# Patient Record
Sex: FEMALE | Race: WHITE | Employment: UNEMPLOYED | URBAN - METROPOLITAN AREA
[De-identification: names, ages, dates, MRNs, and addresses within clinical notes are randomized per-mention and may not be internally consistent; named-entity substitution may affect disease eponyms.]

---

## 2017-07-03 PROBLEM — Z79.899 ENCOUNTER FOR MEDICATION MANAGEMENT: Status: ACTIVE | Noted: 2017-07-03

## 2017-09-20 ENCOUNTER — APPOINTMENT (OUTPATIENT)
Dept: GENERAL RADIOLOGY | Age: 52
End: 2017-09-20
Payer: MEDICAID

## 2017-09-20 ENCOUNTER — HOSPITAL ENCOUNTER (EMERGENCY)
Age: 52
Discharge: HOME | End: 2017-09-20
Attending: EMERGENCY MEDICINE
Payer: MEDICAID

## 2017-09-20 VITALS
RESPIRATION RATE: 16 BRPM | HEART RATE: 61 BPM | DIASTOLIC BLOOD PRESSURE: 71 MMHG | BODY MASS INDEX: 24.73 KG/M2 | OXYGEN SATURATION: 95 % | HEIGHT: 61 IN | WEIGHT: 131 LBS | TEMPERATURE: 98 F | SYSTOLIC BLOOD PRESSURE: 112 MMHG

## 2017-09-20 DIAGNOSIS — R10.84 ABDOMINAL PAIN, GENERALIZED: Primary | ICD-10-CM

## 2017-09-20 DIAGNOSIS — K59.00 CONSTIPATION, UNSPECIFIED CONSTIPATION TYPE: ICD-10-CM

## 2017-09-20 PROCEDURE — 74011250636 HC RX REV CODE- 250/636: Performed by: EMERGENCY MEDICINE

## 2017-09-20 PROCEDURE — 99283 EMERGENCY DEPT VISIT LOW MDM: CPT | Performed by: EMERGENCY MEDICINE

## 2017-09-20 PROCEDURE — 74020 XR ABD (AP AND ERECT OR DECUB): CPT

## 2017-09-20 RX ORDER — NALOXONE HYDROCHLORIDE 1 MG/ML
2 INJECTION INTRAMUSCULAR; INTRAVENOUS; SUBCUTANEOUS
Status: COMPLETED | OUTPATIENT
Start: 2017-09-20 | End: 2017-09-20

## 2017-09-20 RX ORDER — POLYETHYLENE GLYCOL 3350 17 G/17G
POWDER, FOR SOLUTION ORAL
Qty: 505 G | Refills: 0 | Status: SHIPPED | OUTPATIENT
Start: 2017-09-20 | End: 2017-12-13

## 2017-09-20 RX ADMIN — NALOXONE HYDROCHLORIDE 2 MG: 1 INJECTION PARENTERAL at 14:12

## 2017-09-20 NOTE — LETTER
[facility] [facility] EMERGENCY DEPT One [address] [address] 
[phone] Work/School Note Date: 9/20/2017 To Whom It May concern: 
 
Pandora Bosworth was seen and treated today in the emergency room by the following provider(s): 
Attending Provider: Roman Christie MD.   
 
Pandora Bosworth may return to work/school on 09/21/17. Sincerely, Aggie Kirkland

## 2017-09-20 NOTE — ED TRIAGE NOTES
Pt. Reports constipation x 5 days. States she used an enema on Monday and had minimal success. Pt. With hx of constipation problems.

## 2017-09-20 NOTE — DISCHARGE INSTRUCTIONS
Constipation: Care Instructions  Your Care Instructions  Constipation means that you have a hard time passing stools (bowel movements). People pass stools from 3 times a day to once every 3 days. What is normal for you may be different. Constipation may occur with pain in the rectum and cramping. The pain may get worse when you try to pass stools. Sometimes there are small amounts of bright red blood on toilet paper or the surface of stools. This is because of enlarged veins near the rectum (hemorrhoids). A few changes in your diet and lifestyle may help you avoid ongoing constipation. Your doctor may also prescribe medicine to help loosen your stool. Some medicines can cause constipation. These include pain medicines and antidepressants. Tell your doctor about all the medicines you take. Your doctor may want to make a medicine change to ease your symptoms. Follow-up care is a key part of your treatment and safety. Be sure to make and go to all appointments, and call your doctor if you are having problems. It's also a good idea to know your test results and keep a list of the medicines you take. How can you care for yourself at home? · Drink plenty of fluids, enough so that your urine is light yellow or clear like water. If you have kidney, heart, or liver disease and have to limit fluids, talk with your doctor before you increase the amount of fluids you drink. · Include high-fiber foods in your diet each day. These include fruits, vegetables, beans, and whole grains. · Get at least 30 minutes of exercise on most days of the week. Walking is a good choice. You also may want to do other activities, such as running, swimming, cycling, or playing tennis or team sports. · Take a fiber supplement, such as Citrucel or Metamucil, every day. Read and follow all instructions on the label. · Schedule time each day for a bowel movement. A daily routine may help.  Take your time having your bowel movement. · Support your feet with a small step stool when you sit on the toilet. This helps flex your hips and places your pelvis in a squatting position. · Your doctor may recommend an over-the-counter laxative to relieve your constipation. Examples are Milk of Magnesia and MiraLax. Read and follow all instructions on the label. Do not use laxatives on a long-term basis. When should you call for help? Call your doctor now or seek immediate medical care if:  · You have new or worse belly pain. · You have new or worse nausea or vomiting. · You have blood in your stools. Watch closely for changes in your health, and be sure to contact your doctor if:  · Your constipation is getting worse. · You do not get better as expected. Where can you learn more? Go to http://aarti-tammy.info/. Enter 21 [phone] in the search box to learn more about \"Constipation: Care Instructions. \"  Current as of: March 20, 2017  Content Version: 11.3  © 9173-0218 [employer]. Care instructions adapted under license by [employer] (which disclaims liability or warranty for this information). If you have questions about a medical condition or this instruction, always ask your healthcare professional. [place] [address] any warranty or liability for your use of this information.

## 2017-09-20 NOTE — ED PROVIDER NOTES
CDNotesTM Templates                                   Emergency Department     Chief Complaint:    HPI:  44-year-old female presents to the emergency department with abdominal discomfort. Patient went on vacation to [facility]. States that she has not had a bowel movement in 5 days. She usually takes Senokot but did not take it while she was out of town. When she got home she did an enema on Monday. Had minimal results. Did not want to do another enema. Had continued belly pain and no bowel movement. Presents today seeking assistance      Last bowel movement was 5 days  Severity of symptoms is described as mild to moderate  Associated symptoms include pain- no nausea/ vomiting/ fever  Modifying factors include none  Historian:   patient  Review of Systems:  Include pertinent positives and negatives. CONST:          Denies: fever, chills  RESP:  Denies: shortness of breath  CARDIO: Denies: chest pain  GI:  See HPI  :  Denies: urinary symptoms  Past Medical History:  Past Medical History:   Diagnosis Date    Abnormal MRI of head     Frontal Lobe periventricular WM disease.  Ruled out for MS by Neurology    ASCUS with positive high risk HPV cervical     Bipolar disorder ([address]     Borderline personality disorder     CAD     Cerebrovascular disease     Chronic diastolic congestive heart failure (HCC)     Chronic hepatitis C (HCC)     Chronic pain     Chronic UTI     Dyslipidemia     History of coronary artery bypass graft x 3     HTN     IBS (irritable bowel syndrome)     Pacemaker     Sick Sinus Syndrome    Subclavian artery stenosis, right ([address] 2016    Moderate to severe    Vertebral artery stenosis 2016    Asymptomatic Left Severe     Past Surgical History:   Procedure Laterality Date    HX  SECTION  ,     Abruption, CPD    HX CORONARY ARTERY BYPASS GRAFT  2011    3 vessel    HX HEART CATHETERIZATION      2012, 2012-occluded grafts    HX TUBAL LIGATION Social History   Substance Use Topics    Smoking status: Current Every Day Smoker     Packs/day: 0.50     Years: 32.00     Types: Cigarettes    Smokeless tobacco: Never Used    Alcohol use No     Family History   Problem Relation Age of Onset   Fabián Duff Cancer Mother      CAD, Lung cancer    Hypertension Father      DM, CAD    Other Sister     Hypertension Brother     Other Son      Adopted. Previous Medications    AMLODIPINE (NORVASC) 10 MG TABLET    Take 1 Tab by mouth daily. ASPIRIN DELAYED-RELEASE 81 MG TABLET    Take 1 Tab by mouth daily. ATORVASTATIN (LIPITOR) 80 MG TABLET    Take 1 Tab by mouth daily. BREXPIPRAZOLE (REXULTI) 0.5 MG TAB TABLET    Take  by mouth daily. CARVEDILOL (COREG) 12.5 MG TABLET    Take 1 Tab by mouth two (2) times daily (with meals). CEPHALEXIN (KEFLEX) 500 MG CAPSULE    Take 1 Cap by mouth two (2) times a day. CHOLECALCIFEROL, VITAMIN D3, (VITAMIN D3) 2,000 UNIT CAP CAPSULE    Take 2,000 Units by mouth daily. CYANOCOBALAMIN (VITAMIN B-12) 500 MCG TABLET    Take 1,000 mcg by mouth daily. DIVALPROEX DR (DEPAKOTE) 250 MG TABLET    Take 250 mg by mouth two (2) times a day. FLUTICASONE (FLONASE) 50 MCG/ACTUATION NASAL SPRAY    1 [place] by Nasal route. GABAPENTIN (NEURONTIN) 300 MG CAPSULE    TAKE 2 CAPSULES BY MOUTH 3 TIMES A DAY    HYDROCODONE-ACETAMINOPHEN (NORCO) 5-325 MG PER TABLET    Take  by mouth. LISINOPRIL (PRINIVIL, ZESTRIL) 20 MG TABLET    Take 1 tab p.o. daily  Indications: hypertension    MULTIVITAMIN (ONE A DAY) TABLET    Take 1 Tab by mouth daily. MV,CA,MIN-FA-HERBAL NO.157 (ESTROVEN MAXIMUM STRENGTH) 400 MCG TAB    Take  by mouth. NICOTINE (NICODERM CQ) 14 MG/24 HR PATCH    1 Patch by TransDERmal route every twenty-four (24) hours for 30 days. NITROGLYCERIN (NITROSTAT) 0.4 MG SL TABLET    by SubLINGual route every five (5) minutes as needed.       SENNA LAXATIVE 8.6 MG TABLET    TAKE 1 TABLET BY MOUTH NIGHTLY     Allergies as of 09/20/2017 - Review Complete 09/20/2017   Allergen Reaction Noted    Morphine (pf) Rash 08/07/2013    Other medication Swelling 06/23/2010       Physical Exam:    Vital signs:   Visit Vitals    /86 (BP 1 Location: Right arm, BP Patient Position: At rest)    Pulse 80    Temp 98 °F (36.7 °C)    Resp 16    Ht 5' 1\" (1.549 m)    Wt 59.4 kg (131 lb)    SpO2 95%    BMI 24.75 kg/m2       Vital signs were reviewed. General Appear: alert, no distress  Cardiovascular: regular rate, rhythm, no murmur  Respiratory:  clear to auscultation bilaterally  Abdomen:   soft, nontender, no rebound  Skin:   dry, no rash    _______________________________________________________________________  Progress:    Patient resting comfortably. Abdomen soft and nontender without rebound masses or guarding. X-ray of the abdomen and pelvis is reviewed. No signs of obstruction. No free air. Patient takes Ultram daily. Also on medications for bipolar. Suspect some of these may be causing her constipation. _______________________________________________________________________  Condition:  good  Disposition:  home  Diagnosis:  Constipation, abdominal pain      Crystal Frias M.D.      [facility]; version 2.0; revised April, 2016.

## 2017-09-20 NOTE — ED NOTES
I have reviewed discharge instructions with the patient. The patient verbalized understanding. Patient left ED via Discharge Method: ambulatory to Home with self. Opportunity for questions and clarification provided. Patient given 1 scripts that is at patients pharmacy. No e-sign. No distress observed.

## 2017-09-20 NOTE — ED TRIAGE NOTES
Ayah Ashton is a 54 y.o. female here for constipation x 5 days, used enema Monday with some results but still constipation, no fever no nv, h/o same, only abd surgery is c section. Rapid assessment performed. --- Orders were placed.   --- Patient will be placed in triage  Signed By: SPARKLE Terrell     September 20, 2017

## 2017-09-29 PROBLEM — I49.5 SICK SINUS SYNDROME (HCC): Status: ACTIVE | Noted: 2017-09-29

## 2017-10-07 ENCOUNTER — HOSPITAL ENCOUNTER (EMERGENCY)
Age: 52
Discharge: HOME | End: 2017-10-07
Attending: EMERGENCY MEDICINE | Admitting: SURGERY
Payer: MEDICAID

## 2017-10-07 ENCOUNTER — APPOINTMENT (OUTPATIENT)
Dept: CT IMAGING | Age: 52
End: 2017-10-07
Attending: EMERGENCY MEDICINE
Payer: MEDICAID

## 2017-10-07 ENCOUNTER — ANESTHESIA EVENT (OUTPATIENT)
Dept: SURGERY | Age: 52
End: 2017-10-07
Payer: MEDICAID

## 2017-10-07 ENCOUNTER — ANESTHESIA (OUTPATIENT)
Dept: SURGERY | Age: 52
End: 2017-10-07
Payer: MEDICAID

## 2017-10-07 VITALS
HEIGHT: 61 IN | RESPIRATION RATE: 20 BRPM | DIASTOLIC BLOOD PRESSURE: 78 MMHG | BODY MASS INDEX: 24.73 KG/M2 | SYSTOLIC BLOOD PRESSURE: 143 MMHG | WEIGHT: 131 LBS | OXYGEN SATURATION: 91 % | TEMPERATURE: 99 F | HEART RATE: 60 BPM

## 2017-10-07 DIAGNOSIS — K35.80 ACUTE APPENDICITIS, UNSPECIFIED ACUTE APPENDICITIS TYPE: Primary | ICD-10-CM

## 2017-10-07 LAB
ALBUMIN SERPL-MCNC: 3.9 G/DL (ref 3.5–5)
ALBUMIN/GLOB SERPL: 0.7 {RATIO} (ref 1.2–3.5)
ALP SERPL-CCNC: 116 U/L (ref 50–136)
ALT SERPL-CCNC: 135 U/L (ref 12–65)
ANION GAP SERPL CALC-SCNC: 12 MMOL/L (ref 7–16)
AST SERPL-CCNC: 140 U/L (ref 15–37)
BASOPHILS # BLD: 0 K/UL (ref 0–0.2)
BASOPHILS NFR BLD: 0 % (ref 0–2)
BILIRUB SERPL-MCNC: 1.2 MG/DL (ref 0.2–1.1)
BUN SERPL-MCNC: 12 MG/DL (ref 6–23)
CALCIUM SERPL-MCNC: 9.1 MG/DL (ref 8.3–10.4)
CHLORIDE SERPL-SCNC: 102 MMOL/L (ref 98–107)
CO2 SERPL-SCNC: 25 MMOL/L (ref 21–32)
CREAT SERPL-MCNC: 0.71 MG/DL (ref 0.6–1)
DIFFERENTIAL METHOD BLD: ABNORMAL
EOSINOPHIL # BLD: 0 K/UL (ref 0–0.8)
EOSINOPHIL NFR BLD: 0 % (ref 0.5–7.8)
ERYTHROCYTE [DISTWIDTH] IN BLOOD BY AUTOMATED COUNT: 12.6 % (ref 11.9–14.6)
GLOBULIN SER CALC-MCNC: 6 G/DL (ref 2.3–3.5)
GLUCOSE SERPL-MCNC: 128 MG/DL (ref 65–100)
HCT VFR BLD AUTO: 46.4 % (ref 35.8–46.3)
HGB BLD-MCNC: 17.1 G/DL (ref 11.7–15.4)
IMM GRANULOCYTES # BLD: 0 K/UL (ref 0–0.5)
IMM GRANULOCYTES NFR BLD: 0.2 % (ref 0–5)
LIPASE SERPL-CCNC: 203 U/L (ref 73–393)
LYMPHOCYTES # BLD: 2 K/UL (ref 0.5–4.6)
LYMPHOCYTES NFR BLD: 14 % (ref 13–44)
MAGNESIUM SERPL-MCNC: 1.2 MG/DL (ref 1.8–2.4)
MCH RBC QN AUTO: 34.5 PG (ref 26.1–32.9)
MCHC RBC AUTO-ENTMCNC: 37.5 G/DL (ref 31.4–35)
MCV RBC AUTO: 92.1 FL (ref 79.6–97.8)
MONOCYTES # BLD: 0.3 K/UL (ref 0.1–1.3)
MONOCYTES NFR BLD: 2 % (ref 4–12)
NEUTS SEG # BLD: 11.6 K/UL (ref 1.7–8.2)
NEUTS SEG NFR BLD: 84 % (ref 43–78)
PHOSPHATE SERPL-MCNC: 2.1 MG/DL (ref 2.5–4.5)
PLATELET # BLD AUTO: 249 K/UL (ref 150–450)
PMV BLD AUTO: 10.3 FL (ref 10.8–14.1)
POTASSIUM SERPL-SCNC: 3.4 MMOL/L (ref 3.5–5.1)
PROT SERPL-MCNC: 9.9 G/DL (ref 6.3–8.2)
RBC # BLD AUTO: 5.04 M/UL (ref 4.05–5.25)
SODIUM SERPL-SCNC: 139 MMOL/L (ref 136–145)
TROPONIN I SERPL-MCNC: <0.02 NG/ML (ref 0.02–0.05)
WBC # BLD AUTO: 14 K/UL (ref 4.3–11.1)

## 2017-10-07 PROCEDURE — 74011250637 HC RX REV CODE- 250/637: Performed by: ANESTHESIOLOGY

## 2017-10-07 PROCEDURE — 83735 ASSAY OF MAGNESIUM: CPT | Performed by: NURSE PRACTITIONER

## 2017-10-07 PROCEDURE — 77030008518 HC TBNG INSUF ENDO STRY -B: Performed by: SURGERY

## 2017-10-07 PROCEDURE — 77030031139 HC SUT VCRL2 J&J -A: Performed by: SURGERY

## 2017-10-07 PROCEDURE — 84100 ASSAY OF PHOSPHORUS: CPT | Performed by: NURSE PRACTITIONER

## 2017-10-07 PROCEDURE — 96365 THER/PROPH/DIAG IV INF INIT: CPT | Performed by: EMERGENCY MEDICINE

## 2017-10-07 PROCEDURE — 74011000250 HC RX REV CODE- 250: Performed by: SURGERY

## 2017-10-07 PROCEDURE — 77030029140 HC TRCR ENDOSC Z THRD SLV AMR -B: Performed by: SURGERY

## 2017-10-07 PROCEDURE — 85025 COMPLETE CBC W/AUTO DIFF WBC: CPT | Performed by: NURSE PRACTITIONER

## 2017-10-07 PROCEDURE — 84484 ASSAY OF TROPONIN QUANT: CPT | Performed by: NURSE PRACTITIONER

## 2017-10-07 PROCEDURE — 83690 ASSAY OF LIPASE: CPT | Performed by: NURSE PRACTITIONER

## 2017-10-07 PROCEDURE — 77030008756 HC TU IRR SUC STRY -B: Performed by: SURGERY

## 2017-10-07 PROCEDURE — 99285 EMERGENCY DEPT VISIT HI MDM: CPT | Performed by: EMERGENCY MEDICINE

## 2017-10-07 PROCEDURE — 77030037892: Performed by: SURGERY

## 2017-10-07 PROCEDURE — 77030018004 HC RELD STPLR ENDOSC1 J&J -C: Performed by: SURGERY

## 2017-10-07 PROCEDURE — 93005 ELECTROCARDIOGRAM TRACING: CPT | Performed by: EMERGENCY MEDICINE

## 2017-10-07 PROCEDURE — 74176 CT ABD & PELVIS W/O CONTRAST: CPT

## 2017-10-07 PROCEDURE — 96372 THER/PROPH/DIAG INJ SC/IM: CPT | Performed by: EMERGENCY MEDICINE

## 2017-10-07 PROCEDURE — 74011000250 HC RX REV CODE- 250

## 2017-10-07 PROCEDURE — 74011250636 HC RX REV CODE- 250/636

## 2017-10-07 PROCEDURE — 77030011810 HC STPLR ENDOSC J&J -G: Performed by: SURGERY

## 2017-10-07 PROCEDURE — 76210000020 HC REC RM PH II FIRST 0.5 HR: Performed by: SURGERY

## 2017-10-07 PROCEDURE — 76210000016 HC OR PH I REC 1 TO 1.5 HR: Performed by: SURGERY

## 2017-10-07 PROCEDURE — 77030033639 HC SHR ENDO COAG HARM 36 J&J -E: Performed by: SURGERY

## 2017-10-07 PROCEDURE — 76010000161 HC OR TIME 1 TO 1.5 HR INTENSV-TIER 1: Performed by: SURGERY

## 2017-10-07 PROCEDURE — 36415 COLL VENOUS BLD VENIPUNCTURE: CPT | Performed by: NURSE PRACTITIONER

## 2017-10-07 PROCEDURE — 88304 TISSUE EXAM BY PATHOLOGIST: CPT | Performed by: SURGERY

## 2017-10-07 PROCEDURE — 76060000033 HC ANESTHESIA 1 TO 1.5 HR: Performed by: SURGERY

## 2017-10-07 PROCEDURE — 77030008703 HC TU ET UNCUF COVD -A: Performed by: ANESTHESIOLOGY

## 2017-10-07 PROCEDURE — 77030018836 HC SOL IRR NACL ICUM -A: Performed by: SURGERY

## 2017-10-07 PROCEDURE — 77030035048 HC TRCR ENDOSC OPTCL COVD -B: Performed by: SURGERY

## 2017-10-07 PROCEDURE — 74011250637 HC RX REV CODE- 250/637: Performed by: EMERGENCY MEDICINE

## 2017-10-07 PROCEDURE — 74011000258 HC RX REV CODE- 258: Performed by: EMERGENCY MEDICINE

## 2017-10-07 PROCEDURE — 74011636320 HC RX REV CODE- 636/320: Performed by: EMERGENCY MEDICINE

## 2017-10-07 PROCEDURE — 74011250636 HC RX REV CODE- 250/636: Performed by: ANESTHESIOLOGY

## 2017-10-07 PROCEDURE — 77030034850: Performed by: SURGERY

## 2017-10-07 PROCEDURE — 77030011640 HC PAD GRND REM COVD -A: Performed by: SURGERY

## 2017-10-07 PROCEDURE — 77030008771 HC TU NG SALEM SUMP -A: Performed by: ANESTHESIOLOGY

## 2017-10-07 PROCEDURE — 76937 US GUIDE VASCULAR ACCESS: CPT

## 2017-10-07 PROCEDURE — 77030008603 HC TRCR ENDOSC EPATH J&J -C: Performed by: SURGERY

## 2017-10-07 PROCEDURE — 77030032490 HC SLV COMPR SCD KNE COVD -B: Performed by: SURGERY

## 2017-10-07 PROCEDURE — 74011250636 HC RX REV CODE- 250/636: Performed by: EMERGENCY MEDICINE

## 2017-10-07 PROCEDURE — 80053 COMPREHEN METABOLIC PANEL: CPT | Performed by: NURSE PRACTITIONER

## 2017-10-07 RX ORDER — ONDANSETRON 4 MG/1
4 TABLET, ORALLY DISINTEGRATING ORAL
Status: COMPLETED | OUTPATIENT
Start: 2017-10-07 | End: 2017-10-07

## 2017-10-07 RX ORDER — ONDANSETRON 2 MG/ML
INJECTION INTRAMUSCULAR; INTRAVENOUS AS NEEDED
Status: DISCONTINUED | OUTPATIENT
Start: 2017-10-07 | End: 2017-10-07 | Stop reason: HOSPADM

## 2017-10-07 RX ORDER — NALOXONE HYDROCHLORIDE 0.4 MG/ML
0.2 INJECTION, SOLUTION INTRAMUSCULAR; INTRAVENOUS; SUBCUTANEOUS AS NEEDED
Status: DISCONTINUED | OUTPATIENT
Start: 2017-10-07 | End: 2017-10-07 | Stop reason: HOSPADM

## 2017-10-07 RX ORDER — HYDROMORPHONE HYDROCHLORIDE 1 MG/ML
0.5 INJECTION, SOLUTION INTRAMUSCULAR; INTRAVENOUS; SUBCUTANEOUS
Status: DISCONTINUED | OUTPATIENT
Start: 2017-10-07 | End: 2017-10-07

## 2017-10-07 RX ORDER — LIDOCAINE HYDROCHLORIDE 20 MG/ML
INJECTION, SOLUTION EPIDURAL; INFILTRATION; INTRACAUDAL; PERINEURAL AS NEEDED
Status: DISCONTINUED | OUTPATIENT
Start: 2017-10-07 | End: 2017-10-07 | Stop reason: HOSPADM

## 2017-10-07 RX ORDER — PROPOFOL 10 MG/ML
INJECTION, EMULSION INTRAVENOUS AS NEEDED
Status: DISCONTINUED | OUTPATIENT
Start: 2017-10-07 | End: 2017-10-07 | Stop reason: HOSPADM

## 2017-10-07 RX ORDER — BUPIVACAINE HYDROCHLORIDE 2.5 MG/ML
INJECTION, SOLUTION EPIDURAL; INFILTRATION; INTRACAUDAL AS NEEDED
Status: DISCONTINUED | OUTPATIENT
Start: 2017-10-07 | End: 2017-10-07 | Stop reason: HOSPADM

## 2017-10-07 RX ORDER — HYDRALAZINE HYDROCHLORIDE 20 MG/ML
5 INJECTION INTRAMUSCULAR; INTRAVENOUS ONCE
Status: DISCONTINUED | OUTPATIENT
Start: 2017-10-07 | End: 2017-10-07 | Stop reason: HOSPADM

## 2017-10-07 RX ORDER — SODIUM CHLORIDE 9 MG/ML
200 INJECTION, SOLUTION INTRAVENOUS CONTINUOUS
Status: DISCONTINUED | OUTPATIENT
Start: 2017-10-07 | End: 2017-10-07 | Stop reason: HOSPADM

## 2017-10-07 RX ORDER — METOPROLOL TARTRATE 5 MG/5ML
INJECTION INTRAVENOUS AS NEEDED
Status: DISCONTINUED | OUTPATIENT
Start: 2017-10-07 | End: 2017-10-07 | Stop reason: HOSPADM

## 2017-10-07 RX ORDER — OXYCODONE AND ACETAMINOPHEN 5; 325 MG/1; MG/1
1 TABLET ORAL AS NEEDED
Status: DISCONTINUED | OUTPATIENT
Start: 2017-10-07 | End: 2017-10-07 | Stop reason: HOSPADM

## 2017-10-07 RX ORDER — ONDANSETRON 2 MG/ML
4 INJECTION INTRAMUSCULAR; INTRAVENOUS
Status: DISCONTINUED | OUTPATIENT
Start: 2017-10-07 | End: 2017-10-07

## 2017-10-07 RX ORDER — FENTANYL CITRATE 50 UG/ML
INJECTION, SOLUTION INTRAMUSCULAR; INTRAVENOUS AS NEEDED
Status: DISCONTINUED | OUTPATIENT
Start: 2017-10-07 | End: 2017-10-07 | Stop reason: HOSPADM

## 2017-10-07 RX ORDER — SODIUM CHLORIDE 0.9 % (FLUSH) 0.9 %
5-10 SYRINGE (ML) INJECTION AS NEEDED
Status: DISCONTINUED | OUTPATIENT
Start: 2017-10-07 | End: 2017-10-07 | Stop reason: HOSPADM

## 2017-10-07 RX ORDER — ONDANSETRON 8 MG/1
8 TABLET, ORALLY DISINTEGRATING ORAL
Qty: 10 TAB | Refills: 1 | Status: SHIPPED
Start: 2017-10-07 | End: 2017-10-19

## 2017-10-07 RX ORDER — HYDROCODONE BITARTRATE AND ACETAMINOPHEN 5; 325 MG/1; MG/1
TABLET ORAL
Qty: 40 TAB | Refills: 0 | Status: SHIPPED
Start: 2017-10-07 | End: 2017-10-19

## 2017-10-07 RX ORDER — HYDROMORPHONE HYDROCHLORIDE 1 MG/ML
1 INJECTION, SOLUTION INTRAMUSCULAR; INTRAVENOUS; SUBCUTANEOUS
Status: COMPLETED | OUTPATIENT
Start: 2017-10-07 | End: 2017-10-07

## 2017-10-07 RX ORDER — ROCURONIUM BROMIDE 10 MG/ML
INJECTION, SOLUTION INTRAVENOUS AS NEEDED
Status: DISCONTINUED | OUTPATIENT
Start: 2017-10-07 | End: 2017-10-07 | Stop reason: HOSPADM

## 2017-10-07 RX ORDER — SODIUM CHLORIDE, SODIUM LACTATE, POTASSIUM CHLORIDE, CALCIUM CHLORIDE 600; 310; 30; 20 MG/100ML; MG/100ML; MG/100ML; MG/100ML
75 INJECTION, SOLUTION INTRAVENOUS CONTINUOUS
Status: DISCONTINUED | OUTPATIENT
Start: 2017-10-07 | End: 2017-10-07 | Stop reason: HOSPADM

## 2017-10-07 RX ORDER — HYDROMORPHONE HYDROCHLORIDE 2 MG/ML
0.5 INJECTION, SOLUTION INTRAMUSCULAR; INTRAVENOUS; SUBCUTANEOUS
Status: DISCONTINUED | OUTPATIENT
Start: 2017-10-07 | End: 2017-10-07 | Stop reason: HOSPADM

## 2017-10-07 RX ORDER — SODIUM CHLORIDE, SODIUM LACTATE, POTASSIUM CHLORIDE, CALCIUM CHLORIDE 600; 310; 30; 20 MG/100ML; MG/100ML; MG/100ML; MG/100ML
200 INJECTION, SOLUTION INTRAVENOUS ONCE
Status: COMPLETED | OUTPATIENT
Start: 2017-10-07 | End: 2017-10-07

## 2017-10-07 RX ORDER — SUCCINYLCHOLINE CHLORIDE 20 MG/ML
INJECTION INTRAMUSCULAR; INTRAVENOUS AS NEEDED
Status: DISCONTINUED | OUTPATIENT
Start: 2017-10-07 | End: 2017-10-07 | Stop reason: HOSPADM

## 2017-10-07 RX ADMIN — OXYCODONE HYDROCHLORIDE AND ACETAMINOPHEN 1 TABLET: 5; 325 TABLET ORAL at 19:58

## 2017-10-07 RX ADMIN — HYDROMORPHONE HYDROCHLORIDE 0.5 MG: 2 INJECTION, SOLUTION INTRAMUSCULAR; INTRAVENOUS; SUBCUTANEOUS at 19:15

## 2017-10-07 RX ADMIN — PIPERACILLIN SODIUM,TAZOBACTAM SODIUM 4.5 G: 4; .5 INJECTION, POWDER, FOR SOLUTION INTRAVENOUS at 16:27

## 2017-10-07 RX ADMIN — FENTANYL CITRATE 50 MCG: 50 INJECTION, SOLUTION INTRAMUSCULAR; INTRAVENOUS at 18:18

## 2017-10-07 RX ADMIN — METOPROLOL TARTRATE 2 MG: 5 INJECTION INTRAVENOUS at 18:05

## 2017-10-07 RX ADMIN — LIDOCAINE HYDROCHLORIDE 60 MG: 20 INJECTION, SOLUTION EPIDURAL; INFILTRATION; INTRACAUDAL; PERINEURAL at 17:59

## 2017-10-07 RX ADMIN — HYDROMORPHONE HYDROCHLORIDE 1 MG: 1 INJECTION, SOLUTION INTRAMUSCULAR; INTRAVENOUS; SUBCUTANEOUS at 13:54

## 2017-10-07 RX ADMIN — ONDANSETRON 4 MG: 2 INJECTION INTRAMUSCULAR; INTRAVENOUS at 18:36

## 2017-10-07 RX ADMIN — PROPOFOL 30 MG: 10 INJECTION, EMULSION INTRAVENOUS at 18:00

## 2017-10-07 RX ADMIN — ROCURONIUM BROMIDE 5 MG: 10 INJECTION, SOLUTION INTRAVENOUS at 17:59

## 2017-10-07 RX ADMIN — FENTANYL CITRATE 100 MCG: 50 INJECTION, SOLUTION INTRAMUSCULAR; INTRAVENOUS at 17:59

## 2017-10-07 RX ADMIN — SODIUM CHLORIDE, SODIUM LACTATE, POTASSIUM CHLORIDE, AND CALCIUM CHLORIDE 200 ML/HR: 600; 310; 30; 20 INJECTION, SOLUTION INTRAVENOUS at 17:26

## 2017-10-07 RX ADMIN — ONDANSETRON 4 MG: 4 TABLET, ORALLY DISINTEGRATING ORAL at 13:54

## 2017-10-07 RX ADMIN — SUCCINYLCHOLINE CHLORIDE 140 MG: 20 INJECTION INTRAMUSCULAR; INTRAVENOUS at 17:59

## 2017-10-07 RX ADMIN — DIATRIZOATE MEGLUMINE AND DIATRIZOATE SODIUM 15 ML: 600; 100 SOLUTION ORAL; RECTAL at 13:54

## 2017-10-07 RX ADMIN — ROCURONIUM BROMIDE 15 MG: 10 INJECTION, SOLUTION INTRAVENOUS at 18:08

## 2017-10-07 RX ADMIN — FENTANYL CITRATE 50 MCG: 50 INJECTION, SOLUTION INTRAMUSCULAR; INTRAVENOUS at 18:28

## 2017-10-07 RX ADMIN — PROPOFOL 120 MG: 10 INJECTION, EMULSION INTRAVENOUS at 17:59

## 2017-10-07 NOTE — ANESTHESIA POSTPROCEDURE EVALUATION
Post-Anesthesia Evaluation and Assessment    Patient: Gretel Horton MRN: [id number]  SSN: [id number]    YOB: 1965  Age: 54 y.o. Sex: female       Cardiovascular Function/Vital Signs  Visit Vitals    BP (!) 160/95    Pulse 61    Temp 37.3 °C (99.1 °F)    Resp 22    Ht 5' 1\" (1.549 m)    Wt 59.4 kg (131 lb)    SpO2 94%    BMI 24.75 kg/m2       Patient is status post general anesthesia for Procedure(s):  APPENDECTOMY LAPAROSCOPIC SINGLE INCISION (SILS)  UMBILICAL HERNIA REPAIR. Nausea/Vomiting: None    Postoperative hydration reviewed and adequate. Pain:  Pain Scale 1: Numeric (0 - 10) (10/07/17 1915)  Pain Intensity 1: 7 (10/07/17 1915)   Managed    Neurological Status:   Neuro (WDL): Within Defined Limits (10/07/17 1717)   At baseline    Mental Status and Level of Consciousness: Arousable    Pulmonary Status:   O2 Device: Nasal cannula (10/07/17 1909)   Adequate oxygenation and airway patent    Complications related to anesthesia: None    Post-anesthesia assessment completed.  No concerns    Signed By: Marcia Yusuf MD     October 7, 2017

## 2017-10-07 NOTE — PERIOP NOTES
TRANSFER - IN REPORT:    Verbal report received from [facility] on Angela Gilford  being received from ER for ordered procedure      Report consisted of patients Situation, Background, Assessment and   Recommendations(SBAR). Information from the following report(s) Kardex, MAR and Recent Results was reviewed with the receiving nurse. Opportunity for questions and clarification was provided. Assessment completed upon patients arrival to unit and care assumed.

## 2017-10-07 NOTE — H&P
History and Physical      Patient: Burke Favors    Physician: Black Miramontes MD    Referring Physician: Myla Negron MD    Chief Complaint: abd pain    History of Present Illness: pt with nonradiating, persistent RLQ pain since last pm.  Associated with nausea and vomiting but no fever, chills, diarrhea. History:  Past Medical History:   Diagnosis Date    Abnormal MRI of head     Frontal Lobe periventricular WM disease. Ruled out for MS by Neurology    ASCUS with positive high risk HPV cervical     Bipolar disorder ([address]     Borderline personality disorder     CAD     Cerebrovascular disease     Chronic diastolic congestive heart failure (HCC)     Chronic hepatitis C ([address]     Chronic pain     Chronic UTI     Dyslipidemia     History of coronary artery bypass graft x 3     HTN     IBS (irritable bowel syndrome)     Pacemaker     Sick Sinus Syndrome    Subclavian artery stenosis, right ([address] 2016    Moderate to severe    Vertebral artery stenosis 2016    Asymptomatic Left Severe     Past Surgical History:   Procedure Laterality Date    HX  SECTION  ,     Abruption, CPD    HX CORONARY ARTERY BYPASS GRAFT  2011    3 vessel    HX HEART CATHETERIZATION      2012, 2012-occluded grafts    HX TUBAL LIGATION        Social History     Social History    Marital status:      Spouse name: N/A    Number of children: 2    Years of education: N/A     Occupational History    Disabled      Social History Main Topics    Smoking status: Current Every Day Smoker     Packs/day: 0.50     Years: 32.00     Types: Cigarettes    Smokeless tobacco: Never Used    Alcohol use No    Drug use: No      Comment: Former Cocaine User    Sexual activity: Yes     Partners: Male     Other Topics Concern    Not on file     Social History Narrative    Lives alone. 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 Adopted son.        Family History   Problem Relation Age of Onset  Cancer Mother      CAD, Lung cancer    Hypertension Father      DM, CAD    Other Sister     Hypertension Brother     Other Son      Adopted. Medications:   Prior to Admission medications    Medication Sig Start Date End Date Taking? Authorizing Provider   polyethylene glycol (MIRALAX) 17 gram/dose powder 1 tablespoon with 8 oz of water TID for 3 days, then BID for 3 days, then daily 9/20/17  Yes Adamaris Shannon MD   atorvastatin (LIPITOR) 80 mg tablet Take 1 Tab by mouth daily. 8/23/17  Yes Darwin Leone MD   lisinopril (PRINIVIL, ZESTRIL) 20 mg tablet Take 1 tab p.o. daily  Indications: hypertension 8/23/17  Yes Darwin Leone MD   amLODIPine (NORVASC) 10 mg tablet Take 1 Tab by mouth daily. 8/23/17  Yes Darwin Leone MD   carvedilol (COREG) 12.5 mg tablet Take 1 Tab by mouth two (2) times daily (with meals). 8/23/17  Yes Darwin Leone MD   cephALEXin (KEFLEX) 500 mg capsule Take 1 Cap by mouth two (2) times a day. 8/15/17  Yes EDDY Grover   brexpiprazole (REXULTI) 0.5 mg tab tablet Take  by mouth daily. Yes Historical Provider   gabapentin (NEURONTIN) 300 mg capsule TAKE 2 CAPSULES BY MOUTH 3 TIMES A DAY 7/17/17  Yes Andrew Romero MD   cyanocobalamin (VITAMIN B-12) 500 mcg tablet Take 1,000 mcg by mouth daily. Yes Historical Provider   multivitamin (ONE A DAY) tablet Take 1 Tab by mouth daily. Yes Historical Provider   SENNA LAXATIVE 8.6 mg tablet TAKE 1 TABLET BY MOUTH NIGHTLY 5/30/17  Yes Historical Provider   divalproex DR (DEPAKOTE) 250 mg tablet Take 250 mg by mouth two (2) times a day. 3/16/17  Yes Historical Provider   Cholecalciferol, Vitamin D3, (VITAMIN D3) 2,000 unit cap capsule Take 2,000 Units by mouth daily. Yes Historical Provider   aspirin delayed-release 81 mg tablet Take 1 Tab by mouth daily. 7/18/11  Yes Abigail Prakash MD   Mv,Ca,Min-FA-Herbal No.157 (ESTROVEN MAXIMUM STRENGTH) 400 mcg tab Take  by mouth.     Historical Provider HYDROcodone-acetaminophen (NORCO) 5-325 mg per tablet Take  by mouth. Historical Provider   fluticasone (FLONASE) 50 mcg/actuation nasal spray 1 [place] by Nasal route. 3/7/16   Historical Provider   nitroglycerin (NITROSTAT) 0.4 mg SL tablet by SubLINGual route every five (5) minutes as needed. Historical Provider       Allergies: Allergies   Allergen Reactions    Morphine (Pf) Rash    Other Medication Swelling     Whatever holds Vit d pill together       Review of Systems:  Pertinent items are noted in HPI. Physical Exam:     Vital Signs: Blood pressure 150/84, pulse 82, temperature 98.3 °F (36.8 °C), resp. rate 16, height 5' 1\" (1.549 m), weight 131 lb (59.4 kg), SpO2 94 %. .    General: alert, cooperative, no distress      Heart: Regular rate and rhythm or S1S2 present   Lungs: clear to auscultation bilaterally   Abdominal: soft, nondistended. RLQ-tender. Bowel sounds normal. No masses,  no organomegaly   Neurological: Grossly normal        Labs, CT report reviewed. Findings/Diagnosis: appendicitis    Plan of Care/Planned Procedure: Will proceed with appendectomy. Technical details of the procedure are reviewed, including the non-standard single incision (SILS) technique if indicated. Risks reviewed include anesthetic risks, bleeding, infection, visceral injury, postoperative abdominal/pelvic infection, as well as conversion to open technique. All questions are answered.       Signed:  Black Miramontes MD   10/7/2017

## 2017-10-07 NOTE — ED TRIAGE NOTES
Ulises Smiley is a 54 y.o. female here for RLQ pain with vomiting. Rapid assessment performed. --- Orders were placed. --- Patient will be waiting room  Signed By: Vaughn Duff NP     October 7, 2017          To ed from urgent care in wheelchair with vomiting since last hs and RLQ pain. No better w phenergan. Pain started last night about 10 pm with vomiting about 2 am.  No diff with voids. Dry heaves now, last emesis just PTA. No real diarrhea. Ate fish from [employer] for supper last hs. Some fever and chills -started about 2 pm     After discussing that she may have to wait short time to get into room pt states \"Oh by the way \" I am also having chest pain  - couldn't take my meds this am and I have bad heart problems\"  Asking for pain meds now. Wants to go to room for eval now. No arizmendi beds or room beds are available at present     Appears moderately uncomfortable at triage holding her rlq. Skin pink warm and dry   No resp distress present. Unable to assess further at triage.     Will start diagnostics in triage

## 2017-10-07 NOTE — DISCHARGE INSTRUCTIONS
Leonidas Perla M.D.  [phone]    Instructions following Laparoscopic Appendectomy:  ACTIVITY:   Try to take a few short walks with help around the house later today. It is very important to take short walks to avoid blood clots and pneumonia.  You may be light-headed or sleepy from anesthesia, so be careful going up and down stairs.  Avoid any activity that may be dangerous or involves heavy objects for 24-48 hours. DIET:   Drink only clear, non-carbonated liquids when you get home (sugar-free if you are diabetic), such as Gatorade, chicken broth, etc.   Tomorrow start soft foods such as grits and eggs, mashed potatoes, yogurt, cottage cheese, etc.   Remain on soft foods for 24-48 hours, then you may eat any foods you wish.  Many people experience nausea for a day or so after surgery, and many people have loose stools or diarrhea for a few days after any GI surgery. PAIN:   You will be given a prescription for pain medication and nausea.  Try to take the pain medication with food, even a few crackers.  You may also use Tylenol, Motrin, Advil, or Aleve instead of the prescription pain medication. Do not take Tylenol and the prescription pain medication within 4 hours of each other.  URINARY RETENTION: If you are unable to empty your bladder within 6 hours after returning home, please go to your nearest Emergency Department or Urgent Care for urinary catheterization. WOUND CARE:   You may shower the second day after surgery, unless instructed otherwise.  It is not uncommon for the incisions to ooze or drain blood-tinged fluid. Cover the area with gauze if the drainage continues.  Incisions will sometimes develop redness around them, up to the size of a quarter, as well as a hard lumpy feel. If this redness continues to get larger, please call the office. FOLLOW UP:   Your follow-up appointment is usually made when your surgery is arranged.  Please call the office if you are not sure of this appointment. CALL THE DOCTOR IF:   You have a temperature higher than 101.5° Fahrenheit for more than 6 hours.  You have severe nausea or vomiting not relieved by medication.  You develop increasing redness or infection at the incision.  Continue home medications as previously prescribed, unless instructed otherwise. After general anesthesia or intravenous sedation, for 24 hours or while taking prescription Narcotics:  Limit your activities  Do not drive and operate hazardous machinery  Do not make important personal or business decisions  Do  not drink alcoholic beverages  If you have not urinated within 8 hours after discharge, please contact your surgeon on call. *  Please give a list of your current medications to your Primary Care Provider. *  Please update this list whenever your medications are discontinued, doses are      changed, or new medications (including over-the-counter products) are added. *  Please carry medication information at all times in case of emergency situations. These are general instructions for a healthy lifestyle:  No smoking/ No tobacco products/ Avoid exposure to second hand smoke  Surgeon General's Warning:  Quitting smoking now greatly reduces serious risk to your health. Obesity, smoking, and sedentary lifestyle greatly increases your risk for illness  A healthy diet, regular physical exercise & weight monitoring are important for maintaining a healthy lifestyle    You may be retaining fluid if you have a history of heart failure or if you experience any of the following symptoms:  Weight gain of 3 pounds or more overnight or 5 pounds in a week, increased swelling in our hands or feet or shortness of breath while lying flat in bed. Please call your doctor as soon as you notice any of these symptoms; do not wait until your next office visit.     Recognize signs and symptoms of STROKE:    F-face looks uneven    A-arms unable to move or move unevenly    S-speech slurred or non-existent    T-time-call 911 as soon as signs and symptoms begin-DO NOT go       Back to bed or wait to see if you get better-TIME IS BRAIN.       

## 2017-10-07 NOTE — IP AVS SNAPSHOT
[address] [address] 
[phone] Patient: Edward Balderrama MRN: [id number] :1965 You are allergic to the following Allergen Reactions Morphine (Pf) Rash Other Medication Swelling Whatever holds Vit d pill together Recent Documentation Height Weight BMI OB Status Smoking Status 1.549 m 59.4 kg 24.75 kg/m2 Menopause Current Every Day Smoker Emergency Contacts Name Discharge Info Relation Home Work Mobile Zana Tobin  Friend [5] [phone] About your hospitalization You were admitted on:  N/A You last received care in the:  [facility] PACU You were discharged on:  2017 Unit phone number:  [phone] Why you were hospitalized Your primary diagnosis was:  Not on File Providers Seen During Your Hospitalizations Provider Role Specialty Primary office phone Grayson Garcia MD Attending Provider Emergency Medicine [phone] Your Primary Care Physician (PCP) Primary Care Physician Office Phone Office Fax Hero Lai [phone] Follow-up Information Follow up With Details Comments Contact Info Cherly Harada, MD   [address] [place] [address] [address] [place] [address] 
[phone] Jesús Brandon MD Schedule an appointment as soon as possible for a visit in 10 day(s)  [address] [address] 
[phone] Your Appointments 2017  1:30 PM EDT  
(Arrive by 1:00 PM) New Patient with Marcia Hwang MD  
[address]) [facility] [address] [phone] 2017  2:15 PM EDT Office Visit with EDDY Donohue [facility] (U [facility]) [phone] Friday October 27, 2017  2:00 PM EDT Follow Up with Nan Huntley MD  
[address] [facility] [facility] [address] [address] [place] [address]  
[phone] Current Discharge Medication List  
  
START taking these medications Dose & Instructions Dispensing Information Comments Morning Noon Evening Bedtime  
 ondansetron 8 mg disintegrating tablet Commonly known as:  ZOFRAN ODT Your last dose was: Your next dose is:    
   
   
 Dose:  8 mg Take 1 Tab by mouth every eight (8) hours as needed for Nausea. Quantity:  10 Tab Refills:  1 CONTINUE these medications which have CHANGED Dose & Instructions Dispensing Information Comments Morning Noon Evening Bedtime * NORCO 5-325 mg per tablet Generic drug:  HYDROcodone-acetaminophen What changed:  Another medication with the same name was added. Make sure you understand how and when to take each. Your last dose was: Your next dose is: Take  by mouth. Refills:  0  
     
   
   
   
  
 * HYDROcodone-acetaminophen 5-325 mg per tablet Commonly known as:  Epigami What changed: You were already taking a medication with the same name, and this prescription was added. Make sure you understand how and when to take each. Your last dose was: Your next dose is: Take 1-2 tabs po Q4-6hrs prn pain Quantity:  40 Tab Refills:  0  
     
   
   
   
  
 * Notice: This list has 2 medication(s) that are the same as other medications prescribed for you. Read the directions carefully, and ask your doctor or other care provider to review them with you. CONTINUE these medications which have NOT CHANGED Dose & Instructions Dispensing Information Comments Morning Noon Evening Bedtime  
 amLODIPine 10 mg tablet Commonly known as:  Sydnee Cueva Your last dose was: Your next dose is:    
   
   
 Dose:  10 mg Take 1 Tab by mouth daily. Quantity:  90 Tab Refills:  3  
     
   
   
   
  
 aspirin delayed-release 81 mg tablet Your last dose was: Your next dose is:    
   
   
 Dose:  81 mg Take 1 Tab by mouth daily. Quantity:  30 Tab Refills:  11  
     
   
   
   
  
 atorvastatin 80 mg tablet Commonly known as:  LIPITOR Your last dose was: Your next dose is:    
   
   
 Dose:  80 mg Take 1 Tab by mouth daily. Quantity:  90 Tab Refills:  3  
     
   
   
   
  
 carvedilol 12.5 mg tablet Commonly known as:  Gillermina Begun Your last dose was: Your next dose is:    
   
   
 Dose:  12.5 mg Take 1 Tab by mouth two (2) times daily (with meals). Quantity:  180 Tab Refills:  3  
     
   
   
   
  
 cephALEXin 500 mg capsule Commonly known as:  Dk Drum Your last dose was: Your next dose is:    
   
   
 Dose:  500 mg Take 1 Cap by mouth two (2) times a day. Quantity:  14 Cap Refills:  0  
     
   
   
   
  
 divalproex  mg tablet Commonly known as:  DEPAKOTE Your last dose was: Your next dose is:    
   
   
 Dose:  250 mg Take 250 mg by mouth two (2) times a day. Refills:  0 ESTROVEN MAXIMUM STRENGTH 400 mcg Tab Generic drug:  Mv,Ca,Min-FA-Herbal No.157 Your last dose was: Your next dose is: Take  by mouth. Refills:  0  
     
   
   
   
  
 fluticasone 50 mcg/actuation nasal spray Commonly known as:  Walt Blount Your last dose was: Your next dose is:    
   
   
 Dose:  1 Spray 1 Spray by Nasal route. Refills:  0  
     
   
   
   
  
 gabapentin 300 mg capsule Commonly known as:  NEURONTIN Your last dose was: Your next dose is: TAKE 2 CAPSULES BY MOUTH 3 TIMES A DAY Quantity:  180 Cap Refills:  2 lisinopril 20 mg tablet Commonly known as:  Ilan Roth Your last dose was: Your next dose is: Take 1 tab p.o. daily  Indications: hypertension Quantity:  90 Tab Refills:  3  
     
   
   
   
  
 multivitamin tablet Commonly known as:  ONE A DAY Your last dose was: Your next dose is:    
   
   
 Dose:  1 Tab Take 1 Tab by mouth daily. Refills:  0  
     
   
   
   
  
 NITROSTAT 0.4 mg SL tablet Generic drug:  nitroglycerin Your last dose was: Your next dose is:    
   
   
 by SubLINGual route every five (5) minutes as needed. Refills:  0  
     
   
   
   
  
 polyethylene glycol 17 gram/dose powder Commonly known as:  Leata Sans Your last dose was: Your next dose is:    
   
   
 1 tablespoon with 8 oz of water TID for 3 days, then BID for 3 days, then daily Quantity:  505 g Refills:  0 REXULTI 0.5 mg Tab tablet Generic drug:  brexpiprazole Your last dose was: Your next dose is: Take  by mouth daily. Refills:  0 SENNA LAXATIVE 8.6 mg tablet Generic drug:  senna Your last dose was: Your next dose is: TAKE 1 TABLET BY MOUTH NIGHTLY Refills:  0  
     
   
   
   
  
 VITAMIN B-12 500 mcg tablet Generic drug:  cyanocobalamin Your last dose was: Your next dose is:    
   
   
 Dose:  1000 mcg Take 1,000 mcg by mouth daily. Refills:  0  
     
   
   
   
  
 VITAMIN D3 2,000 unit Cap capsule Generic drug:  Cholecalciferol (Vitamin D3) Your last dose was: Your next dose is:    
   
   
 Dose:  2000 Units Take 2,000 Units by mouth daily. Refills:  0 Where to Get Your Medications Information on where to get these meds will be given to you by the nurse or doctor. ! Ask your nurse or doctor about these medications HYDROcodone-acetaminophen 5-325 mg per tablet  
 ondansetron 8 mg disintegrating tablet Discharge Instructions Rene Zapata M.D. 
[phone] Instructions following Laparoscopic Appendectomy: 
ACTIVITY: 
? Try to take a few short walks with help around the house later today. It is very important to take short walks to avoid blood clots and pneumonia. ? You may be light-headed or sleepy from anesthesia, so be careful going up and down stairs. ? Avoid any activity that may be dangerous or involves heavy objects for 24-48 hours. DIET: 
? Drink only clear, non-carbonated liquids when you get home (sugar-free if you are diabetic), such as Gatorade, chicken broth, etc. 
? Tomorrow start soft foods such as grits and eggs, mashed potatoes, yogurt, cottage cheese, etc. 
? Remain on soft foods for 24-48 hours, then you may eat any foods you wish. ? Many people experience nausea for a day or so after surgery, and many people have loose stools or diarrhea for a few days after any GI surgery. PAIN: 
? You will be given a prescription for pain medication and nausea. ? Try to take the pain medication with food, even a few crackers. ? You may also use Tylenol, Motrin, Advil, or Aleve instead of the prescription pain medication. Do not take Tylenol and the prescription pain medication within 4 hours of each other. ? URINARY RETENTION: If you are unable to empty your bladder within 6 hours after returning home, please go to your nearest Emergency Department or Urgent Care for urinary catheterization. WOUND CARE: 
? You may shower the second day after surgery, unless instructed otherwise. ? It is not uncommon for the incisions to ooze or drain blood-tinged fluid. Cover the area with gauze if the drainage continues. ? Incisions will sometimes develop redness around them, up to the size of a quarter, as well as a hard lumpy feel.  If this redness continues to get larger, please call the office. FOLLOW UP: 
? Your follow-up appointment is usually made when your surgery is arranged. Please call the office if you are not sure of this appointment. CALL THE DOCTOR IF: 
? You have a temperature higher than 101.5° Fahrenheit for more than 6 hours. ? You have severe nausea or vomiting not relieved by medication. ? You develop increasing redness or infection at the incision. ? Continue home medications as previously prescribed, unless instructed otherwise. After general anesthesia or intravenous sedation, for 24 hours or while taking prescription Narcotics: 
Limit your activities Do not drive and operate hazardous machinery Do not make important personal or business decisions Do  not drink alcoholic beverages If you have not urinated within 8 hours after discharge, please contact your surgeon on call. *  Please give a list of your current medications to your Primary Care Provider. *  Please update this list whenever your medications are discontinued, doses are 
    changed, or new medications (including over-the-counter products) are added. *  Please carry medication information at all times in case of emergency situations. These are general instructions for a healthy lifestyle: No smoking/ No tobacco products/ Avoid exposure to second hand smoke Surgeon General's Warning:  Quitting smoking now greatly reduces serious risk to your health. Obesity, smoking, and sedentary lifestyle greatly increases your risk for illness A healthy diet, regular physical exercise & weight monitoring are important for maintaining a healthy lifestyle You may be retaining fluid if you have a history of heart failure or if you experience any of the following symptoms:  Weight gain of 3 pounds or more overnight or 5 pounds in a week, increased swelling in our hands or feet or shortness of breath while lying flat in bed.   Please call your doctor as soon as you notice any of these symptoms; do not wait until your next office visit. Recognize signs and symptoms of STROKE: 
 
F-face looks uneven A-arms unable to move or move unevenly S-speech slurred or non-existent T-time-call 911 as soon as signs and symptoms begin-DO NOT go Back to bed or wait to see if you get better-TIME IS BRAIN. ? Discharge Orders None Introducing [facility]! Dear Helena Wright: 
Thank you for requesting a Rain account. Our records indicate that you already have an active Rain account. You can access your account anytime at [email or web address] Did you know that you can access your hospital and ER discharge instructions at any time in Rain? You can also review all of your test results from your hospital stay or ER visit. Additional Information If you have questions, please visit the Frequently Asked Questions section of the Rain website at [email or web address] Remember, Rain is NOT to be used for urgent needs. For medical emergencies, dial 911. Now available from your iPhone and Android! General Information Please provide this summary of care documentation to your next provider. Patient Signature:  ____________________________________________________________ Date:  ____________________________________________________________  
  
Anika Black Provider Signature:  ____________________________________________________________ Date:  ____________________________________________________________

## 2017-10-07 NOTE — PROGRESS NOTES
Asked to start a PIV. Placed a 20g jelco in patients left forearm on first attempt with the assistance of U/S. Primary nurse informed.     Jahaira Parisi RN, VAT

## 2017-10-07 NOTE — ED PROVIDER NOTES
HPI Comments: 82-year-old female with 14 hour history of right lower quadrant pain. Achy and dull and constant. She's had subjective fever and chills. One loose bowel movement but no real diarrhea. No change in urine. No radiation of the pain. No pain like this in the past she has had a  and tubal ligation in the past.  Does not know of any problems with appendix or gallbladder. History of coronary artery disease  CA bypass grafting in the past as well. Some occasional twinges of chest pain. No shortness of breath cough or sputum production. Patient is a 54 y.o. female presenting with abdominal pain. The history is provided by the patient. Abdominal Pain    This is a new problem. The current episode started 12 to 24 hours ago. The problem occurs constantly. The problem has not changed since onset. The pain is associated with vomiting. The pain is located in the chest and RLQ. The pain is moderate. Associated symptoms include a fever, nausea, vomiting and chest pain. Pertinent negatives include no diarrhea, no dysuria, no frequency, no headaches and no back pain. Nothing worsens the pain. The pain is relieved by nothing. Her past medical history does not include gallstones, pancreatitis, diverticulitis, atrial fibrillation or DM. The patient's surgical history non-contributory. Past Medical History:   Diagnosis Date    Abnormal MRI of head     Frontal Lobe periventricular WM disease.  Ruled out for MS by Neurology    ASCUS with positive high risk HPV cervical     B[facility] disorder [facility]     Borderline personality disorder     CAD     Cerebrovascular disease     Chronic diastolic congestive heart failure (HCC)     Chronic hepatitis C (HCC)     Chronic pain     Chronic UTI     Dyslipidemia     History of coronary artery bypass graft x 3     HTN     IBS (irritable bowel syndrome)     Pacemaker     Sick Sinus Syndrome    Subclavian artery stenosis, right ([facility] 2016 Moderate to severe    Vertebral artery stenosis 2016    Asymptomatic Left Severe       Past Surgical History:   Procedure Laterality Date    HX  SECTION  ,     Abruption, CPD    HX CORONARY ARTERY BYPASS GRAFT  2011    3 vessel    HX HEART CATHETERIZATION      2012, 2012-occluded grafts    HX TUBAL LIGATION           Family History:   Problem Relation Age of Onset   Benji Bearden Cancer Mother      CAD, Lung cancer    Hypertension Father      DM, CAD    Other Sister     Hypertension Brother     Other Son      Adopted. Social History     Social History    Marital status:      Spouse name: N/A    Number of children: 2    Years of education: N/A     Occupational History    Disabled      Social History Main Topics    Smoking status: Current Every Day Smoker     Packs/day: 0.50     Years: 32.00     Types: Cigarettes    Smokeless tobacco: Never Used    Alcohol use No    Drug use: No      Comment: Former Cocaine User    Sexual activity: Yes     Partners: Male     Other Topics Concern    Not on file     Social History Narrative    Lives alone. 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 Adopted son. ALLERGIES: Morphine (pf) and Other medication    Review of Systems   Constitutional: Positive for fever. Negative for chills. Respiratory: Negative for cough and shortness of breath. Cardiovascular: Positive for chest pain. Negative for palpitations. Gastrointestinal: Positive for abdominal pain, nausea and vomiting. Negative for diarrhea. Genitourinary: Negative for dysuria, flank pain and frequency. Musculoskeletal: Negative for back pain and neck pain. Skin: Negative for color change and rash. Neurological: Negative for syncope and headaches. All other systems reviewed and are negative.       Vitals:    10/07/17 1106 10/07/17 1107   BP:  (!) 170/108   Pulse: 68    Resp: 20    Temp: 98 °F (36.7 °C)    SpO2: 95%    Weight: 59.4 kg (131 lb)    Height: 5' 1\" (1.549 m)             Physical Exam   Constitutional: She is oriented to person, place, and time. She appears well-developed and well-nourished. No distress. HENT:   Head: Normocephalic and atraumatic. Mouth/Throat: Oropharynx is clear and moist. No oropharyngeal exudate. Eyes: Conjunctivae and EOM are normal. Pupils are equal, round, and reactive to light. Neck: Normal range of motion. Neck supple. Cardiovascular: Normal rate, regular rhythm and intact distal pulses. No murmur heard. Pulmonary/Chest: Breath sounds normal. No respiratory distress. Abdominal: Soft. Bowel sounds are normal. She exhibits no mass. There is tenderness in the right lower quadrant. There is tenderness at McBurney's point. There is no rebound, no guarding and negative Dover's sign. No hernia. Marked tenderness in McBurney's point right lower quadrant. No left-sided tenderness nor tenderness in the region of the gallbladder. Neurological: She is alert and oriented to person, place, and time. Gait normal.   Nl speech   Skin: Skin is warm and dry. Psychiatric: She has a normal mood and affect. Her speech is normal.   Nursing note and vitals reviewed. MDM  Number of Diagnoses or Management Options  Diagnosis management comments: Concern for appendicitis. Also check EKG and troponin. Medicated for pain and nausea.        Amount and/or Complexity of Data Reviewed  Clinical lab tests: ordered and reviewed  Tests in the radiology section of CPT®: ordered and reviewed  Tests in the medicine section of CPT®: ordered and reviewed  Independent visualization of images, tracings, or specimens: yes    Risk of Complications, Morbidity, and/or Mortality  Presenting problems: moderate  Diagnostic procedures: low  Management options: moderate    Patient Progress  Patient progress: stable    ED Course       Procedures      Results Include:    Recent Results (from the past 24 hour(s))   CBC WITH AUTOMATED DIFF    Collection Time: 10/07/17  2:13 PM   Result Value Ref Range    WBC 14.0 (H) 4.3 - 11.1 K/uL    RBC 5.04 4.05 - 5.25 M/uL    HGB 17.1 (H) 11.7 - 15.4 g/dL    HCT 46.4 (H) 35.8 - 46.3 %    MCV 92.1 79.6 - 97.8 FL    MCH 34.5 (H) 26.1 - 32.9 PG    MCHC 37.5 (H) 31.4 - 35.0 g/dL    RDW 12.6 11.9 - 14.6 %    PLATELET 554 081 - 855 K/uL    MPV 10.3 (L) 10.8 - 14.1 FL    DF AUTOMATED      NEUTROPHILS 84 (H) 43 - 78 %    LYMPHOCYTES 14 13 - 44 %    MONOCYTES 2 (L) 4.0 - 12.0 %    EOSINOPHILS 0 (L) 0.5 - 7.8 %    BASOPHILS 0 0.0 - 2.0 %    IMMATURE GRANULOCYTES 0.2 0.0 - 5.0 %    ABS. NEUTROPHILS 11.6 (H) 1.7 - 8.2 K/UL    ABS. LYMPHOCYTES 2.0 0.5 - 4.6 K/UL    ABS. MONOCYTES 0.3 0.1 - 1.3 K/UL    ABS. EOSINOPHILS 0.0 0.0 - 0.8 K/UL    ABS. BASOPHILS 0.0 0.0 - 0.2 K/UL    ABS. IMM. GRANS. 0.0 0.0 - 0.5 K/UL   METABOLIC PANEL, COMPREHENSIVE    Collection Time: 10/07/17  2:13 PM   Result Value Ref Range    Sodium 139 136 - 145 mmol/L    Potassium 3.4 (L) 3.5 - 5.1 mmol/L    Chloride 102 98 - 107 mmol/L    CO2 25 21 - 32 mmol/L    Anion gap 12 7 - 16 mmol/L    Glucose 128 (H) 65 - 100 mg/dL    BUN 12 6 - 23 MG/DL    Creatinine 0.71 0.6 - 1.0 MG/DL    GFR est AA >60 >60 ml/min/1.73m2    GFR est non-AA >60 >60 ml/min/1.73m2    Calcium 9.1 8.3 - 10.4 MG/DL    Bilirubin, total 1.2 (H) 0.2 - 1.1 MG/DL    ALT (SGPT) 135 (H) 12 - 65 U/L    AST (SGOT) 140 (H) 15 - 37 U/L    Alk.  phosphatase 116 50 - 136 U/L    Protein, total 9.9 (H) 6.3 - 8.2 g/dL    Albumin 3.9 3.5 - 5.0 g/dL    Globulin 6.0 (H) 2.3 - 3.5 g/dL    A-G Ratio 0.7 (L) 1.2 - 3.5     TROPONIN I    Collection Time: 10/07/17  2:13 PM   Result Value Ref Range    Troponin-I, Qt. <0.02 (L) 0.02 - 0.05 NG/ML   MAGNESIUM    Collection Time: 10/07/17  2:13 PM   Result Value Ref Range    Magnesium 1.2 (LL) 1.8 - 2.4 mg/dL   PHOSPHORUS    Collection Time: 10/07/17  2:13 PM   Result Value Ref Range    Phosphorus 2.1 (L) 2.5 - 4.5 MG/DL   LIPASE    Collection Time: 10/07/17  2:13 PM   Result Value Ref Range    Lipase 203 73 - 393 U/L     Ct Abd Pelv Wo Cont    Result Date: 10/7/2017  CT ABDOMEN AND PELVIS WITHOUT INTRAVENOUS CONTRAST. HISTORY: Right lower quadrant pain. COMPARISON: August 2016. TECHNIQUE: 5 mm axial scans from above the diaphragms to the pubic symphysis following oral contrast only due to lack of intravenous access. .  Radiation dose reduction techniques were used for this study. Our CT scanners use one or more of the following:  Automated exposure control, adjustment of the mA and or kV according to patient size, iterative reconstruction. FINDINGS: Abdomen: The lung bases are clear. No gross focal parenchymal abnormality identified within the liver or spleen on this noncontrast exam. No calcified gallstones. The biliary tree is not dilated. The pancreas is unremarkable. No free fluid, acute inflammatory changes or adenopathy. Bowel loops are not dilated. The kidneys are normal size. No radiopaque renal calculi. No hydronephrosis. The adrenal glands are normal size. Aorta is normal caliber and calcified. Pelvis: The appendix appears prominent sized. There is high attenuation within the distal appendix centrally. There are mild periappendiceal stranding densities. No free air. The urinary bladder unremarkable. Uterus and adnexa unremarkable. No gross bony lesions. Cardiac pacemaker is present. IMPRESSION:  Findings suspicious for early acute appendicitis. Patient very difficult IV stick. He has some axillary veins with a rather deep. Was able to consult the PICC team for attempted IV placement and this is in process. I will speak with surgeon regarding diagnosis for consultation.

## 2017-10-07 NOTE — OP NOTES
Operative Report    Patient: Bonny Abreu MRN: [id number]     YOB: 1965  Age: 54 y.o. Sex: female       Date of Surgery: 10/7/2017     Preoperative Diagnosis: Appendicitis     Postoperative Diagnosis: Appendicitis, umbilical hernia    NAME OF PROCEDURE:  Laparoscopic appendectomy- single incision/SILS; repair umbilical hernia    SURGEON: Chen Cerna MD    Anesthesia: General     Complications: none    INDICATIONS:  As per history and physical.   Single incision technique is planned to provide the patient with the benefit of less incisional pain and improved cosmesis due to fewer incisions as well as the potential for lower risk of wound infection. This technique is significantly more intricate than standard laparoscopic techniques and typically increases the complexity of the procedure by 10-20%. PROCEDURE IN DETAIL:  Informed consent was obtained. The patient was brought to the operating room and placed on the table in the supine position with adequate padding of all pressure points and compression devices on both lower extremities. After the successful induction of general anesthesia, a Kim catheter was inserted and the patient's abdomen was prepped and draped sterilely. Under laparoscopic visualization and additional local anesthetic, a 5 mm Optiview trocar was inserted through an infraumbilical  incision and pneumoperitoneum was created. Visual exploration revealed filmy lower midline omental adhesions, which were later lysed with harmonic jose luis, but  readily apparent pathology. A maryland grasper was inserted alongside the trocar and an additional 12 mm umbilical trocar was inserted through the umbilical incision. The appendix was identified and was noted to have mild distal 1/3 inflammation with no associated exudate or abscess.  The mesoappendix was divided with the Harmonic scalpel near the serosa of the appendix down to its base at the cecum where the appendix was transected with an endostapler, taking care to avoid compromise of the ileocecal valve. The staple line and transected mesoappendix were confirmed to be hemostatic. The appendix was placed in an endopouch. The pelvis and right abdomen were vigorously irrigated. The pouch was extracted and pneumoperitoneum was released. The trocars were removed. Attention was then turned to the umbilical hernia. The contents were dissected from the surrounding tissue with blunt dissection and cautery. The hernia was then amputated at the fascial level, revealing am additional 1cm fascial defect. Digital exploration of the abdominal wall did not reveal additional defects and did not reveal adhesions. The tissue quality of the fascia was noted to be weak for the patient's age. The fascial bridge between this defect and the 12mm trocar site was incised with cautery then the resulting defect was closed with interrupted sutures of 0-vicryl in a figure of eight fashion. The site confirmed hemostatic and infiltrated with additional local.  The umbilical skin was then tacked to the fascia with 2-0 vicryl to recreate the umbilicus,   and then closed with subcuticular 4-0 Vicryl. Steri-Strips, a tonsil sponge,  telfa, and a tegaderm dressing was applied as a pressure dressing. The patient tolerated the procedure well and was awakened from anesthesia and taken to recovery in satisfactory condition. Sponge, needle, and instrument counts were correct as reported to me.         Estimated Blood Loss: per anesthesia           Specimens:   ID Type Source Tests Collected by Time Destination   1 :  Preservative Appendix  Louie Louis MD 10/7/2017 1823 Pathology           Signed By:  Louie Louis MD     October 7, 2017

## 2017-10-07 NOTE — ED TRIAGE NOTES
Pt arrived ambulatory to ER via POV with c/o RLQ pain since 2200 last evening, and started vomiting at 0200. Pt went to [facility] today and was told to come to the ER per Pt.

## 2017-10-07 NOTE — ANESTHESIA PREPROCEDURE EVALUATION
Anesthetic History   No history of anesthetic complications            Review of Systems / Medical History  Patient summary reviewed, nursing notes reviewed and pertinent labs reviewed    Pulmonary          Smoker         Neuro/Psych             Comments: H/O CVA  Demyelinating disease, not MS  Vertebral and subclavian artery stenosis Cardiovascular    Hypertension      CHF  Dysrhythmias   Pacemaker, past MI, CABG and hyperlipidemia      Comments: SSS, has pacemaker   Recent perfusion scan shows EF 60%, anterior ischemia   GI/Hepatic/Renal       Hepatitis: type C    Liver disease     Endo/Other             Other Findings            Physical Exam    Airway  Mallampati: II  TM Distance: 4 - 6 cm  Neck ROM: normal range of motion   Mouth opening: Normal     Cardiovascular    Rhythm: regular           Dental  No notable dental hx       Pulmonary  Breath sounds clear to auscultation               Abdominal  GI exam deferred       Other Findings            Anesthetic Plan    ASA: 3, emergent  Anesthesia type: general          Induction: Intravenous  Anesthetic plan and risks discussed with: Patient

## 2017-10-08 LAB
ATRIAL RATE: 67 BPM
CALCULATED P AXIS, ECG09: 25 DEGREES
CALCULATED R AXIS, ECG10: -30 DEGREES
CALCULATED T AXIS, ECG11: 119 DEGREES
DIAGNOSIS, 93000: NORMAL
P-R INTERVAL, ECG05: 134 MS
Q-T INTERVAL, ECG07: 422 MS
QRS DURATION, ECG06: 84 MS
QTC CALCULATION (BEZET), ECG08: 445 MS
VENTRICULAR RATE, ECG03: 67 BPM

## 2017-10-31 NOTE — PROGRESS NOTES
Patient pre-assessment complete for [facility] poss with Dr Rajni Tinsley scheduled for 17 at 8am , arrival time 6am. Patient verified using . Patient instructed to bring all home medications in labeled bottles on the day of procedure. NPO status reinforced. Patient informed to take a full dose aspirin 325mg  or 81 mg x 4 on the day of procedure. Instructed they can take all other medications excluding vitamins & supplements. Patient verbalizes understanding of all instructions & denies any questions at this time.

## 2017-11-01 ENCOUNTER — HOSPITAL ENCOUNTER (OUTPATIENT)
Dept: CARDIAC CATH/INVASIVE PROCEDURES | Age: 52
Discharge: HOME | End: 2017-11-01
Attending: INTERNAL MEDICINE | Admitting: INTERNAL MEDICINE
Payer: MEDICAID

## 2017-11-01 VITALS
BODY MASS INDEX: 24.35 KG/M2 | TEMPERATURE: 98.2 F | DIASTOLIC BLOOD PRESSURE: 66 MMHG | WEIGHT: 129 LBS | HEART RATE: 59 BPM | SYSTOLIC BLOOD PRESSURE: 124 MMHG | HEIGHT: 61 IN | OXYGEN SATURATION: 95 % | RESPIRATION RATE: 30 BRPM

## 2017-11-01 LAB
ACT BLD: 219 SECS (ref 70–128)
ANION GAP SERPL CALC-SCNC: 10 MMOL/L (ref 7–16)
ATRIAL RATE: 60 BPM
BUN SERPL-MCNC: 11 MG/DL (ref 6–23)
CALCIUM SERPL-MCNC: 8.4 MG/DL (ref 8.3–10.4)
CALCULATED P AXIS, ECG09: 58 DEGREES
CALCULATED R AXIS, ECG10: -20 DEGREES
CALCULATED T AXIS, ECG11: 122 DEGREES
CHLORIDE SERPL-SCNC: 108 MMOL/L (ref 98–107)
CO2 SERPL-SCNC: 28 MMOL/L (ref 21–32)
CREAT SERPL-MCNC: 0.74 MG/DL (ref 0.6–1)
DIAGNOSIS, 93000: NORMAL
ERYTHROCYTE [DISTWIDTH] IN BLOOD BY AUTOMATED COUNT: 12.5 % (ref 11.9–14.6)
GLUCOSE SERPL-MCNC: 87 MG/DL (ref 65–100)
HCT VFR BLD AUTO: 37.9 % (ref 35.8–46.3)
HGB BLD-MCNC: 13.6 G/DL (ref 11.7–15.4)
INR PPP: 1 (ref 0.9–1.2)
MAGNESIUM SERPL-MCNC: 1.4 MG/DL (ref 1.8–2.4)
MCH RBC QN AUTO: 32.9 PG (ref 26.1–32.9)
MCHC RBC AUTO-ENTMCNC: 35.9 G/DL (ref 31.4–35)
MCV RBC AUTO: 91.8 FL (ref 79.6–97.8)
P-R INTERVAL, ECG05: 164 MS
PLATELET # BLD AUTO: 275 K/UL (ref 150–450)
PMV BLD AUTO: 9.8 FL (ref 10.8–14.1)
POTASSIUM SERPL-SCNC: 3.2 MMOL/L (ref 3.5–5.1)
PROTHROMBIN TIME: 11.1 SEC (ref 9.6–12)
Q-T INTERVAL, ECG07: 452 MS
QRS DURATION, ECG06: 90 MS
QTC CALCULATION (BEZET), ECG08: 452 MS
RBC # BLD AUTO: 4.13 M/UL (ref 4.05–5.25)
SODIUM SERPL-SCNC: 146 MMOL/L (ref 136–145)
VENTRICULAR RATE, ECG03: 60 BPM
WBC # BLD AUTO: 7.5 K/UL (ref 4.3–11.1)

## 2017-11-01 PROCEDURE — 93458 L HRT ARTERY/VENTRICLE ANGIO: CPT

## 2017-11-01 PROCEDURE — 74011250637 HC RX REV CODE- 250/637: Performed by: INTERNAL MEDICINE

## 2017-11-01 PROCEDURE — C1894 INTRO/SHEATH, NON-LASER: HCPCS

## 2017-11-01 PROCEDURE — 99153 MOD SED SAME PHYS/QHP EA: CPT

## 2017-11-01 PROCEDURE — 74011250636 HC RX REV CODE- 250/636: Performed by: INTERNAL MEDICINE

## 2017-11-01 PROCEDURE — 77030004558 HC CATH ANGI DX SUPR TORQ CARD -A

## 2017-11-01 PROCEDURE — 85027 COMPLETE CBC AUTOMATED: CPT | Performed by: INTERNAL MEDICINE

## 2017-11-01 PROCEDURE — C1769 GUIDE WIRE: HCPCS

## 2017-11-01 PROCEDURE — 74011636320 HC RX REV CODE- 636/320: Performed by: INTERNAL MEDICINE

## 2017-11-01 PROCEDURE — 77030004534 HC CATH ANGI DX INFN CARD -A

## 2017-11-01 PROCEDURE — C1760 CLOSURE DEV, VASC: HCPCS

## 2017-11-01 PROCEDURE — 80048 BASIC METABOLIC PNL TOTAL CA: CPT | Performed by: INTERNAL MEDICINE

## 2017-11-01 PROCEDURE — 77030004559 HC CATH ANGI DX SUPT CARD -B

## 2017-11-01 PROCEDURE — 77030012678 HC VLV HEMSTAS ABBT -B

## 2017-11-01 PROCEDURE — 93005 ELECTROCARDIOGRAM TRACING: CPT | Performed by: INTERNAL MEDICINE

## 2017-11-01 PROCEDURE — 76937 US GUIDE VASCULAR ACCESS: CPT

## 2017-11-01 PROCEDURE — 93459 L HRT ART/GRFT ANGIO: CPT

## 2017-11-01 PROCEDURE — 74011000250 HC RX REV CODE- 250: Performed by: INTERNAL MEDICINE

## 2017-11-01 PROCEDURE — 85610 PROTHROMBIN TIME: CPT | Performed by: INTERNAL MEDICINE

## 2017-11-01 PROCEDURE — 83735 ASSAY OF MAGNESIUM: CPT | Performed by: INTERNAL MEDICINE

## 2017-11-01 PROCEDURE — C1887 CATHETER, GUIDING: HCPCS

## 2017-11-01 PROCEDURE — 77030013687 HC GD NDL BARD -B

## 2017-11-01 PROCEDURE — 93571 IV DOP VEL&/PRESS C FLO 1ST: CPT

## 2017-11-01 PROCEDURE — 85347 COAGULATION TIME ACTIVATED: CPT

## 2017-11-01 PROCEDURE — 99152 MOD SED SAME PHYS/QHP 5/>YRS: CPT

## 2017-11-01 PROCEDURE — 74011250636 HC RX REV CODE- 250/636

## 2017-11-01 RX ORDER — ACETAMINOPHEN 325 MG/1
650 TABLET ORAL
Status: DISCONTINUED | OUTPATIENT
Start: 2017-11-01 | End: 2017-11-01 | Stop reason: HOSPADM

## 2017-11-01 RX ORDER — SODIUM CHLORIDE 9 MG/ML
75 INJECTION, SOLUTION INTRAVENOUS CONTINUOUS
Status: DISCONTINUED | OUTPATIENT
Start: 2017-11-01 | End: 2017-11-01 | Stop reason: HOSPADM

## 2017-11-01 RX ORDER — SODIUM CHLORIDE 0.9 % (FLUSH) 0.9 %
5-10 SYRINGE (ML) INJECTION AS NEEDED
Status: DISCONTINUED | OUTPATIENT
Start: 2017-11-01 | End: 2017-11-01 | Stop reason: HOSPADM

## 2017-11-01 RX ORDER — LIDOCAINE HYDROCHLORIDE 20 MG/ML
1-20 INJECTION, SOLUTION INFILTRATION; PERINEURAL ONCE
Status: COMPLETED | OUTPATIENT
Start: 2017-11-01 | End: 2017-11-01

## 2017-11-01 RX ORDER — ONDANSETRON 2 MG/ML
4 INJECTION INTRAMUSCULAR; INTRAVENOUS
Status: DISCONTINUED | OUTPATIENT
Start: 2017-11-01 | End: 2017-11-01 | Stop reason: HOSPADM

## 2017-11-01 RX ORDER — FENTANYL CITRATE 50 UG/ML
25-100 INJECTION, SOLUTION INTRAMUSCULAR; INTRAVENOUS
Status: DISCONTINUED | OUTPATIENT
Start: 2017-11-01 | End: 2017-11-01

## 2017-11-01 RX ORDER — MAGNESIUM SULFATE HEPTAHYDRATE 40 MG/ML
4 INJECTION, SOLUTION INTRAVENOUS ONCE
Status: COMPLETED | OUTPATIENT
Start: 2017-11-01 | End: 2017-11-01

## 2017-11-01 RX ORDER — HEPARIN SODIUM 200 [USP'U]/100ML
15 INJECTION, SOLUTION INTRAVENOUS CONTINUOUS
Status: DISCONTINUED | OUTPATIENT
Start: 2017-11-01 | End: 2017-11-01

## 2017-11-01 RX ORDER — DIAZEPAM 5 MG/1
5 TABLET ORAL ONCE
Status: DISCONTINUED | OUTPATIENT
Start: 2017-11-01 | End: 2017-11-01 | Stop reason: HOSPADM

## 2017-11-01 RX ORDER — HEPARIN SODIUM 10000 [USP'U]/ML
1000-9000 INJECTION, SOLUTION INTRAVENOUS; SUBCUTANEOUS
Status: DISCONTINUED | OUTPATIENT
Start: 2017-11-01 | End: 2017-11-01

## 2017-11-01 RX ORDER — HEPARIN SODIUM 200 [USP'U]/100ML
25 INJECTION, SOLUTION INTRAVENOUS CONTINUOUS
Status: DISCONTINUED | OUTPATIENT
Start: 2017-11-01 | End: 2017-11-01

## 2017-11-01 RX ORDER — HYDROCODONE BITARTRATE AND ACETAMINOPHEN 5; 325 MG/1; MG/1
1 TABLET ORAL
Status: DISCONTINUED | OUTPATIENT
Start: 2017-11-01 | End: 2017-11-01 | Stop reason: HOSPADM

## 2017-11-01 RX ORDER — SODIUM CHLORIDE 0.9 % (FLUSH) 0.9 %
5-10 SYRINGE (ML) INJECTION EVERY 8 HOURS
Status: DISCONTINUED | OUTPATIENT
Start: 2017-11-01 | End: 2017-11-01 | Stop reason: HOSPADM

## 2017-11-01 RX ORDER — GUAIFENESIN 100 MG/5ML
324 LIQUID (ML) ORAL ONCE
Status: COMPLETED | OUTPATIENT
Start: 2017-11-01 | End: 2017-11-01

## 2017-11-01 RX ORDER — MIDAZOLAM HYDROCHLORIDE 1 MG/ML
1-6 INJECTION, SOLUTION INTRAMUSCULAR; INTRAVENOUS
Status: DISCONTINUED | OUTPATIENT
Start: 2017-11-01 | End: 2017-11-01

## 2017-11-01 RX ADMIN — FENTANYL CITRATE 25 MCG: 50 INJECTION, SOLUTION INTRAMUSCULAR; INTRAVENOUS at 09:18

## 2017-11-01 RX ADMIN — SODIUM CHLORIDE 75 ML/HR: 900 INJECTION, SOLUTION INTRAVENOUS at 09:15

## 2017-11-01 RX ADMIN — MIDAZOLAM HYDROCHLORIDE 2 MG: 1 INJECTION, SOLUTION INTRAMUSCULAR; INTRAVENOUS at 09:17

## 2017-11-01 RX ADMIN — HEPARIN SODIUM 6000 UNITS: 10000 INJECTION, SOLUTION INTRAVENOUS; SUBCUTANEOUS at 09:19

## 2017-11-01 RX ADMIN — MAGNESIUM SULFATE HEPTAHYDRATE 4 G: 40 INJECTION, SOLUTION INTRAVENOUS at 10:15

## 2017-11-01 RX ADMIN — HEPARIN SODIUM 25 ML/HR: 200 INJECTION, SOLUTION INTRAVENOUS at 08:51

## 2017-11-01 RX ADMIN — NITROGLYCERIN 0.2 MG: 200 INJECTION, SOLUTION INTRAVENOUS at 09:24

## 2017-11-01 RX ADMIN — FENTANYL CITRATE 50 MCG: 50 INJECTION, SOLUTION INTRAMUSCULAR; INTRAVENOUS at 09:17

## 2017-11-01 RX ADMIN — LIDOCAINE HYDROCHLORIDE 200 MG: 20 INJECTION, SOLUTION INFILTRATION; PERINEURAL at 09:10

## 2017-11-01 RX ADMIN — ASPIRIN 81 MG 324 MG: 81 TABLET ORAL at 07:00

## 2017-11-01 RX ADMIN — MIDAZOLAM HYDROCHLORIDE 1 MG: 1 INJECTION, SOLUTION INTRAMUSCULAR; INTRAVENOUS at 09:18

## 2017-11-01 RX ADMIN — IOPAMIDOL 125 ML: 755 INJECTION, SOLUTION INTRAVENOUS at 09:38

## 2017-11-01 NOTE — PROGRESS NOTES
Discharge instructions reviewed with patient and boyfriend. Verbalize understanding. Written instructions given.

## 2017-11-01 NOTE — PROCEDURES
Tyoksana Fragoso 46       Name:  Ingrid Siu   MR#:  [id number]   :  1965   Account #:  [de-identified]   Date of Adm:  2017       DATE OF PROCEDURE: 2017    PROCEDURES:    1. Left heart catheterization, selective coronary arteriography,   left ventriculogram via the right femoral approach. 2. Right common femoral venous central line placement. 3. IFR of the LAD and diagonal.       INDICATION:    1. Recurrent chest pain in a patient with high risk stress test   showing anterior ischemia and prior history of left anterior   descending diagonal disease. 2. Need for intravenous access. Unable to obtain IV access in a   patient with multiple attempts. A central venous line was placed   for administration of medication, and drawing of labs. 3. Eccentric stenosis of the LAD and diagonal, assess   hemodynamic significance. TECHNICAL FACTORS: After informed consent was obtained, the   patient was brought to the cardiac catheterization lab. The   right femoral area was prepped and draped in the usual sterile   fashion. Using [facility]-R[facility] ultrasound, the right common femoral   vein and artery were identified. A 4-Tuvaluan venous line was   placed in the right common femoral vein and a 6-Tuvaluan arterial   sheath was placed in the right common femoral artery. A JL4   catheter was used to selectively engage the ostium of the left   main coronary artery. Selective injections in various   projections were performed. A 3DRC catheter was used to   selectively engage the ostium of the right coronary artery. Selective injections in various projections were performed. Pigtail catheter was used to cross the aortic valve and enter   the left ventricle. Hemodynamic measurements and left   ventriculogram were obtained. Left ventricular aortic pressure   gradient was obtained by pullback technique.       At the conclusion of the diagnostic procedure, patient was given   6000 units of heparin. ACT was greater than 250. A 6-Lithuanian   JL3.5 guide catheter was used to selectively engage the ostium   of the left main coronary artery and a Verrata pressure wire   system was advanced and appropriately normalized at the ostium   of the left main coronary artery. The patient was given 150 mcg   of intracoronary nitroglycerin and the Verrata wire was placed   in the distal LAD. IFR measurement was 0.98 indicating a   nonhemodynamically significant stenosis of the LAD. The wire was   then repositioned into the first diagonal artery. At this point,   iFR was measured again and again was noted to be 0.98. Both   stenoses were felt to be hemodynamically insignificant. The wire   was removed and final orthogonal projections were obtained. No   intervention was undertaken. SEDATION: The patient received a total of 3 mg Versed and 75 mcg   of fentanyl. Sedation was administered by   Rodolfo Severs, RN, under my supervision. Sedation start time   was 9:12 a.m. with procedure completion time of 9:42. CONTRAST: Isovue 125. HEMODYNAMIC RESULTS:   1. Aortic pressure 125/79 with a mean of 104.   2. Left ventricular end-diastolic pressure was 16.   3. There was no significant gradient across the aortic valve. ANGIOGRAPHIC RESULTS:     1. Left main coronary artery: Large caliber vessel. Angiographically normal.   2. LAD: This is initially a medium to large caliber vessel, 20%   proximal stenosis. The mid vessel contains a 30% to 40%   eccentric stenosis at the origin of the first diagonal artery. 3. First diagonal artery: Small to medium caliber vessel. Contains a 50% eccentric ostial stenosis. 4. Left circumflex: Medium to large caliber dominant vessel, 20%   proximal stenosis and 20% distal stenosis. 5. First obtuse marginal: A medium caliber vessel. Patent. 6. Left PDA: Small caliber vessel. Patent.    7. Right coronary artery: A small caliber, nondominant vessel,   20% to 30% mid stenosis. 8. Left ventriculogram performed in THOMAS projection shows normal   left ventricular systolic function, EF 83% to 70%. No focal   segmental wall motion abnormalities were seen. 9. IFR of LAD 0.98.   10. IFR of the diagonal 0.98. CONCLUSION:   1. Mild to moderate nonhemodynamically significant coronary   artery disease with normal iFR of left anterior descending   diagonal.   2. Normal left ventricular systolic function. 3. Successful placement of a 4-Belarusian right common femoral   venous central catheter for IV administration and lab draw. PLAN: Ongoing medical therapy. NOTE: At the conclusion of the procedure, right femoral   arteriogram was performed that showed the sheath was contained   in the right common femoral artery. A 6-Belarusian Mynx fascial   closure device was deployed by standard technique with excellent   hemostasis. No complications were encountered.         MD TERRANCE Blake / Cherelle Hernandez   D:  11/01/2017   17:16   T:  11/01/2017   17:44   Job #:  [id number]

## 2017-11-01 NOTE — PROGRESS NOTES
Report received from [address] [place] [address]. Procedural findings communicated. Intra procedural  medication administration reviewed. Progression of care discussed. Patient received into [address] [facility] 6 with 4 Icelandic venous sheath intact to right femoral vein.      Access site without bleeding or swelling yes    Dressing dry and intact yes    Patient instructed to limit movement to right lower extremity    Routine post procedural vital signs and site assessment initiated yes

## 2017-11-01 NOTE — PROGRESS NOTES
Discharged to home accompanied by boyfriend. Left unit via wheelchair. Right groin without bleeding or hematoma.

## 2017-11-01 NOTE — PROGRESS NOTES
Hemostasis obtained to right femoral vein after 4 Bulgarian sheath pulled and pressure hel for 20 minutes. No bleeding or hematoma. Site redressed with sterile gauze covered with tegaderm.

## 2017-11-01 NOTE — IP AVS SNAPSHOT
[address] 
[phone] Patient: Jade Jewell MRN: [id number] :1965 Discharge Summary 2017 Jade Jewell MRN[de-identified]  [id number] Admission Information Provider Pager Service Admission Date Expected D/C Date Sasha Garg MD  CARDIAC CATH LAB 2017 Actual LOS Patient Class 0 days OUTPATIENT Follow-up Information Follow up With Details Comments Contact Info Sasha Garg MD On 2017 at 11:15 a.m.  [address] [facility] [address] 
[phone] My Medications ASK your physician about these medications Instructions Each Dose to Equal  
 Morning Noon Evening Bedtime  
 amLODIPine 10 mg tablet Commonly known as:  Ponce Del Your last dose was: Your next dose is: Take 1 Tab by mouth daily. 10 mg  
    
   
   
   
  
 aspirin delayed-release 81 mg tablet Your last dose was: Your next dose is: Take 1 Tab by mouth daily. 81 mg  
    
   
   
   
  
 atorvastatin 80 mg tablet Commonly known as:  LIPITOR Your last dose was: Your next dose is: Take 1 Tab by mouth daily. 80 mg  
    
   
   
   
  
 carvedilol 12.5 mg tablet Commonly known as:  Nathaniel Bourdon Your last dose was: Your next dose is: Take 1 Tab by mouth two (2) times daily (with meals). 12.5 mg  
    
   
   
   
  
 divalproex  mg tablet Commonly known as:  DEPAKOTE Your last dose was: Your next dose is: Take 250 mg by mouth two (2) times a day. 250 mg  
    
   
   
   
  
 fluticasone 50 mcg/actuation nasal spray Commonly known as:  Chacho Barrios Your last dose was: Your next dose is:    
   
   
 1 Cook Sta by Nasal route daily. 1 Spray  
    
   
   
   
  
 gabapentin 300 mg capsule Commonly known as:  NEURONTIN Your last dose was: Your next dose is: TAKE 2 CAPSULES BY MOUTH 3 TIMES A DAY  
     
   
   
   
  
 lisinopril 20 mg tablet Commonly known as:  Ty Becker Your last dose was: Your next dose is: Take 1 tab p.o. daily  Indications: hypertension  
     
   
   
   
  
 methylPREDNISolone 4 mg tablet Commonly known as:  Cleta Loss Your last dose was: Your next dose is: TAKE AS DIRECTED  
     
   
   
   
  
 multivitamin tablet Commonly known as:  ONE A DAY Your last dose was: Your next dose is: Take 1 Tab by mouth daily. 1 Tab NITROSTAT 0.4 mg SL tablet Generic drug:  nitroglycerin Your last dose was: Your next dose is:    
   
   
 by SubLINGual route every five (5) minutes as needed. polyethylene glycol 17 gram/dose powder Commonly known as:  Lelan Situ Your last dose was: Your next dose is:    
   
   
 1 tablespoon with 8 oz of water TID for 3 days, then BID for 3 days, then daily  
     
   
   
   
  
 risperiDONE 2 mg tablet Commonly known as:  RisperDAL Your last dose was: Your next dose is: Take  by mouth. 1 qam and 2 qhs SENNA LAXATIVE 8.6 mg tablet Generic drug:  senna Your last dose was: Your next dose is: TAKE 1 TABLET BY MOUTH NIGHTLY  
     
   
   
   
  
 VITAMIN B-12 500 mcg tablet Generic drug:  cyanocobalamin Your last dose was: Your next dose is: Take 1,000 mcg by mouth daily. 1000 mcg VITAMIN D3 2,000 unit Cap capsule Generic drug:  Cholecalciferol (Vitamin D3) Your last dose was: Your next dose is: Take 2,000 Units by mouth daily. 2000 Units General Information Please provide this summary of care documentation to your next provider. Allergies Unspecified:  Morphine (Pf);  Other Medication Current Immunizations  Reviewed on 7/15/2011 Name Date Influenza Vaccine 9/6/2016 Influenza Vaccine (Quad) PF 12/29/2015, 11/5/2014 Pneumococcal Polysaccharide (PPSV-23) 11/5/2014 Discharge Instructions Discharge Instructions HEART CATHETERIZATION/ANGIOGRAPHY DISCHARGE INSTRUCTIONS 1. Check puncture site frequently for swelling or bleeding. If there is any bleeding, lie down and apply pressure over the area with a clean towel or washcloth. Notify your doctor for any redness, swelling, drainage, or oozing from the puncture site. Notify your doctor for any fever or chills. 2. If the extremity becomes cold, numb, or painful call Dr. Rajni Tinsley at [phone]. 
3. Activity should be limited for the next 48 hours. Climb stairs as little as possible and avoid any stooping, bending, or strenuous activity for 48 hours. No heavy lifting (anything over 10 pounds) for 3 days. 4. You may resume your usual diet. Drink more fluids than usual. 
5. Have a responsible person drive you home and stay with you for at least 24 hours after your heart catheterization/angiography. 6. You may remove bandage from your Right groin in 24 hours. You may shower in 24 hours. No tub baths, hot tubs, or swimming for 1 week. Do not place any lotions, creams, powders, or ointments over puncture site for 1 week. You may place a clean band-aid over the puncture site each day for 5 days. Change daily. 7. Do not drive for 24 hours. I have read the above instructions and have had the opportunity to ask questions. Patient: ________________________   Date: 11/1/2017 Witness: _______________________   Date: 11/1/2017 Discharge Orders None  
  
`  Patient Signature: ____________________________________________________________ Date:  ____________________________________________________________  
  
 [place] Provider Signature:  ____________________________________________________________ Date:  ____________________________________________________________

## 2017-11-01 NOTE — PROCEDURES
Brief Cardiac Procedure Note    Patient: Mary Estrada MRN: [id number]  SSN: [id number]    YOB: 1965  Age: 54 y.o. Sex: female      Date of Procedure: 11/1/2017     Pre-procedure Diagnosis: Chest pain CCS Class III    Post-procedure Diagnosis: Coronary Artery Disease    Procedure: Left Heart Catheterization. IFR of LAD/Diagonal    Brief Description of Procedure: As above    Performed By: Nabila Csise MD     Assistants: None    Anesthesia: Moderate Sedation    Estimated Blood Loss: Less than 10 mL      Specimens: None    Implants: None    Findings: Moderate LAD/diagonal disease. Media Boozer. IFR of LAD and diagonal.     Complications: None    Recommendations: Continue medical therapy.     Signed By: Nabila Cisse MD     November 1, 2017

## 2017-11-01 NOTE — DISCHARGE INSTRUCTIONS
HEART CATHETERIZATION/ANGIOGRAPHY DISCHARGE INSTRUCTIONS    1. Check puncture site frequently for swelling or bleeding. If there is any bleeding, lie down and apply pressure over the area with a clean towel or washcloth. Notify your doctor for any redness, swelling, drainage, or oozing from the puncture site. Notify your doctor for any fever or chills. 2. If the extremity becomes cold, numb, or painful call Dr. Shruthi Mathis at [phone].  3. Activity should be limited for the next 48 hours. Climb stairs as little as possible and avoid any stooping, bending, or strenuous activity for 48 hours. No heavy lifting (anything over 10 pounds) for 3 days. 4. You may resume your usual diet. Drink more fluids than usual.  5. Have a responsible person drive you home and stay with you for at least 24 hours after your heart catheterization/angiography. 6. You may remove bandage from your Right groin in 24 hours. You may shower in 24 hours. No tub baths, hot tubs, or swimming for 1 week. Do not place any lotions, creams, powders, or ointments over puncture site for 1 week. You may place a clean band-aid over the puncture site each day for 5 days. Change daily. 7. Do not drive for 24 hours. I have read the above instructions and have had the opportunity to ask questions.       Patient: ________________________   Date: 11/1/2017    Witness: _______________________   Date: 11/1/2017

## 2017-11-01 NOTE — PROGRESS NOTES
Patient received to [address] [place] [address] room # 6  Ambulatory from [facility]. Patient scheduled for [facility] today with Dr Loulou Chacon. Procedure reviewed & questions answered, voiced good understanding consent obtained & placed on chart. All medications and medical history reviewed. Will prep patient per orders. Patient & family updated on plan of care.     Patient states she took  mg at 0515 am

## 2018-01-24 PROBLEM — I49.5 SICK SINUS SYNDROME (HCC): Status: RESOLVED | Noted: 2017-09-29 | Resolved: 2018-01-24

## 2018-01-24 PROBLEM — Z79.899 ENCOUNTER FOR MEDICATION MANAGEMENT: Status: RESOLVED | Noted: 2017-07-03 | Resolved: 2018-01-24

## 2018-01-31 ENCOUNTER — APPOINTMENT (OUTPATIENT)
Dept: ULTRASOUND IMAGING | Age: 53
End: 2018-01-31
Attending: EMERGENCY MEDICINE
Payer: MEDICAID

## 2018-01-31 ENCOUNTER — HOSPITAL ENCOUNTER (EMERGENCY)
Age: 53
Discharge: HOME | End: 2018-01-31
Attending: EMERGENCY MEDICINE
Payer: MEDICAID

## 2018-01-31 ENCOUNTER — APPOINTMENT (OUTPATIENT)
Dept: GENERAL RADIOLOGY | Age: 53
End: 2018-01-31
Attending: EMERGENCY MEDICINE
Payer: MEDICAID

## 2018-01-31 VITALS
HEIGHT: 61 IN | HEART RATE: 75 BPM | WEIGHT: 129 LBS | BODY MASS INDEX: 24.35 KG/M2 | TEMPERATURE: 98.3 F | RESPIRATION RATE: 16 BRPM | OXYGEN SATURATION: 98 % | DIASTOLIC BLOOD PRESSURE: 72 MMHG | SYSTOLIC BLOOD PRESSURE: 158 MMHG

## 2018-01-31 DIAGNOSIS — M25.562 ARTHRALGIA OF BOTH LOWER LEGS: Primary | ICD-10-CM

## 2018-01-31 DIAGNOSIS — M25.561 ARTHRALGIA OF BOTH LOWER LEGS: Primary | ICD-10-CM

## 2018-01-31 LAB
ANION GAP SERPL CALC-SCNC: 9 MMOL/L (ref 7–16)
BASOPHILS # BLD: 0.1 K/UL (ref 0–0.2)
BASOPHILS NFR BLD: 0 % (ref 0–2)
BUN SERPL-MCNC: 23 MG/DL (ref 6–23)
CALCIUM SERPL-MCNC: 8.3 MG/DL (ref 8.3–10.4)
CHLORIDE SERPL-SCNC: 104 MMOL/L (ref 98–107)
CK SERPL-CCNC: 83 U/L (ref 21–215)
CO2 SERPL-SCNC: 28 MMOL/L (ref 21–32)
CREAT SERPL-MCNC: 0.75 MG/DL (ref 0.6–1)
DIFFERENTIAL METHOD BLD: ABNORMAL
EOSINOPHIL # BLD: 0.4 K/UL (ref 0–0.8)
EOSINOPHIL NFR BLD: 2 % (ref 0.5–7.8)
ERYTHROCYTE [DISTWIDTH] IN BLOOD BY AUTOMATED COUNT: 12.6 % (ref 11.9–14.6)
GLUCOSE SERPL-MCNC: 86 MG/DL (ref 65–100)
HCG UR QL: NEGATIVE
HCT VFR BLD AUTO: 44 % (ref 35.8–46.3)
HGB BLD-MCNC: 16 G/DL (ref 11.7–15.4)
IMM GRANULOCYTES # BLD: 0 K/UL (ref 0–0.5)
IMM GRANULOCYTES NFR BLD AUTO: 0 % (ref 0–5)
LACTATE BLD-SCNC: 1.2 MMOL/L (ref 0.5–1.9)
LYMPHOCYTES # BLD: 7.5 K/UL (ref 0.5–4.6)
LYMPHOCYTES NFR BLD: 51 % (ref 13–44)
MCH RBC QN AUTO: 33.2 PG (ref 26.1–32.9)
MCHC RBC AUTO-ENTMCNC: 36.4 G/DL (ref 31.4–35)
MCV RBC AUTO: 91.3 FL (ref 79.6–97.8)
MONOCYTES # BLD: 0.7 K/UL (ref 0.1–1.3)
MONOCYTES NFR BLD: 4 % (ref 4–12)
NEUTS SEG # BLD: 6.5 K/UL (ref 1.7–8.2)
NEUTS SEG NFR BLD: 43 % (ref 43–78)
PLATELET # BLD AUTO: 346 K/UL (ref 150–450)
PMV BLD AUTO: 9.4 FL (ref 10.8–14.1)
POTASSIUM SERPL-SCNC: 3.4 MMOL/L (ref 3.5–5.1)
RBC # BLD AUTO: 4.82 M/UL (ref 4.05–5.25)
SODIUM SERPL-SCNC: 141 MMOL/L (ref 136–145)
WBC # BLD AUTO: 15.1 K/UL (ref 4.3–11.1)

## 2018-01-31 PROCEDURE — 96375 TX/PRO/DX INJ NEW DRUG ADDON: CPT | Performed by: EMERGENCY MEDICINE

## 2018-01-31 PROCEDURE — 82550 ASSAY OF CK (CPK): CPT | Performed by: EMERGENCY MEDICINE

## 2018-01-31 PROCEDURE — 74011250636 HC RX REV CODE- 250/636: Performed by: EMERGENCY MEDICINE

## 2018-01-31 PROCEDURE — 81025 URINE PREGNANCY TEST: CPT

## 2018-01-31 PROCEDURE — 93970 EXTREMITY STUDY: CPT

## 2018-01-31 PROCEDURE — 96374 THER/PROPH/DIAG INJ IV PUSH: CPT | Performed by: EMERGENCY MEDICINE

## 2018-01-31 PROCEDURE — 73552 X-RAY EXAM OF FEMUR 2/>: CPT

## 2018-01-31 PROCEDURE — 73502 X-RAY EXAM HIP UNI 2-3 VIEWS: CPT

## 2018-01-31 PROCEDURE — 80048 BASIC METABOLIC PNL TOTAL CA: CPT | Performed by: EMERGENCY MEDICINE

## 2018-01-31 PROCEDURE — 83605 ASSAY OF LACTIC ACID: CPT

## 2018-01-31 PROCEDURE — 99285 EMERGENCY DEPT VISIT HI MDM: CPT | Performed by: EMERGENCY MEDICINE

## 2018-01-31 PROCEDURE — 85025 COMPLETE CBC W/AUTO DIFF WBC: CPT | Performed by: EMERGENCY MEDICINE

## 2018-01-31 PROCEDURE — 81003 URINALYSIS AUTO W/O SCOPE: CPT | Performed by: EMERGENCY MEDICINE

## 2018-01-31 PROCEDURE — 74011250637 HC RX REV CODE- 250/637: Performed by: EMERGENCY MEDICINE

## 2018-01-31 RX ORDER — KETOROLAC TROMETHAMINE 30 MG/ML
30 INJECTION, SOLUTION INTRAMUSCULAR; INTRAVENOUS
Status: COMPLETED | OUTPATIENT
Start: 2018-01-31 | End: 2018-01-31

## 2018-01-31 RX ORDER — DIAZEPAM 2 MG/1
2 TABLET ORAL
Qty: 12 TAB | Refills: 0 | Status: SHIPPED | OUTPATIENT
Start: 2018-01-31 | End: 2018-02-06

## 2018-01-31 RX ORDER — POTASSIUM CHLORIDE 20MEQ/15ML
40 LIQUID (ML) ORAL
Status: COMPLETED | OUTPATIENT
Start: 2018-01-31 | End: 2018-01-31

## 2018-01-31 RX ORDER — LORAZEPAM 2 MG/ML
1 INJECTION INTRAMUSCULAR
Status: COMPLETED | OUTPATIENT
Start: 2018-01-31 | End: 2018-01-31

## 2018-01-31 RX ADMIN — LORAZEPAM 1 MG: 2 INJECTION INTRAMUSCULAR; INTRAVENOUS at 16:12

## 2018-01-31 RX ADMIN — KETOROLAC TROMETHAMINE 30 MG: 30 INJECTION, SOLUTION INTRAMUSCULAR at 16:12

## 2018-01-31 RX ADMIN — POTASSIUM CHLORIDE 40 MEQ: 20 SOLUTION ORAL at 16:58

## 2018-01-31 NOTE — ED PROVIDER NOTES
HPI Comments: Presents with complaint of left greater than right leg pain. She states that she has a deep ache and hurts to touch and move. Reports throbbing but no numbness tingling or swelling. She denies any back pain. She's had it in both legs in the past and typically goes away after a few days and now she is unable to sleep because of the pain. She is worried that she may have bone CA because her mother had similar symptoms before her dx of bone CA. Patient is a 50 y.o. female presenting with leg pain. The history is provided by the patient. Leg Pain    This is a new problem. The current episode started more than 2 days ago. The problem occurs constantly. The problem has been gradually worsening. The pain is present in the left hip and left upper leg. The quality of the pain is described as pounding and aching. The pain is moderate. Associated symptoms include stiffness. Pertinent negatives include no numbness, full range of motion and no back pain. The symptoms are aggravated by contact, movement, standing, activity and palpation. She has tried nothing for the symptoms. There has been no history of extremity trauma. Past Medical History:   Diagnosis Date    Abnormal MRI of head     Frontal Lobe periventricular WM disease.  Ruled out for MS by Neurology    ASCUS with positive high risk HPV cervical     Bipolar disorder ([address]     Borderline personality disorder     CAD     Cerebrovascular disease     Chronic diastolic congestive heart failure (HCC)     Chronic hepatitis C (HCC)     Chronic pain     Dyslipidemia     HTN     IBS (irritable bowel syndrome)     Pacemaker     Sick Sinus Syndrome    S/P CABG x 3     Subclavian artery stenosis, right ([address] 2016    Moderate to severe    Vertebral artery stenosis 2016    Asymptomatic Left Severe       Past Surgical History:   Procedure Laterality Date    APPENDECTOMY      HX  SECTION  ,     Abruption, CPD    HX CORONARY ARTERY BYPASS GRAFT  07/2011    3 vessel    HX HEART CATHETERIZATION      7/2012, 8/2012-occluded grafts    HX HERNIA REPAIR      HX ORTHOPAEDIC      left leg    HX PACEMAKER      HX TUBAL LIGATION           Family History:   Problem Relation Age of Onset   Lissette Ramon Cancer Mother      CAD, Lung cancer    Hypertension Father      DM, CAD    Other Sister     Hypertension Brother     Other Son      Adopted. 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 Adopted son. Her boyfriend is Una Gore. ALLERGIES: Morphine (pf) and Other medication    Review of Systems   Constitutional: Negative for chills and fever. Musculoskeletal: Positive for stiffness. Negative for back pain. Skin: Negative for rash and wound. Neurological: Negative for numbness. All other systems reviewed and are negative. Vitals:    01/31/18 1445   BP: (!) 194/100   Pulse: 80   Resp: 18   Temp: 98.3 °F (36.8 °C)   SpO2: 98%   Weight: 58.5 kg (129 lb)   Height: 5' 1\" (1.549 m)            Physical Exam   Constitutional: She is oriented to person, place, and time. She appears well-developed and well-nourished. No distress. HENT:   Head: Normocephalic and atraumatic. Abdominal: Soft. She exhibits no distension. There is no tenderness. There is no rebound and no guarding.    Musculoskeletal: She exhibits tenderness (diffusely in thigh). She exhibits no edema or deformity. Left hip tenderness no edema normal distal pulses   Neurological: She is alert and oriented to person, place, and time. No cranial nerve deficit. Skin: Skin is warm and dry. She is not diaphoretic. Nursing note and vitals reviewed. MDM  Number of Diagnoses or Management Options  Arthralgia of both lower legs: new and requires workup  Diagnosis management comments: Replace K, ativan, xrays and labs. WBC is elevated likely due to steroids. Check lactic. Has nl distal pulses. Check US and if neg home with valium and f/u with PCP.   Checked out to Dr. Jr Cheng for dispo       Amount and/or Complexity of Data Reviewed  Clinical lab tests: ordered and reviewed  Tests in the radiology section of CPT®: ordered and reviewed  Review and summarize past medical records: yes    Risk of Complications, Morbidity, and/or Mortality  Presenting problems: high  Diagnostic procedures: moderate  Management options: moderate    Patient Progress  Patient progress: improved        ED Course       Procedures

## 2018-01-31 NOTE — DISCHARGE INSTRUCTIONS
Musculoskeletal Pain: Care Instructions  Your Care Instructions    Different problems with the bones, muscles, nerves, ligaments, and tendons in the body can cause pain. One or more areas of your body may ache or burn. Or they may feel tired, stiff, or sore. The medical term for this type of pain is musculoskeletal pain. It can have many different causes. Sometimes the pain is caused by an injury such as a strain or sprain. Or you might have pain from using one part of your body in the same way over and over again. This is called overuse. In some cases, the cause of the pain is another health problem such as arthritis or fibromyalgia. The doctor will examine you and ask you questions about your health to help find the cause of your pain. Blood tests or imaging tests like an X-ray may also be helpful. But sometimes doctors can't find a cause of the pain. Treatment depends on your symptoms and the cause of the pain, if known. The doctor has checked you carefully, but problems can develop later. If you notice any problems or new symptoms, get medical treatment right away. Follow-up care is a key part of your treatment and safety. Be sure to make and go to all appointments, and call your doctor if you are having problems. It's also a good idea to know your test results and keep a list of the medicines you take. How can you care for yourself at home? · Rest until you feel better. · Do not do anything that makes the pain worse. Return to exercise gradually if you feel better and your doctor says it's okay. · Be safe with medicines. Read and follow all instructions on the label. ¨ If the doctor gave you a prescription medicine for pain, take it as prescribed. ¨ If you are not taking a prescription pain medicine, ask your doctor if you can take an over-the-counter medicine. · Put ice or a cold pack on the area for 10 to 20 minutes at a time to ease pain.  Put a thin cloth between the ice and your skin.  When should you call for help? Call your doctor now or seek immediate medical care if:  ? · You have new pain, or your pain gets worse. ? · You have new symptoms such as a fever, a rash, or chills. ? Watch closely for changes in your health, and be sure to contact your doctor if:  ? · You do not get better as expected. Where can you learn more? Go to http://aarti-tammy.info/. Enter T965 in the search box to learn more about \"Musculoskeletal Pain: Care Instructions. \"  Current as of: October 14, 2016  Content Version: 11.4  © 4973-4629 [employer]. Care instructions adapted under license by [employer] (which disclaims liability or warranty for this information). If you have questions about a medical condition or this instruction, always ask your healthcare professional. [address] any warranty or liability for your use of this information.

## 2018-02-01 NOTE — ED NOTES
I have reviewed discharge instructions with the patient. The patient verbalized understanding. Patient left ED via Discharge Method: ambulatory to Home with self    Opportunity for questions and clarification provided. Patient given 1 scripts. To continue your aftercare when you leave the hospital, you may receive an automated call from our care team to check in on how you are doing. This is a free service and part of our promise to provide the best care and service to meet your aftercare needs.  If you have questions, or wish to unsubscribe from this service please call [phone]. Thank you for Choosing our [facility] Emergency Department.

## 2018-02-02 ENCOUNTER — HOSPITAL ENCOUNTER (EMERGENCY)
Age: 53
Discharge: HOME | End: 2018-02-02
Attending: EMERGENCY MEDICINE
Payer: MEDICAID

## 2018-02-02 VITALS
OXYGEN SATURATION: 98 % | BODY MASS INDEX: 25.49 KG/M2 | RESPIRATION RATE: 18 BRPM | TEMPERATURE: 98.2 F | SYSTOLIC BLOOD PRESSURE: 135 MMHG | WEIGHT: 135 LBS | DIASTOLIC BLOOD PRESSURE: 80 MMHG | HEIGHT: 61 IN | HEART RATE: 80 BPM

## 2018-02-02 DIAGNOSIS — M25.50 ARTHRALGIA, UNSPECIFIED JOINT: Primary | ICD-10-CM

## 2018-02-02 DIAGNOSIS — F41.1 ANXIETY STATE: ICD-10-CM

## 2018-02-02 PROCEDURE — 99283 EMERGENCY DEPT VISIT LOW MDM: CPT | Performed by: NURSE PRACTITIONER

## 2018-02-02 PROCEDURE — 74011250637 HC RX REV CODE- 250/637: Performed by: NURSE PRACTITIONER

## 2018-02-02 RX ORDER — HYDROCODONE BITARTRATE AND ACETAMINOPHEN 5; 325 MG/1; MG/1
1 TABLET ORAL ONCE
Status: COMPLETED | OUTPATIENT
Start: 2018-02-02 | End: 2018-02-02

## 2018-02-02 RX ADMIN — HYDROCODONE BITARTRATE AND ACETAMINOPHEN 1 TABLET: 5; 325 TABLET ORAL at 14:50

## 2018-02-02 NOTE — ED NOTES
I have reviewed discharge instructions with the patient. The patient verbalized understanding. Patient left ED via Discharge Method: ambulatory to Home with self. Opportunity for questions and clarification provided. Patient given 0 scripts. To continue your aftercare when you leave the hospital, you may receive an automated call from our care team to check in on how you are doing. This is a free service and part of our promise to provide the best care and service to meet your aftercare needs.  If you have questions, or wish to unsubscribe from this service please call [phone]. Thank you for Choosing our [facility] Emergency Department.

## 2018-02-02 NOTE — ED TRIAGE NOTES
Pt presents to ED c/o throbbing to BLE. Pt states she was seen here a few days ago for same complaint, given Rx for valium without relief. Pt ambulatory without difficulty.

## 2018-02-02 NOTE — DISCHARGE INSTRUCTIONS
Anxiety Disorder: Care Instructions  Your Care Instructions    Anxiety is a normal reaction to stress. Difficult situations can cause you to have symptoms such as sweaty palms and a nervous feeling. In an anxiety disorder, the symptoms are far more severe. Constant worry, muscle tension, trouble sleeping, nausea and diarrhea, and other symptoms can make normal daily activities difficult or impossible. These symptoms may occur for no reason, and they can affect your work, school, or social life. Medicines, counseling, and self-care can all help. Follow-up care is a key part of your treatment and safety. Be sure to make and go to all appointments, and call your doctor if you are having problems. It's also a good idea to know your test results and keep a list of the medicines you take. How can you care for yourself at home? · Take medicines exactly as directed. Call your doctor if you think you are having a problem with your medicine. · Go to your counseling sessions and follow-up appointments. · Recognize and accept your anxiety. Then, when you are in a situation that makes you anxious, say to yourself, \"This is not an emergency. I feel uncomfortable, but I am not in danger. I can keep going even if I feel anxious. \"  · Be kind to your body:  ¨ Relieve tension with exercise or a massage. ¨ Get enough rest.  ¨ Avoid alcohol, caffeine, nicotine, and illegal drugs. They can increase your anxiety level and cause sleep problems. ¨ Learn and do relaxation techniques. See below for more about these techniques. · Engage your mind. Get out and do something you enjoy. Go to a funny movie, or take a walk or hike. Plan your day. Having too much or too little to do can make you anxious. · Keep a record of your symptoms. Discuss your fears with a good friend or family member, or join a support group for people with similar problems. Talking to others sometimes relieves stress.   · Get involved in social groups, or volunteer to help others. Being alone sometimes makes things seem worse than they are. · Get at least 30 minutes of exercise on most days of the week to relieve stress. Walking is a good choice. You also may want to do other activities, such as running, swimming, cycling, or playing tennis or team sports. Relaxation techniques  Do relaxation exercises 10 to 20 minutes a day. You can play soothing, relaxing music while you do them, if you wish. · Tell others in your house that you are going to do your relaxation exercises. Ask them not to disturb you. · Find a comfortable place, away from all distractions and noise. · Lie down on your back, or sit with your back straight. · Focus on your breathing. Make it slow and steady. · Breathe in through your nose. Breathe out through either your nose or mouth. · Breathe deeply, filling up the area between your navel and your rib cage. Breathe so that your belly goes up and down. · Do not hold your breath. · Breathe like this for 5 to 10 minutes. Notice the feeling of calmness throughout your whole body. As you continue to breathe slowly and deeply, relax by doing the following for another 5 to 10 minutes:  · Tighten and relax each muscle group in your body. You can begin at your toes and work your way up to your head. · Imagine your muscle groups relaxing and becoming heavy. · Empty your mind of all thoughts. · Let yourself relax more and more deeply. · Become aware of the state of calmness that surrounds you. · When your relaxation time is over, you can bring yourself back to alertness by moving your fingers and toes and then your hands and feet and then stretching and moving your entire body. Sometimes people fall asleep during relaxation, but they usually wake up shortly afterward. · Always give yourself time to return to full alertness before you drive a car or do anything that might cause an accident if you are not fully alert.  Never play a relaxation tape while you drive a car. When should you call for help? Call 911 anytime you think you may need emergency care. For example, call if:  ? · You feel you cannot stop from hurting yourself or someone else. ? Keep the numbers for these national suicide hotlines: 1-142-630-TALK (1-181.748.2141) and 1-574-ZEJAXAV (3-572.347.3596). If you or someone you know talks about suicide or feeling hopeless, get help right away. ? Watch closely for changes in your health, and be sure to contact your doctor if:  ? · You have anxiety or fear that affects your life. ? · You have symptoms of anxiety that are new or different from those you had before. Where can you learn more? Go to http://aartiOpanga Networkstammy.info/. Enter P754 in the search box to learn more about \"Anxiety Disorder: Care Instructions. \"  Current as of: May 12, 2017  Content Version: 11.4  © 0462-7234 [employer]. Care instructions adapted under license by [employer] (which disclaims liability or warranty for this information). If you have questions about a medical condition or this instruction, always ask your healthcare professional. [address] any warranty or liability for your use of this information. Chronic Pain: Care Instructions  Your Care Instructions    Chronic pain is pain that lasts a long time (months or even years) and may or may not have a clear cause. It is different from acute pain, which usually does have a clear cause-like an injury or illness-and gets better over time. Chronic pain:  · Lasts over time but may vary from day to day. · Does not go away despite efforts to end it. · May disrupt your sleep and lead to fatigue. · May cause depression or anxiety. · May make your muscles tense, causing more pain. · Can disrupt your work, hobbies, home life, and relationships with friends and family. Chronic pain is a very real condition. It is not just in your head.  Treatment can help and usually includes several methods used together, such as medicines, physical therapy, exercise, and other treatments. Learning how to relax and changing negative thought patterns can also help you cope. Chronic pain is complex. Taking an active role in your treatment will help you better manage your pain. Tell your doctor if you have trouble dealing with your pain. You may have to try several things before you find what works best for you. Follow-up care is a key part of your treatment and safety. Be sure to make and go to all appointments, and call your doctor if you are having problems. It's also a good idea to know your test results and keep a list of the medicines you take. How can you care for yourself at home? · Pace yourself. Break up large jobs into smaller tasks. Save harder tasks for days when you have less pain, or go back and forth between hard tasks and easier ones. Take rest breaks. · Relax, and reduce stress. Relaxation techniques such as deep breathing or meditation can help. · Keep moving. Gentle, daily exercise can help reduce pain over the long run. Try low- or no-impact exercises such as walking, swimming, and stationary biking. Do stretches to stay flexible. · Try heat, cold packs, and massage. · Get enough sleep. Chronic pain can make you tired and drain your energy. Talk with your doctor if you have trouble sleeping because of pain. · Think positive. Your thoughts can affect your pain level. Do things that you enjoy to distract yourself when you have pain instead of focusing on the pain. See a movie, read a book, listen to music, or spend time with a friend. · If you think you are depressed, talk to your doctor about treatment. · Keep a daily pain diary. Record how your moods, thoughts, sleep patterns, activities, and medicine affect your pain. You may find that your pain is worse during or after certain activities or when you are feeling a certain emotion.  Having a record of your pain can help you and your doctor find the best ways to treat your pain. · Take pain medicines exactly as directed. ¨ If the doctor gave you a prescription medicine for pain, take it as prescribed. ¨ If you are not taking a prescription pain medicine, ask your doctor if you can take an over-the-counter medicine. Reducing constipation caused by pain medicine  · Include fruits, vegetables, beans, and whole grains in your diet each day. These foods are high in fiber. · Drink plenty of fluids, enough so that your urine is light yellow or clear like water. If you have kidney, heart, or liver disease and have to limit fluids, talk with your doctor before you increase the amount of fluids you drink. · If your doctor recommends it, get more exercise. Walking is a good choice. Bit by bit, increase the amount you walk every day. Try for at least 30 minutes on most days of the week. · Schedule time each day for a bowel movement. A daily routine may help. Take your time and do not strain when having a bowel movement. When should you call for help? Call your doctor now or seek immediate medical care if:  ? · Your pain gets worse or is out of control. ? · You feel down or blue, or you do not enjoy things like you once did. You may be depressed, which is common in people with chronic pain. Depression can be treated. ? · You have vomiting or cramps for more than 2 hours. ? Watch closely for changes in your health, and be sure to contact your doctor if:  ? · You cannot sleep because of pain. ? · You are very worried or anxious about your pain. ? · You have trouble taking your pain medicine. ? · You have any concerns about your pain medicine. ? · You have trouble with bowel movements, such as:  ¨ No bowel movement in 3 days. ¨ Blood in the anal area, in your stool, or on the toilet paper. ¨ Diarrhea for more than 24 hours. Where can you learn more?   Go to http://aarti-tammy.info/. Enter N004 in the search box to learn more about \"Chronic Pain: Care Instructions. \"  Current as of: October 14, 2016  Content Version: 11.4  © 2311-8429 [employer]. Care instructions adapted under license by [employer] (which disclaims liability or warranty for this information). If you have questions about a medical condition or this instruction, always ask your healthcare professional. [place] [address] any warranty or liability for your use of this information.

## 2018-02-02 NOTE — ED PROVIDER NOTES
HPI Comments: 53-year-old female with history of coronary artery disease, sick sinus syndrome resulting in a pacemaker, congestive heart failure, and coronary artery bypass graft ×3, borderline personality disorder, bipolar disorder, hypertension, cerebrovascular disease, vertebral artery stenosis, chronic hepatitis C, chronic pain, irritable bowel syndrome presents to the emergency department for reevaluation of her lower extremity pain. She reports that she's had pain to her lower extremities for the last week. She is ambulatory. She has no swelling and no discoloration. But she complains of constant pain to the lower extremities the last week. She is very concerned that this is the beginning of bone cancer as this is how her mother felt prior to being diagnosed with bone cancer. She is quite anxious. She was seen here 2 days ago for the same thing given Valium after workup was completed. She says that the Valium helps take the edge off of her nerves but she still has pain. Patient is a 50 y.o. female presenting with leg pain. The history is provided by the patient and the spouse. No  was used. Leg Pain    This is a new problem. Episode onset: one week. The problem occurs constantly. The problem has not changed since onset. The pain is present in the left lower leg and right lower leg. The quality of the pain is described as aching. The pain is moderate. Associated symptoms include stiffness. Pertinent negatives include no numbness, full range of motion, no tingling, no itching, no back pain and no neck pain. There has been no history of extremity trauma. Past Medical History:   Diagnosis Date    Abnormal MRI of head 2015    Frontal Lobe periventricular WM disease.  Ruled out for MS by Neurology    ASCUS with positive high risk HPV cervical     B[facility] disorder ([facility] [address]     Borderline personality disorder     CAD     Cerebrovascular disease     Chronic diastolic congestive heart failure (HCC)     Chronic hepatitis C (HCC)     Chronic pain     Dyslipidemia     HTN     IBS (irritable bowel syndrome)     Pacemaker     Sick Sinus Syndrome    S/P CABG x 3     Subclavian artery stenosis, right ([address] 2016    Moderate to severe    Vertebral artery stenosis 2016    Asymptomatic Left Severe       Past Surgical History:   Procedure Laterality Date    APPENDECTOMY      HX  SECTION  ,     Abruption, CPD    HX CORONARY ARTERY BYPASS GRAFT  2011    3 vessel    HX HEART CATHETERIZATION      2012, 2012-occluded grafts    HX HERNIA REPAIR      HX ORTHOPAEDIC      left leg    HX PACEMAKER      HX TUBAL LIGATION           Family History:   Problem Relation Age of Onset   [address] [facility] Cancer Mother      CAD, Lung cancer    Hypertension Father      DM, CAD    Other Sister     Hypertension Brother     Other Son      Adopted. 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 Adopted son. Her boyfriend is Ash Poser. ALLERGIES: Morphine (pf) and Other medication    Review of Systems   Constitutional: Negative for chills and fever. HENT: Negative for facial swelling and mouth sores. Eyes: Negative for discharge and redness. Respiratory: Negative for cough and shortness of breath. Cardiovascular: Negative for chest pain and palpitations.    Gastrointestinal: Negative for nausea and vomiting. Endocrine: Negative for cold intolerance and heat intolerance. Genitourinary: Negative for difficulty urinating, pelvic pain and urgency. Musculoskeletal: Positive for arthralgias, myalgias and stiffness. Negative for back pain, gait problem and neck pain. Skin: Negative for color change, itching, pallor, rash and wound. Neurological: Negative for dizziness, tingling, tremors, weakness and numbness. Psychiatric/Behavioral: Negative for confusion and decreased concentration. The patient is nervous/anxious. Vitals:    02/02/18 1218   BP: (!) 143/91   Pulse: 91   Resp: 18   Temp: 98.1 °F (36.7 °C)   SpO2: 98%   Weight: 61.2 kg (135 lb)   Height: 5' 1\" (1.549 m)            Physical Exam   Constitutional: She is oriented to person, place, and time. She appears well-developed and well-nourished. No distress. HENT:   Head: Normocephalic and atraumatic. Right Ear: External ear normal.   Left Ear: External ear normal.   Nose: Nose normal.   Eyes: Conjunctivae and EOM are normal. Pupils are equal, round, and reactive to light. Neck: Normal range of motion. Neck supple. Cardiovascular: Normal rate, regular rhythm and normal heart sounds. Pulmonary/Chest: Effort normal and breath sounds normal. No respiratory distress. She has no wheezes. Abdominal: Soft. Bowel sounds are normal. She exhibits no distension. There is no tenderness. Musculoskeletal: Normal range of motion. She exhibits tenderness. She exhibits no edema. Legs:  Neurological: She is alert and oriented to person, place, and time. No cranial nerve deficit. Coordination normal.   Skin: Skin is warm and dry. No rash noted. Psychiatric: She has a normal mood and affect. Her behavior is normal. Judgment and thought content normal.   Nursing note and vitals reviewed.        MDM  Number of Diagnoses or Management Options  Diagnosis management comments: 56-year-old female with history of coronary artery disease, sick sinus syndrome resulting in a pacemaker, congestive heart failure, and coronary artery bypass graft ×3, borderline personality disorder, bipolar disorder, hypertension, cerebrovascular disease, vertebral artery stenosis, chronic hepatitis C, chronic pain, irritable bowel syndrome presents to the emergency department for reevaluation of her lower extremity pain. She reports that she's had pain to her lower extremities for the last week. She is ambulatory. She has no swelling and no discoloration. But she complains of constant pain to the lower extremities the last week. She is very concerned that this is the beginning of bone cancer as this is how her mother felt prior to being diagnosed with bone cancer. She is quite anxious. She was seen here 2 days ago for the same thing given Valium after workup was completed. She says that the Valium helps take the edge off of her nerves but she still has pain. Review of her record from work up 2 days ago :  Left femur neg, bilat lower ext dopplers neg. Labs neg except wbc 15 (she is on steroid dose pack). Urine preg neg. Lactic 1.2. I discussed with her we can not prescribe pain meds from ed but will provide one time dose in ed. She expressed that she is very worried that these symptoms are the beginning of bone cancer as this is how her mother was prior to her diagnosis of same. I asked her to please contact her family md right now while I get her paper work ready so that he can work her up as out pt for this. She agrees.   Will dc home       Amount and/or Complexity of Data Reviewed  Clinical lab tests: reviewed  Tests in the radiology section of CPT®: reviewed    Risk of Complications, Morbidity, and/or Mortality  Presenting problems: minimal  Diagnostic procedures: minimal  Management options: minimal    Patient Progress  Patient progress: stable        ED Course       Procedures

## 2018-03-11 ENCOUNTER — HOSPITAL ENCOUNTER (EMERGENCY)
Age: 53
Discharge: HOME | End: 2018-03-11
Attending: EMERGENCY MEDICINE
Payer: MEDICAID

## 2018-03-11 VITALS
DIASTOLIC BLOOD PRESSURE: 78 MMHG | OXYGEN SATURATION: 95 % | HEART RATE: 83 BPM | TEMPERATURE: 98.5 F | SYSTOLIC BLOOD PRESSURE: 133 MMHG | BODY MASS INDEX: 26.06 KG/M2 | WEIGHT: 138 LBS | RESPIRATION RATE: 18 BRPM | HEIGHT: 61 IN

## 2018-03-11 DIAGNOSIS — N30.00 ACUTE CYSTITIS WITHOUT HEMATURIA: Primary | ICD-10-CM

## 2018-03-11 LAB
ALBUMIN SERPL-MCNC: 3.6 G/DL (ref 3.5–5)
ALBUMIN/GLOB SERPL: 0.8 {RATIO} (ref 1.2–3.5)
ALP SERPL-CCNC: 63 U/L (ref 50–136)
ALT SERPL-CCNC: 16 U/L (ref 12–65)
ANION GAP SERPL CALC-SCNC: 8 MMOL/L (ref 7–16)
AST SERPL-CCNC: 26 U/L (ref 15–37)
BACTERIA URNS QL MICRO: ABNORMAL /HPF
BASOPHILS # BLD: 0 K/UL (ref 0–0.2)
BASOPHILS NFR BLD: 0 % (ref 0–2)
BILIRUB SERPL-MCNC: 0.7 MG/DL (ref 0.2–1.1)
BUN SERPL-MCNC: 12 MG/DL (ref 6–23)
CALCIUM SERPL-MCNC: 8.9 MG/DL (ref 8.3–10.4)
CASTS URNS QL MICRO: ABNORMAL /LPF
CHLORIDE SERPL-SCNC: 110 MMOL/L (ref 98–107)
CO2 SERPL-SCNC: 25 MMOL/L (ref 21–32)
CREAT SERPL-MCNC: 0.82 MG/DL (ref 0.6–1)
DIFFERENTIAL METHOD BLD: ABNORMAL
EOSINOPHIL # BLD: 0.1 K/UL (ref 0–0.8)
EOSINOPHIL NFR BLD: 1 % (ref 0.5–7.8)
EPI CELLS #/AREA URNS HPF: ABNORMAL /HPF
ERYTHROCYTE [DISTWIDTH] IN BLOOD BY AUTOMATED COUNT: 13.1 % (ref 11.9–14.6)
GLOBULIN SER CALC-MCNC: 4.3 G/DL (ref 2.3–3.5)
GLUCOSE SERPL-MCNC: 130 MG/DL (ref 65–100)
HCT VFR BLD AUTO: 41.4 % (ref 35.8–46.3)
HGB BLD-MCNC: 14.9 G/DL (ref 11.7–15.4)
IMM GRANULOCYTES # BLD: 0 K/UL (ref 0–0.5)
IMM GRANULOCYTES NFR BLD AUTO: 0 % (ref 0–5)
LYMPHOCYTES # BLD: 3.2 K/UL (ref 0.5–4.6)
LYMPHOCYTES NFR BLD: 26 % (ref 13–44)
MCH RBC QN AUTO: 33 PG (ref 26.1–32.9)
MCHC RBC AUTO-ENTMCNC: 36 G/DL (ref 31.4–35)
MCV RBC AUTO: 91.6 FL (ref 79.6–97.8)
MONOCYTES # BLD: 0.9 K/UL (ref 0.1–1.3)
MONOCYTES NFR BLD: 7 % (ref 4–12)
NEUTS SEG # BLD: 8.1 K/UL (ref 1.7–8.2)
NEUTS SEG NFR BLD: 66 % (ref 43–78)
PLATELET # BLD AUTO: 259 K/UL (ref 150–450)
PMV BLD AUTO: 9.6 FL (ref 10.8–14.1)
POTASSIUM SERPL-SCNC: 3.7 MMOL/L (ref 3.5–5.1)
PROT SERPL-MCNC: 7.9 G/DL (ref 6.3–8.2)
RBC # BLD AUTO: 4.52 M/UL (ref 4.05–5.25)
RBC #/AREA URNS HPF: ABNORMAL /HPF
SERVICE CMNT-IMP: NORMAL
SODIUM SERPL-SCNC: 143 MMOL/L (ref 136–145)
WBC # BLD AUTO: 12.3 K/UL (ref 4.3–11.1)
WBC URNS QL MICRO: ABNORMAL /HPF
WET PREP GENITAL: NORMAL
WET PREP GENITAL: NORMAL

## 2018-03-11 PROCEDURE — 96372 THER/PROPH/DIAG INJ SC/IM: CPT | Performed by: EMERGENCY MEDICINE

## 2018-03-11 PROCEDURE — 80053 COMPREHEN METABOLIC PANEL: CPT | Performed by: EMERGENCY MEDICINE

## 2018-03-11 PROCEDURE — 87491 CHLMYD TRACH DNA AMP PROBE: CPT | Performed by: EMERGENCY MEDICINE

## 2018-03-11 PROCEDURE — 85025 COMPLETE CBC W/AUTO DIFF WBC: CPT | Performed by: EMERGENCY MEDICINE

## 2018-03-11 PROCEDURE — 87210 SMEAR WET MOUNT SALINE/INK: CPT | Performed by: EMERGENCY MEDICINE

## 2018-03-11 PROCEDURE — 74011250637 HC RX REV CODE- 250/637: Performed by: EMERGENCY MEDICINE

## 2018-03-11 PROCEDURE — 99284 EMERGENCY DEPT VISIT MOD MDM: CPT | Performed by: EMERGENCY MEDICINE

## 2018-03-11 PROCEDURE — 74011250636 HC RX REV CODE- 250/636: Performed by: EMERGENCY MEDICINE

## 2018-03-11 PROCEDURE — 81015 MICROSCOPIC EXAM OF URINE: CPT | Performed by: EMERGENCY MEDICINE

## 2018-03-11 PROCEDURE — 81003 URINALYSIS AUTO W/O SCOPE: CPT | Performed by: EMERGENCY MEDICINE

## 2018-03-11 RX ORDER — KETOROLAC TROMETHAMINE 30 MG/ML
60 INJECTION, SOLUTION INTRAMUSCULAR; INTRAVENOUS
Status: COMPLETED | OUTPATIENT
Start: 2018-03-11 | End: 2018-03-11

## 2018-03-11 RX ORDER — TRAMADOL HYDROCHLORIDE 50 MG/1
50 TABLET ORAL
Qty: 20 TAB | Refills: 0 | Status: SHIPPED | OUTPATIENT
Start: 2018-03-11 | End: 2018-03-26

## 2018-03-11 RX ORDER — CEFUROXIME AXETIL 500 MG/1
500 TABLET ORAL 2 TIMES DAILY
Qty: 14 TAB | Refills: 0 | Status: SHIPPED | OUTPATIENT
Start: 2018-03-11 | End: 2018-03-15

## 2018-03-11 RX ORDER — CEPHALEXIN 500 MG/1
500 CAPSULE ORAL 4 TIMES DAILY
Qty: 28 CAP | Refills: 0 | Status: SHIPPED | OUTPATIENT
Start: 2018-03-11 | End: 2018-03-11

## 2018-03-11 RX ORDER — HYDROMORPHONE HYDROCHLORIDE 2 MG/ML
0.2 INJECTION, SOLUTION INTRAMUSCULAR; INTRAVENOUS; SUBCUTANEOUS ONCE
Status: DISCONTINUED | OUTPATIENT
Start: 2018-03-11 | End: 2018-03-11

## 2018-03-11 RX ORDER — HYDROCODONE BITARTRATE AND ACETAMINOPHEN 10; 325 MG/1; MG/1
1 TABLET ORAL
Status: COMPLETED | OUTPATIENT
Start: 2018-03-11 | End: 2018-03-11

## 2018-03-11 RX ORDER — KETOROLAC TROMETHAMINE 30 MG/ML
30 INJECTION, SOLUTION INTRAMUSCULAR; INTRAVENOUS
Status: DISCONTINUED | OUTPATIENT
Start: 2018-03-11 | End: 2018-03-11

## 2018-03-11 RX ADMIN — KETOROLAC TROMETHAMINE 60 MG: 30 INJECTION, SOLUTION INTRAMUSCULAR at 12:15

## 2018-03-11 RX ADMIN — HYDROCODONE BITARTRATE AND ACETAMINOPHEN 1 TABLET: 10; 325 TABLET ORAL at 13:17

## 2018-03-11 NOTE — ED PROVIDER NOTES
HPI Comments: Patient started yesterday with some lower abdominal pain  Dull in nature. Continue today so came in. No nausea vomiting diarrhea constipation. Patient is a 50 y.o. female presenting with pelvic pain. The history is provided by the patient. No  was used. Pelvic Pain    This is a new problem. The current episode started yesterday. The problem occurs constantly. The problem has not changed since onset. The pain is associated with an unknown factor. The pain is located in the suprapubic region. The quality of the pain is dull. The pain is moderate. Pertinent negatives include no fever, no diarrhea, no melena, no nausea, no vomiting, no constipation, no dysuria, no hematuria, no headaches, no chest pain and no back pain. The pain is worsened by palpation. The pain is relieved by nothing. The patient's surgical history includes appendectomy. Past Medical History:   Diagnosis Date    Abnormal MRI of head     Frontal Lobe periventricular WM disease.  Ruled out for MS by Neurology    ASCUS with positive high risk HPV cervical     Bipolar disorder ([address]     Borderline personality disorder     CAD     Cerebrovascular disease     Chronic diastolic congestive heart failure (HCC)     Chronic hepatitis C (HCC)     Chronic pain     Dyslipidemia     HTN     IBS (irritable bowel syndrome)     Pacemaker     Sick Sinus Syndrome    S/P CABG x 3     Subclavian artery stenosis, right ([address] 2016    Moderate to severe    Vertebral artery stenosis 2016    Asymptomatic Left Severe       Past Surgical History:   Procedure Laterality Date    APPENDECTOMY      HX  SECTION  ,     Abruption, CPD    HX CORONARY ARTERY BYPASS GRAFT  2011    3 vessel    HX HEART CATHETERIZATION      2012, 2012-occluded grafts    HX HERNIA REPAIR      HX ORTHOPAEDIC      left leg    HX PACEMAKER      HX TUBAL LIGATION           Family History:   Problem Relation Age of Onset    Cancer Mother      CAD, Lung cancer    Hypertension Father      DM, CAD    Other Sister     Hypertension Brother     Other Son      Adopted. 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 Adopted son. Her boyfriend is Sammye Homans. ALLERGIES: Morphine (pf) and Other medication    Review of Systems   Constitutional: Negative for chills and fever. HENT: Negative for rhinorrhea and sore throat. Eyes: Negative for pain and redness. Respiratory: Negative for chest tightness, shortness of breath and wheezing. Cardiovascular: Negative for chest pain and leg swelling. Gastrointestinal: Positive for abdominal pain. Negative for constipation, diarrhea, melena, nausea and vomiting. Genitourinary: Negative for dysuria, hematuria, vaginal bleeding and vaginal discharge. Musculoskeletal: Negative for back pain, gait problem, neck pain and neck stiffness. Skin: Negative for color change and rash. Neurological: Negative for weakness, numbness and headaches. Vitals:    03/11/18 1145   BP: 134/83   Pulse: 83   Resp: 18   Temp: 98.5 °F (36.9 °C)   SpO2: 95%   Weight: 62.6 kg (138 lb)   Height: 5' 1\" (1.549 m)            Physical Exam   Constitutional: She is oriented to person, place, and time. She appears well-developed and well-nourished. HENT:   Head: Normocephalic and atraumatic.    Neck: Normal range of motion. Neck supple. Cardiovascular: Normal rate and regular rhythm. No murmur heard. Pulmonary/Chest: Effort normal and breath sounds normal. She has no wheezes. Abdominal: Soft. Bowel sounds are normal. There is tenderness (lower abd). Genitourinary: Cervix exhibits no motion tenderness, no discharge and no friability. No tenderness or bleeding in the vagina. No foreign body in the vagina. Vaginal discharge (mild white) found. Musculoskeletal: Normal range of motion. She exhibits no edema. Neurological: She is alert and oriented to person, place, and time. Skin: Skin is warm and dry. Nursing note and vitals reviewed. MDM  Number of Diagnoses or Management Options  Diagnosis management comments: UTI will treat at home. Amount and/or Complexity of Data Reviewed  Clinical lab tests: ordered and reviewed  Tests in the medicine section of CPT®: ordered and reviewed    Patient Progress  Patient progress: stable        ED Course       Procedures      Results Include:    Recent Results (from the past 24 hour(s))   URINE MICROSCOPIC    Collection Time: 03/11/18 11:59 AM   Result Value Ref Range    WBC 20-50 0 /hpf    RBC 0-3 0 /hpf    Epithelial cells 5-10 0 /hpf    Bacteria 1+ (H) 0 /hpf    Casts 5-10 0 /lpf   CBC WITH AUTOMATED DIFF    Collection Time: 03/11/18 12:05 PM   Result Value Ref Range    WBC 12.3 (H) 4.3 - 11.1 K/uL    RBC 4.52 4.05 - 5.25 M/uL    HGB 14.9 11.7 - 15.4 g/dL    HCT 41.4 35.8 - 46.3 %    MCV 91.6 79.6 - 97.8 FL    MCH 33.0 (H) 26.1 - 32.9 PG    MCHC 36.0 (H) 31.4 - 35.0 g/dL    RDW 13.1 11.9 - 14.6 %    PLATELET 449 703 - 758 K/uL    MPV 9.6 (L) 10.8 - 14.1 FL    DF AUTOMATED      NEUTROPHILS 66 43 - 78 %    LYMPHOCYTES 26 13 - 44 %    MONOCYTES 7 4.0 - 12.0 %    EOSINOPHILS 1 0.5 - 7.8 %    BASOPHILS 0 0.0 - 2.0 %    IMMATURE GRANULOCYTES 0 0.0 - 5.0 %    ABS. NEUTROPHILS 8.1 1.7 - 8.2 K/UL    ABS. LYMPHOCYTES 3.2 0.5 - 4.6 K/UL    ABS.  MONOCYTES 0.9 0.1 - 1.3 K/UL    ABS. EOSINOPHILS 0.1 0.0 - 0.8 K/UL    ABS. BASOPHILS 0.0 0.0 - 0.2 K/UL    ABS. IMM. GRANS. 0.0 0.0 - 0.5 K/UL   METABOLIC PANEL, COMPREHENSIVE    Collection Time: 03/11/18 12:05 PM   Result Value Ref Range    Sodium 143 136 - 145 mmol/L    Potassium 3.7 3.5 - 5.1 mmol/L    Chloride 110 (H) 98 - 107 mmol/L    CO2 25 21 - 32 mmol/L    Anion gap 8 7 - 16 mmol/L    Glucose 130 (H) 65 - 100 mg/dL    BUN 12 6 - 23 MG/DL    Creatinine 0.82 0.6 - 1.0 MG/DL    GFR est AA >60 >60 ml/min/1.73m2    GFR est non-AA >60 >60 ml/min/1.73m2    Calcium 8.9 8.3 - 10.4 MG/DL    Bilirubin, total 0.7 0.2 - 1.1 MG/DL    ALT (SGPT) 16 12 - 65 U/L    AST (SGOT) 26 15 - 37 U/L    Alk.  phosphatase 63 50 - 136 U/L    Protein, total 7.9 6.3 - 8.2 g/dL    Albumin 3.6 3.5 - 5.0 g/dL    Globulin 4.3 (H) 2.3 - 3.5 g/dL    A-G Ratio 0.8 (L) 1.2 - 3.5            WET PREP (Final result) Component (Lab Inquiry)       Collection Time Result Time SREQ WETS WETS     03/11/18 13:08:00 03/11/18 13:30:15 NO SPECIAL REQUESTS NO YEAST,TRICHOMONAS OR CLUE CELLS NOTED NO WBCS SEEN

## 2018-03-11 NOTE — DISCHARGE INSTRUCTIONS

## 2018-03-11 NOTE — ED TRIAGE NOTES
Pt arrived ambulatory via POV with c/o \"my bladder is hurting\", pt points to pelvic area. Pt reports pain started last night. Pain is constant, denies any N/V/D.

## 2018-03-12 ENCOUNTER — HOSPITAL ENCOUNTER (EMERGENCY)
Age: 53
Discharge: HOME | End: 2018-03-13
Attending: EMERGENCY MEDICINE
Payer: MEDICAID

## 2018-03-12 DIAGNOSIS — R33.8 ACUTE URINARY RETENTION: Primary | ICD-10-CM

## 2018-03-12 PROCEDURE — 81003 URINALYSIS AUTO W/O SCOPE: CPT | Performed by: EMERGENCY MEDICINE

## 2018-03-12 PROCEDURE — 77030005515 HC CATH URETH FOL14 BARD -B

## 2018-03-12 PROCEDURE — 51702 INSERT TEMP BLADDER CATH: CPT | Performed by: EMERGENCY MEDICINE

## 2018-03-12 PROCEDURE — 99284 EMERGENCY DEPT VISIT MOD MDM: CPT | Performed by: EMERGENCY MEDICINE

## 2018-03-13 VITALS
DIASTOLIC BLOOD PRESSURE: 72 MMHG | SYSTOLIC BLOOD PRESSURE: 179 MMHG | HEART RATE: 64 BPM | HEIGHT: 61 IN | OXYGEN SATURATION: 96 % | WEIGHT: 138 LBS | TEMPERATURE: 98.3 F | RESPIRATION RATE: 18 BRPM | BODY MASS INDEX: 26.06 KG/M2

## 2018-03-13 LAB
C TRACH RRNA SPEC QL NAA+PROBE: NEGATIVE
N GONORRHOEA RRNA SPEC QL NAA+PROBE: NEGATIVE
SPECIMEN SOURCE: NORMAL

## 2018-03-13 PROCEDURE — 74011250637 HC RX REV CODE- 250/637: Performed by: EMERGENCY MEDICINE

## 2018-03-13 RX ORDER — HYDROCODONE BITARTRATE AND ACETAMINOPHEN 5; 325 MG/1; MG/1
1 TABLET ORAL ONCE
Status: COMPLETED | OUTPATIENT
Start: 2018-03-13 | End: 2018-03-13

## 2018-03-13 RX ADMIN — HYDROCODONE BITARTRATE AND ACETAMINOPHEN 1 TABLET: 5; 325 TABLET ORAL at 00:44

## 2018-03-13 NOTE — DISCHARGE INSTRUCTIONS
Return with any fevers, vomiting, worsening symptoms, or additional concerns. Continue the antibiotics you were previously prescribed. Follow-up with your urologist as planned. Learning About How to Care for an Indwelling Urinary Catheter    A urinary catheter is a flexible plastic tube used to drain urine from the bladder when a person cannot urinate. A doctor will place the catheter into the bladder by inserting it through the urethra. The urethra is the opening that carries urine from the bladder to the outside of the body. When the catheter is in the bladder, a small balloon is used to keep the catheter in place. The catheter lets urine drain from the bladder into a bag. The bag is usually attached to the thigh. Urinary catheters can be used in both men and women. A catheter that stays in place for a longer period of time is called an indwelling catheter. A catheter may be needed because of certain medical conditions. These include an enlarged prostate or problems controlling urine. It may be used after surgery on the pelvis or urinary tract. Urinary catheters are also used when the lower part of the body is paralyzed. When helping a loved one with a catheter, try to be relaxed. Caring for a catheter can be embarrassing for both of you. This may be especially true if you are caring for someone of the opposite sex. If you are calm and don't seem embarrassed, the person may feel more comfortable. How do you take care of the catheter? Wear disposable gloves when handling someone's catheter. Make sure to follow all of the instructions the doctor has given. And always wash your hands before and after you're done. Here are some other things to remember when caring for someone's catheter:  · Make sure that urine is running out of the catheter into the urine collection bag. And make sure that the catheter tubing does not get twisted or bent.   · Keep the urine collection bag below the level of the bladder. At night it may be helpful to hang the bag on the side of the bed. · Make sure that the urine collection bag does not drag and pull on the catheter. · It is okay to shower with a catheter and urine collection bag in place, unless the doctor says not to. · Check for swelling or signs of infection in the area around the catheter. Signs of infection include pus or irritated, swollen, red, or tender skin. · Clean the area around the catheter twice a day with water. Dry with a clean towel afterward. · Do not apply powder or lotion to the skin around the catheter. · Do not tug or pull on the catheter. · Sexual intercourse may still be possible for individuals who wear a catheter. It is best to talk with a doctor about options. How do you empty the bag? The urine collection bag needs to be emptied regularly. It is best to empty the bag when it's about half full or at bedtime. If the doctor has asked you to measure the amount of urine, do that before you empty the urine into the toilet. When you are ready to empty the bag, follow these steps:  1. Put on disposable gloves. 2. Remove the drain spout from its sleeve at the bottom of the collection bag. Open the valve on the spout. 3. Let the urine flow out of the bag and into the toilet or a container. Do not let the tubing or drain spout touch anything. 4. After you empty the bag, wipe off any liquid on the end of the drain spout. Close the valve and put the drain spout back into its sleeve. 5. Remove your gloves and throw them away. 6. Wash your hands with soap and water. How do you care for someone after the catheter is removed? After the catheter is taken out, the person may have trouble urinating. If this happens, try helping them sit in a few inches of warm water (sitz bath). If the urge to urinate comes during the sitz bath, it may be easier for them to urinate while still in the bath.   Some burning may happen the first few times the person urinates. If the burning lasts longer, it may be a sign of an infection. If the catheter causes irritation or a rash, wearing loose, cotton underwear may help. Watch closely for changes in the person's health, and be sure to contact their doctor if you notice any problems. Where can you learn more? Go to http://aarti-tammy.info/. Enter U349 in the search box to learn more about \"Learning About How to Care for an Indwelling Urinary Catheter. \"  Current as of: September 24, 2016  Content Version: 11.4  © 5535-4321 [employer]. Care instructions adapted under license by [employer] (which disclaims liability or warranty for this information). If you have questions about a medical condition or this instruction, always ask your healthcare professional. [address] any warranty or liability for your use of this information.

## 2018-03-13 NOTE — ED NOTES
I have reviewed discharge instructions with the patient. The patient verbalized understanding. Patient left ED via Discharge Method: ambulatory to Home with boyfriend. Opportunity for questions and clarification provided. Patient given 0 scripts. To continue your aftercare when you leave the hospital, you may receive an automated call from our care team to check in on how you are doing. This is a free service and part of our promise to provide the best care and service to meet your aftercare needs.  If you have questions, or wish to unsubscribe from this service please call [phone]. Thank you for Choosing our [facility] Emergency Department.

## 2018-03-13 NOTE — ED TRIAGE NOTES
Pt arrives to ED with c/o urinary retention. Pt states seen last night and diagnosed with UTI. States has not been able to pee since around 1 pm today. Denies any burning with urination, denies any blood. Denies vomiting, fevers.

## 2018-04-24 PROBLEM — R33.9 URINARY RETENTION: Status: ACTIVE | Noted: 2018-04-24

## 2018-04-24 PROBLEM — R31.0 GROSS HEMATURIA: Status: ACTIVE | Noted: 2018-04-24

## 2018-04-24 PROBLEM — N39.0 RECURRENT UTI: Status: ACTIVE | Noted: 2018-04-24

## 2018-05-10 ENCOUNTER — HOSPITAL ENCOUNTER (OUTPATIENT)
Dept: CT IMAGING | Age: 53
Discharge: HOME | End: 2018-05-10
Attending: NURSE PRACTITIONER
Payer: MEDICAID

## 2018-05-10 DIAGNOSIS — R31.0 GROSS HEMATURIA: ICD-10-CM

## 2018-05-10 LAB — CREAT BLD-MCNC: 0.9 MG/DL (ref 0.8–1.5)

## 2018-05-10 PROCEDURE — 74011636320 HC RX REV CODE- 636/320: Performed by: NURSE PRACTITIONER

## 2018-05-10 PROCEDURE — 82565 ASSAY OF CREATININE: CPT

## 2018-05-10 PROCEDURE — 74178 CT ABD&PLV WO CNTR FLWD CNTR: CPT

## 2018-05-10 PROCEDURE — 74011000258 HC RX REV CODE- 258: Performed by: NURSE PRACTITIONER

## 2018-05-10 RX ORDER — SODIUM CHLORIDE 0.9 % (FLUSH) 0.9 %
10 SYRINGE (ML) INJECTION
Status: COMPLETED | OUTPATIENT
Start: 2018-05-10 | End: 2018-05-10

## 2018-05-10 RX ADMIN — Medication 10 ML: at 16:14

## 2018-05-10 RX ADMIN — SODIUM CHLORIDE 100 ML: 900 INJECTION, SOLUTION INTRAVENOUS at 16:14

## 2018-05-10 RX ADMIN — IOPAMIDOL 100 ML: 755 INJECTION, SOLUTION INTRAVENOUS at 16:14

## 2018-05-11 NOTE — PROGRESS NOTES
CT showed no etiology for hematuria. Follow up for in office cystoscopy as scheduled for further evaluation.

## 2018-05-24 ENCOUNTER — HOSPITAL ENCOUNTER (OUTPATIENT)
Dept: MAMMOGRAPHY | Age: 53
Discharge: HOME | End: 2018-05-24
Attending: INTERNAL MEDICINE
Payer: MEDICAID

## 2018-05-24 DIAGNOSIS — Z12.39 SCREENING FOR BREAST CANCER: ICD-10-CM

## 2018-05-24 PROCEDURE — 77067 SCR MAMMO BI INCL CAD: CPT

## 2018-06-22 ENCOUNTER — ANESTHESIA (OUTPATIENT)
Dept: ENDOSCOPY | Age: 53
End: 2018-06-22
Payer: MEDICAID

## 2018-06-22 ENCOUNTER — HOSPITAL ENCOUNTER (OUTPATIENT)
Age: 53
Setting detail: OUTPATIENT SURGERY
Discharge: HOME | End: 2018-06-22
Attending: SURGERY | Admitting: SURGERY
Payer: MEDICAID

## 2018-06-22 ENCOUNTER — ANESTHESIA EVENT (OUTPATIENT)
Dept: ENDOSCOPY | Age: 53
End: 2018-06-22
Payer: MEDICAID

## 2018-06-22 VITALS
WEIGHT: 130 LBS | OXYGEN SATURATION: 97 % | SYSTOLIC BLOOD PRESSURE: 171 MMHG | BODY MASS INDEX: 24.55 KG/M2 | RESPIRATION RATE: 12 BRPM | TEMPERATURE: 98 F | DIASTOLIC BLOOD PRESSURE: 74 MMHG | HEART RATE: 66 BPM | HEIGHT: 61 IN

## 2018-06-22 PROCEDURE — 76060000032 HC ANESTHESIA 0.5 TO 1 HR: Performed by: SURGERY

## 2018-06-22 PROCEDURE — 74011250636 HC RX REV CODE- 250/636

## 2018-06-22 PROCEDURE — 74011000250 HC RX REV CODE- 250

## 2018-06-22 PROCEDURE — 74011250636 HC RX REV CODE- 250/636: Performed by: ANESTHESIOLOGY

## 2018-06-22 PROCEDURE — 77030013991 HC SNR POLYP ENDOSC BSC -A: Performed by: SURGERY

## 2018-06-22 PROCEDURE — 76040000025: Performed by: SURGERY

## 2018-06-22 PROCEDURE — 77030011640 HC PAD GRND REM COVD -A: Performed by: SURGERY

## 2018-06-22 PROCEDURE — 88305 TISSUE EXAM BY PATHOLOGIST: CPT | Performed by: SURGERY

## 2018-06-22 PROCEDURE — 77030009426 HC FCPS BIOP ENDOSC BSC -B: Performed by: SURGERY

## 2018-06-22 RX ORDER — PROPOFOL 10 MG/ML
INJECTION, EMULSION INTRAVENOUS
Status: DISCONTINUED | OUTPATIENT
Start: 2018-06-22 | End: 2018-06-22 | Stop reason: HOSPADM

## 2018-06-22 RX ORDER — PROPOFOL 10 MG/ML
INJECTION, EMULSION INTRAVENOUS AS NEEDED
Status: DISCONTINUED | OUTPATIENT
Start: 2018-06-22 | End: 2018-06-22 | Stop reason: HOSPADM

## 2018-06-22 RX ORDER — SODIUM CHLORIDE, SODIUM LACTATE, POTASSIUM CHLORIDE, CALCIUM CHLORIDE 600; 310; 30; 20 MG/100ML; MG/100ML; MG/100ML; MG/100ML
100 INJECTION, SOLUTION INTRAVENOUS CONTINUOUS
Status: DISCONTINUED | OUTPATIENT
Start: 2018-06-22 | End: 2018-06-22 | Stop reason: HOSPADM

## 2018-06-22 RX ORDER — SODIUM CHLORIDE 0.9 % (FLUSH) 0.9 %
5-10 SYRINGE (ML) INJECTION AS NEEDED
Status: DISCONTINUED | OUTPATIENT
Start: 2018-06-22 | End: 2018-06-22 | Stop reason: HOSPADM

## 2018-06-22 RX ADMIN — SODIUM CHLORIDE, SODIUM LACTATE, POTASSIUM CHLORIDE, AND CALCIUM CHLORIDE: 600; 310; 30; 20 INJECTION, SOLUTION INTRAVENOUS at 10:16

## 2018-06-22 RX ADMIN — PROPOFOL 200 MCG/KG/MIN: 10 INJECTION, EMULSION INTRAVENOUS at 10:46

## 2018-06-22 RX ADMIN — PROPOFOL 50 MG: 10 INJECTION, EMULSION INTRAVENOUS at 10:46

## 2018-06-22 RX ADMIN — PROPOFOL 40 MG: 10 INJECTION, EMULSION INTRAVENOUS at 10:49

## 2018-06-22 NOTE — PROCEDURES
Procedure in Detail:  Informed consent was obtained for the procedure. The patient was placed in the left lateral decubitus position and sedation was induced by anesthesia. The [id number] was inserted into the rectum and advanced under direct vision to the cecum, which was identified by the ileocecal valve and appendiceal orifice. The quality of the colonic preparation was adequate. A careful inspection was made as the colonoscope was withdrawn, including a retroflexed view of the rectum; findings and interventions are described below. Appropriate photodocumentation was obtained. Findings:   Rectum:   Normal  Sigmoid:     - Protruding lesions:     -rectosigmoid sessile Polyp(s) size 8 and 6 mm removed by polypectomy (snare cautery)  Descending Colon:     - Protruding lesions:     -Sessile Polyp(s) size 4 mm removed by polypectomy (hot biopsy)  Transverse Colon:   Normal  Ascending Colon:   Normal  Cecum:   Normal          Specimens: Specimens were collected and sent to pathology. Complications: None; patient tolerated the procedure well. \    EBL - none    Recommendations:   - Await pathology.      Signed By: Ashley Alexander MD                        June 22, 2018

## 2018-06-22 NOTE — IP AVS SNAPSHOT
[facility] 
 
 
 [address] [facility] [address] [facility] 
[phone] Patient: Lacey Davies MRN: [id number] :1965 About your hospitalization You were admitted on:  2018 You last received care in the:  [facility] You were discharged on:  2018 Why you were hospitalized Your primary diagnosis was:  Not on File Follow-up Information None Your Scheduled Appointments 2018 10:00 AM EDT  
(Arrive by 9:30 AM) New Patient with [facility]) [address] [place] [address] [facility]  
[phone] 2018 10:15 AM EDT CYSTOSCOPY with Conrado Sarah MD  
[facility] 46 (PGU [facility] [address]) [address] [facility] [address] [facility] [address]  
[phone] Discharge Orders None A check edis indicates which time of day the medication should be taken. My Medications ASK your doctor about these medications Instructions Each Dose to Equal  
 Morning Noon Evening Bedtime  
 albuterol 90 mcg/actuation inhaler Commonly known as:  PROVENTIL HFA, VENTOLIN HFA, PROAIR HFA Your last dose was: Your next dose is: Take 2 Puffs by inhalation every six (6) hours as needed for Wheezing. 2 Puff  
    
   
   
   
  
 amLODIPine 5 mg tablet Commonly known as:  Octavio Cordial Your last dose was: Your next dose is: Take 1 Tab by mouth daily. 5 mg  
    
   
   
   
  
 aspirin delayed-release 81 mg tablet Your last dose was: Your next dose is: Take 1 Tab by mouth daily. 81 mg  
    
   
   
   
  
 atorvastatin 80 mg tablet Commonly known as:  LIPITOR Your last dose was: Your next dose is:    
   
   
      
   
   
   
  
 carvedilol 12.5 mg tablet Commonly known as:  Flora Mehta Your last dose was: Your next dose is: Take 1 Tab by mouth two (2) times daily (with meals). 12.5 mg  
    
   
   
   
  
 divalproex  mg tablet Commonly known as:  DEPAKOTE Your last dose was: Your next dose is: Take 1 Tab by mouth two (2) times a day. 250 mg  
    
   
   
   
  
 estradiol 0.01 % (0.1 mg/gram) vaginal cream  
Commonly known as:  ESTRACE Your last dose was: Your next dose is:    
   
   
 1 g per vagina QHS x 2 weeks Then twice weekly  
     
   
   
   
  
 fluticasone 50 mcg/actuation nasal spray Commonly known as:  Star Serve Your last dose was: Your next dose is:    
   
   
 1 [place] by Nasal route daily. 1 Spray  
    
   
   
   
  
 gabapentin 300 mg capsule Commonly known as:  NEURONTIN Your last dose was: Your next dose is: TAKE 2 CAPSULES BY MOUTH 3 TIMES A DAY MIRALAX 17 gram/dose powder Generic drug:  polyethylene glycol Your last dose was: Your next dose is: Take 17 g by mouth as needed. 17 g  
    
   
   
   
  
 multivitamin tablet Commonly known as:  ONE A DAY Your last dose was: Your next dose is: Take 1 Tab by mouth daily. 1 Tab NITROSTAT 0.4 mg SL tablet Generic drug:  nitroglycerin Your last dose was: Your next dose is:    
   
   
 by SubLINGual route every five (5) minutes as needed. potassium chloride 10 mEq tablet Commonly known as:  KLOR-CON Your last dose was: Your next dose is: Take 1 Tab by mouth daily. 10 mEq REXULTI 1 mg Tab tablet Generic drug:  brexpiprazole Your last dose was: Your next dose is: SENNA LAXATIVE 8.6 mg tablet Generic drug:  senna Your last dose was: Your next dose is: PRN  
     
   
   
   
  
 VITAMIN B-12 500 mcg tablet Generic drug:  cyanocobalamin Your last dose was: Your next dose is: Take 1,000 mcg by mouth daily. 1000 mcg VITAMIN D3 2,000 unit Cap capsule Generic drug:  Cholecalciferol (Vitamin D3) Your last dose was: Your next dose is: Take 2,000 Units by mouth daily. 2000 Units Discharge Instructions Gastrointestinal Colonoscopy/Flexible Sigmoidoscopy - Lower Exam Discharge Instructions 1. Call Dr. Mark Abebe for any problems or questions. 2. Contact the doctors office for follow up appointment as directed 3. Medication may cause drowsiness for several hours, therefore, do not drive or operate machinery for remainder of the day. 4. No alcohol today. 5. Ordinarily, you may resume regular diet and activity after exam unless otherwise specified by your physician. 6. Because of air put into your colon during exam, you may experience some abdominal distension, relieved by the passage of gas, for several hours. 7. Contact your physician if you have any of the following: 
a. Excessive amount of bleeding  large amount when having a bowel movement. Occasional specks of blood with bowel movement would not be unusual. 
b. Severe abdominal pain 
c. Fever or Chills 8. Polyp Removal  follow these additional instructions 
a. Take Metamucil  1 tablespoon in juice every morning for 3 days, if needed. b. No Aspirin, Advil, Aleve, Nuprin, Ibuprofen, or medications that contain these drugs for 2 weeks. Any additional instructions:  
Dr. Cherelle Corona office will call you with the results of the pathology within the week. If you do not hear from the office within a week, call the office and ask about your results. Instructions given to Lashaun West and other family members. Introducing [facility]! Dear Ann Jose: 
Thank you for requesting a [employer] account. Our records indicate that you already have an active [employer] account. You can access your account anytime at [email or web address] Did you know that you can access your hospital and ER discharge instructions at any time in MegaBits? You can also review all of your test results from your hospital stay or ER visit. Additional Information If you have questions, please visit the Frequently Asked Questions section of the Mega[employer] website at [email or web address] Remember, MegaBits is NOT to be used for urgent needs. For medical emergencies, dial 911. Now available from your iPhone and Android! Introducing Archie Goldsmith As a Murdeborah White patient, I wanted to make you aware of our electronic visit tool called Archie Goldsmith. Jorge Luis [email or web address] allows you to connect within minutes with a medical provider 24 hours a day, seven days a week via a mobile device or tablet or logging into a secure website from your computer. You can access Archie Goldsmith from anywhere in the [place]. A virtual visit might be right for you when you have a simple condition and feel like you just dont want to get out of bed, or cant get away from work for an appointment, when your regular Jorge Luis White provider is not available (evenings, weekends or holidays), or when youre out of town and need minor care. Electronic visits cost only $49 and if the Jorge Luis White [employer] provider determines a prescription is needed to treat your condition, one can be electronically transmitted to a nearby pharmacy*. Please take a moment to enroll today if you have not already done so. The enrollment process is free and takes just a few minutes. To enroll, please download the Z[employer]now/Schrodinger maddy to your tablet or phone, or visit [email or web address] to enroll on your computer. And, as an [address] [place] [address] patient with a [employer] account, the results of your visits will be scanned into your electronic medical record and your primary care provider will be able to view the scanned results. We urge you to continue to see your regular [employer] provider for your ongoing medical care. And while your primary care provider may not be the one available when you seek a Archie Dobsonfin virtual visit, the peace of mind you get from getting a real diagnosis real time can be priceless. For more information on Archie OpenEdlisafin, view our Frequently Asked Questions (FAQs) at [email or web address]. Sincerely, 
 
Candace Koehler MD 
Chief Medical Officer [employer] *:  certain medications cannot be prescribed via [employer] Providers Seen During Your Hospitalization Provider Specialty Primary office phone Tami Lowe MD General Surgery [phone] Your Primary Care Physician (PCP) Primary Care Physician Office Phone Office Fax Юлия Chen, [address] [facility] [phone] You are allergic to the following Allergen Reactions Lithium Analogues Unknown (comments) Morphine (Pf) Rash Other Medication Swelling Whatever holds Vit d pill together Recent Documentation Height Weight Breastfeeding? BMI OB Status Smoking Status 1.549 m 59 kg No 24.56 kg/m2 Menopause Current Every Day Smoker Emergency Contacts Name Discharge Info Relation Home Work Mobile Zana Tobin  Friend [] [phone] Patient Belongings The following personal items are in your possession at time of discharge: 
  Dental Appliances: Uppers  Visual Aid: Glasses Please provide this summary of care documentation to your next provider.  
  
  
 
  
Signatures-by signing, you are acknowledging that this After Visit Summary has been reviewed with you and you have received a copy. Patient Signature:  ____________________________________________________________ Date:  ____________________________________________________________  
  
Kia Wale Provider Signature:  ____________________________________________________________ Date:  ____________________________________________________________

## 2018-06-22 NOTE — DISCHARGE INSTRUCTIONS
Gastrointestinal Colonoscopy/Flexible Sigmoidoscopy - Lower Exam Discharge Instructions  1. Call Dr. Jimmy Hatfield for any problems or questions. 2. Contact the doctors office for follow up appointment as directed  3. Medication may cause drowsiness for several hours, therefore, do not drive or operate machinery for remainder of the day. 4. No alcohol today. 5. Ordinarily, you may resume regular diet and activity after exam unless otherwise specified by your physician. 6. Because of air put into your colon during exam, you may experience some abdominal distension, relieved by the passage of gas, for several hours. 7. Contact your physician if you have any of the following:  a. Excessive amount of bleeding - large amount when having a bowel movement. Occasional specks of blood with bowel movement would not be unusual.  b. Severe abdominal pain  c. Fever or Chills  8. Polyp Removal - follow these additional instructions  a. Take Metamucil - 1 tablespoon in juice every morning for 3 days, if needed. b. No Aspirin, Advil, Aleve, Nuprin, Ibuprofen, or medications that contain these drugs for 2 weeks. Any additional instructions:   Dr. Harsh Lanza office will call you with the results of the pathology within the week. If you do not hear from the office within a week, call the office and ask about your results. Instructions given to Iwona Harrison and other family members.

## 2018-06-22 NOTE — ANESTHESIA PREPROCEDURE EVALUATION
Anesthetic History   No history of anesthetic complications            Review of Systems / Medical History  Patient summary reviewed and pertinent labs reviewed    Pulmonary    COPD: mild      Smoker (Current)         Neuro/Psych       CVA: no residual symptoms  Psychiatric history     Cardiovascular    Hypertension: well controlled          Pacemaker (SSS), CAD, PAD and CABG    Exercise tolerance: >4 METS  Comments: Denies CP, SOB or recent changes in functional status   GI/Hepatic/Renal       Hepatitis: type C    Liver disease     Endo/Other             Other Findings              Physical Exam    Airway  Mallampati: II  TM Distance: 4 - 6 cm  Neck ROM: normal range of motion   Mouth opening: Normal     Cardiovascular    Rhythm: regular  Rate: normal         Dental    Dentition: Full upper dentures and Full lower dentures     Pulmonary  Breath sounds clear to auscultation               Abdominal  GI exam deferred       Other Findings            Anesthetic Plan    ASA: 3  Anesthesia type: total IV anesthesia          Induction: Intravenous  Anesthetic plan and risks discussed with: Patient

## 2018-06-22 NOTE — ROUTINE PROCESS
VSS. Discharge instructions reviewed with patient and friend and copy of instructions sent home with patient. Dr. Mark Abebe spoke with patient and friend prior to discharge. Questions answered. Discharged via car, wheeled out by [employer], volunteer. IV discontinued prior to discharge.      Personal items with patient at discharge: clothing, denture

## 2018-06-22 NOTE — ANESTHESIA POSTPROCEDURE EVALUATION
Post-Anesthesia Evaluation and Assessment    Patient: Jayme Root MRN: [id number]  SSN: [id number]    YOB: 1965  Age: 50 y.o. Sex: female       Cardiovascular Function/Vital Signs  Visit Vitals    BP 95/55    Pulse 68    Temp 36.7 °C (98 °F)    Resp 12    Ht 5' 1\" (1.549 m)    Wt 59 kg (130 lb)    SpO2 98%    Breastfeeding No    BMI 24.56 kg/m2       Patient is status post total IV anesthesia anesthesia for Procedure(s):  COLONOSCOPY/ 25  ENDOSCOPIC POLYPECTOMY. Nausea/Vomiting: None    Postoperative hydration reviewed and adequate. Pain:  Pain Scale 1: Numeric (0 - 10) (06/22/18 0932)  Pain Intensity 1: 6 (06/22/18 0932)   Managed    Neurological Status: At baseline    Mental Status and Level of Consciousness: Arousable    Pulmonary Status:   Room air  Adequate oxygenation and airway patent    Complications related to anesthesia: None    Post-anesthesia assessment completed.  No concerns    Signed By: Payton Pereira MD     June 22, 2018

## 2018-06-22 NOTE — H&P
History and Physical      Patient: Tali Freeman PWQCI    Physician: Tess Otero MD    Referring Physician: Juliocesar Alvarez MD    Chief Complaint: For colonoscopy    History of Present Illness: Pt presents for colonoscopy. She is known from appendectomy 10/2017. This is first screening exam.    History:  Past Medical History:   Diagnosis Date    Abnormal MRI of head     Frontal Lobe periventricular WM disease.  Ruled out for MS by Neurology    ASCUS with positive high risk HPV cervical     Bipolar disorder ([address]     Borderline personality disorder     CAD     Cerebrovascular disease     Chronic diastolic congestive heart failure (HCC)     Chronic hepatitis C (HCC)     Treated, in remission    Chronic pain     COPD (chronic obstructive pulmonary disease) (HCC)     Moderate    Dyslipidemia     HTN     IBS (irritable bowel syndrome)     Pacemaker     Sick Sinus Syndrome    S/P CABG x 3     Subclavian artery stenosis, right ([address] 2016    Moderate to severe    Vertebral artery stenosis 2016    Asymptomatic Left Severe     Past Surgical History:   Procedure Laterality Date    APPENDECTOMY      HX  SECTION  ,     Abruption, CPD    HX CORONARY ARTERY BYPASS GRAFT  2011    3 vessel    HX HEART CATHETERIZATION      2012, 2012-occluded grafts    HX HERNIA REPAIR      HX ORTHOPAEDIC      left leg    HX PACEMAKER      HX TUBAL LIGATION        Social History     Social History    Marital status:      Spouse name: N/A    Number of children: 2    Years of education: N/A     Occupational History    Disabled      Social History Main Topics    Smoking status: Current Every Day Smoker     Packs/day: 0.50     Years: 32.00     Types: Cigarettes    Smokeless tobacco: Never Used    Alcohol use 0.0 oz/week      Comment: seldom    Drug use: No      Comment: cocaine addict - clean x 5 months    Sexual activity: Yes     Partners: Male     Other Topics Concern    None Social History Narrative    Lives alone. 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 Adopted son. Her boyfriend is [place]. Family History   Problem Relation Age of Onset   [facility] Cancer Mother      CAD, Lung cancer    Hypertension Father      DM, CAD    Other Sister     Hypertension Brother     Other Son      Adopted. Medications:   Prior to Admission medications    Medication Sig Start Date End Date Taking? Authorizing Provider   potassium chloride (KLOR-CON) 10 mEq tablet Take 1 Tab by mouth daily. 5/24/18  Yes Hannah Marshall MD   gabapentin (NEURONTIN) 300 mg capsule TAKE 2 CAPSULES BY MOUTH 3 TIMES A DAY 5/3/18  Yes Hannah Marshall MD   divalproex DR (DEPAKOTE) 250 mg tablet Take 1 Tab by mouth two (2) times a day. 5/1/18  Yes Malia Coats MD   albuterol (PROVENTIL HFA, VENTOLIN HFA, PROAIR HFA) 90 mcg/actuation inhaler Take 2 Puffs by inhalation every six (6) hours as needed for Wheezing. 4/3/18  Yes Hannah Marshall MD   atorvastatin (LIPITOR) 80 mg tablet  2/5/18  Yes Historical Provider   REXULTI 1 mg tab tablet  2/6/18  Yes Historical Provider   estradiol (ESTRACE) 0.01 % (0.1 mg/gram) vaginal cream 1 g per vagina QHS x 2 weeks Then twice weekly 1/11/18  Yes Historical Provider   polyethylene glycol (MIRALAX) 17 gram/dose powder Take 17 g by mouth as needed. Yes Historical Provider   amLODIPine (NORVASC) 5 mg tablet Take 1 Tab by mouth daily. 12/13/17  Yes Hanh Duran MD   carvedilol (COREG) 12.5 mg tablet Take 1 Tab by mouth two (2) times daily (with meals). 12/4/17  Yes Hanh Duran MD   cyanocobalamin (VITAMIN B-12) 500 mcg tablet Take 1,000 mcg by mouth daily. Yes Historical Provider   multivitamin (ONE A DAY) tablet Take 1 Tab by mouth daily. Yes Historical Provider   Cholecalciferol, Vitamin D3, (VITAMIN D3) 2,000 unit cap capsule Take 2,000 Units by mouth daily. Yes Historical Provider   aspirin delayed-release 81 mg tablet Take 1 Tab by mouth daily. 7/18/11  Yes MD EARLE Nguyen LAXATIVE 8.6 mg tablet PRN 5/30/17   Historical Provider   fluticasone (FLONASE) 50 mcg/actuation nasal spray 1 [place] by Nasal route daily. 3/7/16   Historical Provider   nitroglycerin (NITROSTAT) 0.4 mg SL tablet by SubLINGual route every five (5) minutes as needed. Historical Provider       Allergies: Allergies   Allergen Reactions    Lithium Analogues Unknown (comments)    Morphine (Pf) Rash    Other Medication Swelling     Whatever holds Vit d pill together       Physical Exam:     Vital Signs:   Visit Vitals    /71    Pulse 64    Temp 98 °F (36.7 °C)    Resp 16    Ht 5' 1\" (1.549 m)    Wt 130 lb (59 kg)    SpO2 94%    Breastfeeding No    BMI 24.56 kg/m2     . General: no distress      Heart: regular   Lungs: unlabored   Abdominal: soft   Neurological: Grossly normal        Findings/Diagnosis: Screening for colorectal cancer     Plan of Care/Planned Procedure: Colonoscopy, possible polypectomy. Pt/designee agree to proceed.       Signed:  Eddie Brar MD   6/22/2018

## 2018-07-03 ENCOUNTER — HOSPITAL ENCOUNTER (OUTPATIENT)
Dept: GENERAL RADIOLOGY | Age: 53
Discharge: HOME | End: 2018-07-03
Payer: MEDICAID

## 2018-07-03 DIAGNOSIS — J44.9 CHRONIC OBSTRUCTIVE PULMONARY DISEASE, UNSPECIFIED COPD TYPE (HCC): ICD-10-CM

## 2018-07-03 PROBLEM — F17.210 CIGARETTE NICOTINE DEPENDENCE WITHOUT COMPLICATION: Status: ACTIVE | Noted: 2018-07-03

## 2018-07-03 PROCEDURE — 71046 X-RAY EXAM CHEST 2 VIEWS: CPT

## 2018-10-03 ENCOUNTER — HOSPITAL ENCOUNTER (OUTPATIENT)
Dept: MAMMOGRAPHY | Age: 53
Discharge: HOME | End: 2018-10-03
Attending: INTERNAL MEDICINE
Payer: MEDICAID

## 2018-10-03 DIAGNOSIS — N95.9 MENOPAUSAL AND POSTMENOPAUSAL DISORDER: ICD-10-CM

## 2018-10-03 PROCEDURE — 77080 DXA BONE DENSITY AXIAL: CPT

## 2019-01-01 ENCOUNTER — APPOINTMENT (OUTPATIENT)
Dept: GENERAL RADIOLOGY | Age: 54
End: 2019-01-01
Attending: INTERNAL MEDICINE
Payer: MEDICAID

## 2019-01-01 ENCOUNTER — HOSPITAL ENCOUNTER (EMERGENCY)
Age: 54
Discharge: HOME | End: 2019-12-06
Attending: EMERGENCY MEDICINE
Payer: MEDICAID

## 2019-01-01 ENCOUNTER — HOSPITAL ENCOUNTER (OUTPATIENT)
Age: 54
Setting detail: OBSERVATION
LOS: 1 days | Discharge: HOME | End: 2019-12-07
Attending: INTERNAL MEDICINE | Admitting: INTERNAL MEDICINE
Payer: MEDICAID

## 2019-01-01 ENCOUNTER — APPOINTMENT (OUTPATIENT)
Dept: GENERAL RADIOLOGY | Age: 54
End: 2019-01-01
Attending: EMERGENCY MEDICINE
Payer: MEDICAID

## 2019-01-01 VITALS
HEIGHT: 63 IN | BODY MASS INDEX: 17.54 KG/M2 | DIASTOLIC BLOOD PRESSURE: 99 MMHG | SYSTOLIC BLOOD PRESSURE: 160 MMHG | TEMPERATURE: 98 F | RESPIRATION RATE: 16 BRPM | HEART RATE: 72 BPM | WEIGHT: 99 LBS | OXYGEN SATURATION: 97 %

## 2019-01-01 VITALS
TEMPERATURE: 100.4 F | OXYGEN SATURATION: 95 % | BODY MASS INDEX: 16.7 KG/M2 | RESPIRATION RATE: 20 BRPM | WEIGHT: 94.3 LBS | HEART RATE: 75 BPM | DIASTOLIC BLOOD PRESSURE: 72 MMHG | SYSTOLIC BLOOD PRESSURE: 118 MMHG

## 2019-01-01 DIAGNOSIS — R07.89 ATYPICAL CHEST PAIN: Primary | ICD-10-CM

## 2019-01-01 DIAGNOSIS — J44.9 COPD, MODERATE (HCC): Chronic | ICD-10-CM

## 2019-01-01 DIAGNOSIS — I20.0 UNSTABLE ANGINA PECTORIS (HCC): ICD-10-CM

## 2019-01-01 DIAGNOSIS — I25.10 CORONARY ARTERY DISEASE INVOLVING NATIVE CORONARY ARTERY OF NATIVE HEART, ANGINA PRESENCE UNSPECIFIED: ICD-10-CM

## 2019-01-01 LAB
ALBUMIN SERPL-MCNC: 3.8 G/DL (ref 3.5–5)
ALBUMIN/GLOB SERPL: 0.8 {RATIO} (ref 1.2–3.5)
ALP SERPL-CCNC: 77 U/L (ref 50–130)
ALT SERPL-CCNC: 13 U/L (ref 12–65)
AMPHET UR QL SCN: POSITIVE
ANION GAP SERPL CALC-SCNC: 7 MMOL/L (ref 7–16)
ANION GAP SERPL CALC-SCNC: 8 MMOL/L (ref 7–16)
APTT PPP: 54.1 SEC (ref 24.7–39.8)
AST SERPL-CCNC: 21 U/L (ref 15–37)
ATRIAL RATE: 64 BPM
ATRIAL RATE: 69 BPM
ATRIAL RATE: 70 BPM
BARBITURATES UR QL SCN: NEGATIVE
BASOPHILS # BLD: 0.1 K/UL (ref 0–0.2)
BASOPHILS NFR BLD: 0 % (ref 0–2)
BENZODIAZ UR QL: NEGATIVE
BILIRUB SERPL-MCNC: 0.8 MG/DL (ref 0.2–1.1)
BUN SERPL-MCNC: 14 MG/DL (ref 6–23)
BUN SERPL-MCNC: 14 MG/DL (ref 6–23)
CALCIUM SERPL-MCNC: 10.1 MG/DL (ref 8.3–10.4)
CALCIUM SERPL-MCNC: 8.3 MG/DL (ref 8.3–10.4)
CALCULATED P AXIS, ECG09: 55 DEGREES
CALCULATED P AXIS, ECG09: 66 DEGREES
CALCULATED P AXIS, ECG09: 71 DEGREES
CALCULATED R AXIS, ECG10: -15 DEGREES
CALCULATED R AXIS, ECG10: -26 DEGREES
CALCULATED R AXIS, ECG10: -6 DEGREES
CALCULATED T AXIS, ECG11: -130 DEGREES
CALCULATED T AXIS, ECG11: -137 DEGREES
CALCULATED T AXIS, ECG11: -153 DEGREES
CANNABINOIDS UR QL SCN: NEGATIVE
CHLORIDE SERPL-SCNC: 103 MMOL/L (ref 98–107)
CHLORIDE SERPL-SCNC: 108 MMOL/L (ref 98–107)
CHOLEST SERPL-MCNC: 76 MG/DL
CO2 SERPL-SCNC: 25 MMOL/L (ref 21–32)
CO2 SERPL-SCNC: 25 MMOL/L (ref 21–32)
COCAINE UR QL SCN: POSITIVE
CREAT SERPL-MCNC: 1.15 MG/DL (ref 0.6–1)
CREAT SERPL-MCNC: 1.54 MG/DL (ref 0.6–1)
D DIMER PPP FEU-MCNC: 0.58 UG/ML(FEU)
DIAGNOSIS, 93000: NORMAL
DIFFERENTIAL METHOD BLD: ABNORMAL
EOSINOPHIL # BLD: 0.2 K/UL (ref 0–0.8)
EOSINOPHIL NFR BLD: 1 % (ref 0.5–7.8)
ERYTHROCYTE [DISTWIDTH] IN BLOOD BY AUTOMATED COUNT: 12.3 % (ref 11.9–14.6)
ERYTHROCYTE [DISTWIDTH] IN BLOOD BY AUTOMATED COUNT: 12.3 % (ref 11.9–14.6)
GLOBULIN SER CALC-MCNC: 5 G/DL (ref 2.3–3.5)
GLUCOSE SERPL-MCNC: 111 MG/DL (ref 65–100)
GLUCOSE SERPL-MCNC: 98 MG/DL (ref 65–100)
HCT VFR BLD AUTO: 31.5 % (ref 35.8–46.3)
HCT VFR BLD AUTO: 39.8 % (ref 35.8–46.3)
HDLC SERPL-MCNC: 34 MG/DL (ref 40–60)
HDLC SERPL: 2.2 {RATIO}
HGB BLD-MCNC: 11.1 G/DL (ref 11.7–15.4)
HGB BLD-MCNC: 13.5 G/DL (ref 11.7–15.4)
IMM GRANULOCYTES # BLD AUTO: 0 K/UL (ref 0–0.5)
IMM GRANULOCYTES NFR BLD AUTO: 0 % (ref 0–5)
LDLC SERPL CALC-MCNC: 31.8 MG/DL
LIPID PROFILE,FLP: ABNORMAL
LYMPHOCYTES # BLD: 2.8 K/UL (ref 0.5–4.6)
LYMPHOCYTES NFR BLD: 23 % (ref 13–44)
MAGNESIUM SERPL-MCNC: 1.3 MG/DL (ref 1.8–2.4)
MCH RBC QN AUTO: 30.5 PG (ref 26.1–32.9)
MCH RBC QN AUTO: 31.9 PG (ref 26.1–32.9)
MCHC RBC AUTO-ENTMCNC: 33.9 G/DL (ref 31.4–35)
MCHC RBC AUTO-ENTMCNC: 35.2 G/DL (ref 31.4–35)
MCV RBC AUTO: 90 FL (ref 79.6–97.8)
MCV RBC AUTO: 90.5 FL (ref 79.6–97.8)
METHADONE UR QL: NEGATIVE
MONOCYTES # BLD: 0.8 K/UL (ref 0.1–1.3)
MONOCYTES NFR BLD: 7 % (ref 4–12)
NEUTS SEG # BLD: 8.4 K/UL (ref 1.7–8.2)
NEUTS SEG NFR BLD: 69 % (ref 43–78)
NRBC # BLD: 0 K/UL (ref 0–0.2)
NRBC # BLD: 0 K/UL (ref 0–0.2)
OPIATES UR QL: NEGATIVE
P-R INTERVAL, ECG05: 130 MS
P-R INTERVAL, ECG05: 138 MS
P-R INTERVAL, ECG05: 140 MS
PCP UR QL: NEGATIVE
PLATELET # BLD AUTO: 407 K/UL (ref 150–450)
PLATELET # BLD AUTO: 444 K/UL (ref 150–450)
PMV BLD AUTO: 8.7 FL (ref 9.4–12.3)
PMV BLD AUTO: 9.1 FL (ref 9.4–12.3)
POTASSIUM SERPL-SCNC: 3.1 MMOL/L (ref 3.5–5.1)
POTASSIUM SERPL-SCNC: 3.3 MMOL/L (ref 3.5–5.1)
PROT SERPL-MCNC: 8.8 G/DL (ref 6.3–8.2)
Q-T INTERVAL, ECG07: 374 MS
Q-T INTERVAL, ECG07: 440 MS
Q-T INTERVAL, ECG07: 444 MS
QRS DURATION, ECG06: 86 MS
QRS DURATION, ECG06: 90 MS
QRS DURATION, ECG06: 96 MS
QTC CALCULATION (BEZET), ECG08: 403 MS
QTC CALCULATION (BEZET), ECG08: 458 MS
QTC CALCULATION (BEZET), ECG08: 471 MS
RBC # BLD AUTO: 3.48 M/UL (ref 4.05–5.2)
RBC # BLD AUTO: 4.42 M/UL (ref 4.05–5.2)
SODIUM SERPL-SCNC: 135 MMOL/L (ref 136–145)
SODIUM SERPL-SCNC: 141 MMOL/L (ref 136–145)
TRIGL SERPL-MCNC: 51 MG/DL (ref 35–150)
TROPONIN I BLD-MCNC: 0 NG/ML (ref 0.02–0.05)
TROPONIN I BLD-MCNC: 0 NG/ML (ref 0.02–0.05)
TROPONIN I SERPL-MCNC: <0.02 NG/ML (ref 0.02–0.05)
TROPONIN I SERPL-MCNC: <0.02 NG/ML (ref 0.02–0.05)
VENTRICULAR RATE, ECG03: 64 BPM
VENTRICULAR RATE, ECG03: 69 BPM
VENTRICULAR RATE, ECG03: 70 BPM
VLDLC SERPL CALC-MCNC: 10.2 MG/DL (ref 6–23)
WBC # BLD AUTO: 11.7 K/UL (ref 4.3–11.1)
WBC # BLD AUTO: 12.2 K/UL (ref 4.3–11.1)

## 2019-01-01 PROCEDURE — 80053 COMPREHEN METABOLIC PANEL: CPT

## 2019-01-01 PROCEDURE — 74011250637 HC RX REV CODE- 250/637: Performed by: NURSE PRACTITIONER

## 2019-01-01 PROCEDURE — 99218 HC RM OBSERVATION: CPT

## 2019-01-01 PROCEDURE — 77030004559 HC CATH ANGI DX SUPT CARD -B

## 2019-01-01 PROCEDURE — 94640 AIRWAY INHALATION TREATMENT: CPT

## 2019-01-01 PROCEDURE — 74011250636 HC RX REV CODE- 250/636: Performed by: INTERNAL MEDICINE

## 2019-01-01 PROCEDURE — 84484 ASSAY OF TROPONIN QUANT: CPT

## 2019-01-01 PROCEDURE — 74011636320 HC RX REV CODE- 636/320: Performed by: INTERNAL MEDICINE

## 2019-01-01 PROCEDURE — 36556 INSERT NON-TUNNEL CV CATH: CPT | Performed by: INTERNAL MEDICINE

## 2019-01-01 PROCEDURE — 99214 OFFICE O/P EST MOD 30 MIN: CPT | Performed by: INTERNAL MEDICINE

## 2019-01-01 PROCEDURE — 83735 ASSAY OF MAGNESIUM: CPT

## 2019-01-01 PROCEDURE — 74011250636 HC RX REV CODE- 250/636: Performed by: EMERGENCY MEDICINE

## 2019-01-01 PROCEDURE — 93005 ELECTROCARDIOGRAM TRACING: CPT | Performed by: NURSE PRACTITIONER

## 2019-01-01 PROCEDURE — 80307 DRUG TEST PRSMV CHEM ANLYZR: CPT

## 2019-01-01 PROCEDURE — C1760 CLOSURE DEV, VASC: HCPCS

## 2019-01-01 PROCEDURE — 74011000250 HC RX REV CODE- 250: Performed by: INTERNAL MEDICINE

## 2019-01-01 PROCEDURE — C1894 INTRO/SHEATH, NON-LASER: HCPCS

## 2019-01-01 PROCEDURE — 99153 MOD SED SAME PHYS/QHP EA: CPT

## 2019-01-01 PROCEDURE — 85025 COMPLETE CBC W/AUTO DIFF WBC: CPT

## 2019-01-01 PROCEDURE — 96374 THER/PROPH/DIAG INJ IV PUSH: CPT | Performed by: EMERGENCY MEDICINE

## 2019-01-01 PROCEDURE — 93005 ELECTROCARDIOGRAM TRACING: CPT | Performed by: EMERGENCY MEDICINE

## 2019-01-01 PROCEDURE — 80061 LIPID PANEL: CPT

## 2019-01-01 PROCEDURE — 71045 X-RAY EXAM CHEST 1 VIEW: CPT

## 2019-01-01 PROCEDURE — C1751 CATH, INF, PER/CENT/MIDLINE: HCPCS

## 2019-01-01 PROCEDURE — 74011000250 HC RX REV CODE- 250: Performed by: NURSE PRACTITIONER

## 2019-01-01 PROCEDURE — 36591 DRAW BLOOD OFF VENOUS DEVICE: CPT

## 2019-01-01 PROCEDURE — 85027 COMPLETE CBC AUTOMATED: CPT

## 2019-01-01 PROCEDURE — 99152 MOD SED SAME PHYS/QHP 5/>YRS: CPT

## 2019-01-01 PROCEDURE — 85730 THROMBOPLASTIN TIME PARTIAL: CPT

## 2019-01-01 PROCEDURE — 85379 FIBRIN DEGRADATION QUANT: CPT

## 2019-01-01 PROCEDURE — 96361 HYDRATE IV INFUSION ADD-ON: CPT | Performed by: EMERGENCY MEDICINE

## 2019-01-01 PROCEDURE — 77030004558 HC CATH ANGI DX SUPR TORQ CARD -A

## 2019-01-01 PROCEDURE — 74011250637 HC RX REV CODE- 250/637: Performed by: EMERGENCY MEDICINE

## 2019-01-01 PROCEDURE — 74011250636 HC RX REV CODE- 250/636: Performed by: NURSE PRACTITIONER

## 2019-01-01 PROCEDURE — 96375 TX/PRO/DX INJ NEW DRUG ADDON: CPT | Performed by: EMERGENCY MEDICINE

## 2019-01-01 PROCEDURE — 74011250637 HC RX REV CODE- 250/637: Performed by: INTERNAL MEDICINE

## 2019-01-01 PROCEDURE — 99285 EMERGENCY DEPT VISIT HI MDM: CPT | Performed by: EMERGENCY MEDICINE

## 2019-01-01 PROCEDURE — 77030020263 HC SOL INJ SOD CL0.9% LFCR 1000ML

## 2019-01-01 PROCEDURE — 94760 N-INVAS EAR/PLS OXIMETRY 1: CPT

## 2019-01-01 PROCEDURE — 93459 L HRT ART/GRFT ANGIO: CPT

## 2019-01-01 PROCEDURE — 80048 BASIC METABOLIC PNL TOTAL CA: CPT

## 2019-01-01 PROCEDURE — 77030013687 HC GD NDL BARD -B

## 2019-01-01 RX ORDER — POTASSIUM CHLORIDE 20 MEQ/1
40 TABLET, EXTENDED RELEASE ORAL
Status: COMPLETED | OUTPATIENT
Start: 2019-01-01 | End: 2019-01-01

## 2019-01-01 RX ORDER — CARVEDILOL 6.25 MG/1
6.25 TABLET ORAL 2 TIMES DAILY WITH MEALS
Status: DISCONTINUED | OUTPATIENT
Start: 2019-01-01 | End: 2019-01-01 | Stop reason: HOSPADM

## 2019-01-01 RX ORDER — ACETAMINOPHEN 325 MG/1
650 TABLET ORAL
Status: DISCONTINUED | OUTPATIENT
Start: 2019-01-01 | End: 2019-01-01 | Stop reason: HOSPADM

## 2019-01-01 RX ORDER — SODIUM CHLORIDE 9 MG/ML
75 INJECTION, SOLUTION INTRAVENOUS CONTINUOUS
Status: DISCONTINUED | OUTPATIENT
Start: 2019-01-01 | End: 2019-01-01

## 2019-01-01 RX ORDER — POTASSIUM CHLORIDE 20 MEQ/1
40 TABLET, EXTENDED RELEASE ORAL
Status: DISCONTINUED | OUTPATIENT
Start: 2019-01-01 | End: 2019-01-01 | Stop reason: HOSPADM

## 2019-01-01 RX ORDER — SODIUM CHLORIDE 0.9 % (FLUSH) 0.9 %
5-40 SYRINGE (ML) INJECTION EVERY 8 HOURS
Status: DISCONTINUED | OUTPATIENT
Start: 2019-01-01 | End: 2019-01-01 | Stop reason: HOSPADM

## 2019-01-01 RX ORDER — IPRATROPIUM BROMIDE AND ALBUTEROL SULFATE 2.5; .5 MG/3ML; MG/3ML
3 SOLUTION RESPIRATORY (INHALATION)
Status: DISCONTINUED | OUTPATIENT
Start: 2019-01-01 | End: 2019-01-01 | Stop reason: HOSPADM

## 2019-01-01 RX ORDER — MAGNESIUM SULFATE HEPTAHYDRATE 40 MG/ML
2 INJECTION, SOLUTION INTRAVENOUS ONCE
Status: COMPLETED | OUTPATIENT
Start: 2019-01-01 | End: 2019-01-01

## 2019-01-01 RX ORDER — NALOXONE HYDROCHLORIDE 0.4 MG/ML
0.4 INJECTION, SOLUTION INTRAMUSCULAR; INTRAVENOUS; SUBCUTANEOUS AS NEEDED
Status: DISCONTINUED | OUTPATIENT
Start: 2019-01-01 | End: 2019-01-01 | Stop reason: HOSPADM

## 2019-01-01 RX ORDER — HEPARIN SODIUM 5000 [USP'U]/ML
4000 INJECTION, SOLUTION INTRAVENOUS; SUBCUTANEOUS ONCE
Status: COMPLETED | OUTPATIENT
Start: 2019-01-01 | End: 2019-01-01

## 2019-01-01 RX ORDER — SODIUM CHLORIDE 0.9 % (FLUSH) 0.9 %
5-40 SYRINGE (ML) INJECTION AS NEEDED
Status: DISCONTINUED | OUTPATIENT
Start: 2019-01-01 | End: 2019-01-01 | Stop reason: HOSPADM

## 2019-01-01 RX ORDER — NITROGLYCERIN 0.4 MG/1
0.4 TABLET SUBLINGUAL
Status: DISCONTINUED | OUTPATIENT
Start: 2019-01-01 | End: 2019-01-01 | Stop reason: HOSPADM

## 2019-01-01 RX ORDER — HEPARIN SODIUM 5000 [USP'U]/100ML
12-25 INJECTION, SOLUTION INTRAVENOUS
Status: DISCONTINUED | OUTPATIENT
Start: 2019-01-01 | End: 2019-01-01 | Stop reason: ALTCHOICE

## 2019-01-01 RX ORDER — ATORVASTATIN CALCIUM 40 MG/1
80 TABLET, FILM COATED ORAL
Status: DISCONTINUED | OUTPATIENT
Start: 2019-01-01 | End: 2019-01-01

## 2019-01-01 RX ORDER — SODIUM CHLORIDE 9 MG/ML
250 INJECTION, SOLUTION INTRAVENOUS CONTINUOUS
Status: DISPENSED | OUTPATIENT
Start: 2019-01-01 | End: 2019-01-01

## 2019-01-01 RX ORDER — POTASSIUM CHLORIDE 750 MG/1
10 TABLET, EXTENDED RELEASE ORAL 2 TIMES DAILY
Status: DISCONTINUED | OUTPATIENT
Start: 2019-01-01 | End: 2019-01-01 | Stop reason: HOSPADM

## 2019-01-01 RX ORDER — LORAZEPAM 1 MG/1
1 TABLET ORAL
Status: DISCONTINUED | OUTPATIENT
Start: 2019-01-01 | End: 2019-01-01 | Stop reason: HOSPADM

## 2019-01-01 RX ORDER — MIDAZOLAM HYDROCHLORIDE 1 MG/ML
.5-2 INJECTION, SOLUTION INTRAMUSCULAR; INTRAVENOUS
Status: DISCONTINUED | OUTPATIENT
Start: 2019-01-01 | End: 2019-01-01 | Stop reason: HOSPADM

## 2019-01-01 RX ORDER — NALBUPHINE HYDROCHLORIDE 10 MG/ML
10 INJECTION, SOLUTION INTRAMUSCULAR; INTRAVENOUS; SUBCUTANEOUS
Status: COMPLETED | OUTPATIENT
Start: 2019-01-01 | End: 2019-01-01

## 2019-01-01 RX ORDER — ASPIRIN 81 MG/1
81 TABLET ORAL DAILY
Status: DISCONTINUED | OUTPATIENT
Start: 2019-01-01 | End: 2019-01-01 | Stop reason: HOSPADM

## 2019-01-01 RX ORDER — SODIUM CHLORIDE 9 MG/ML
75 INJECTION, SOLUTION INTRAVENOUS CONTINUOUS
Status: DISCONTINUED | OUTPATIENT
Start: 2019-01-01 | End: 2019-01-01 | Stop reason: HOSPADM

## 2019-01-01 RX ORDER — LORATADINE 10 MG/1
10 TABLET ORAL DAILY
Status: DISCONTINUED | OUTPATIENT
Start: 2019-01-01 | End: 2019-01-01 | Stop reason: HOSPADM

## 2019-01-01 RX ORDER — VARENICLINE TARTRATE 1 MG/1
.5-1 TABLET, FILM COATED ORAL 2 TIMES DAILY
Status: DISCONTINUED | OUTPATIENT
Start: 2019-01-01 | End: 2019-01-01 | Stop reason: HOSPADM

## 2019-01-01 RX ORDER — HEPARIN SODIUM 200 [USP'U]/100ML
3 INJECTION, SOLUTION INTRAVENOUS CONTINUOUS
Status: DISCONTINUED | OUTPATIENT
Start: 2019-01-01 | End: 2019-01-01 | Stop reason: HOSPADM

## 2019-01-01 RX ORDER — ONDANSETRON 2 MG/ML
4 INJECTION INTRAMUSCULAR; INTRAVENOUS
Status: COMPLETED | OUTPATIENT
Start: 2019-01-01 | End: 2019-01-01

## 2019-01-01 RX ORDER — LIDOCAINE HYDROCHLORIDE 10 MG/ML
10-30 INJECTION INFILTRATION; PERINEURAL ONCE
Status: COMPLETED | OUTPATIENT
Start: 2019-01-01 | End: 2019-01-01

## 2019-01-01 RX ORDER — ONDANSETRON 2 MG/ML
4 INJECTION INTRAMUSCULAR; INTRAVENOUS
Status: DISCONTINUED | OUTPATIENT
Start: 2019-01-01 | End: 2019-01-01 | Stop reason: HOSPADM

## 2019-01-01 RX ORDER — GUAIFENESIN 100 MG/5ML
324 LIQUID (ML) ORAL
Status: COMPLETED | OUTPATIENT
Start: 2019-01-01 | End: 2019-01-01

## 2019-01-01 RX ORDER — MAG HYDROX/ALUMINUM HYD/SIMETH 200-200-20
30 SUSPENSION, ORAL (FINAL DOSE FORM) ORAL
Status: DISCONTINUED | OUTPATIENT
Start: 2019-01-01 | End: 2019-01-01 | Stop reason: HOSPADM

## 2019-01-01 RX ORDER — ATORVASTATIN CALCIUM 40 MG/1
80 TABLET, FILM COATED ORAL DAILY
Status: DISCONTINUED | OUTPATIENT
Start: 2019-01-01 | End: 2019-01-01 | Stop reason: HOSPADM

## 2019-01-01 RX ORDER — AMLODIPINE BESYLATE 5 MG/1
5 TABLET ORAL DAILY
Status: DISCONTINUED | OUTPATIENT
Start: 2019-01-01 | End: 2019-01-01 | Stop reason: HOSPADM

## 2019-01-01 RX ORDER — DIPHENHYDRAMINE HCL 25 MG
25 CAPSULE ORAL
Status: DISCONTINUED | OUTPATIENT
Start: 2019-01-01 | End: 2019-01-01 | Stop reason: HOSPADM

## 2019-01-01 RX ORDER — HYDROMORPHONE HYDROCHLORIDE 1 MG/ML
0.5 INJECTION, SOLUTION INTRAMUSCULAR; INTRAVENOUS; SUBCUTANEOUS
Status: DISCONTINUED | OUTPATIENT
Start: 2019-01-01 | End: 2019-01-01

## 2019-01-01 RX ORDER — FENTANYL CITRATE 50 UG/ML
25-75 INJECTION, SOLUTION INTRAMUSCULAR; INTRAVENOUS
Status: DISCONTINUED | OUTPATIENT
Start: 2019-01-01 | End: 2019-01-01 | Stop reason: HOSPADM

## 2019-01-01 RX ADMIN — MAGNESIUM SULFATE HEPTAHYDRATE 2 G: 40 INJECTION, SOLUTION INTRAVENOUS at 04:34

## 2019-01-01 RX ADMIN — ACETAMINOPHEN 650 MG: 325 TABLET, FILM COATED ORAL at 02:52

## 2019-01-01 RX ADMIN — HYDROMORPHONE HYDROCHLORIDE 0.5 MG: 1 INJECTION, SOLUTION INTRAMUSCULAR; INTRAVENOUS; SUBCUTANEOUS at 08:06

## 2019-01-01 RX ADMIN — SODIUM CHLORIDE 75 ML/HR: 900 INJECTION, SOLUTION INTRAVENOUS at 11:55

## 2019-01-01 RX ADMIN — CARVEDILOL 6.25 MG: 6.25 TABLET, FILM COATED ORAL at 08:13

## 2019-01-01 RX ADMIN — HEPARIN SODIUM 4000 UNITS: 5000 INJECTION INTRAVENOUS; SUBCUTANEOUS at 01:26

## 2019-01-01 RX ADMIN — Medication 10 ML: at 08:19

## 2019-01-01 RX ADMIN — MIDAZOLAM 2 MG: 1 INJECTION INTRAMUSCULAR; INTRAVENOUS at 11:24

## 2019-01-01 RX ADMIN — HYDROMORPHONE HYDROCHLORIDE 0.5 MG: 1 INJECTION, SOLUTION INTRAMUSCULAR; INTRAVENOUS; SUBCUTANEOUS at 03:38

## 2019-01-01 RX ADMIN — POTASSIUM CHLORIDE 40 MEQ: 20 TABLET, EXTENDED RELEASE ORAL at 01:01

## 2019-01-01 RX ADMIN — IOPAMIDOL 80 ML: 755 INJECTION, SOLUTION INTRAVENOUS at 12:05

## 2019-01-01 RX ADMIN — SODIUM CHLORIDE 75 ML/HR: 900 INJECTION, SOLUTION INTRAVENOUS at 01:29

## 2019-01-01 RX ADMIN — Medication 10 ML: at 08:18

## 2019-01-01 RX ADMIN — HEPARIN SODIUM 3 UNITS/HR: 200 INJECTION, SOLUTION INTRAVENOUS at 11:15

## 2019-01-01 RX ADMIN — IPRATROPIUM BROMIDE AND ALBUTEROL SULFATE 3 ML: .5; 3 SOLUTION RESPIRATORY (INHALATION) at 08:17

## 2019-01-01 RX ADMIN — LIDOCAINE HYDROCHLORIDE 7 ML: 10 INJECTION, SOLUTION INFILTRATION; PERINEURAL at 11:51

## 2019-01-01 RX ADMIN — Medication 10 ML: at 01:06

## 2019-01-01 RX ADMIN — SODIUM CHLORIDE 250 ML/HR: 900 INJECTION, SOLUTION INTRAVENOUS at 12:44

## 2019-01-01 RX ADMIN — NALBUPHINE HYDROCHLORIDE 10 MG: 10 INJECTION, SOLUTION INTRAMUSCULAR; INTRAVENOUS; SUBCUTANEOUS at 20:02

## 2019-01-01 RX ADMIN — MAGNESIUM SULFATE HEPTAHYDRATE 2 G: 40 INJECTION, SOLUTION INTRAVENOUS at 03:42

## 2019-01-01 RX ADMIN — FENTANYL CITRATE 50 MCG: 50 INJECTION, SOLUTION INTRAMUSCULAR; INTRAVENOUS at 11:24

## 2019-01-01 RX ADMIN — ASPIRIN 81 MG 324 MG: 81 TABLET ORAL at 19:02

## 2019-01-01 RX ADMIN — POTASSIUM CHLORIDE 40 MEQ: 20 TABLET, EXTENDED RELEASE ORAL at 10:45

## 2019-01-01 RX ADMIN — AMLODIPINE BESYLATE 5 MG: 5 TABLET ORAL at 08:13

## 2019-01-01 RX ADMIN — NITROGLYCERIN 1 INCH: 20 OINTMENT TOPICAL at 01:04

## 2019-01-01 RX ADMIN — SODIUM CHLORIDE 500 ML: 900 INJECTION, SOLUTION INTRAVENOUS at 19:06

## 2019-01-01 RX ADMIN — Medication 10 ML: at 06:10

## 2019-01-01 RX ADMIN — DIPHENHYDRAMINE HYDROCHLORIDE 25 MG: 25 CAPSULE ORAL at 01:34

## 2019-01-01 RX ADMIN — IPRATROPIUM BROMIDE AND ALBUTEROL SULFATE 3 ML: .5; 3 SOLUTION RESPIRATORY (INHALATION) at 14:02

## 2019-01-01 RX ADMIN — POTASSIUM CHLORIDE 10 MEQ: 10 TABLET, EXTENDED RELEASE ORAL at 08:15

## 2019-01-01 RX ADMIN — NITROGLYCERIN 0.4 MG: 0.4 TABLET SUBLINGUAL at 19:02

## 2019-01-01 RX ADMIN — LORATADINE 10 MG: 10 TABLET ORAL at 08:13

## 2019-01-01 RX ADMIN — ONDANSETRON 4 MG: 2 INJECTION INTRAMUSCULAR; INTRAVENOUS at 20:02

## 2019-01-01 RX ADMIN — HEPARIN SODIUM 12 UNITS/KG/HR: 5000 INJECTION, SOLUTION INTRAVENOUS at 01:27

## 2019-01-01 RX ADMIN — ASPIRIN 81 MG: 81 TABLET ORAL at 08:13

## 2019-05-13 PROBLEM — G44.221 CHRONIC TENSION-TYPE HEADACHE, INTRACTABLE: Status: ACTIVE | Noted: 2019-01-01

## 2019-12-06 PROBLEM — R07.9 CHEST PAIN: Status: ACTIVE | Noted: 2019-01-01

## 2019-12-06 NOTE — ED TRIAGE NOTES
Patient with multiple complaints during triage. Patient advises about 1 hour ago she was shopping began to feel dizzy and thought she was going to pass out. Patient advises her friend took her home and states \" I think I went out a couple times\" while in car.  Patient complaining of shortness of breath, jaw and neck pain with radiation into chest.

## 2019-12-06 NOTE — ED PROVIDER NOTES
32-year-old female was out shopping when she noticed lightheadedness and felt like she was going to pass out. Symptoms persisted for half hour 45 minutes. EMS was called. They found essentially normal vitals but patient was still felt presyncopal.  She developed some substernal tightness in her chest after feeling a sharp left chest pain somewhere between 530 and 6 PM.  Pain somewhat worse with breathing. Slight wheezing but has COPD. She also broke out in a sweat and started. No cough no vomiting no fever no URI symptoms. Patient has history of coronary artery disease with bypass grafting. Also history of sick sinus syndrome with pacemaker placement. Also history of hypertension diastolic heart failure. These diagnoses per the old record. Also old records say patient had a catheterization of the heart and 2017 with some moderate coronary artery disease but nothing amenable to stenting. Also history of peripheral vascular disease. The patient did not take the aspirin today did not have a nitroglycerin with her. The history is provided by the patient. Dizziness This is a new problem. The current episode started 1 to 2 hours ago. The problem has been gradually improving. There was no focality noted. Pertinent negatives include no loss of sensation and no slurred speech. There has been no fever. Associated symptoms include shortness of breath and chest pain. Pertinent negatives include no vomiting and no headaches. There were no medications administered prior to arrival.  
  
 
Past Medical History:  
Diagnosis Date  Abnormal MRI of head 2015 Frontal Lobe periventricular WM disease. Ruled out for MS by Neurology  ASCUS with positive high risk HPV cervical   
 Bipolar disorder ([address]  Borderline personality disorder ([address]  CAD  Cerebrovascular disease  Chronic diastolic congestive heart failure ([address]  Chronic hepatitis C ([address] Treated, in remission  Chronic pain  COPD (chronic obstructive pulmonary disease) (HCC) Moderate  Dyslipidemia  Heart [facility] ([facility] 2011  
 HTN   
 IBS (irritable bowel syndrome)  Pacemaker Sick Sinus Syndrome  S/P CABG x 3   
 Subclavian artery stenosis, right ([facility] 2016 Moderate to severe  Vertebral artery stenosis 2016 Asymptomatic Left Severe Past Surgical History:  
Procedure Laterality Date  APPENDECTOMY  COLONOSCOPY N/A 6/22/2018 COLONOSCOPY/ 25 performed by Mohinder Olmstead MD at [address] [place] [address], [facility] Abruption, CPD  HX CORONARY ARTERY BYPASS GRAFT  07/2011  
 3 vessel  HX HEART CATHETERIZATION    
 7/2012, 8/2012-occluded grafts  HX HERNIA REPAIR    
 HX ORTHOPAEDIC    
 left leg  HX PACEMAKER    
 HX TUBAL LIGATION    
 AL COLSC FLX W/RMVL OF TUMOR POLYP LESION SNARE TQ  6/22/2018 Family History:  
Problem Relation Age of Onset  Cancer Mother   
     CAD, Lung cancer  Hypertension Father DM, CAD  Other Sister  Hypertension Brother  Other Son Adopted. Social History Socioeconomic History  Marital status:  Spouse name: Not on file  Number of children: 2  
 Years of education: Not on file  Highest education level: Not on file Occupational History  Occupation: Disabled Social Needs  Financial resource strain: Not on file  Food insecurity:  
  Worry: Not on file Inability: Not on file  Transportation needs:  
  Medical: Not on file Non-medical: Not on file Tobacco Use  Smoking status: Current Every Day Smoker Packs/day: 0.75 Years: 38.00 Pack years: 28.50 Types: Cigarettes  Smokeless tobacco: Never Used Substance and Sexual Activity  Alcohol use: No  
  Alcohol/week: 2.0 standard drinks Types: 2 Cans of beer per week Frequency: Never Binge frequency: Never  Drug use: No  
  Types: Cocaine Comment: cocaine addict - clean x 5 months  Sexual activity: Yes  
  Partners: Male Lifestyle  Physical activity:  
  Days per week: Not on file Minutes per session: Not on file  Stress: Not on file Relationships  Social connections:  
  Talks on phone: Not on file Gets together: Not on file Attends Confucianist service: Not on file Active member of club or organization: Not on file Attends meetings of clubs or organizations: Not on file Relationship status: Not on file  Intimate partner violence:  
  Fear of current or ex partner: Not on file Emotionally abused: Not on file Physically abused: Not on file Forced sexual activity: Not on file Other Topics Concern  Not on file Social History Narrative  and lives with roommate. 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 Has a son in his 19's. Adopted son. ALLERGIES: Lithium analogues; Morphine (pf); and Other medication Review of Systems Constitutional: Positive for diaphoresis. Negative for chills and fever. Respiratory: Positive for shortness of breath. Negative for cough. Cardiovascular: Positive for chest pain. Negative for palpitations. Gastrointestinal: Negative for abdominal pain, diarrhea and vomiting. Genitourinary: Negative for dysuria and flank pain. Musculoskeletal: Negative for back pain and neck pain. Skin: Negative for color change and rash. Neurological: Positive for dizziness and light-headedness. Negative for syncope, weakness and headaches. All other systems reviewed and are negative. Vitals:  
 12/06/19 1759 BP: 107/66 Pulse: 72 Resp: 16 Temp: 98 °F (36.7 °C) SpO2: 99% Weight: 44.9 kg (99 lb) Height: 5' 3\" (1.6 m) Physical Exam 
Vitals signs and nursing note reviewed. Constitutional:   
   General: She is not in acute distress. Appearance: She is well-developed. HENT:  
   Head: Normocephalic and atraumatic. Right Ear: External ear normal.  
   Left Ear: External ear normal.  
   Mouth/Throat:  
   Pharynx: No oropharyngeal exudate. Eyes:  
   Conjunctiva/sclera: Conjunctivae normal.  
   Pupils: Pupils are equal, round, and reactive to light. Neck: Musculoskeletal: Normal range of motion and neck supple. Cardiovascular:  
   Rate and Rhythm: Normal rate and regular rhythm. Heart sounds: No murmur. Pulmonary:  
   Effort: No respiratory distress. Breath sounds: Normal breath sounds. Abdominal:  
   General: Bowel sounds are normal.  
   Palpations: Abdomen is soft. There is no mass. Tenderness: There is no tenderness. There is no guarding or rebound. Hernia: No hernia is present. Skin: 
   General: Skin is warm and dry. Neurological:  
   Mental Status: She is alert and oriented to person, place, and time. Gait: Gait normal.  
   Comments: Nl speech Psychiatric:     
   Speech: Speech normal.  
 
  
 
MDM Number of Diagnoses or Management Options Diagnosis management comments: Check EKG and serial troponins. Screening blood work. Chest x-ray. Believe pulmonary embolism less likely but with shortness of breath and pleurisy, check d-dimer. Amount and/or Complexity of Data Reviewed Clinical lab tests: ordered and reviewed Tests in the radiology section of CPT®: ordered and reviewed Tests in the medicine section of CPT®: ordered and reviewed Review and summarize past medical records: yes (See HPI) Discuss the patient with other providers: yes Independent visualization of images, tracings, or specimens: yes (EKG shows normal sinus rhythm. There is ST depression about 1 mm that is widespread. Nonspecific ST elevation in aVR. The ST depression is somewhat more prominent inferiorly than on previous tracings.) Risk of Complications, Morbidity, and/or Mortality Presenting problems: moderate Diagnostic procedures: minimal 
Management options: low Procedures Results Include: 
 
Recent Results (from the past 24 hour(s)) EKG, 12 LEAD, INITIAL Collection Time: 12/06/19  6:05 PM  
Result Value Ref Range Ventricular Rate 69 BPM  
 Atrial Rate 69 BPM  
 P-R Interval 130 ms QRS Duration 90 ms Q-T Interval 440 ms QTC Calculation (Bezet) 471 ms Calculated P Axis 71 degrees Calculated R Axis -6 degrees Calculated T Axis -153 degrees Diagnosis Normal sinus rhythm Possible Left atrial enlargement RSR' or QR pattern in V1 suggests right ventricular conduction delay ST & T wave abnormality, consider inferior ischemia ST & T wave abnormality, consider anterolateral ischemia Prolonged QT Abnormal ECG When compared with ECG of 01-NOV-2017 07:00, Sinus rhythm has replaced Electronic atrial pacemaker T wave inversion now evident in Inferior leads T wave inversion more evident in Anterolateral leads Confirmed by Nakul Ray MD (), JERO STAFFORD ([id number]) on 12/6/2019 6:41:50 PM 
  
CBC WITH AUTOMATED DIFF Collection Time: 12/06/19  6:22 PM  
Result Value Ref Range WBC 12.2 (H) 4.3 - 11.1 K/uL  
 RBC 4.42 4.05 - 5.2 M/uL  
 HGB 13.5 11.7 - 15.4 g/dL HCT 39.8 35.8 - 46.3 % MCV 90.0 79.6 - 97.8 FL  
 MCH 30.5 26.1 - 32.9 PG  
 MCHC 33.9 31.4 - 35.0 g/dL  
 RDW 12.3 11.9 - 14.6 % PLATELET 374 172 - 272 K/uL MPV 8.7 (L) 9.4 - 12.3 FL ABSOLUTE NRBC 0.00 0.0 - 0.2 K/uL  
 DF AUTOMATED NEUTROPHILS 69 43 - 78 % LYMPHOCYTES 23 13 - 44 % MONOCYTES 7 4.0 - 12.0 % EOSINOPHILS 1 0.5 - 7.8 % BASOPHILS 0 0.0 - 2.0 % IMMATURE GRANULOCYTES 0 0.0 - 5.0 %  
 ABS. NEUTROPHILS 8.4 (H) 1.7 - 8.2 K/UL  
 ABS. LYMPHOCYTES 2.8 0.5 - 4.6 K/UL  
 ABS. MONOCYTES 0.8 0.1 - 1.3 K/UL  
 ABS. EOSINOPHILS 0.2 0.0 - 0.8 K/UL  
 ABS. BASOPHILS 0.1 0.0 - 0.2 K/UL  
 ABS. IMM. GRANS. 0.0 0.0 - 0.5 K/UL METABOLIC PANEL, COMPREHENSIVE  
 Collection Time: 12/06/19  6:22 PM  
Result Value Ref Range Sodium 135 (L) 136 - 145 mmol/L Potassium 3.3 (L) 3.5 - 5.1 mmol/L Chloride 103 98 - 107 mmol/L  
 CO2 25 21 - 32 mmol/L Anion gap 7 7 - 16 mmol/L Glucose 98 65 - 100 mg/dL BUN 14 6 - 23 MG/DL Creatinine 1.54 (H) 0.6 - 1.0 MG/DL  
 GFR est AA 45 (L) >60 ml/min/1.73m2 GFR est non-AA 38 (L) >60 ml/min/1.73m2 Calcium 10.1 8.3 - 10.4 MG/DL Bilirubin, total 0.8 0.2 - 1.1 MG/DL  
 ALT (SGPT) 13 12 - 65 U/L  
 AST (SGOT) 21 15 - 37 U/L Alk. phosphatase 77 50 - 130 U/L Protein, total 8.8 (H) 6.3 - 8.2 g/dL Albumin 3.8 3.5 - 5.0 g/dL Globulin 5.0 (H) 2.3 - 3.5 g/dL A-G Ratio 0.8 (L) 1.2 - 3.5    
TROPONIN I Collection Time: 12/06/19  6:22 PM  
Result Value Ref Range Troponin-I, Qt. <0.02 (L) 0.02 - 0.05 NG/ML  
D DIMER Collection Time: 12/06/19  6:22 PM  
Result Value Ref Range D DIMER 0.58 (HH) <0.56 ug/ml(FEU) POC TROPONIN-I Collection Time: 12/06/19  6:25 PM  
Result Value Ref Range Troponin-I (POC) 0 (L) 0.02 - 0.05 ng/ml EKG, 12 LEAD, SUBSEQUENT Collection Time: 12/06/19  7:38 PM  
Result Value Ref Range Ventricular Rate 70 BPM  
 Atrial Rate 70 BPM  
 P-R Interval 140 ms QRS Duration 86 ms  
 Q-T Interval 374 ms QTC Calculation (Bezet) 403 ms Calculated P Axis 66 degrees Calculated R Axis -26 degrees Calculated T Axis -130 degrees Diagnosis    
  !! AGE AND GENDER SPECIFIC ECG ANALYSIS !! Normal sinus rhythm RSR' or QR pattern in V1 suggests right ventricular conduction delay ST & T wave abnormality, consider inferior ischemia ST & T wave abnormality, consider anterolateral ischemia Abnormal ECG When compared with ECG of 06-DEC-2019 18:05, 
QT has shortened POC TROPONIN-I Collection Time: 12/06/19  8:05 PM  
Result Value Ref Range Troponin-I (POC) 0 (L) 0.02 - 0.05 ng/ml Xr Chest [facility] Result Date: 12/6/2019 Chest X-ray INDICATION: Chest pain A portable AP view of the chest was obtained. FINDINGS: The lungs are clear. There are no infiltrates or effusions. The heart size is normal.  There are sternotomy changes. Pacemaker is present. Cesia Perez IMPRESSION: No acute findings in the chest  
 
 
9:16 PM 
Reexam, mild pain.  in the sternum. Discussed EKG changes with the cardiologist.  They will admit for observation and accept in transfer.

## 2019-12-07 PROBLEM — E87.6 HYPOKALEMIA: Status: ACTIVE | Noted: 2019-01-01

## 2019-12-07 PROBLEM — I49.5 SICK SINUS SYNDROME (HCC): Chronic | Status: ACTIVE | Noted: 2017-09-29

## 2019-12-07 NOTE — PROCEDURES
Brief Cardiac Procedure Note Patient: Gabriel Page MRN: [id number]  SSN: [id number] YOB: 1965  Age: 48 y.o. Sex: female Date of Procedure: 12/7/2019 Pre-procedure Diagnosis: Atypical Angina Post-procedure Diagnosis: Non-cardiac Chest Pain Procedure: Left Heart Catheterization Brief Description of Procedure: See note Performed By: Eun Busch MD  
 
Assistants: None Anesthesia: Moderate Sedation Estimated Blood Loss: Less than 10 mL Specimens: None Implants: None Findings:  
LV:  EF 55% LM:  NML 
LAD:  30-40% mid LCx:  NML - dominant RCA:  NML - non dominant % [id number] Complications: None Recommendations: Continue medical therapy. Signed By: Eun Busch MD   
 December 7, 2019

## 2019-12-07 NOTE — H&P
[facility] CARDIOLOGY History & Physical 
            
 
Primary Cardiologist: Dr. Davida Renner Primary Care Physician: Dr. Maximo Méndez Admitting Physician: Dr. Hosey Dakin Subjective:  
 
Patient is a 48 y.o.  female with MHX of CAD (s/p CABG x3 2011), HTN, HLD, PVD, SSS (s/p MDT DC PM), dHF, Bipolar disorder, Chronic Hep C, COPD and Tobacco abuse who presented to the ED at [facility] with c/p dizziness, SOB and CP that radiated to her neck and R jaw. She denies any associated palpitations. States she was out shopping around 5PM when her s/s started. She used her inhaler without any improvement. States she has never felt this before. She left the store and had her friend take her to her sons house and she reports that on the way there she was \"passing out. \" EMS was then summoned. In the ED: EKG with new/worse ST depression. She was given 324mg ASA, 10mg Nubain and 1 SL NTG with improvement in s/s. CXR w/o acute findings. Labs with K 3.3 and Cr 1.54 (baseline ~0.8). DDimer 0.58. Trop (-) x2. She was transferred to [facility] for further Cardiology evaluation. She has a history of difficult IV access. On arrival to [facility] she did not have an IV in place despite multiple attempts PTA. She denies recent illness, F/C/S, no diaphoresis, N/V or edema. She continues to smoke. She has reportedly been complaint with meds and f/u recently. She had last device interrogation 8/19 with normal function and short runs ATach noted. [facility] 11/2017: 
CONCLUSION:  
1. Mild to moderate nonhemodynamically significant coronary  
artery disease with normal iFR of left anterior descending Diagonal (IFR of LAD 0.98, IFR of the diagonal 0.98) 2. Normal left ventricular systolic function. [address] [place] [address] 7/2012: 
ASSESSMENT 1. Occluded right internal and left internal mammary bypass grafts. 2. No change in the 70 to 80% focal left anterior descending disease 
involving the takeoff of a small first diagonal branch. 3. Bifurcation disease of a very small first diagonal, too small for 
intervention or for bypass evidently. 4. Moderately severe proximal right coronary artery disease in a vessel 
that is, at most, codominant and probably nondominant. Past Medical History:  
Diagnosis Date  Abnormal MRI of head 2015 Frontal Lobe periventricular WM disease. Ruled out for MS by Neurology  ASCUS with positive high risk HPV cervical   
 Bipolar disorder ([address]  Borderline personality disorder ([address]  CAD  Cerebrovascular disease  Chronic diastolic congestive heart failure ([address]  Chronic hepatitis C ([address] Treated, in remission  Chronic pain  COPD (chronic obstructive pulmonary disease) (HCC) Moderate  Dyslipidemia  Heart attack ([address] 2011  
 HTN   
 IBS (irritable bowel syndrome)  Pacemaker Sick Sinus Syndrome  S/P CABG x 3   
 Subclavian artery stenosis, right ([address] 2016 Moderate to severe  Vertebral artery stenosis 2016 Asymptomatic Left Severe Past Surgical History:  
Procedure Laterality Date  APPENDECTOMY  COLONOSCOPY N/A 6/22/2018 COLONOSCOPY/ 25 performed by Cody García MD at [address] [place] [address], [facility] Abruption, CPD  HX CORONARY ARTERY BYPASS GRAFT  07/2011  
 3 vessel  HX HEART CATHETERIZATION    
 7/2012, 8/2012-occluded grafts  HX HERNIA REPAIR    
 HX ORTHOPAEDIC    
 left leg  HX PACEMAKER    
 HX TUBAL LIGATION    
 ID COLSC FLX W/RMVL OF TUMOR POLYP LESION SNARE TQ  6/22/2018 Allergies Allergen Reactions  Lithium Analogues Unknown (comments)  Morphine (Pf) Rash  Other Medication Swelling Whatever holds Vit d pill together Social History Tobacco Use  Smoking status: Current Every Day Smoker Packs/day: 0.75 Years: 38.00 Pack years: 28.50 Types: Cigarettes  Smokeless tobacco: Never Used Substance Use Topics  Alcohol use:  No  
 Alcohol/week: 2.0 standard drinks Types: 2 Cans of beer per week Frequency: Never Binge frequency: Never FH:  
Family History Problem Relation Age of Onset  Cancer Mother   
     CAD, Lung cancer  Hypertension Father DM, CAD  Other Sister  Hypertension Brother  Other Son Adopted. Review of Systems General: no weight change, no weakness, fever or chills Skin: no rashes, lumps, or other skin changes HEENT: no headache, +dizziness, +lightheadedness, no vision changes, hearing changes, tinnitus, vertigo, sinus pressure/pain, bleeding gums, sore throat, or hoarseness Neck: no swollen glands, goiter, pain or stiffness Respiratory: no cough, sputum, hemoptysis, +dyspnea, no wheezing Cardiovascular: + as per HPI Gastrointestinal: no reflux, constipation, diarrhea, liver problems, GI bleeding, N/V 
Urinary: no frequency, urgency, hematuria, burning/pain with urination, recent flank pain, polyuria, nocturia, or difficulty urinating Peripheral Vascular: no claudication, leg cramps, prior DVTs, swelling of calves, legs, or feet, color change, or swelling with redness or tenderness Musculoskeletal: no muscle or joint pain/stiffness, joint swelling, erythema of joints, or back pain Psychiatric: no depression or excessive stress Neurological: no sensory or motor loss, seizures, syncope, tremors, numbness, tingling, no changes in mood, attention, or speech, no changes in orientation, memory, insight, or judgment. Hematologic: no anemia, easy bruising or bleeding Endocrine: no thyroid problems, no heat or cold intolerance, excessive sweating, polyuria, polydipsia, no diabetes. Objective:  
 
 
Visit Vitals /90 (BP 1 Location: Left arm, BP Patient Position: At rest) Pulse 67 Temp 98.7 °F (37.1 °C) Resp 18 Wt 42.2 kg (93 lb) SpO2 93% BMI 16.47 kg/m² No intake/output data recorded. No intake/output data recorded. Physical Exam: 
General: well developed and nourished, mildly dehydrated, chronic unhealthy appearance HEENT: pupils equal and round, no abnormalities noted, sclera clear Neck: supple, no JVD, no carotid bruits Heart: S1S2 with RRR without murmurs, rubs or gallops Lungs: coarse throughout auscultation bilaterally, normal effort on RA, no wheezing or rales Abd: soft, nontender, nondistended, with good bowel sounds Ext: warm, no edema, calves supple/nontender, pulses 1+ bilaterally Skin: warm and dry Psychiatric: Normal mood and affect, cooperative Neurologic: Alert and oriented X 3, CNs intact, moves all 4 equally ECG: SR with I/L ST depression and T-wave inversion worse than priors Data Review:  
  
Recent Results (from the past 24 hour(s)) EKG, 12 LEAD, INITIAL Collection Time: 12/06/19  6:05 PM  
Result Value Ref Range Ventricular Rate 69 BPM  
 Atrial Rate 69 BPM  
 P-R Interval 130 ms QRS Duration 90 ms Q-T Interval 440 ms QTC Calculation (Bezet) 471 ms Calculated P Axis 71 degrees Calculated R Axis -6 degrees Calculated T Axis -153 degrees Diagnosis Normal sinus rhythm Possible Left atrial enlargement RSR' or QR pattern in V1 suggests right ventricular conduction delay ST & T wave abnormality, consider inferior ischemia ST & T wave abnormality, consider anterolateral ischemia Prolonged QT Abnormal ECG When compared with ECG of 01-NOV-2017 07:00, Sinus rhythm has replaced Electronic atrial pacemaker T wave inversion now evident in Inferior leads T wave inversion more evident in Anterolateral leads Confirmed by Yosi Abdul MD (), JERO STAFFORD ([id number]) on 12/6/2019 6:41:50 PM 
  
CBC WITH AUTOMATED DIFF Collection Time: 12/06/19  6:22 PM  
Result Value Ref Range WBC 12.2 (H) 4.3 - 11.1 K/uL  
 RBC 4.42 4.05 - 5.2 M/uL  
 HGB 13.5 11.7 - 15.4 g/dL HCT 39.8 35.8 - 46.3 %  MCV 90.0 79.6 - 97.8 FL  
 MCH 30.5 26.1 - 32.9 PG  
 MCHC 33.9 31.4 - 35.0 g/dL  
 RDW 12.3 11.9 - 14.6 % PLATELET 384 146 - 873 K/uL MPV 8.7 (L) 9.4 - 12.3 FL ABSOLUTE NRBC 0.00 0.0 - 0.2 K/uL  
 DF AUTOMATED NEUTROPHILS 69 43 - 78 % LYMPHOCYTES 23 13 - 44 % MONOCYTES 7 4.0 - 12.0 % EOSINOPHILS 1 0.5 - 7.8 % BASOPHILS 0 0.0 - 2.0 % IMMATURE GRANULOCYTES 0 0.0 - 5.0 %  
 ABS. NEUTROPHILS 8.4 (H) 1.7 - 8.2 K/UL  
 ABS. LYMPHOCYTES 2.8 0.5 - 4.6 K/UL  
 ABS. MONOCYTES 0.8 0.1 - 1.3 K/UL  
 ABS. EOSINOPHILS 0.2 0.0 - 0.8 K/UL  
 ABS. BASOPHILS 0.1 0.0 - 0.2 K/UL  
 ABS. IMM. GRANS. 0.0 0.0 - 0.5 K/UL METABOLIC PANEL, COMPREHENSIVE Collection Time: 12/06/19  6:22 PM  
Result Value Ref Range Sodium 135 (L) 136 - 145 mmol/L Potassium 3.3 (L) 3.5 - 5.1 mmol/L Chloride 103 98 - 107 mmol/L  
 CO2 25 21 - 32 mmol/L Anion gap 7 7 - 16 mmol/L Glucose 98 65 - 100 mg/dL BUN 14 6 - 23 MG/DL Creatinine 1.54 (H) 0.6 - 1.0 MG/DL  
 GFR est AA 45 (L) >60 ml/min/1.73m2 GFR est non-AA 38 (L) >60 ml/min/1.73m2 Calcium 10.1 8.3 - 10.4 MG/DL Bilirubin, total 0.8 0.2 - 1.1 MG/DL  
 ALT (SGPT) 13 12 - 65 U/L  
 AST (SGOT) 21 15 - 37 U/L Alk. phosphatase 77 50 - 130 U/L Protein, total 8.8 (H) 6.3 - 8.2 g/dL Albumin 3.8 3.5 - 5.0 g/dL Globulin 5.0 (H) 2.3 - 3.5 g/dL A-G Ratio 0.8 (L) 1.2 - 3.5    
TROPONIN I Collection Time: 12/06/19  6:22 PM  
Result Value Ref Range Troponin-I, Qt. <0.02 (L) 0.02 - 0.05 NG/ML  
D DIMER Collection Time: 12/06/19  6:22 PM  
Result Value Ref Range D DIMER 0.58 (HH) <0.56 ug/ml(FEU) POC TROPONIN-I Collection Time: 12/06/19  6:25 PM  
Result Value Ref Range Troponin-I (POC) 0 (L) 0.02 - 0.05 ng/ml EKG, 12 LEAD, SUBSEQUENT Collection Time: 12/06/19  7:38 PM  
Result Value Ref Range Ventricular Rate 70 BPM  
 Atrial Rate 70 BPM  
 P-R Interval 140 ms QRS Duration 86 ms  
 Q-T Interval 374 ms QTC Calculation (Bezet) 403 ms Calculated P Axis 66 degrees Calculated R Axis -26 degrees Calculated T Axis -130 degrees Diagnosis    
  !! AGE AND GENDER SPECIFIC ECG ANALYSIS !! Normal sinus rhythm RSR' or QR pattern in V1 suggests right ventricular conduction delay ST & T wave abnormality, consider inferior ischemia ST & T wave abnormality, consider anterolateral ischemia Abnormal ECG When compared with ECG of 06-DEC-2019 18:05, 
QT has shortened POC TROPONIN-I Collection Time: 12/06/19  8:05 PM  
Result Value Ref Range Troponin-I (POC) 0 (L) 0.02 - 0.05 ng/ml Assessment/Plan:  
Principal Problem: 
  Chest pain -- admit to tele, serial Delfina, cont ASA and NTG, start IV heparin, daily labs/lytes, NPO at MN with IV hydration for poss [facility] later today Active Problems: S/P CABG x 3 -- MIR and LIMA grafts occluded per [facility] 2012 Bipolar disorder -- not on meds and refuses per PCP note Chronic diastolic congestive heart failure -- dehydrated presently -- monitor, f/u labs and I&O Coronary atherosclerosis of native coronary vessel -- cont ASA, BB, statin Tobacco use disorder -- ongoing, needs cessation HTN (hypertension), benign -- cont home meds, monitor and adjust PRN Dyslipidemia -- cont statin, check lipids Peripheral vascular disease -- diffuse vascular disease -- unable to place PIV, Dr. Madeleine Paez (Intensivist) will place CVC Neuropathy -- cont home meds Sick sinus syndrome -- s/p MDT DC PM -- interrogate in AM, ?? arrhythmia at time of her dizziness/CP event COPD, moderate -- cont nebs, O2 PRN, not hypoxic or wheezing Hypokalemia -- supplement, f/u labs MEDARDO Hickman 
12/7/2019 
12:05 AM

## 2019-12-07 NOTE — PROGRESS NOTES
TRANSFER - IN REPORT: 
 
Verbal report received from LOWELL He on Taylor Florentino  being received from [facility] for routine progression of care. Report consisted of patients Situation, Background, Assessment and  
Recommendations(SBAR). Information from the following reports was reviewed: Kardex, Procedure Summary, MAR and Recent Results. Opportunity for questions and clarification was provided. Assessment completed upon patients arrival to unit and care assumed. Patient received to room 324 and assessment completed. Patient connected to telemetry monitor and eagle with BP cycling every 15 minutes. Patient oriented to room and plan of care reviewed. Patient asleep, responds to voice but still drowsy and not verbally responsive. Right groin site benign, dressing dry and intact, no hematoma. Patient told to use call light to communicate needs.

## 2019-12-07 NOTE — PROGRESS NOTES
Patient is complaining of head pain and is crying in the bed. Tylenol given for pain. Critical magnesium result of 1.3 called. Andrew Wise NP paged. New orders received for Magnesium 2 g IVPB and Ativan 1 mg PO q 6 hr. Will continue to monitor patient.

## 2019-12-07 NOTE — PROGRESS NOTES
Verbal bedside report received from [facility]. Assumed care of patient. Heparin IV drip verified at bedside with outgoing RN.

## 2019-12-07 NOTE — ED NOTES
Pt now asking for something for her pain. When told she was given nitro for her chest pain, she stated \"nitro is not for pain! \".

## 2019-12-07 NOTE — PROGRESS NOTES
Problem: Falls - Risk of 
Goal: *Absence of Falls Description Document Ani Salmon Fall Risk and appropriate interventions in the flowsheet. Outcome: Progressing Towards Goal 
Note: Fall Risk Interventions: 
  
 
  
 
Medication Interventions: Evaluate medications/consider consulting pharmacy Problem: Pain Goal: *Control of Pain Outcome: Progressing Towards Goal 
  
Problem: Cath Lab Procedures: Post-Cath Day of Procedure (Initiate SCIP Measures for Post-Op Care) Goal: Activity/Safety Outcome: Progressing Towards Goal 
Goal: Nutrition/Diet Outcome: Progressing Towards Goal 
Goal: Medications Outcome: Progressing Towards Goal

## 2019-12-07 NOTE — ROUTINE PROCESS
TRANSFER - OUT REPORT: 
 
[facility] Dr. Aminata Sifuentes RFA access Versed 2mg, fentanyl 100mcg Diagnostic cath, medical management 
angioseal right groin Verbal report given to [facility] RN(name) on Lela Hernandez  being transferred to [facility](unit) for routine progression of care Report consisted of patients Situation, Background, Assessment and  
Recommendations(SBAR). Information from the following report(s) Procedure Summary was reviewed with the receiving nurse. Lines:  
Triple Lumen 12/07/19 Left Internal jugular (Active) Central Line Being Utilized Yes 12/7/2019  8:21 AM  
Criteria for Appropriate Use Limited/no vessel suitable for conventional peripheral access 12/7/2019  8:21 AM  
Site Assessment Clean, dry, & intact 12/7/2019  8:21 AM  
Infiltration Assessment 0 12/7/2019  8:21 AM  
Affected Extremity/Extremities Color distal to insertion site pink (or appropriate for race) 12/7/2019  8:21 AM  
Date of Last Dressing Change 12/07/19 12/7/2019  8:21 AM  
Dressing Status Clean, dry, & intact 12/7/2019  8:21 AM  
Dressing Type Transparent;Tape 12/7/2019  8:21 AM  
Action Taken Blood drawn 12/7/2019  8:21 AM  
Proximal Hub Color/Line Status Patent; Infusing;Flushed 12/7/2019  8:21 AM  
Positive Blood Return (Medial Site) Yes 12/7/2019  8:21 AM  
Medial Hub Color/Line Status Patent; Infusing;Flushed 12/7/2019  8:21 AM  
Positive Blood Return (Lateral Site) Yes 12/7/2019  8:21 AM  
Distal Hub Color/Line Status Patent; Flushed 12/7/2019  8:21 AM  
Positive Blood Return (Site #3) Yes 12/7/2019  8:21 AM  
Alcohol Cap Used No 12/7/2019  8:21 AM  
  
 
Opportunity for questions and clarification was provided. Patient transported with: 
 Registered Nurse Tech

## 2019-12-07 NOTE — DISCHARGE INSTRUCTIONS
No lifting of 10 lbs or more for 7 days, no tub baths for a week, may shower, no creams, lotions powders, or ointments to site for a week. Cardiac Catheterization/Angiography Discharge Instructions    *Check the puncture site frequently for swelling or bleeding. If you see any bleeding, lie down and apply pressure over the area with a clean town or washcloth. Notify your doctor for any redness, swelling, drainage or oozing from the puncture site. Notify your doctor for any fever or chills. *If the leg or arm with the puncture becomes cold, numb or painful, call Dr Genevieve Sullivan at  [facility]    *Activity should be limited for the next 48 hours. Climb stairs as little as possible and avoid any stooping, bending or strenuous activity for 48 hours. No heavy lifting (anything over 10 pounds) for three days. *Do not drive for 48 hours. *You may resume your usual diet. Drink more fluids than usual.    *Have a responsible person drive you home and stay with you for at least 24 hours after your heart catheterization/angiography. *You may remove the bandage from your {ARM/GROIN:18746} in 24 hours. You may shower in 24 hours. No tub baths, hot tubs or swimming for one week. Do not place any lotions, creams, powders, ointments over the puncture site for one week. You may place a clean band-aid over the puncture site each day for 5 days. Change this daily. Patient Education        Chest Pain: Care Instructions  Your Care Instructions    There are many things that can cause chest pain. Some are not serious and will get better on their own in a few days. But some kinds of chest pain need more testing and treatment. Your doctor may have recommended a follow-up visit in the next 8 to 12 hours. If you are not getting better, you may need more tests or treatment. Even though your doctor has released you, you still need to watch for any problems.  The doctor carefully checked you, but sometimes problems can develop later. If you have new symptoms or if your symptoms do not get better, get medical care right away. If you have worse or different chest pain or pressure that lasts more than 5 minutes or you passed out (lost consciousness), call 911 or seek other emergency help right away. A medical visit is only one step in your treatment. Even if you feel better, you still need to do what your doctor recommends, such as going to all suggested follow-up appointments and taking medicines exactly as directed. This will help you recover and help prevent future problems. How can you care for yourself at home? · Rest until you feel better. · Take your medicine exactly as prescribed. Call your doctor if you think you are having a problem with your medicine. · Do not drive after taking a prescription pain medicine. When should you call for help? Call 911 if:    · You passed out (lost consciousness).     · You have severe difficulty breathing.     · You have symptoms of a heart attack. These may include:  ? Chest pain or pressure, or a strange feeling in your chest.  ? Sweating. ? Shortness of breath. ? Nausea or vomiting. ? Pain, pressure, or a strange feeling in your back, neck, jaw, or upper belly or in one or both shoulders or arms. ? Lightheadedness or sudden weakness. ? A fast or irregular heartbeat. After you call 911, the  may tell you to chew 1 adult-strength or 2 to 4 low-dose aspirin. Wait for an ambulance. Do not try to drive yourself.    Call your doctor today if:    · You have any trouble breathing.     · Your chest pain gets worse.     · You are dizzy or lightheaded, or you feel like you may faint.     · You are not getting better as expected.     · You are having new or different chest pain. Where can you learn more? Go to http://aarti-tammy.info/. Enter A120 in the search box to learn more about \"Chest Pain: Care Instructions. \"  Current as of: June 26, 2019  Content Version: 12.2  © 0906-6100 [employer]. Care instructions adapted under license by [employer] (which disclaims liability or warranty for this information). If you have questions about a medical condition or this instruction, always ask your healthcare professional. [address] any warranty or liability for your use of this information. DISCHARGE SUMMARY from Nurse    PATIENT INSTRUCTIONS:    After general anesthesia or intravenous sedation, for 24 hours or while taking prescription Narcotics:  · Limit your activities  · Do not drive and operate hazardous machinery  · Do not make important personal or business decisions  · Do  not drink alcoholic beverages  · If you have not urinated within 8 hours after discharge, please contact your surgeon on call. Report the following to your surgeon:  · Excessive pain, swelling, redness or odor of or around the surgical area  · Temperature over 100.5  · Nausea and vomiting lasting longer than 4 hours or if unable to take medications  · Any signs of decreased circulation or nerve impairment to extremity: change in color, persistent  numbness, tingling, coldness or increase pain  · Any questions    What to do at Home:  Recommended activity: Activity as tolerated, limited by restrictions previously discussed    If you experience any of the following symptoms chest pain, please follow up with Your primary care physician. *  Please give a list of your current medications to your Primary Care Provider. *  Please update this list whenever your medications are discontinued, doses are      changed, or new medications (including over-the-counter products) are added. *  Please carry medication information at all times in case of emergency situations.     These are general instructions for a healthy lifestyle:    No smoking/ No tobacco products/ Avoid exposure to second hand smoke  Surgeon General's Warning:  Quitting smoking now greatly reduces serious risk to your health. Obesity, smoking, and sedentary lifestyle greatly increases your risk for illness    A healthy diet, regular physical exercise & weight monitoring are important for maintaining a healthy lifestyle    You may be retaining fluid if you have a history of heart failure or if you experience any of the following symptoms:  Weight gain of 3 pounds or more overnight or 5 pounds in a week, increased swelling in our hands or feet or shortness of breath while lying flat in bed. Please call your doctor as soon as you notice any of these symptoms; do not wait until your next office visit. The discharge information has been reviewed with the {PATIENT PARENT GUARDIAN:84502}. The {PATIENT PARENT GUARDIAN:13697} verbalized understanding. Discharge medications reviewed with the {Dishcarge meds reviewed HKK} and appropriate educational materials and side effects teaching were provided.   ___________________________________________________________________________________________________________________________________

## 2019-12-07 NOTE — ED NOTES
TRANSFER - OUT REPORT: 
 
Verbal report given to [address] [place] [address] (name) on Lela Mo  being transferred to [facility] (unit) for routine progression of care Report consisted of patients Situation, Background, Assessment and  
Recommendations(SBAR). Information from the following report(s) ED Summary was reviewed with the receiving nurse. Lines:  
Peripheral IV 12/06/19 Right Hand (Active) Opportunity for questions and clarification was provided. Patient transported with: 
 Monitor

## 2019-12-07 NOTE — H&P
Date of Surgery Update: 
Gabriel Page was seen and examined. History and physical has been reviewed. The patient has been examined.  There have been no significant clinical changes since the completion of the originally dated History and Physical. 
 
Signed By: Abelino Kennedy MD   
 December 7, 2019 1:00 AM

## 2019-12-07 NOTE — PROCEDURES
The pt. Was placed in the supine position The R neck was prepped and drapped 
usint ultrasound the R ij was accessed with 18 gauge needle but the vein was obstructed about 10 cm from skin The left neck was then prepped and drapped. 1% lidocaine was injected Using ultrasound the left ij was accessed with 18 gauge needle A quad lumen catheter was then placed using Seldinger technique Good blood return

## 2019-12-07 NOTE — PROCEDURES
[facility] 
CARDIAC CATH Name:  Phyllis Henderson 
MR#:  [id number] :  1965 ACCOUNT #:  [de-identified] DATE OF SERVICE:  2019 PRIMARY CARDIOLOGIST:  Sonam Mantilla MD 
 
PRIMARY CARE PHYSICIAN:  Nilda Schlatter. Rosalina Pabon MD 
 
BRIEF HISTORY:  The patient was admitted with symptoms worrisome for unstable angina, referred for cardiac catheterization. PREOPERATIVE DIAGNOSIS:  Unstable angina. POSTOPERATIVE DIAGNOSIS:  Noncardiac chest pain. PROCEDURES PERFORMED:  Bilateral selective coronary angiography, left ventriculography, left internal mammary artery injection, right internal mammary injection. SURGEON:  Tripp Lipscomb MD 
 
ASSISTANT:  None. COMPLICATIONS:  None. ANESTHESIA:  Conscious sedation. Start time 11:27, end time 12:09. MEDICATIONS:  2 mg of Versed, 50 mcg of fentanyl. MONITORING RN:  Baylee Ely. SPECIMENS:  None. IMPLANTS:  None. ESTIMATED BLOOD LOSS:  Less than 5 mL. PROCEDURE:  After informed consent, she was prepped and draped in the usual sterile fashion. Right groin was infiltrated with lidocaine. Right artery was accessed under ultrasound guidance. A 6-Micronesian sheath was advanced. A 6-Micronesian JL4 and JR4 were utilized for left and right coronary injections as well as left internal mammary artery and right internal mammary artery injections. A 6-Micronesian angled pigtail for left ventriculography. Isovue contrast utilized. FINDINGS: 
1. Left ventricle:  Normal left ventricular size. Normal left ventricular systolic function. Ejection fraction is 55%. There is no significant mitral regurgitation. No aortic valve gradient. Left ventricular end-diastolic pressure is measured at 40 mmHg. 2.  Left main:  Left main is large, bifurcates into LAD and circumflex systems, appears angiographically normal. 
3.  Left anterior descending coronary: It is a moderate-sized vessel. There is a moderate-sized proximal diagonal.  There is 30-40% stenosis in both of these limbs. There is a very large septal LAD that takes off, which is normal.  The majority of the apex is filled via the large first obtuse marginal. 
4.  Left circumflex coronary: It is a large anatomically dominant vessel, gives rise to a very large first obtuse marginal, which superior branch courses all the way to the apex of the left ventricle. The ongoing circumflex gives rise to a moderate-sized second obtuse marginal, moderate-sized posterolateral branch and posterior descending branch that all appeared normal. 
5.  Right coronary: It is a moderate-sized anatomically nondominant vessel, appears angiographically normal. 
6.  Right internal mammary artery:  Occluded. 7.  Left internal mammary artery:  Occluded. Successful hemostasis with Angio-Seal closure device. CONCLUSIONS: 
1. Normal left ventricular systolic function. 2.  Stable-appearing atherosclerotic coronary disease with stable mid LAD diagonal bifurcation disease, normal-appearing left circumflex and right coronary systems. RECOMMENDATIONS:  Medication compliance and cessation of methamphetamines and cocaine. The patient will be discharged home. Thank you for allowing us to participate in the care of this patient. Any questions or concerns, please feel free to contact me. Nirali Tai MD 
 
 
[id number] 
D:  12/07/2019 12:31 
T:  12/07/2019 13:21 
JOB #:  [id number] CC:  MD Mindi Mattson MD

## 2019-12-07 NOTE — PROGRESS NOTES
Multiple IV attempts made to stick patient. Unsuccessful. Zakiya Pineda NP at bedside. New orders received for central line placement. Consent obtained. Currently waiting on Dr. Romelle Dance to place line. Will continue to monitor patient.

## 2019-12-07 NOTE — DISCHARGE SUMMARY
[address] Cardiology Discharge Summary Patient ID: Porfirio Preciado 
[id number] 
5 y.o. 
1965 Admit date: 12/6/2019 Discharge date:  12/7/2019 Admitting Physician: Sherwin Patel MD  
 
Discharge Physician: SPARKLE Overton/Dr. Luis Gallardo Admission Diagnoses: Chest pain [R07.9] Discharge Diagnoses:  
 Diagnosis  Hypokalemia  Chest pain  Chronic tension-type headache, intractable  Cigarette nicotine dependence without complication  COPD, moderate ([address]  Recurrent UTI  Gross hematuria  Urinary retention  Sick sinus syndrome ([address]  IBS (irritable bowel syndrome)  Stenosis of vertebral artery without cerebral infarction  Encounter for medication management  Neuropathy  Intractable migraine with aura without status migrainosus  Peripheral vascular disease ([address]  Borderline personality disorder ([address]  Cerebrovascular disease  Chronic hepatitis C ([address]  Chronic pain  ASCUS with positive high risk HPV cervical  
 Pacemaker  Chronic diastolic congestive heart failure ([address]  Coronary atherosclerosis of native coronary vessel  HTN (hypertension), benign  Dyslipidemia  Tobacco use disorder  Abnormal MRI of head  Cocaine abuse in remission ([address]  S/P CABG x 3  
 Bipolar disorder ([address] Cardiology Procedures this admission:  Diagnostic left heart catheterization Consults: None and Pulmonary/Intensive care Hospital Course: [place] [facility] of [place] radiating to her jaw and questionable syncope. Patient was evaluated and subsequently admitted for further cardiac evaluation and treatment. Cardiac enzymes were negative. Shew as started on heparin. Pulmonary was consulted to place a central line for IV access. LH was planned for 12-7-19.   Patient underwent cardiac catheterization by  Jessenia showed stable CAD. Patient tolerated the procedure well and returned to the telemetry floor for recovery. The afternoon of 12/7/19 patient was up feeling well without any complaints of chest pain or shortness of breath. Patient's right femoral cath site was clean, dry and intact without hematoma or bruit. Patient's labs were WNL. Patient was seen and examined by Dr. Rashad Puri and determined stable and ready for discharge. The patient will follow up with [facility] Cardiology Dr. Sebas Miramontes in 3-4 weeks. DISPOSITION: The patient is being discharged home in stable condition on a low saturated fat, low cholesterol and low salt diet. The patient is instructed to advance activities as tolerated to the limit of fatigue or shortness of breath. The patient is instructed to avoid all heavy lifting, straining, stooping or squatting for 3-5 days. The patient is instructed to watch the cath site for bleeding/oozing; if seen, the patient is instructed to apply firm pressure with a clean cloth and call [facility] Cardiology at [phone]. The patient is instructed to watch for signs of infection which include: increasing area of redness, fever/hot to touch or purulent drainage at the catheterization site. The patient is instructed not to soak in a bathtub for 7-10 days, but is cleared to shower. The patient is instructed to call the office or return to the ER for immediate evaluation for any shortness of breath or chest pain not relieved by NTG. Discharge Exam:  
Visit Vitals /67 (BP 1 Location: Right arm, BP Patient Position: At rest;Supine) Pulse 67 Temp 99.5 °F (37.5 °C) Resp 21 Wt 42.8 kg (94 lb 4.8 oz) SpO2 92% BMI 16.70 kg/m² Patient has been seen by Dr. Ray Gilmore: see his progress note for exam details. Recent Results (from the past 24 hour(s)) EKG, 12 LEAD, INITIAL Collection Time: 12/06/19  6:05 PM  
Result Value Ref Range  Ventricular Rate 69 BPM  
 Atrial Rate 69 BPM  
 P-R Interval 130 ms QRS Duration 90 ms Q-T Interval 440 ms QTC Calculation (Bezet) 471 ms Calculated P Axis 71 degrees Calculated R Axis -6 degrees Calculated T Axis -153 degrees Diagnosis Normal sinus rhythm Possible Left atrial enlargement RSR' or QR pattern in V1 suggests right ventricular conduction delay ST & T wave abnormality, consider inferior ischemia ST & T wave abnormality, consider anterolateral ischemia Prolonged QT Abnormal ECG When compared with ECG of 01-NOV-2017 07:00, Sinus rhythm has replaced Electronic atrial pacemaker T wave inversion now evident in Inferior leads T wave inversion more evident in Anterolateral leads Confirmed by Tyler Pion MD (), JERO STAFFORD ([id number]) on 12/6/2019 6:41:50 PM 
  
CBC WITH AUTOMATED DIFF Collection Time: 12/06/19  6:22 PM  
Result Value Ref Range WBC 12.2 (H) 4.3 - 11.1 K/uL  
 RBC 4.42 4.05 - 5.2 M/uL  
 HGB 13.5 11.7 - 15.4 g/dL HCT 39.8 35.8 - 46.3 % MCV 90.0 79.6 - 97.8 FL  
 MCH 30.5 26.1 - 32.9 PG  
 MCHC 33.9 31.4 - 35.0 g/dL  
 RDW 12.3 11.9 - 14.6 % PLATELET 025 705 - 924 K/uL MPV 8.7 (L) 9.4 - 12.3 FL ABSOLUTE NRBC 0.00 0.0 - 0.2 K/uL  
 DF AUTOMATED NEUTROPHILS 69 43 - 78 % LYMPHOCYTES 23 13 - 44 % MONOCYTES 7 4.0 - 12.0 % EOSINOPHILS 1 0.5 - 7.8 % BASOPHILS 0 0.0 - 2.0 % IMMATURE GRANULOCYTES 0 0.0 - 5.0 %  
 ABS. NEUTROPHILS 8.4 (H) 1.7 - 8.2 K/UL  
 ABS. LYMPHOCYTES 2.8 0.5 - 4.6 K/UL  
 ABS. MONOCYTES 0.8 0.1 - 1.3 K/UL  
 ABS. EOSINOPHILS 0.2 0.0 - 0.8 K/UL  
 ABS. BASOPHILS 0.1 0.0 - 0.2 K/UL  
 ABS. IMM. GRANS. 0.0 0.0 - 0.5 K/UL METABOLIC PANEL, COMPREHENSIVE Collection Time: 12/06/19  6:22 PM  
Result Value Ref Range Sodium 135 (L) 136 - 145 mmol/L Potassium 3.3 (L) 3.5 - 5.1 mmol/L Chloride 103 98 - 107 mmol/L  
 CO2 25 21 - 32 mmol/L Anion gap 7 7 - 16 mmol/L Glucose 98 65 - 100 mg/dL  BUN 14 6 - 23 MG/DL  
 Creatinine 1.54 (H) 0.6 - 1.0 MG/DL  
 GFR est AA 45 (L) >60 ml/min/1.73m2 GFR est non-AA 38 (L) >60 ml/min/1.73m2 Calcium 10.1 8.3 - 10.4 MG/DL Bilirubin, total 0.8 0.2 - 1.1 MG/DL  
 ALT (SGPT) 13 12 - 65 U/L  
 AST (SGOT) 21 15 - 37 U/L Alk. phosphatase 77 50 - 130 U/L Protein, total 8.8 (H) 6.3 - 8.2 g/dL Albumin 3.8 3.5 - 5.0 g/dL Globulin 5.0 (H) 2.3 - 3.5 g/dL A-G Ratio 0.8 (L) 1.2 - 3.5    
TROPONIN I Collection Time: 12/06/19  6:22 PM  
Result Value Ref Range Troponin-I, Qt. <0.02 (L) 0.02 - 0.05 NG/ML  
D DIMER Collection Time: 12/06/19  6:22 PM  
Result Value Ref Range D DIMER 0.58 (HH) <0.56 ug/ml(FEU) POC TROPONIN-I Collection Time: 12/06/19  6:25 PM  
Result Value Ref Range Troponin-I (POC) 0 (L) 0.02 - 0.05 ng/ml EKG, 12 LEAD, SUBSEQUENT Collection Time: 12/06/19  7:38 PM  
Result Value Ref Range Ventricular Rate 70 BPM  
 Atrial Rate 70 BPM  
 P-R Interval 140 ms QRS Duration 86 ms  
 Q-T Interval 374 ms QTC Calculation (Bezet) 403 ms Calculated P Axis 66 degrees Calculated R Axis -26 degrees Calculated T Axis -130 degrees Diagnosis    
  !! AGE AND GENDER SPECIFIC ECG ANALYSIS !! Normal sinus rhythm RSR' or QR pattern in V1 suggests right ventricular conduction delay ST & T wave abnormality, consider inferior ischemia ST & T wave abnormality, consider anterolateral ischemia Abnormal ECG When compared with ECG of 06-DEC-2019 18:05, 
QT has shortened Confirmed by Maria Isabel Becker MD (), JERO STAFFORD ([id number]) on 12/7/2019 7:48:21 AM 
  
POC TROPONIN-I Collection Time: 12/06/19  8:05 PM  
Result Value Ref Range Troponin-I (POC) 0 (L) 0.02 - 0.05 ng/ml DRUG SCREEN, URINE Collection Time: 12/07/19  1:20 AM  
Result Value Ref Range PCP(PHENCYCLIDINE) NEGATIVE BENZODIAZEPINES NEGATIVE     
 COCAINE POSITIVE AMPHETAMINES POSITIVE  METHADONE NEGATIVE     
 THC (TH-CANNABINOL) NEGATIVE     
 OPIATES NEGATIVE     
 BARBITURATES NEGATIVE     
EKG, 12 LEAD, INITIAL Collection Time: 12/07/19  1:39 AM  
Result Value Ref Range Ventricular Rate 64 BPM  
 Atrial Rate 64 BPM  
 P-R Interval 138 ms QRS Duration 96 ms  
 Q-T Interval 444 ms QTC Calculation (Bezet) 458 ms Calculated P Axis 55 degrees Calculated R Axis -15 degrees Calculated T Axis -137 degrees Diagnosis Normal sinus rhythm Incomplete right bundle branch block Septal infarct , age undetermined ST & T wave abnormality, consider inferior ischemia ST & T wave abnormality, consider anterolateral ischemia Abnormal ECG When compared with ECG of 06-DEC-2019 19:38, No significant change was found Confirmed by [facility]  MD ()JERO ([id number]) on 12/7/2019 7:54:50 AM 
  
LIPID PANEL Collection Time: 12/07/19  2:02 AM  
Result Value Ref Range LIPID PROFILE Cholesterol, total 76 <200 MG/DL Triglyceride 51 35 - 150 MG/DL  
 HDL Cholesterol 34 (L) 40 - 60 MG/DL  
 LDL, calculated 31.8 <100 MG/DL VLDL, calculated 10.2 6.0 - 23.0 MG/DL  
 CHOL/HDL Ratio 2.2 TROPONIN I Collection Time: 12/07/19  2:02 AM  
Result Value Ref Range Troponin-I, Qt. <0.02 (L) 0.02 - 8.63 NG/ML  
METABOLIC PANEL, BASIC Collection Time: 12/07/19  2:02 AM  
Result Value Ref Range Sodium 141 136 - 145 mmol/L Potassium 3.1 (L) 3.5 - 5.1 mmol/L Chloride 108 (H) 98 - 107 mmol/L  
 CO2 25 21 - 32 mmol/L Anion gap 8 7 - 16 mmol/L Glucose 111 (H) 65 - 100 mg/dL BUN 14 6 - 23 MG/DL Creatinine 1.15 (H) 0.6 - 1.0 MG/DL  
 GFR est AA >60 >60 ml/min/1.73m2 GFR est non-AA 53 (L) >60 ml/min/1.73m2 Calcium 8.3 8.3 - 10.4 MG/DL  
CBC W/O DIFF Collection Time: 12/07/19  2:02 AM  
Result Value Ref Range WBC 11.7 (H) 4.3 - 11.1 K/uL  
 RBC 3.48 (L) 4.05 - 5.2 M/uL  
 HGB 11.1 (L) 11.7 - 15.4 g/dL HCT 31.5 (L) 35.8 - 46.3 % MCV 90.5 79.6 - 97.8 FL  
 MCH 31.9 26.1 - 32.9 PG  
 MCHC 35.2 (H) 31.4 - 35.0 g/dL RDW 12.3 11.9 - 14.6 % PLATELET 726 938 - 995 K/uL MPV 9.1 (L) 9.4 - 12.3 FL ABSOLUTE NRBC 0.00 0.0 - 0.2 K/uL MAGNESIUM Collection Time: 12/07/19  2:02 AM  
Result Value Ref Range Magnesium 1.3 (LL) 1.8 - 2.4 mg/dL PTT Collection Time: 12/07/19  8:04 AM  
Result Value Ref Range aPTT 54.1 (H) 24.7 - 39.8 SEC Patient Instructions:  
Current Discharge Medication List  
  
CONTINUE these medications which have NOT CHANGED Details  
varenicline (CHANTIX STARTER CASSIE) 0.5 mg (11)- 1 mg (42) DsPk Use as directed on starter pack. Qty: 1 Dose Pack, Refills: 0  
  
varenicline (CHANTIX) 1 mg tablet Take 1 Tab by mouth two (2) times a day for 90 days. Qty: 60 Tab, Refills: 2  
  
tiotropium-olodaterol (STIOLTO RESPIMAT) 2.5-2.5 mcg/actuation inhaler Take 2 Inhalation by inhalation two (2) times a day. loratadine 10 mg cap Take 1 Cap by mouth daily. Qty: 30 Cap, Refills: 2  
  
amLODIPine (NORVASC) 5 mg tablet Take 1 Tab by mouth daily. Qty: 90 Tab, Refills: 3  
  
atorvastatin (LIPITOR) 80 mg tablet Take 1 Tab by mouth daily. Qty: 90 Tab, Refills: 3  
  
ipratropium (ATROVENT HFA) 17 mcg/actuation inhaler Take 2 Puffs by inhalation four (4) times daily. Qty: 1 Inhaler, Refills: 11  
  
nitroglycerin (NITROSTAT) 0.4 mg SL tablet by SubLINGual route every five (5) minutes as needed. aspirin delayed-release 81 mg tablet Take 1 Tab by mouth daily. Qty: 30 Tab, Refills: 11  
  
potassium chloride (KLOR-CON) 10 mEq tablet Take 1 Tab by mouth two (2) times a day. Qty: 180 Tab, Refills: 1  
  
carvedilol (COREG) 6.25 mg tablet Take 1 Tab by mouth two (2) times daily (with meals). Qty: 180 Tab, Refills: 3 Signed: 
Omid Adkins PA-C 
12/7/2019 
12:27 Diana Antunez MD

## 2019-12-07 NOTE — PROGRESS NOTES
Problem: Falls - Risk of 
Goal: *Absence of Falls Description Document Faviola Yost Fall Risk and appropriate interventions in the flowsheet. Outcome: Progressing Towards Goal 
Note: Fall Risk Interventions: 
  
 
  
 
Medication Interventions: Evaluate medications/consider consulting pharmacy, Patient to call before getting OOB, Teach patient to arise slowly Problem: Pain Goal: *Control of Pain Outcome: Progressing Towards Goal 
  
Problem: Cath Lab Procedures: Pre-Procedure Goal: Activity/Safety Outcome: Progressing Towards Goal

## 2019-12-07 NOTE — PROGRESS NOTES
Bedside and Verbal shift change report given to Fortino Rosales (oncoming nurse) by self, RN (offgoing nurse). Report included the following information SBAR, Kardex, Intake/Output, MAR and Recent Results. Heparin gtt verified per MAR.

## 2019-12-07 NOTE — PROGRESS NOTES
TRANSFER - IN REPORT: 
 
Verbal report received from LOWELL Pizano(name) on [facility]  being received from [facility] ED(unit) for routine progression of care Report consisted of patients Situation, Background, Assessment and  
Recommendations(SBAR). Information from the following report(s) SBAR, Kardex, Intake/Output, MAR and Recent Results was reviewed with the receiving nurse. Opportunity for questions and clarification was provided. Dual skin assessment completed with second RN on arrival to floor. Sacrum is intact and blanchable. Scattered tattoos present on back. Heels are dry and intact. Chest scar noted from previous CABG. No other abnormalities noted. Assessment completed upon patients arrival to unit and care assumed.

## 2019-12-07 NOTE — PROGRESS NOTES
Patient complaining of 7/10 chest pain. Patient refusing to take sublingual nitroglycerin. Dilaudid 0.5 mg ordered. Medication given. Will continue to monitor patient.

## 2019-12-07 NOTE — CONSULTS
CONSULT NOTE [address] 12/7/2019 Date of Admission:  12/6/2019 The patient's chart is reviewed and the patient is discussed with the staff. Subjective:  
 
Patient is a 48 y.o.  female  48 y.o.  female with MHX of CAD (s/p CABG x3 2011), HTN, HLD, PVD, SSS (s/p MDT DC PM), dHF, Bipolar disorder, Chronic Hep C, COPD and Tobacco abuse who presented to the ED at [facility] with c/p dizziness, SOB and CP that radiated to her neck and R jaw. She denies any associated palpitations. States she was out shopping around 5PM when her s/s started. She used her inhaler without any improvement. States she has never felt this before. She left the store and had her friend take her to her sons house and she reports that on the way there she was \"passing out. \" EMS was then summoned.   
  
In the ED: EKG with new/worse ST depression. She was given 324mg ASA, 10mg Nubain and 1 SL NTG with improvement in s/s. CXR w/o acute findings. Labs with K 3.3 and Cr 1.54 (baseline ~0.8). DDimer 0.58. Trop (-) x2. She was transferred to [facility] for further Cardiology evaluation. 
  
She has a history of difficult IV access. On arrival to [facility] she did not have an IV in place despite multiple attempts PTA. We are asked to assist with iv access Review of Systems Pertinent items are noted in HPI. Patient Active Problem List  
Diagnosis Code  Cocaine abuse in remission (HCC) F14.11  
 S/P CABG x 3 Z95.1  Bipolar disorder ([address] F31.9  Chronic diastolic congestive heart failure (HCC) I50.32  
 Coronary atherosclerosis of native coronary vessel I25.10  Tobacco use disorder F17.200  
 HTN (hypertension), benign I10  Dyslipidemia E78.5  Pacemaker Z95.0  Borderline personality disorder ([address] F60.3  Cerebrovascular disease I67.9  Chronic hepatitis C ([address] B18.2  Chronic pain G89.29  
 ASCUS with positive high risk HPV cervical R87.610, R87.810  
  Abnormal MRI of head R93.0  Peripheral vascular disease (HCC) I73.9  Stenosis of vertebral artery without cerebral infarction I65.09  
 Encounter for medication management Z79.899  Neuropathy G62.9  
 Intractable migraine with aura without status migrainosus G43.119  
 IBS (irritable bowel syndrome) K58.9  Sick sinus syndrome (HCC) I49.5  Recurrent UTI N39.0  Gross hematuria R31.0  
 Urinary retention R33.9  COPD, moderate ([address] J44.9  Cigarette nicotine dependence without complication R84.820  Chronic tension-type headache, intractable G44.221  
 Chest pain R07.9  Hypokalemia E87.6 Prior to Admission Medications Prescriptions Last Dose Informant Patient Reported? Taking? amLODIPine (NORVASC) 5 mg tablet   No No  
Sig: Take 1 Tab by mouth daily. aspirin delayed-release 81 mg tablet   No No  
Sig: Take 1 Tab by mouth daily. atorvastatin (LIPITOR) 80 mg tablet 12/6/2019 at Unknown time  No Yes Sig: Take 1 Tab by mouth daily. carvedilol (COREG) 6.25 mg tablet   No No  
Sig: Take 1 Tab by mouth two (2) times daily (with meals). ipratropium (ATROVENT HFA) 17 mcg/actuation inhaler   No No  
Sig: Take 2 Puffs by inhalation four (4) times daily. loratadine 10 mg cap   No No  
Sig: Take 1 Cap by mouth daily. methylPREDNISolone (MEDROL DOSEPACK) 4 mg tablet   No No  
Sig: As directed  
nitroglycerin (NITROSTAT) 0.4 mg SL tablet   Yes No  
Sig: by SubLINGual route every five (5) minutes as needed. potassium chloride (KLOR-CON) 10 mEq tablet   No No  
Sig: Take 1 Tab by mouth two (2) times a day. tiotropium-olodaterol (STIOLTO RESPIMAT) 2.5-2.5 mcg/actuation inhaler   Yes No  
Sig: Take 2 Inhalation by inhalation two (2) times a day. varenicline (CHANTIX STARTER CASSIE) 0.5 mg (11)- 1 mg (42) DsPk   No No  
Sig: Use as directed on starter pack. varenicline (CHANTIX) 1 mg tablet   No No  
Sig: Take 1 Tab by mouth two (2) times a day for 90 days. Facility-Administered Medications: None Past Medical History:  
Diagnosis Date  Abnormal MRI of head 2015 Frontal Lobe periventricular WM disease. Ruled out for MS by Neurology  ASCUS with positive high risk HPV cervical   
 Bipolar disorder [facility]  Borderline [facility] disorder ([facility]  CAD  Cerebrovascular disease  Chronic diastolic congestive heart failure ([facility]  Chronic hepatitis C ([facility] Treated, in remission  Chronic pain  COPD (chronic obstructive pulmonary disease) (HCC) Moderate  Dyslipidemia  Heart attack ([facility] 2011  
 HTN   
 IBS (irritable bowel syndrome)  Pacemaker Sick Sinus Syndrome  S/P CABG x 3   
 Subclavian artery stenosis, right ([facility] 2016 Moderate to severe  Vertebral artery stenosis 2016 Asymptomatic Left Severe Past Surgical History:  
Procedure Laterality Date  APPENDECTOMY  COLONOSCOPY N/A 6/22/2018 COLONOSCOPY/ 25 performed by Veronica Sheffield MD at [address] [place] [address], [facility] Abruption, CPD  HX CORONARY ARTERY BYPASS GRAFT  07/2011  
 3 vessel  HX HEART CATHETERIZATION    
 7/2012, 8/2012-occluded grafts  HX HERNIA REPAIR    
 HX ORTHOPAEDIC    
 left leg  HX PACEMAKER    
 HX TUBAL LIGATION    
 NC COLSC FLX W/RMVL OF TUMOR POLYP LESION SNARE TQ  6/22/2018 Social History Socioeconomic History  Marital status:  Spouse name: Not on file  Number of children: 2  
 Years of education: Not on file  Highest education level: Not on file Occupational History  Occupation: Disabled Social Needs  Financial resource strain: Not on file  Food insecurity:  
  Worry: Not on file Inability: Not on file  Transportation needs:  
  Medical: Not on file Non-medical: Not on file Tobacco Use  Smoking status: Current Every Day Smoker Packs/day: 0.75 Years: 38.00 Pack years: 28.50 Types: Cigarettes  Smokeless tobacco: Never Used Substance and Sexual Activity  Alcohol use: No  
  Alcohol/week: 2.0 standard drinks Types: 2 Cans of beer per week Frequency: Never Binge frequency: Never  Drug use: No  
  Types: Cocaine Comment: cocaine addict - clean x 5 months  Sexual activity: Yes  
  Partners: Male Lifestyle  Physical activity:  
  Days per week: Not on file Minutes per session: Not on file  Stress: Not on file Relationships  Social connections:  
  Talks on phone: Not on file Gets together: Not on file Attends [employer] service: Not on file Active member of club or organization: Not on file Attends meetings of clubs or organizations: Not on file Relationship status: Not on file  Intimate partner violence:  
  Fear of current or ex partner: Not on file Emotionally abused: Not on file Physically abused: Not on file Forced sexual activity: Not on file Other Topics Concern  Not on file Social History Narrative  and lives with roommate. 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 Has a son in his 19's. Adopted son. Family History Problem Relation Age of Onset  Cancer Mother   
     CAD, Lung cancer  Hypertension Father DM, CAD  Other Sister  Hypertension Brother  Other Son Adopted. Allergies Allergen Reactions  Lithium Analogues Unknown (comments)  Morphine (Pf) Rash  Other Medication Swelling Whatever holds Vit d pill together Current Facility-Administered Medications Medication Dose Route Frequency  [START ON 12/8/2019] atorvastatin (LIPITOR) tablet 80 mg  80 mg Oral DAILY  0.9% sodium chloride infusion  75 mL/hr IntraVENous CONTINUOUS  
 LORazepam (ATIVAN) tablet 1 mg  1 mg Oral Q6H PRN  
 amLODIPine (NORVASC) tablet 5 mg  5 mg Oral DAILY  aspirin delayed-release tablet 81 mg  81 mg Oral DAILY  carvedilol (COREG) tablet 6.25 mg  6.25 mg Oral BID WITH MEALS  
 loratadine (CLARITIN) tablet 10 mg  10 mg Oral DAILY  potassium chloride (KLOR-CON) tablet 10 mEq  10 mEq Oral BID  varenicline (CHANTIX) tablet 0.5-1 mg (Patient Supplied)  0.5-1 mg Oral BID  sodium chloride (NS) flush 5-40 mL  5-40 mL IntraVENous Q8H  
 sodium chloride (NS) flush 5-40 mL  5-40 mL IntraVENous PRN  
 nitroglycerin (NITROBID) 2 % ointment 1 Inch  1 Inch Topical Q6H  
 nitroglycerin (NITROSTAT) tablet 0.4 mg  0.4 mg SubLINGual Q5MIN PRN  
 acetaminophen (TYLENOL) tablet 650 mg  650 mg Oral Q4H PRN  
 HYDROmorphone (PF) (DILAUDID) injection 0.5 mg  0.5 mg IntraVENous Q4H PRN  
 naloxone (NARCAN) injection 0.4 mg  0.4 mg IntraVENous PRN  
 ondansetron (ZOFRAN) injection 4 mg  4 mg IntraVENous Q4H PRN  
 alum-mag hydroxide-simeth (MYLANTA) oral suspension 30 mL  30 mL Oral Q4H PRN  
 diphenhydrAMINE (BENADRYL) capsule 25 mg  25 mg Oral Q6H PRN  
 heparin 25,000 units in dextrose 500 mL infusion  12-25 Units/kg/hr IntraVENous TITRATE  albuterol-ipratropium (DUO-NEB) 2.5 MG-0.5 MG/3 ML  3 mL Nebulization Q6HWA RT  
 
 
 
Objective:  
 
Vitals:  
 12/06/19 2244 12/06/19 2249 12/07/19 0155 12/07/19 7367 BP: 137/90  117/68 Pulse: 67  64 61 Resp: 18  16 16 Temp: 98.7 °F (37.1 °C)  98.9 °F (37.2 °C) 98.8 °F (37.1 °C) SpO2: 93%  99% 96% Weight:  93 lb (42.2 kg)  94 lb 4.8 oz (42.8 kg) PHYSICAL EXAM  
 
Constitutional:  the patient is well developed and in no acute distress EENMT:  Sclera clear, pupils equal, oral mucosa moist 
Respiratory: clear Cardiovascular:  RRR without M,G,R 
Gastrointestinal: soft and non-tender; with positive bowel sounds. Musculoskeletal: warm without cyanosis. There is no lower extremity edema. Skin:  no jaundice or rashes, no wounds Neurologic: no gross neuro deficits Psychiatric:  alert and oriented x 3 CXR:   
 
 
Recent Labs 12/07/19 
0202 12/06/19 
1822 WBC 11.7* 12.2* HGB 11.1* 13.5 HCT 31.5* 39.8  444 Recent Labs 12/07/19 
0202 12/06/19 
1822  135* K 3.1* 3.3*  
* 103 * 98  
CO2 25 25 BUN 14 14 CREA 1.15* 1.54* MG 1.3*  --   
CA 8.3 10.1 TROIQ <0.02* <0.02* ALB  --  3.8 TBILI  --  0.8 ALT  --  13 SGOT  --  21 No results for input(s): PH, PCO2, PO2, HCO3, PHI, PCO2I, PO2I, HCO3I in the last 72 hours. No results for input(s): LCAD, LAC in the last 72 hours. Assessment:  (Medical Decision Making) Hospital Problems  Date Reviewed: 11/9/2019 Codes Class Noted POA Hypokalemia ICD-10-CM: E87.6 ICD-9-CM: 276.8  12/7/2019 Yes * (Principal) Chest pain ICD-10-CM: R07.9 ICD-9-CM: 786.50  12/6/2019 Yes COPD, moderate ([address] (Chronic) ICD-10-CM: J44.9 ICD-9-CM: 616  Unknown Yes Sick sinus syndrome (HCC) (Chronic) ICD-10-CM: I49.5 ICD-9-CM: 427.81  9/29/2017 Yes Neuropathy (Chronic) ICD-10-CM: G62.9 ICD-9-CM: 355.9  12/28/2016 Yes Overview Signed 7/28/2017  2:20 PM by Marga Fontana MD  
  Last Assessment & Plan:  
Patient is currently on gabapentin 600 mg. Medication may cause increased drowsiness with Rispirdol, however pt has no complaints today. Peripheral vascular disease (HCC) (Chronic) ICD-10-CM: I73.9 ICD-9-CM: 443.9  12/1/2016 Yes Overview Signed 12/1/2016  4:23 PM by Nicolasa Treadwell MD  
  CTA (2/11/16): Moderate to severe stenosis in the proximal right subclavian artery. Chronic diastolic congestive heart failure (HCC) (Chronic) ICD-10-CM: I50.32 
ICD-9-CM: 428.32, 428.0  8/9/2016 Yes Overview Addendum 8/27/2019  5:11 PM by Nicolasa Treadwell MD  
  Admitted [facility]/12/11 with CP and elevated BNP at 352. 
1.  Echo (8/3/11):  EF > 55%. Mild LVH. Pseudonormal LV filling pattern. Bi-atrial enlargement. .  Mild mitral regurgitation. 2.  Echo (8/22/19):  EF 65-70%. Indeterminant diastolic function.   Mild LAE.   
  
  
   
 Coronary atherosclerosis of native coronary vessel (Chronic) ICD-10-CM: I25.10 ICD-9-CM: 414.01  8/9/2016 Yes Overview Addendum 12/1/2017 11:36 AM by Emilio Streeter MD  
  1.  3 Vessel CABG (7/8/11):  MIR to LAD. LIMA in Y graft to superior and inferior diagonal. 
2.  Cath (8/31/12): Occluded grafts. FFR of LAD 0.89. Similar stenosis of diagonal but no FFR done. 3.  LHC (11/1/17): Mild to moderate nonhemodynamically significant CAD. Normal IFR of LAD (40%: 0.98) and Diagonal (50%: 0.98) Tobacco use disorder (Chronic) ICD-10-CM: X41.851 ICD-9-CM: 305.1  Unknown Yes HTN (hypertension), benign (Chronic) ICD-10-CM: I10 
ICD-9-CM: 401.1  8/9/2016 Yes Dyslipidemia (Chronic) ICD-10-CM: W02.1 ICD-9-CM: 272.4  8/9/2016 Yes S/P CABG x 3 (Chronic) ICD-10-CM: Z95.1 ICD-9-CM: V45.81  9/18/2012 Yes Bipolar disorder (HCC) (Chronic) ICD-10-CM: F31.9 ICD-9-CM: 296.80  9/18/2012 Yes Plan:  (Medical Decision Making) 1   Will place central line and check cxr after placement- will not plan to follow after that- 
-- 
 
More than 50% of the time documented was spent in face-to-face contact with the patient and in the care of the patient on the floor/unit where the patient is located. Thank you very much for this referral.  We appreciate the opportunity to participate in this patient's care. Will follow along with above stated plan.  
 
Abhijeet Bolaños MD

## 2020-01-01 ENCOUNTER — HOSPITAL ENCOUNTER (EMERGENCY)
Age: 55
Discharge: HOME | End: 2020-01-13
Attending: EMERGENCY MEDICINE
Payer: MEDICAID

## 2020-01-01 ENCOUNTER — APPOINTMENT (OUTPATIENT)
Dept: GENERAL RADIOLOGY | Age: 55
DRG: 136 | End: 2020-01-01
Attending: PHYSICIAN ASSISTANT
Payer: MEDICAID

## 2020-01-01 ENCOUNTER — APPOINTMENT (OUTPATIENT)
Dept: GENERAL RADIOLOGY | Age: 55
DRG: 136 | End: 2020-01-01
Attending: NURSE PRACTITIONER
Payer: MEDICAID

## 2020-01-01 ENCOUNTER — HOSPITAL ENCOUNTER (OUTPATIENT)
Dept: LAB | Age: 55
Discharge: HOME | End: 2020-02-06
Payer: MEDICAID

## 2020-01-01 ENCOUNTER — ANESTHESIA EVENT (OUTPATIENT)
Dept: ENDOSCOPY | Age: 55
End: 2020-01-01
Payer: MEDICAID

## 2020-01-01 ENCOUNTER — APPOINTMENT (OUTPATIENT)
Dept: GENERAL RADIOLOGY | Age: 55
DRG: 136 | End: 2020-01-01
Attending: INTERNAL MEDICINE
Payer: MEDICAID

## 2020-01-01 ENCOUNTER — HOSPITAL ENCOUNTER (OUTPATIENT)
Dept: PET IMAGING | Age: 55
Discharge: HOME | End: 2020-01-30
Payer: MEDICAID

## 2020-01-01 ENCOUNTER — HOSPICE ADMISSION (OUTPATIENT)
Dept: HOSPICE | Facility: HOSPICE | Age: 55
End: 2020-01-01

## 2020-01-01 ENCOUNTER — APPOINTMENT (OUTPATIENT)
Dept: CT IMAGING | Age: 55
DRG: 136 | End: 2020-01-01
Attending: INTERNAL MEDICINE
Payer: MEDICAID

## 2020-01-01 ENCOUNTER — HOSPITAL ENCOUNTER (OUTPATIENT)
Age: 55
Discharge: STILL ADMITTED OR ADMINISTRATIVE | End: 2020-02-10
Attending: INTERNAL MEDICINE | Admitting: INTERNAL MEDICINE
Payer: MEDICAID

## 2020-01-01 ENCOUNTER — HOSPITAL ENCOUNTER (OUTPATIENT)
Dept: LAB | Age: 55
Discharge: HOME | End: 2020-01-30
Payer: MEDICAID

## 2020-01-01 ENCOUNTER — HOSPITAL ENCOUNTER (OUTPATIENT)
Dept: RADIATION ONCOLOGY | Age: 55
Discharge: HOME | End: 2020-02-03
Payer: MEDICAID

## 2020-01-01 ENCOUNTER — HOSPITAL ENCOUNTER (OUTPATIENT)
Dept: RADIATION ONCOLOGY | Age: 55
Discharge: HOME | End: 2020-02-06
Payer: MEDICAID

## 2020-01-01 ENCOUNTER — HOSPITAL ENCOUNTER (EMERGENCY)
Age: 55
Discharge: HOME | DRG: 136 | End: 2020-02-07
Attending: EMERGENCY MEDICINE
Payer: MEDICAID

## 2020-01-01 ENCOUNTER — ANESTHESIA EVENT (OUTPATIENT)
Dept: SURGERY | Age: 55
End: 2020-01-01
Payer: MEDICAID

## 2020-01-01 ENCOUNTER — HOSPITAL ENCOUNTER (OUTPATIENT)
Dept: MRI IMAGING | Age: 55
Discharge: HOME | End: 2020-02-03
Attending: INTERNAL MEDICINE
Payer: MEDICAID

## 2020-01-01 ENCOUNTER — PATIENT OUTREACH (OUTPATIENT)
Dept: CASE MANAGEMENT | Age: 55
End: 2020-01-01

## 2020-01-01 ENCOUNTER — HOSPITAL ENCOUNTER (OUTPATIENT)
Dept: RADIATION ONCOLOGY | Age: 55
Discharge: HOME | End: 2020-02-19
Payer: MEDICAID

## 2020-01-01 ENCOUNTER — HOSPITAL ENCOUNTER (OUTPATIENT)
Age: 55
Setting detail: OUTPATIENT SURGERY
Discharge: HOME | End: 2020-02-05
Attending: RADIOLOGY | Admitting: RADIOLOGY
Payer: MEDICAID

## 2020-01-01 ENCOUNTER — HOSPITAL ENCOUNTER (INPATIENT)
Age: 55
LOS: 15 days | DRG: 136 | End: 2020-02-25
Attending: INTERNAL MEDICINE | Admitting: INTERNAL MEDICINE
Payer: MEDICAID

## 2020-01-01 ENCOUNTER — HOSPITAL ENCOUNTER (OUTPATIENT)
Dept: INFUSION THERAPY | Age: 55
End: 2020-01-01
Payer: MEDICAID

## 2020-01-01 ENCOUNTER — HOSPITAL ENCOUNTER (OUTPATIENT)
Age: 55
Setting detail: OUTPATIENT SURGERY
Discharge: HOME | End: 2020-01-22
Attending: INTERNAL MEDICINE | Admitting: INTERNAL MEDICINE
Payer: MEDICAID

## 2020-01-01 ENCOUNTER — ANESTHESIA (OUTPATIENT)
Dept: SURGERY | Age: 55
End: 2020-01-01
Payer: MEDICAID

## 2020-01-01 ENCOUNTER — HOSPITAL ENCOUNTER (OUTPATIENT)
Dept: RADIATION ONCOLOGY | Age: 55
Discharge: HOME | End: 2020-02-21
Payer: MEDICAID

## 2020-01-01 ENCOUNTER — APPOINTMENT (OUTPATIENT)
Dept: GENERAL RADIOLOGY | Age: 55
End: 2020-01-01
Attending: EMERGENCY MEDICINE
Payer: MEDICAID

## 2020-01-01 ENCOUNTER — APPOINTMENT (OUTPATIENT)
Dept: CT IMAGING | Age: 55
End: 2020-01-01
Attending: PHYSICIAN ASSISTANT
Payer: MEDICAID

## 2020-01-01 ENCOUNTER — ANESTHESIA (OUTPATIENT)
Dept: ENDOSCOPY | Age: 55
End: 2020-01-01
Payer: MEDICAID

## 2020-01-01 ENCOUNTER — HOSPITAL ENCOUNTER (OUTPATIENT)
Dept: INFUSION THERAPY | Age: 55
Discharge: HOME | End: 2020-02-10
Payer: MEDICAID

## 2020-01-01 ENCOUNTER — APPOINTMENT (OUTPATIENT)
Dept: CT IMAGING | Age: 55
End: 2020-01-01
Attending: EMERGENCY MEDICINE
Payer: MEDICAID

## 2020-01-01 ENCOUNTER — DOCUMENTATION ONLY (OUTPATIENT)
Dept: HEMATOLOGY | Age: 55
End: 2020-01-01

## 2020-01-01 ENCOUNTER — HOSPITAL ENCOUNTER (OUTPATIENT)
Dept: INTERVENTIONAL RADIOLOGY/VASCULAR | Age: 55
Discharge: HOME | End: 2020-02-05
Attending: INTERNAL MEDICINE
Payer: MEDICAID

## 2020-01-01 ENCOUNTER — HOSPITAL ENCOUNTER (OUTPATIENT)
Dept: RADIATION ONCOLOGY | Age: 55
Discharge: HOME | End: 2020-02-12
Payer: MEDICAID

## 2020-01-01 ENCOUNTER — HOSPITAL ENCOUNTER (OUTPATIENT)
Dept: LAB | Age: 55
Discharge: HOME | DRG: 136 | End: 2020-02-10
Payer: MEDICAID

## 2020-01-01 ENCOUNTER — HOSPITAL ENCOUNTER (EMERGENCY)
Age: 55
Discharge: HOME | End: 2020-01-23
Attending: EMERGENCY MEDICINE
Payer: MEDICAID

## 2020-01-01 VITALS
RESPIRATION RATE: 16 BRPM | SYSTOLIC BLOOD PRESSURE: 110 MMHG | DIASTOLIC BLOOD PRESSURE: 66 MMHG | OXYGEN SATURATION: 100 % | TEMPERATURE: 98.1 F | HEART RATE: 70 BPM

## 2020-01-01 VITALS
HEART RATE: 79 BPM | DIASTOLIC BLOOD PRESSURE: 69 MMHG | SYSTOLIC BLOOD PRESSURE: 128 MMHG | RESPIRATION RATE: 18 BRPM | TEMPERATURE: 98.6 F | OXYGEN SATURATION: 95 %

## 2020-01-01 VITALS
SYSTOLIC BLOOD PRESSURE: 95 MMHG | HEART RATE: 69 BPM | RESPIRATION RATE: 16 BRPM | DIASTOLIC BLOOD PRESSURE: 56 MMHG | OXYGEN SATURATION: 97 % | TEMPERATURE: 98.1 F

## 2020-01-01 VITALS
BODY MASS INDEX: 17.08 KG/M2 | HEART RATE: 89 BPM | RESPIRATION RATE: 26 BRPM | DIASTOLIC BLOOD PRESSURE: 50 MMHG | WEIGHT: 90.4 LBS | OXYGEN SATURATION: 94 % | TEMPERATURE: 97.2 F | SYSTOLIC BLOOD PRESSURE: 90 MMHG

## 2020-01-01 VITALS
BODY MASS INDEX: 18.88 KG/M2 | HEART RATE: 72 BPM | OXYGEN SATURATION: 98 % | WEIGHT: 100 LBS | RESPIRATION RATE: 16 BRPM | SYSTOLIC BLOOD PRESSURE: 142 MMHG | DIASTOLIC BLOOD PRESSURE: 72 MMHG | HEIGHT: 61 IN | TEMPERATURE: 98.2 F

## 2020-01-01 VITALS
OXYGEN SATURATION: 95 % | BODY MASS INDEX: 17.37 KG/M2 | DIASTOLIC BLOOD PRESSURE: 70 MMHG | SYSTOLIC BLOOD PRESSURE: 113 MMHG | TEMPERATURE: 99 F | WEIGHT: 92 LBS | RESPIRATION RATE: 16 BRPM | HEIGHT: 61 IN | HEART RATE: 78 BPM

## 2020-01-01 VITALS
OXYGEN SATURATION: 96 % | SYSTOLIC BLOOD PRESSURE: 99 MMHG | DIASTOLIC BLOOD PRESSURE: 50 MMHG | WEIGHT: 92.8 LBS | TEMPERATURE: 98.4 F | HEART RATE: 80 BPM | BODY MASS INDEX: 16.44 KG/M2 | RESPIRATION RATE: 17 BRPM | HEIGHT: 63 IN

## 2020-01-01 VITALS
OXYGEN SATURATION: 95 % | WEIGHT: 100 LBS | RESPIRATION RATE: 16 BRPM | DIASTOLIC BLOOD PRESSURE: 74 MMHG | SYSTOLIC BLOOD PRESSURE: 119 MMHG | TEMPERATURE: 97.6 F | HEART RATE: 83 BPM | BODY MASS INDEX: 19.53 KG/M2

## 2020-01-01 VITALS
OXYGEN SATURATION: 85 % | HEART RATE: 62 BPM | RESPIRATION RATE: 22 BRPM | DIASTOLIC BLOOD PRESSURE: 60 MMHG | SYSTOLIC BLOOD PRESSURE: 104 MMHG

## 2020-01-01 VITALS
BODY MASS INDEX: 18.88 KG/M2 | OXYGEN SATURATION: 97 % | HEART RATE: 72 BPM | SYSTOLIC BLOOD PRESSURE: 115 MMHG | RESPIRATION RATE: 18 BRPM | TEMPERATURE: 98.6 F | WEIGHT: 100 LBS | HEIGHT: 61 IN | DIASTOLIC BLOOD PRESSURE: 67 MMHG

## 2020-01-01 DIAGNOSIS — J98.59 MEDIASTINAL MASS: Primary | ICD-10-CM

## 2020-01-01 DIAGNOSIS — Z51.5 ENCOUNTER FOR PALLIATIVE CARE: ICD-10-CM

## 2020-01-01 DIAGNOSIS — R53.81 DEBILITY: ICD-10-CM

## 2020-01-01 DIAGNOSIS — J98.59 MEDIASTINAL MASS: ICD-10-CM

## 2020-01-01 DIAGNOSIS — J96.01 ACUTE RESPIRATORY FAILURE WITH HYPOXIA (HCC): ICD-10-CM

## 2020-01-01 DIAGNOSIS — Z79.899 NEED FOR PROPHYLACTIC CHEMOTHERAPY: ICD-10-CM

## 2020-01-01 DIAGNOSIS — C34.82 MALIGNANT NEOPLASM OF OVERLAPPING SITES OF LEFT LUNG (HCC): ICD-10-CM

## 2020-01-01 DIAGNOSIS — E87.1 HYPONATREMIA: ICD-10-CM

## 2020-01-01 DIAGNOSIS — M54.2 NECK PAIN: Primary | ICD-10-CM

## 2020-01-01 DIAGNOSIS — J18.9 PNEUMONIA OF RIGHT LUNG DUE TO INFECTIOUS ORGANISM, UNSPECIFIED PART OF LUNG: ICD-10-CM

## 2020-01-01 DIAGNOSIS — J44.9 CHRONIC OBSTRUCTIVE PULMONARY DISEASE, UNSPECIFIED COPD TYPE (HCC): ICD-10-CM

## 2020-01-01 DIAGNOSIS — F17.200 TOBACCO USE DISORDER: Chronic | ICD-10-CM

## 2020-01-01 DIAGNOSIS — R52 PAIN: ICD-10-CM

## 2020-01-01 DIAGNOSIS — C34.01 MALIGNANT NEOPLASM OF HILUS OF RIGHT LUNG (HCC): Primary | ICD-10-CM

## 2020-01-01 DIAGNOSIS — I87.1 SVC SYNDROME: ICD-10-CM

## 2020-01-01 DIAGNOSIS — I80.8 SUPERFICIAL THROMBOPHLEBITIS INVOLVING OTHER SITE: Primary | ICD-10-CM

## 2020-01-01 DIAGNOSIS — G89.3 CANCER RELATED PAIN: ICD-10-CM

## 2020-01-01 DIAGNOSIS — E87.29 CO2 RETENTION: ICD-10-CM

## 2020-01-01 DIAGNOSIS — F14.11 COCAINE ABUSE IN REMISSION (HCC): ICD-10-CM

## 2020-01-01 DIAGNOSIS — C34.82 MALIGNANT NEOPLASM OF OVERLAPPING SITES OF LEFT LUNG (HCC): Primary | ICD-10-CM

## 2020-01-01 DIAGNOSIS — R41.82 ALTERED MENTAL STATUS, UNSPECIFIED ALTERED MENTAL STATUS TYPE: ICD-10-CM

## 2020-01-01 DIAGNOSIS — Z51.5 END OF LIFE CARE: ICD-10-CM

## 2020-01-01 DIAGNOSIS — R41.0 CONFUSION: ICD-10-CM

## 2020-01-01 DIAGNOSIS — R09.02 HYPOXIA: ICD-10-CM

## 2020-01-01 DIAGNOSIS — R82.5 POSITIVE URINE DRUG SCREEN: ICD-10-CM

## 2020-01-01 DIAGNOSIS — R45.1 AGITATION: ICD-10-CM

## 2020-01-01 DIAGNOSIS — R41.0 DELIRIUM: ICD-10-CM

## 2020-01-01 DIAGNOSIS — R06.02 SOB (SHORTNESS OF BREATH): ICD-10-CM

## 2020-01-01 DIAGNOSIS — E22.2 SIADH (SYNDROME OF INAPPROPRIATE ADH PRODUCTION) (HCC): ICD-10-CM

## 2020-01-01 DIAGNOSIS — J90 PLEURAL EFFUSION ON RIGHT: ICD-10-CM

## 2020-01-01 DIAGNOSIS — J44.1 COPD EXACERBATION (HCC): ICD-10-CM

## 2020-01-01 DIAGNOSIS — R93.0 ABNORMAL MRI OF HEAD: ICD-10-CM

## 2020-01-01 DIAGNOSIS — I50.32 CHRONIC DIASTOLIC CONGESTIVE HEART FAILURE (HCC): Chronic | ICD-10-CM

## 2020-01-01 DIAGNOSIS — Z71.89 COUNSELING AND COORDINATION OF CARE: ICD-10-CM

## 2020-01-01 LAB
ABO + RH BLD: NORMAL
AFP-TM SERPL-MCNC: 2.4 NG/ML
ALBUMIN SERPL-MCNC: 1.8 G/DL (ref 3.5–5)
ALBUMIN SERPL-MCNC: 1.9 G/DL (ref 3.5–5)
ALBUMIN SERPL-MCNC: 2 G/DL (ref 3.5–5)
ALBUMIN SERPL-MCNC: 2.1 G/DL (ref 3.5–5)
ALBUMIN SERPL-MCNC: 2.1 G/DL (ref 3.5–5)
ALBUMIN SERPL-MCNC: 2.2 G/DL (ref 3.5–5)
ALBUMIN SERPL-MCNC: 2.7 G/DL (ref 3.5–5)
ALBUMIN SERPL-MCNC: 2.8 G/DL (ref 3.5–5)
ALBUMIN SERPL-MCNC: 3 G/DL (ref 3.5–5)
ALBUMIN/GLOB SERPL: 0.4 {RATIO} (ref 1.2–3.5)
ALBUMIN/GLOB SERPL: 0.5 {RATIO} (ref 1.2–3.5)
ALBUMIN/GLOB SERPL: 0.6 {RATIO} (ref 1.2–3.5)
ALBUMIN/GLOB SERPL: 0.6 {RATIO} (ref 1.2–3.5)
ALP SERPL-CCNC: 47 U/L (ref 50–136)
ALP SERPL-CCNC: 49 U/L (ref 50–136)
ALP SERPL-CCNC: 54 U/L (ref 50–136)
ALP SERPL-CCNC: 55 U/L (ref 50–136)
ALP SERPL-CCNC: 55 U/L (ref 50–136)
ALP SERPL-CCNC: 56 U/L (ref 50–136)
ALP SERPL-CCNC: 60 U/L (ref 50–136)
ALP SERPL-CCNC: 61 U/L (ref 50–136)
ALP SERPL-CCNC: 62 U/L (ref 50–136)
ALP SERPL-CCNC: 69 U/L (ref 50–136)
ALP SERPL-CCNC: 70 U/L (ref 50–136)
ALP SERPL-CCNC: 71 U/L (ref 50–136)
ALP SERPL-CCNC: 73 U/L (ref 50–136)
ALP SERPL-CCNC: 75 U/L (ref 50–136)
ALP SERPL-CCNC: 81 U/L (ref 50–136)
ALT SERPL-CCNC: 10 U/L (ref 12–65)
ALT SERPL-CCNC: 11 U/L (ref 12–65)
ALT SERPL-CCNC: 12 U/L (ref 12–65)
ALT SERPL-CCNC: 12 U/L (ref 12–65)
ALT SERPL-CCNC: 13 U/L (ref 12–65)
ALT SERPL-CCNC: 13 U/L (ref 12–65)
ALT SERPL-CCNC: 14 U/L (ref 12–65)
ALT SERPL-CCNC: 15 U/L (ref 12–65)
ALT SERPL-CCNC: 16 U/L (ref 12–65)
ALT SERPL-CCNC: 19 U/L (ref 12–65)
ALT SERPL-CCNC: 27 U/L (ref 12–65)
ALT SERPL-CCNC: 35 U/L (ref 12–65)
ALT SERPL-CCNC: 50 U/L (ref 12–65)
ALT SERPL-CCNC: 54 U/L (ref 12–65)
ALT SERPL-CCNC: 56 U/L (ref 12–65)
ALT SERPL-CCNC: 59 U/L (ref 12–65)
ALT SERPL-CCNC: 66 U/L (ref 12–65)
ALT SERPL-CCNC: 7 U/L (ref 12–65)
ALT SERPL-CCNC: 85 U/L (ref 12–65)
AMPHET UR QL SCN: NEGATIVE
ANION GAP SERPL CALC-SCNC: 12 MMOL/L (ref 7–16)
ANION GAP SERPL CALC-SCNC: 3 MMOL/L (ref 7–16)
ANION GAP SERPL CALC-SCNC: 4 MMOL/L (ref 7–16)
ANION GAP SERPL CALC-SCNC: 4 MMOL/L (ref 7–16)
ANION GAP SERPL CALC-SCNC: 5 MMOL/L (ref 7–16)
ANION GAP SERPL CALC-SCNC: 6 MMOL/L (ref 7–16)
ANION GAP SERPL CALC-SCNC: 7 MMOL/L (ref 7–16)
ANION GAP SERPL CALC-SCNC: 8 MMOL/L (ref 7–16)
ANION GAP SERPL CALC-SCNC: 8 MMOL/L (ref 7–16)
ANION GAP SERPL CALC-SCNC: 9 MMOL/L (ref 7–16)
APPEARANCE UR: CLEAR
ARTERIAL PATENCY WRIST A: YES
AST SERPL-CCNC: 11 U/L (ref 15–37)
AST SERPL-CCNC: 17 U/L (ref 15–37)
AST SERPL-CCNC: 19 U/L (ref 15–37)
AST SERPL-CCNC: 19 U/L (ref 15–37)
AST SERPL-CCNC: 20 U/L (ref 15–37)
AST SERPL-CCNC: 22 U/L (ref 15–37)
AST SERPL-CCNC: 27 U/L (ref 15–37)
AST SERPL-CCNC: 27 U/L (ref 15–37)
AST SERPL-CCNC: 28 U/L (ref 15–37)
AST SERPL-CCNC: 31 U/L (ref 15–37)
AST SERPL-CCNC: 33 U/L (ref 15–37)
AST SERPL-CCNC: 36 U/L (ref 15–37)
AST SERPL-CCNC: 38 U/L (ref 15–37)
AST SERPL-CCNC: 39 U/L (ref 15–37)
AST SERPL-CCNC: 48 U/L (ref 15–37)
AST SERPL-CCNC: 55 U/L (ref 15–37)
AST SERPL-CCNC: 55 U/L (ref 15–37)
AST SERPL-CCNC: 57 U/L (ref 15–37)
AST SERPL-CCNC: 63 U/L (ref 15–37)
ATRIAL RATE: 82 BPM
BACTERIA SPEC CULT: ABNORMAL
BACTERIA SPEC CULT: ABNORMAL
BACTERIA SPEC CULT: NORMAL
BACTERIA URNS QL MICRO: 0 /HPF
BARBITURATES UR QL SCN: NEGATIVE
BASE EXCESS BLD CALC-SCNC: 0 MMOL/L
BASE EXCESS BLD CALC-SCNC: 0 MMOL/L
BASE EXCESS BLD CALC-SCNC: 13 MMOL/L
BASE EXCESS BLD CALC-SCNC: 5 MMOL/L
BASOPHILS # BLD: 0 K/UL (ref 0–0.2)
BASOPHILS # BLD: 0.1 K/UL (ref 0–0.2)
BASOPHILS NFR BLD: 0 % (ref 0–2)
BASOPHILS NFR BLD: 1 % (ref 0–2)
BASOPHILS NFR BLD: 2 % (ref 0–2)
BDY SITE: ABNORMAL
BENZODIAZ UR QL: NEGATIVE
BILIRUB SERPL-MCNC: 0.2 MG/DL (ref 0.2–1.1)
BILIRUB SERPL-MCNC: 0.2 MG/DL (ref 0.2–1.1)
BILIRUB SERPL-MCNC: 0.3 MG/DL (ref 0.2–1.1)
BILIRUB SERPL-MCNC: 0.3 MG/DL (ref 0.2–1.1)
BILIRUB SERPL-MCNC: 0.4 MG/DL (ref 0.2–1.1)
BILIRUB SERPL-MCNC: 0.5 MG/DL (ref 0.2–1.1)
BILIRUB SERPL-MCNC: 0.6 MG/DL (ref 0.2–1.1)
BILIRUB SERPL-MCNC: 0.7 MG/DL (ref 0.2–1.1)
BILIRUB SERPL-MCNC: 0.7 MG/DL (ref 0.2–1.1)
BILIRUB SERPL-MCNC: 0.8 MG/DL (ref 0.2–1.1)
BILIRUB SERPL-MCNC: 0.9 MG/DL (ref 0.2–1.1)
BILIRUB SERPL-MCNC: 1 MG/DL (ref 0.2–1.1)
BILIRUB UR QL: NEGATIVE
BLD PROD TYP BPU: NORMAL
BLOOD GROUP ANTIBODIES SERPL: NORMAL
BNP SERPL-MCNC: 2115 PG/ML (ref 5–125)
BNP SERPL-MCNC: 3655 PG/ML (ref 5–125)
BNP SERPL-MCNC: 4014 PG/ML (ref 5–125)
BPU ID: NORMAL
BUN SERPL-MCNC: 13 MG/DL (ref 6–23)
BUN SERPL-MCNC: 14 MG/DL (ref 6–23)
BUN SERPL-MCNC: 15 MG/DL (ref 6–23)
BUN SERPL-MCNC: 17 MG/DL (ref 6–23)
BUN SERPL-MCNC: 21 MG/DL (ref 6–23)
BUN SERPL-MCNC: 21 MG/DL (ref 6–23)
BUN SERPL-MCNC: 24 MG/DL (ref 6–23)
BUN SERPL-MCNC: 25 MG/DL (ref 6–23)
BUN SERPL-MCNC: 26 MG/DL (ref 6–23)
BUN SERPL-MCNC: 26 MG/DL (ref 6–23)
BUN SERPL-MCNC: 28 MG/DL (ref 6–23)
BUN SERPL-MCNC: 31 MG/DL (ref 6–23)
BUN SERPL-MCNC: 8 MG/DL (ref 6–23)
BUN SERPL-MCNC: 8 MG/DL (ref 6–23)
BUN SERPL-MCNC: 9 MG/DL (ref 6–23)
CALCIUM SERPL-MCNC: 10.1 MG/DL (ref 8.3–10.4)
CALCIUM SERPL-MCNC: 7.6 MG/DL (ref 8.3–10.4)
CALCIUM SERPL-MCNC: 7.8 MG/DL (ref 8.3–10.4)
CALCIUM SERPL-MCNC: 8 MG/DL (ref 8.3–10.4)
CALCIUM SERPL-MCNC: 8.2 MG/DL (ref 8.3–10.4)
CALCIUM SERPL-MCNC: 8.3 MG/DL (ref 8.3–10.4)
CALCIUM SERPL-MCNC: 8.4 MG/DL (ref 8.3–10.4)
CALCIUM SERPL-MCNC: 8.7 MG/DL (ref 8.3–10.4)
CALCIUM SERPL-MCNC: 8.7 MG/DL (ref 8.3–10.4)
CALCIUM SERPL-MCNC: 8.8 MG/DL (ref 8.3–10.4)
CALCIUM SERPL-MCNC: 9.1 MG/DL (ref 8.3–10.4)
CALCIUM SERPL-MCNC: 9.2 MG/DL (ref 8.3–10.4)
CALCIUM SERPL-MCNC: 9.6 MG/DL (ref 8.3–10.4)
CALCULATED P AXIS, ECG09: 75 DEGREES
CALCULATED R AXIS, ECG10: -13 DEGREES
CALCULATED T AXIS, ECG11: 111 DEGREES
CANCER AG19-9 SERPL-ACNC: 20.6 U/ML (ref 2–37)
CANNABINOIDS UR QL SCN: NEGATIVE
CASTS URNS QL MICRO: ABNORMAL /LPF
CEA SERPL-MCNC: 19 NG/ML (ref 0–3)
CEA SERPL-MCNC: 21.3 NG/ML (ref 0–3)
CHLORIDE SERPL-SCNC: 100 MMOL/L (ref 98–107)
CHLORIDE SERPL-SCNC: 102 MMOL/L (ref 98–107)
CHLORIDE SERPL-SCNC: 104 MMOL/L (ref 98–107)
CHLORIDE SERPL-SCNC: 92 MMOL/L (ref 98–107)
CHLORIDE SERPL-SCNC: 94 MMOL/L (ref 98–107)
CHLORIDE SERPL-SCNC: 95 MMOL/L (ref 98–107)
CHLORIDE SERPL-SCNC: 95 MMOL/L (ref 98–107)
CHLORIDE SERPL-SCNC: 96 MMOL/L (ref 98–107)
CHLORIDE SERPL-SCNC: 96 MMOL/L (ref 98–107)
CHLORIDE SERPL-SCNC: 97 MMOL/L (ref 98–107)
CHLORIDE SERPL-SCNC: 98 MMOL/L (ref 98–107)
CK SERPL-CCNC: 34 U/L (ref 21–215)
CO2 BLD-SCNC: 27 MMOL/L
CO2 BLD-SCNC: 27 MMOL/L
CO2 BLD-SCNC: 34 MMOL/L
CO2 BLD-SCNC: 43 MMOL/L
CO2 SERPL-SCNC: 22 MMOL/L (ref 21–32)
CO2 SERPL-SCNC: 23 MMOL/L (ref 21–32)
CO2 SERPL-SCNC: 24 MMOL/L (ref 21–32)
CO2 SERPL-SCNC: 26 MMOL/L (ref 21–32)
CO2 SERPL-SCNC: 26 MMOL/L (ref 21–32)
CO2 SERPL-SCNC: 27 MMOL/L (ref 21–32)
CO2 SERPL-SCNC: 28 MMOL/L (ref 21–32)
CO2 SERPL-SCNC: 29 MMOL/L (ref 21–32)
CO2 SERPL-SCNC: 29 MMOL/L (ref 21–32)
CO2 SERPL-SCNC: 31 MMOL/L (ref 21–32)
CO2 SERPL-SCNC: 31 MMOL/L (ref 21–32)
CO2 SERPL-SCNC: 32 MMOL/L (ref 21–32)
CO2 SERPL-SCNC: 34 MMOL/L (ref 21–32)
CO2 SERPL-SCNC: 35 MMOL/L (ref 21–32)
CO2 SERPL-SCNC: 36 MMOL/L (ref 21–32)
CO2 SERPL-SCNC: 39 MMOL/L (ref 21–32)
COCAINE UR QL SCN: NEGATIVE
COLLECT TIME,HTIME: 1145
COLLECT TIME,HTIME: 1405
COLLECT TIME,HTIME: 1429
COLLECT TIME,HTIME: 1757
COLOR UR: YELLOW
CREAT BLD-MCNC: 0.8 MG/DL (ref 0.8–1.5)
CREAT SERPL-MCNC: 0.46 MG/DL (ref 0.6–1)
CREAT SERPL-MCNC: 0.47 MG/DL (ref 0.6–1)
CREAT SERPL-MCNC: 0.51 MG/DL (ref 0.6–1)
CREAT SERPL-MCNC: 0.53 MG/DL (ref 0.6–1)
CREAT SERPL-MCNC: 0.58 MG/DL (ref 0.6–1)
CREAT SERPL-MCNC: 0.58 MG/DL (ref 0.6–1)
CREAT SERPL-MCNC: 0.59 MG/DL (ref 0.6–1)
CREAT SERPL-MCNC: 0.61 MG/DL (ref 0.6–1)
CREAT SERPL-MCNC: 0.62 MG/DL (ref 0.6–1)
CREAT SERPL-MCNC: 0.64 MG/DL (ref 0.6–1)
CREAT SERPL-MCNC: 0.64 MG/DL (ref 0.6–1)
CREAT SERPL-MCNC: 0.66 MG/DL (ref 0.6–1)
CREAT SERPL-MCNC: 0.66 MG/DL (ref 0.6–1)
CREAT SERPL-MCNC: 0.67 MG/DL (ref 0.6–1)
CREAT SERPL-MCNC: 0.73 MG/DL (ref 0.6–1)
CREAT SERPL-MCNC: 0.78 MG/DL (ref 0.6–1)
CREAT SERPL-MCNC: 1.03 MG/DL (ref 0.6–1)
CROSSMATCH RESULT,%XM: NORMAL
DIAGNOSIS, 93000: NORMAL
DIFFERENTIAL METHOD BLD: ABNORMAL
EOSINOPHIL # BLD: 0 K/UL (ref 0–0.8)
EOSINOPHIL # BLD: 0.1 K/UL (ref 0–0.8)
EOSINOPHIL # BLD: 0.1 K/UL (ref 0–0.8)
EOSINOPHIL NFR BLD: 0 % (ref 0.5–7.8)
EOSINOPHIL NFR BLD: 1 % (ref 0.5–7.8)
EOSINOPHIL NFR BLD: 2 % (ref 0.5–7.8)
EPI CELLS #/AREA URNS HPF: ABNORMAL /HPF
ERYTHROCYTE [DISTWIDTH] IN BLOOD BY AUTOMATED COUNT: 12.3 % (ref 11.9–14.6)
ERYTHROCYTE [DISTWIDTH] IN BLOOD BY AUTOMATED COUNT: 12.4 % (ref 11.9–14.6)
ERYTHROCYTE [DISTWIDTH] IN BLOOD BY AUTOMATED COUNT: 12.6 % (ref 11.9–14.6)
ERYTHROCYTE [DISTWIDTH] IN BLOOD BY AUTOMATED COUNT: 12.8 % (ref 11.9–14.6)
ERYTHROCYTE [DISTWIDTH] IN BLOOD BY AUTOMATED COUNT: 12.8 % (ref 11.9–14.6)
ERYTHROCYTE [DISTWIDTH] IN BLOOD BY AUTOMATED COUNT: 12.9 % (ref 11.9–14.6)
ERYTHROCYTE [DISTWIDTH] IN BLOOD BY AUTOMATED COUNT: 13 % (ref 11.9–14.6)
ERYTHROCYTE [DISTWIDTH] IN BLOOD BY AUTOMATED COUNT: 13 % (ref 11.9–14.6)
ERYTHROCYTE [DISTWIDTH] IN BLOOD BY AUTOMATED COUNT: 13.1 % (ref 11.9–14.6)
ERYTHROCYTE [DISTWIDTH] IN BLOOD BY AUTOMATED COUNT: 13.2 % (ref 11.9–14.6)
FLOW RATE ISTAT,IFRATE: 4 L/MIN
FLOW RATE ISTAT,IFRATE: 40 L/MIN
FLOW RATE ISTAT,IFRATE: 5 L/MIN
FLOW RATE ISTAT,IFRATE: 6 L/MIN
GAS FLOW.O2 O2 DELIVERY SYS: ABNORMAL L/MIN
GLOBULIN SER CALC-MCNC: 4 G/DL (ref 2.3–3.5)
GLOBULIN SER CALC-MCNC: 4 G/DL (ref 2.3–3.5)
GLOBULIN SER CALC-MCNC: 4.1 G/DL (ref 2.3–3.5)
GLOBULIN SER CALC-MCNC: 4.2 G/DL (ref 2.3–3.5)
GLOBULIN SER CALC-MCNC: 4.3 G/DL (ref 2.3–3.5)
GLOBULIN SER CALC-MCNC: 4.5 G/DL (ref 2.3–3.5)
GLOBULIN SER CALC-MCNC: 4.5 G/DL (ref 2.3–3.5)
GLOBULIN SER CALC-MCNC: 4.6 G/DL (ref 2.3–3.5)
GLOBULIN SER CALC-MCNC: 4.7 G/DL (ref 2.3–3.5)
GLOBULIN SER CALC-MCNC: 4.7 G/DL (ref 2.3–3.5)
GLOBULIN SER CALC-MCNC: 4.8 G/DL (ref 2.3–3.5)
GLOBULIN SER CALC-MCNC: 4.9 G/DL (ref 2.3–3.5)
GLOBULIN SER CALC-MCNC: 5 G/DL (ref 2.3–3.5)
GLOBULIN SER CALC-MCNC: 5.2 G/DL (ref 2.3–3.5)
GLUCOSE SERPL-MCNC: 101 MG/DL (ref 65–100)
GLUCOSE SERPL-MCNC: 105 MG/DL (ref 65–100)
GLUCOSE SERPL-MCNC: 111 MG/DL (ref 65–100)
GLUCOSE SERPL-MCNC: 111 MG/DL (ref 65–100)
GLUCOSE SERPL-MCNC: 112 MG/DL (ref 65–100)
GLUCOSE SERPL-MCNC: 117 MG/DL (ref 65–100)
GLUCOSE SERPL-MCNC: 124 MG/DL (ref 65–100)
GLUCOSE SERPL-MCNC: 126 MG/DL (ref 65–100)
GLUCOSE SERPL-MCNC: 130 MG/DL (ref 65–100)
GLUCOSE SERPL-MCNC: 131 MG/DL (ref 65–100)
GLUCOSE SERPL-MCNC: 132 MG/DL (ref 65–100)
GLUCOSE SERPL-MCNC: 139 MG/DL (ref 65–100)
GLUCOSE SERPL-MCNC: 145 MG/DL (ref 65–100)
GLUCOSE SERPL-MCNC: 150 MG/DL (ref 65–100)
GLUCOSE SERPL-MCNC: 151 MG/DL (ref 65–100)
GLUCOSE SERPL-MCNC: 169 MG/DL (ref 65–100)
GLUCOSE SERPL-MCNC: 95 MG/DL (ref 65–100)
GLUCOSE UR STRIP.AUTO-MCNC: 100 MG/DL
GRAM STN SPEC: ABNORMAL
HCO3 BLD-SCNC: 25.8 MMOL/L (ref 22–26)
HCO3 BLD-SCNC: 25.9 MMOL/L (ref 22–26)
HCO3 BLD-SCNC: 32.3 MMOL/L (ref 22–26)
HCO3 BLD-SCNC: 40.8 MMOL/L (ref 22–26)
HCT VFR BLD AUTO: 23.3 % (ref 35.8–46.3)
HCT VFR BLD AUTO: 23.4 % (ref 35.8–46.3)
HCT VFR BLD AUTO: 24.7 % (ref 35.8–46.3)
HCT VFR BLD AUTO: 25 % (ref 35.8–46.3)
HCT VFR BLD AUTO: 25.1 % (ref 35.8–46.3)
HCT VFR BLD AUTO: 25.8 % (ref 35.8–46.3)
HCT VFR BLD AUTO: 26.3 % (ref 35.8–46.3)
HCT VFR BLD AUTO: 26.3 % (ref 35.8–46.3)
HCT VFR BLD AUTO: 26.5 % (ref 35.8–46.3)
HCT VFR BLD AUTO: 26.9 % (ref 35.8–46.3)
HCT VFR BLD AUTO: 27 % (ref 35.8–46.3)
HCT VFR BLD AUTO: 28 % (ref 35.8–46.3)
HCT VFR BLD AUTO: 28.1 % (ref 35.8–46.3)
HCT VFR BLD AUTO: 28.5 % (ref 35.8–46.3)
HCT VFR BLD AUTO: 28.9 % (ref 35.8–46.3)
HCT VFR BLD AUTO: 29.1 % (ref 35.8–46.3)
HCT VFR BLD AUTO: 32.8 % (ref 35.8–46.3)
HCT VFR BLD AUTO: 36.1 % (ref 35.8–46.3)
HCT VFR BLD AUTO: 39.5 % (ref 35.8–46.3)
HGB BLD-MCNC: 11 G/DL (ref 11.7–15.4)
HGB BLD-MCNC: 12.1 G/DL (ref 11.7–15.4)
HGB BLD-MCNC: 12.6 G/DL (ref 11.7–15.4)
HGB BLD-MCNC: 7.5 G/DL (ref 11.7–15.4)
HGB BLD-MCNC: 7.6 G/DL (ref 11.7–15.4)
HGB BLD-MCNC: 7.9 G/DL (ref 11.7–15.4)
HGB BLD-MCNC: 7.9 G/DL (ref 11.7–15.4)
HGB BLD-MCNC: 8.2 G/DL (ref 11.7–15.4)
HGB BLD-MCNC: 8.3 G/DL (ref 11.7–15.4)
HGB BLD-MCNC: 8.4 G/DL (ref 11.7–15.4)
HGB BLD-MCNC: 8.4 G/DL (ref 11.7–15.4)
HGB BLD-MCNC: 8.5 G/DL (ref 11.7–15.4)
HGB BLD-MCNC: 8.7 G/DL (ref 11.7–15.4)
HGB BLD-MCNC: 8.7 G/DL (ref 11.7–15.4)
HGB BLD-MCNC: 8.8 G/DL (ref 11.7–15.4)
HGB BLD-MCNC: 8.9 G/DL (ref 11.7–15.4)
HGB BLD-MCNC: 9.2 G/DL (ref 11.7–15.4)
HGB BLD-MCNC: 9.2 G/DL (ref 11.7–15.4)
HGB BLD-MCNC: 9.4 G/DL (ref 11.7–15.4)
HGB UR QL STRIP: ABNORMAL
IMM GRANULOCYTES # BLD AUTO: 0 K/UL (ref 0–0.5)
IMM GRANULOCYTES # BLD AUTO: 0.1 K/UL (ref 0–0.5)
IMM GRANULOCYTES # BLD AUTO: 0.2 K/UL (ref 0–0.5)
IMM GRANULOCYTES # BLD AUTO: 0.3 K/UL (ref 0–0.5)
IMM GRANULOCYTES # BLD AUTO: 0.5 K/UL (ref 0–0.5)
IMM GRANULOCYTES # BLD AUTO: 0.7 K/UL (ref 0–0.5)
IMM GRANULOCYTES NFR BLD AUTO: 0 % (ref 0–5)
IMM GRANULOCYTES NFR BLD AUTO: 1 % (ref 0–5)
IMM GRANULOCYTES NFR BLD AUTO: 6 % (ref 0–5)
IMM GRANULOCYTES NFR BLD AUTO: 6 % (ref 0–5)
IMM GRANULOCYTES NFR BLD AUTO: 8 % (ref 0–5)
KETONES UR QL STRIP.AUTO: NEGATIVE MG/DL
LDH SERPL L TO P-CCNC: 413 U/L (ref 100–190)
LEUKOCYTE ESTERASE UR QL STRIP.AUTO: NEGATIVE
LYMPHOCYTES # BLD: 0.1 K/UL (ref 0.5–4.6)
LYMPHOCYTES # BLD: 0.1 K/UL (ref 0.5–4.6)
LYMPHOCYTES # BLD: 0.2 K/UL (ref 0.5–4.6)
LYMPHOCYTES # BLD: 0.2 K/UL (ref 0.5–4.6)
LYMPHOCYTES # BLD: 0.3 K/UL (ref 0.5–4.6)
LYMPHOCYTES # BLD: 0.3 K/UL (ref 0.5–4.6)
LYMPHOCYTES # BLD: 0.4 K/UL (ref 0.5–4.6)
LYMPHOCYTES # BLD: 0.6 K/UL (ref 0.5–4.6)
LYMPHOCYTES # BLD: 0.6 K/UL (ref 0.5–4.6)
LYMPHOCYTES # BLD: 0.7 K/UL (ref 0.5–4.6)
LYMPHOCYTES # BLD: 1.3 K/UL (ref 0.5–4.6)
LYMPHOCYTES # BLD: 1.4 K/UL (ref 0.5–4.6)
LYMPHOCYTES # BLD: 1.4 K/UL (ref 0.5–4.6)
LYMPHOCYTES # BLD: 1.6 K/UL (ref 0.5–4.6)
LYMPHOCYTES # BLD: 2.7 K/UL (ref 0.5–4.6)
LYMPHOCYTES # BLD: 3.1 K/UL (ref 0.5–4.6)
LYMPHOCYTES NFR BLD: 1 % (ref 13–44)
LYMPHOCYTES NFR BLD: 10 % (ref 13–44)
LYMPHOCYTES NFR BLD: 10 % (ref 13–44)
LYMPHOCYTES NFR BLD: 14 % (ref 13–44)
LYMPHOCYTES NFR BLD: 15 % (ref 13–44)
LYMPHOCYTES NFR BLD: 2 % (ref 13–44)
LYMPHOCYTES NFR BLD: 22 % (ref 13–44)
LYMPHOCYTES NFR BLD: 25 % (ref 13–44)
LYMPHOCYTES NFR BLD: 3 % (ref 13–44)
LYMPHOCYTES NFR BLD: 5 % (ref 13–44)
LYMPHOCYTES NFR BLD: 6 % (ref 13–44)
LYMPHOCYTES NFR BLD: 61 % (ref 13–44)
LYMPHOCYTES NFR BLD: 7 % (ref 13–44)
LYMPHOCYTES NFR BLD: 9 % (ref 13–44)
MAGNESIUM SERPL-MCNC: 1.3 MG/DL (ref 1.8–2.4)
MAGNESIUM SERPL-MCNC: 1.6 MG/DL (ref 1.8–2.4)
MAGNESIUM SERPL-MCNC: 1.7 MG/DL (ref 1.8–2.4)
MAGNESIUM SERPL-MCNC: 1.9 MG/DL (ref 1.8–2.4)
MAGNESIUM SERPL-MCNC: 1.9 MG/DL (ref 1.8–2.4)
MAGNESIUM SERPL-MCNC: 2.1 MG/DL (ref 1.8–2.4)
MCH RBC QN AUTO: 28 PG (ref 26.1–32.9)
MCH RBC QN AUTO: 28.2 PG (ref 26.1–32.9)
MCH RBC QN AUTO: 28.4 PG (ref 26.1–32.9)
MCH RBC QN AUTO: 28.5 PG (ref 26.1–32.9)
MCH RBC QN AUTO: 28.5 PG (ref 26.1–32.9)
MCH RBC QN AUTO: 28.6 PG (ref 26.1–32.9)
MCH RBC QN AUTO: 28.7 PG (ref 26.1–32.9)
MCH RBC QN AUTO: 28.8 PG (ref 26.1–32.9)
MCH RBC QN AUTO: 28.9 PG (ref 26.1–32.9)
MCH RBC QN AUTO: 29.6 PG (ref 26.1–32.9)
MCH RBC QN AUTO: 30.3 PG (ref 26.1–32.9)
MCH RBC QN AUTO: 30.4 PG (ref 26.1–32.9)
MCHC RBC AUTO-ENTMCNC: 31.3 G/DL (ref 31.4–35)
MCHC RBC AUTO-ENTMCNC: 31.5 G/DL (ref 31.4–35)
MCHC RBC AUTO-ENTMCNC: 31.6 G/DL (ref 31.4–35)
MCHC RBC AUTO-ENTMCNC: 31.8 G/DL (ref 31.4–35)
MCHC RBC AUTO-ENTMCNC: 31.9 G/DL (ref 31.4–35)
MCHC RBC AUTO-ENTMCNC: 31.9 G/DL (ref 31.4–35)
MCHC RBC AUTO-ENTMCNC: 32 G/DL (ref 31.4–35)
MCHC RBC AUTO-ENTMCNC: 32.1 G/DL (ref 31.4–35)
MCHC RBC AUTO-ENTMCNC: 32.3 G/DL (ref 31.4–35)
MCHC RBC AUTO-ENTMCNC: 32.3 G/DL (ref 31.4–35)
MCHC RBC AUTO-ENTMCNC: 32.5 G/DL (ref 31.4–35)
MCHC RBC AUTO-ENTMCNC: 32.6 G/DL (ref 31.4–35)
MCHC RBC AUTO-ENTMCNC: 32.6 G/DL (ref 31.4–35)
MCHC RBC AUTO-ENTMCNC: 32.7 G/DL (ref 31.4–35)
MCHC RBC AUTO-ENTMCNC: 32.8 G/DL (ref 31.4–35)
MCHC RBC AUTO-ENTMCNC: 33.5 G/DL (ref 31.4–35)
MCHC RBC AUTO-ENTMCNC: 33.5 G/DL (ref 31.4–35)
MCV RBC AUTO: 87.2 FL (ref 79.6–97.8)
MCV RBC AUTO: 87.2 FL (ref 79.6–97.8)
MCV RBC AUTO: 88.1 FL (ref 79.6–97.8)
MCV RBC AUTO: 88.2 FL (ref 79.6–97.8)
MCV RBC AUTO: 88.3 FL (ref 79.6–97.8)
MCV RBC AUTO: 88.4 FL (ref 79.6–97.8)
MCV RBC AUTO: 88.4 FL (ref 79.6–97.8)
MCV RBC AUTO: 88.5 FL (ref 79.6–97.8)
MCV RBC AUTO: 88.6 FL (ref 79.6–97.8)
MCV RBC AUTO: 88.7 FL (ref 79.6–97.8)
MCV RBC AUTO: 88.8 FL (ref 79.6–97.8)
MCV RBC AUTO: 89.5 FL (ref 79.6–97.8)
MCV RBC AUTO: 89.7 FL (ref 79.6–97.8)
MCV RBC AUTO: 90.3 FL (ref 79.6–97.8)
MCV RBC AUTO: 90.4 FL (ref 79.6–97.8)
MCV RBC AUTO: 90.7 FL (ref 79.6–97.8)
MCV RBC AUTO: 90.7 FL (ref 79.6–97.8)
MCV RBC AUTO: 91.2 FL (ref 79.6–97.8)
MCV RBC AUTO: 95.4 FL (ref 79.6–97.8)
METHADONE UR QL: NEGATIVE
MM INDURATION POC: 0 MM (ref 0–5)
MM INDURATION POC: 0 MM (ref 0–5)
MONOCYTES # BLD: 0 K/UL (ref 0.1–1.3)
MONOCYTES # BLD: 0.1 K/UL (ref 0.1–1.3)
MONOCYTES # BLD: 0.2 K/UL (ref 0.1–1.3)
MONOCYTES # BLD: 0.3 K/UL (ref 0.1–1.3)
MONOCYTES # BLD: 0.6 K/UL (ref 0.1–1.3)
MONOCYTES # BLD: 0.6 K/UL (ref 0.1–1.3)
MONOCYTES # BLD: 1.1 K/UL (ref 0.1–1.3)
MONOCYTES # BLD: 1.1 K/UL (ref 0.1–1.3)
MONOCYTES # BLD: 1.2 K/UL (ref 0.1–1.3)
MONOCYTES # BLD: 1.3 K/UL (ref 0.1–1.3)
MONOCYTES NFR BLD: 0 % (ref 4–12)
MONOCYTES NFR BLD: 1 % (ref 4–12)
MONOCYTES NFR BLD: 11 % (ref 4–12)
MONOCYTES NFR BLD: 2 % (ref 4–12)
MONOCYTES NFR BLD: 2 % (ref 4–12)
MONOCYTES NFR BLD: 3 % (ref 4–12)
MONOCYTES NFR BLD: 6 % (ref 4–12)
MONOCYTES NFR BLD: 8 % (ref 4–12)
MONOCYTES NFR BLD: 9 % (ref 4–12)
NEUTS SEG # BLD: 0.2 K/UL (ref 1.7–8.2)
NEUTS SEG # BLD: 1.9 K/UL (ref 1.7–8.2)
NEUTS SEG # BLD: 10.2 K/UL (ref 1.7–8.2)
NEUTS SEG # BLD: 10.4 K/UL (ref 1.7–8.2)
NEUTS SEG # BLD: 10.5 K/UL (ref 1.7–8.2)
NEUTS SEG # BLD: 11.3 K/UL (ref 1.7–8.2)
NEUTS SEG # BLD: 14.2 K/UL (ref 1.7–8.2)
NEUTS SEG # BLD: 15.4 K/UL (ref 1.7–8.2)
NEUTS SEG # BLD: 16.1 K/UL (ref 1.7–8.2)
NEUTS SEG # BLD: 16.3 K/UL (ref 1.7–8.2)
NEUTS SEG # BLD: 18.8 K/UL (ref 1.7–8.2)
NEUTS SEG # BLD: 3.3 K/UL (ref 1.7–8.2)
NEUTS SEG # BLD: 6.8 K/UL (ref 1.7–8.2)
NEUTS SEG # BLD: 7.2 K/UL (ref 1.7–8.2)
NEUTS SEG # BLD: 7.5 K/UL (ref 1.7–8.2)
NEUTS SEG # BLD: 9.8 K/UL (ref 1.7–8.2)
NEUTS SEG NFR BLD: 27 % (ref 43–78)
NEUTS SEG NFR BLD: 67 % (ref 43–78)
NEUTS SEG NFR BLD: 68 % (ref 43–78)
NEUTS SEG NFR BLD: 74 % (ref 43–78)
NEUTS SEG NFR BLD: 76 % (ref 43–78)
NEUTS SEG NFR BLD: 76 % (ref 43–78)
NEUTS SEG NFR BLD: 80 % (ref 43–78)
NEUTS SEG NFR BLD: 81 % (ref 43–78)
NEUTS SEG NFR BLD: 87 % (ref 43–78)
NEUTS SEG NFR BLD: 87 % (ref 43–78)
NEUTS SEG NFR BLD: 88 % (ref 43–78)
NEUTS SEG NFR BLD: 96 % (ref 43–78)
NEUTS SEG NFR BLD: 96 % (ref 43–78)
NEUTS SEG NFR BLD: 97 % (ref 43–78)
NEUTS SEG NFR BLD: 98 % (ref 43–78)
NEUTS SEG NFR BLD: 98 % (ref 43–78)
NITRITE UR QL STRIP.AUTO: NEGATIVE
NRBC # BLD: 0 K/UL (ref 0–0.2)
O2/TOTAL GAS SETTING VFR VENT: 45 %
OPIATES UR QL: POSITIVE
OSMOLALITY SERPL: 265 MOSM/KG H2O (ref 275–295)
P-R INTERVAL, ECG05: 124 MS
PCO2 BLD: 47.2 MMHG (ref 35–45)
PCO2 BLD: 47.2 MMHG (ref 35–45)
PCO2 BLD: 61.1 MMHG (ref 35–45)
PCO2 BLD: 76.5 MMHG (ref 35–45)
PCP UR QL: NEGATIVE
PH BLD: 7.33 [PH] (ref 7.35–7.45)
PH BLD: 7.33 [PH] (ref 7.35–7.45)
PH BLD: 7.34 [PH] (ref 7.35–7.45)
PH BLD: 7.35 [PH] (ref 7.35–7.45)
PH UR STRIP: 7.5 [PH] (ref 5–9)
PLATELET # BLD AUTO: 116 K/UL (ref 150–450)
PLATELET # BLD AUTO: 121 K/UL (ref 150–450)
PLATELET # BLD AUTO: 182 K/UL (ref 150–450)
PLATELET # BLD AUTO: 226 K/UL (ref 150–450)
PLATELET # BLD AUTO: 31 K/UL (ref 150–450)
PLATELET # BLD AUTO: 340 K/UL (ref 150–450)
PLATELET # BLD AUTO: 349 K/UL (ref 150–450)
PLATELET # BLD AUTO: 380 K/UL (ref 150–450)
PLATELET # BLD AUTO: 382 K/UL (ref 150–450)
PLATELET # BLD AUTO: 392 K/UL (ref 150–450)
PLATELET # BLD AUTO: 400 K/UL (ref 150–450)
PLATELET # BLD AUTO: 411 K/UL (ref 150–450)
PLATELET # BLD AUTO: 433 K/UL (ref 150–450)
PLATELET # BLD AUTO: 45 K/UL (ref 150–450)
PLATELET # BLD AUTO: 51 K/UL (ref 150–450)
PLATELET # BLD AUTO: 510 K/UL (ref 150–450)
PLATELET # BLD AUTO: 542 K/UL (ref 150–450)
PLATELET # BLD AUTO: 63 K/UL (ref 150–450)
PLATELET # BLD AUTO: 97 K/UL (ref 150–450)
PLATELET COMMENTS,PCOM: ABNORMAL
PLATELET COMMENTS,PCOM: ADEQUATE
PLATELET COMMENTS,PCOM: ADEQUATE
PLATELET COMMENTS,PCOM: SLIGHT
PMV BLD AUTO: 10.4 FL (ref 9.4–12.3)
PMV BLD AUTO: 10.6 FL (ref 9.4–12.3)
PMV BLD AUTO: 10.8 FL (ref 9.4–12.3)
PMV BLD AUTO: 12.2 FL (ref 9.4–12.3)
PMV BLD AUTO: 8.1 FL (ref 9.4–12.3)
PMV BLD AUTO: 8.3 FL (ref 9.4–12.3)
PMV BLD AUTO: 8.3 FL (ref 9.4–12.3)
PMV BLD AUTO: 8.4 FL (ref 9.4–12.3)
PMV BLD AUTO: 8.5 FL (ref 9.4–12.3)
PMV BLD AUTO: 8.5 FL (ref 9.4–12.3)
PMV BLD AUTO: 8.6 FL (ref 9.4–12.3)
PMV BLD AUTO: 8.7 FL (ref 9.4–12.3)
PMV BLD AUTO: 8.8 FL (ref 9.4–12.3)
PMV BLD AUTO: 9.1 FL (ref 9.4–12.3)
PMV BLD AUTO: 9.7 FL (ref 9.4–12.3)
PMV BLD AUTO: 9.7 FL (ref 9.4–12.3)
PMV BLD AUTO: 9.8 FL (ref 9.4–12.3)
PO2 BLD: 57 MMHG (ref 75–100)
PO2 BLD: 59 MMHG (ref 75–100)
PO2 BLD: 63 MMHG (ref 75–100)
PO2 BLD: 88 MMHG (ref 75–100)
POTASSIUM SERPL-SCNC: 2.7 MMOL/L (ref 3.5–5.1)
POTASSIUM SERPL-SCNC: 2.8 MMOL/L (ref 3.5–5.1)
POTASSIUM SERPL-SCNC: 3 MMOL/L (ref 3.5–5.1)
POTASSIUM SERPL-SCNC: 3.1 MMOL/L (ref 3.5–5.1)
POTASSIUM SERPL-SCNC: 3.3 MMOL/L (ref 3.5–5.1)
POTASSIUM SERPL-SCNC: 3.3 MMOL/L (ref 3.5–5.1)
POTASSIUM SERPL-SCNC: 3.4 MMOL/L (ref 3.5–5.1)
POTASSIUM SERPL-SCNC: 3.5 MMOL/L (ref 3.5–5.1)
POTASSIUM SERPL-SCNC: 3.6 MMOL/L (ref 3.5–5.1)
POTASSIUM SERPL-SCNC: 3.7 MMOL/L (ref 3.5–5.1)
POTASSIUM SERPL-SCNC: 3.8 MMOL/L (ref 3.5–5.1)
POTASSIUM SERPL-SCNC: 3.8 MMOL/L (ref 3.5–5.1)
POTASSIUM SERPL-SCNC: 3.9 MMOL/L (ref 3.5–5.1)
POTASSIUM SERPL-SCNC: 4 MMOL/L (ref 3.5–5.1)
POTASSIUM SERPL-SCNC: 4.2 MMOL/L (ref 3.5–5.1)
POTASSIUM SERPL-SCNC: 4.3 MMOL/L (ref 3.5–5.1)
POTASSIUM SERPL-SCNC: 4.3 MMOL/L (ref 3.5–5.1)
POTASSIUM SERPL-SCNC: 5.2 MMOL/L (ref 3.5–5.1)
PPD POC: NEGATIVE NEGATIVE
PPD POC: NEGATIVE NEGATIVE
PROT SERPL-MCNC: 5.9 G/DL (ref 6.3–8.2)
PROT SERPL-MCNC: 5.9 G/DL (ref 6.3–8.2)
PROT SERPL-MCNC: 6 G/DL (ref 6.3–8.2)
PROT SERPL-MCNC: 6 G/DL (ref 6.3–8.2)
PROT SERPL-MCNC: 6.1 G/DL (ref 6.3–8.2)
PROT SERPL-MCNC: 6.2 G/DL (ref 6.3–8.2)
PROT SERPL-MCNC: 6.2 G/DL (ref 6.3–8.2)
PROT SERPL-MCNC: 6.3 G/DL (ref 6.3–8.2)
PROT SERPL-MCNC: 6.4 G/DL (ref 6.3–8.2)
PROT SERPL-MCNC: 6.5 G/DL (ref 6.3–8.2)
PROT SERPL-MCNC: 6.6 G/DL (ref 6.3–8.2)
PROT SERPL-MCNC: 6.7 G/DL (ref 6.3–8.2)
PROT SERPL-MCNC: 6.9 G/DL (ref 6.3–8.2)
PROT SERPL-MCNC: 6.9 G/DL (ref 6.3–8.2)
PROT SERPL-MCNC: 7.5 G/DL (ref 6.3–8.2)
PROT SERPL-MCNC: 8 G/DL (ref 6.3–8.2)
PROT SERPL-MCNC: 8 G/DL (ref 6.3–8.2)
PROT UR STRIP-MCNC: 30 MG/DL
Q-T INTERVAL, ECG07: 362 MS
QRS DURATION, ECG06: 84 MS
QTC CALCULATION (BEZET), ECG08: 422 MS
RBC # BLD AUTO: 2.64 M/UL (ref 4.05–5.2)
RBC # BLD AUTO: 2.64 M/UL (ref 4.05–5.2)
RBC # BLD AUTO: 2.78 M/UL (ref 4.05–5.2)
RBC # BLD AUTO: 2.82 M/UL (ref 4.05–5.2)
RBC # BLD AUTO: 2.88 M/UL (ref 4.05–5.2)
RBC # BLD AUTO: 2.9 M/UL (ref 4.05–5.2)
RBC # BLD AUTO: 2.91 M/UL (ref 4.05–5.2)
RBC # BLD AUTO: 2.93 M/UL (ref 4.05–5.2)
RBC # BLD AUTO: 3.01 M/UL (ref 4.05–5.2)
RBC # BLD AUTO: 3.04 M/UL (ref 4.05–5.2)
RBC # BLD AUTO: 3.04 M/UL (ref 4.05–5.2)
RBC # BLD AUTO: 3.08 M/UL (ref 4.05–5.2)
RBC # BLD AUTO: 3.13 M/UL (ref 4.05–5.2)
RBC # BLD AUTO: 3.21 M/UL (ref 4.05–5.2)
RBC # BLD AUTO: 3.23 M/UL (ref 4.05–5.25)
RBC # BLD AUTO: 3.27 M/UL (ref 4.05–5.2)
RBC # BLD AUTO: 3.71 M/UL (ref 4.05–5.25)
RBC # BLD AUTO: 4 M/UL (ref 4.05–5.2)
RBC # BLD AUTO: 4.14 M/UL (ref 4.05–5.2)
RBC #/AREA URNS HPF: ABNORMAL /HPF
RBC MORPH BLD: ABNORMAL
SAO2 % BLD: 86 % (ref 95–98)
SAO2 % BLD: 88 % (ref 95–98)
SAO2 % BLD: 89 % (ref 95–98)
SAO2 % BLD: 96 % (ref 95–98)
SERVICE CMNT-IMP: ABNORMAL
SERVICE CMNT-IMP: NORMAL
SODIUM SERPL-SCNC: 123 MMOL/L (ref 136–145)
SODIUM SERPL-SCNC: 131 MMOL/L (ref 136–145)
SODIUM SERPL-SCNC: 132 MMOL/L (ref 136–145)
SODIUM SERPL-SCNC: 133 MMOL/L (ref 136–145)
SODIUM SERPL-SCNC: 134 MMOL/L (ref 136–145)
SODIUM SERPL-SCNC: 135 MMOL/L (ref 136–145)
SODIUM SERPL-SCNC: 135 MMOL/L (ref 136–145)
SODIUM SERPL-SCNC: 136 MMOL/L (ref 136–145)
SODIUM SERPL-SCNC: 137 MMOL/L (ref 136–145)
SODIUM SERPL-SCNC: 137 MMOL/L (ref 136–145)
SODIUM SERPL-SCNC: 138 MMOL/L (ref 136–145)
SODIUM SERPL-SCNC: 141 MMOL/L (ref 136–145)
SP GR UR REFRACTOMETRY: 1.01 (ref 1–1.02)
SPECIMEN EXP DATE BLD: NORMAL
SPECIMEN TYPE: ABNORMAL
STATUS OF UNIT,%ST: NORMAL
UNIT DIVISION, %UDIV: 0
UROBILINOGEN UR QL STRIP.AUTO: 0.2 EU/DL (ref 0.2–1)
VANCOMYCIN TROUGH SERPL-MCNC: 19.8 UG/ML (ref 5–20)
VANCOMYCIN TROUGH SERPL-MCNC: 19.9 UG/ML (ref 5–20)
VANCOMYCIN TROUGH SERPL-MCNC: 21.2 UG/ML (ref 5–20)
VANCOMYCIN TROUGH SERPL-MCNC: 29.8 UG/ML (ref 5–20)
VENTRICULAR RATE, ECG03: 82 BPM
WBC # BLD AUTO: 0.1 K/UL (ref 4.3–11.1)
WBC # BLD AUTO: 0.2 K/UL (ref 4.3–11.1)
WBC # BLD AUTO: 0.3 K/UL (ref 4.3–11.1)
WBC # BLD AUTO: 0.6 K/UL (ref 4.3–11.1)
WBC # BLD AUTO: 10 K/UL (ref 4.3–11.1)
WBC # BLD AUTO: 10.6 K/UL (ref 4.3–11.1)
WBC # BLD AUTO: 10.7 K/UL (ref 4.3–11.1)
WBC # BLD AUTO: 11.9 K/UL (ref 4.3–11.1)
WBC # BLD AUTO: 13 K/UL (ref 4.3–11.1)
WBC # BLD AUTO: 13.9 K/UL (ref 4.3–11.1)
WBC # BLD AUTO: 14.3 K/UL (ref 4.3–11.1)
WBC # BLD AUTO: 14.6 K/UL (ref 4.3–11.1)
WBC # BLD AUTO: 16 K/UL (ref 4.3–11.1)
WBC # BLD AUTO: 16.8 K/UL (ref 4.3–11.1)
WBC # BLD AUTO: 18.6 K/UL (ref 4.3–11.1)
WBC # BLD AUTO: 19.3 K/UL (ref 4.3–11.1)
WBC # BLD AUTO: 2.5 K/UL (ref 4.3–11.1)
WBC # BLD AUTO: 4.2 K/UL (ref 4.3–11.1)
WBC # BLD AUTO: 7.9 K/UL (ref 4.3–11.1)
WBC MORPH BLD: ABNORMAL
WBC URNS QL MICRO: ABNORMAL /HPF

## 2020-01-01 PROCEDURE — 74011250637 HC RX REV CODE- 250/637: Performed by: NURSE PRACTITIONER

## 2020-01-01 PROCEDURE — 93005 ELECTROCARDIOGRAM TRACING: CPT | Performed by: INTERNAL MEDICINE

## 2020-01-01 PROCEDURE — 74011000258 HC RX REV CODE- 258: Performed by: NURSE PRACTITIONER

## 2020-01-01 PROCEDURE — 74011250636 HC RX REV CODE- 250/636: Performed by: INTERNAL MEDICINE

## 2020-01-01 PROCEDURE — 76060000032 HC ANESTHESIA 0.5 TO 1 HR: Performed by: INTERNAL MEDICINE

## 2020-01-01 PROCEDURE — 85025 COMPLETE CBC W/AUTO DIFF WBC: CPT

## 2020-01-01 PROCEDURE — 77030040830 HC CATH URETH FOL MDII -A

## 2020-01-01 PROCEDURE — 74011250636 HC RX REV CODE- 250/636: Performed by: NURSE PRACTITIONER

## 2020-01-01 PROCEDURE — 80053 COMPREHEN METABOLIC PANEL: CPT

## 2020-01-01 PROCEDURE — 74011000250 HC RX REV CODE- 250: Performed by: NURSE PRACTITIONER

## 2020-01-01 PROCEDURE — 83735 ASSAY OF MAGNESIUM: CPT

## 2020-01-01 PROCEDURE — C1894 INTRO/SHEATH, NON-LASER: HCPCS

## 2020-01-01 PROCEDURE — 70470 CT HEAD/BRAIN W/O & W/DYE: CPT

## 2020-01-01 PROCEDURE — 74011250637 HC RX REV CODE- 250/637: Performed by: INTERNAL MEDICINE

## 2020-01-01 PROCEDURE — 99232 SBSQ HOSP IP/OBS MODERATE 35: CPT | Performed by: INTERNAL MEDICINE

## 2020-01-01 PROCEDURE — 74011000250 HC RX REV CODE- 250: Performed by: INTERNAL MEDICINE

## 2020-01-01 PROCEDURE — 74011250636 HC RX REV CODE- 250/636: Performed by: PHYSICIAN ASSISTANT

## 2020-01-01 PROCEDURE — 65270000029 HC RM PRIVATE

## 2020-01-01 PROCEDURE — 97162 PT EVAL MOD COMPLEX 30 MIN: CPT

## 2020-01-01 PROCEDURE — 77470 SPECIAL RADIATION TREATMENT: CPT

## 2020-01-01 PROCEDURE — 74011636637 HC RX REV CODE- 636/637: Performed by: NURSE PRACTITIONER

## 2020-01-01 PROCEDURE — 77300 RADIATION THERAPY DOSE PLAN: CPT

## 2020-01-01 PROCEDURE — 80202 ASSAY OF VANCOMYCIN: CPT

## 2020-01-01 PROCEDURE — 74011000250 HC RX REV CODE- 250: Performed by: NURSE ANESTHETIST, CERTIFIED REGISTERED

## 2020-01-01 PROCEDURE — 36415 COLL VENOUS BLD VENIPUNCTURE: CPT

## 2020-01-01 PROCEDURE — 77030003560 HC NDL HUBR BARD -A

## 2020-01-01 PROCEDURE — 76604 US EXAM CHEST: CPT | Performed by: INTERNAL MEDICINE

## 2020-01-01 PROCEDURE — 77399 UNLISTED PX MED RADJ PHYSICS: CPT

## 2020-01-01 PROCEDURE — 36591 DRAW BLOOD OFF VENOUS DEVICE: CPT

## 2020-01-01 PROCEDURE — 94760 N-INVAS EAR/PLS OXIMETRY 1: CPT

## 2020-01-01 PROCEDURE — 31720 CLEARANCE OF AIRWAYS: CPT

## 2020-01-01 PROCEDURE — 77010033678 HC OXYGEN DAILY

## 2020-01-01 PROCEDURE — 93005 ELECTROCARDIOGRAM TRACING: CPT | Performed by: EMERGENCY MEDICINE

## 2020-01-01 PROCEDURE — 71046 X-RAY EXAM CHEST 2 VIEWS: CPT

## 2020-01-01 PROCEDURE — 86580 TB INTRADERMAL TEST: CPT | Performed by: INTERNAL MEDICINE

## 2020-01-01 PROCEDURE — 74011250636 HC RX REV CODE- 250/636: Performed by: EMERGENCY MEDICINE

## 2020-01-01 PROCEDURE — 74011250637 HC RX REV CODE- 250/637: Performed by: PHYSICIAN ASSISTANT

## 2020-01-01 PROCEDURE — 82550 ASSAY OF CK (CPK): CPT

## 2020-01-01 PROCEDURE — 77030018846 HC SOL IRR STRL H20 ICUM -A

## 2020-01-01 PROCEDURE — 99211 OFF/OP EST MAY X REQ PHY/QHP: CPT

## 2020-01-01 PROCEDURE — 94640 AIRWAY INHALATION TREATMENT: CPT

## 2020-01-01 PROCEDURE — 77010033711 HC HIGH FLOW OXYGEN

## 2020-01-01 PROCEDURE — 30233N1 TRANSFUSION OF NONAUTOLOGOUS RED BLOOD CELLS INTO PERIPHERAL VEIN, PERCUTANEOUS APPROACH: ICD-10-PCS | Performed by: INTERNAL MEDICINE

## 2020-01-01 PROCEDURE — 86923 COMPATIBILITY TEST ELECTRIC: CPT

## 2020-01-01 PROCEDURE — 74011636320 HC RX REV CODE- 636/320: Performed by: INTERNAL MEDICINE

## 2020-01-01 PROCEDURE — 77301 RADIOTHERAPY DOSE PLAN IMRT: CPT

## 2020-01-01 PROCEDURE — 87086 URINE CULTURE/COLONY COUNT: CPT

## 2020-01-01 PROCEDURE — 99233 SBSQ HOSP IP/OBS HIGH 50: CPT | Performed by: INTERNAL MEDICINE

## 2020-01-01 PROCEDURE — 74011250637 HC RX REV CODE- 250/637: Performed by: EMERGENCY MEDICINE

## 2020-01-01 PROCEDURE — 80307 DRUG TEST PRSMV CHEM ANLYZR: CPT

## 2020-01-01 PROCEDURE — 36600 WITHDRAWAL OF ARTERIAL BLOOD: CPT

## 2020-01-01 PROCEDURE — 96374 THER/PROPH/DIAG INJ IV PUSH: CPT | Performed by: EMERGENCY MEDICINE

## 2020-01-01 PROCEDURE — 83880 ASSAY OF NATRIURETIC PEPTIDE: CPT

## 2020-01-01 PROCEDURE — 77030009046 HC CATH BRNCH BLLN OCOA -B: Performed by: INTERNAL MEDICINE

## 2020-01-01 PROCEDURE — 99284 EMERGENCY DEPT VISIT MOD MDM: CPT

## 2020-01-01 PROCEDURE — 74011250636 HC RX REV CODE- 250/636: Performed by: NURSE ANESTHETIST, CERTIFIED REGISTERED

## 2020-01-01 PROCEDURE — 99223 1ST HOSP IP/OBS HIGH 75: CPT | Performed by: INTERNAL MEDICINE

## 2020-01-01 PROCEDURE — 82803 BLOOD GASES ANY COMBINATION: CPT

## 2020-01-01 PROCEDURE — 74011000250 HC RX REV CODE- 250: Performed by: PHYSICIAN ASSISTANT

## 2020-01-01 PROCEDURE — 73020000043 HC LEUKOCYTE FILTER

## 2020-01-01 PROCEDURE — 70553 MRI BRAIN STEM W/O & W/DYE: CPT

## 2020-01-01 PROCEDURE — 77293 RESPIRATOR MOTION MGMT SIMUL: CPT

## 2020-01-01 PROCEDURE — 93306 TTE W/DOPPLER COMPLETE: CPT

## 2020-01-01 PROCEDURE — 87040 BLOOD CULTURE FOR BACTERIA: CPT

## 2020-01-01 PROCEDURE — 74011000258 HC RX REV CODE- 258: Performed by: INTERNAL MEDICINE

## 2020-01-01 PROCEDURE — 76450000000

## 2020-01-01 PROCEDURE — 82378 CARCINOEMBRYONIC ANTIGEN: CPT

## 2020-01-01 PROCEDURE — 99233 SBSQ HOSP IP/OBS HIGH 50: CPT | Performed by: NURSE PRACTITIONER

## 2020-01-01 PROCEDURE — BT40ZZZ ULTRASONOGRAPHY OF BLADDER: ICD-10-PCS | Performed by: INTERNAL MEDICINE

## 2020-01-01 PROCEDURE — 77030027138 HC INCENT SPIROMETER -A

## 2020-01-01 PROCEDURE — 99223 1ST HOSP IP/OBS HIGH 75: CPT | Performed by: NURSE PRACTITIONER

## 2020-01-01 PROCEDURE — 82565 ASSAY OF CREATININE: CPT

## 2020-01-01 PROCEDURE — 71045 X-RAY EXAM CHEST 1 VIEW: CPT

## 2020-01-01 PROCEDURE — 71250 CT THORAX DX C-: CPT

## 2020-01-01 PROCEDURE — 74011636320 HC RX REV CODE- 636/320: Performed by: EMERGENCY MEDICINE

## 2020-01-01 PROCEDURE — 31652 BRONCH EBUS SAMPLNG 1/2 NODE: CPT | Performed by: INTERNAL MEDICINE

## 2020-01-01 PROCEDURE — 88305 TISSUE EXAM BY PATHOLOGIST: CPT

## 2020-01-01 PROCEDURE — C1788 PORT, INDWELLING, IMP: HCPCS

## 2020-01-01 PROCEDURE — 99232 SBSQ HOSP IP/OBS MODERATE 35: CPT | Performed by: NURSE PRACTITIONER

## 2020-01-01 PROCEDURE — 74011000258 HC RX REV CODE- 258: Performed by: RADIOLOGY

## 2020-01-01 PROCEDURE — 74011636320 HC RX REV CODE- 636/320: Performed by: RADIOLOGY

## 2020-01-01 PROCEDURE — 83930 ASSAY OF BLOOD OSMOLALITY: CPT

## 2020-01-01 PROCEDURE — 81001 URINALYSIS AUTO W/SCOPE: CPT

## 2020-01-01 PROCEDURE — 86900 BLOOD TYPING SEROLOGIC ABO: CPT

## 2020-01-01 PROCEDURE — 84132 ASSAY OF SERUM POTASSIUM: CPT

## 2020-01-01 PROCEDURE — P9040 RBC LEUKOREDUCED IRRADIATED: HCPCS

## 2020-01-01 PROCEDURE — 36561 INSERT TUNNELED CV CATH: CPT

## 2020-01-01 PROCEDURE — 77030003406 HC NDL ASPIR BIOP OCOA -C: Performed by: INTERNAL MEDICINE

## 2020-01-01 PROCEDURE — 97530 THERAPEUTIC ACTIVITIES: CPT

## 2020-01-01 PROCEDURE — 97110 THERAPEUTIC EXERCISES: CPT

## 2020-01-01 PROCEDURE — 76060000032 HC ANESTHESIA 0.5 TO 1 HR: Performed by: RADIOLOGY

## 2020-01-01 PROCEDURE — 51798 US URINE CAPACITY MEASURE: CPT

## 2020-01-01 PROCEDURE — 96365 THER/PROPH/DIAG IV INF INIT: CPT

## 2020-01-01 PROCEDURE — 77030010507 HC ADH SKN DERMBND J&J -B

## 2020-01-01 PROCEDURE — 77030031131 HC SUT MXN P COVD -B

## 2020-01-01 PROCEDURE — 86644 CMV ANTIBODY: CPT

## 2020-01-01 PROCEDURE — 86301 IMMUNOASSAY TUMOR CA 19-9: CPT

## 2020-01-01 PROCEDURE — 99239 HOSP IP/OBS DSCHRG MGMT >30: CPT | Performed by: INTERNAL MEDICINE

## 2020-01-01 PROCEDURE — 76040000026: Performed by: INTERNAL MEDICINE

## 2020-01-01 PROCEDURE — 99153 MOD SED SAME PHYS/QHP EA: CPT | Performed by: INTERNAL MEDICINE

## 2020-01-01 PROCEDURE — A9552 F18 FDG: HCPCS

## 2020-01-01 PROCEDURE — 88173 CYTOPATH EVAL FNA REPORT: CPT

## 2020-01-01 PROCEDURE — 77030019605

## 2020-01-01 PROCEDURE — 74011636320 HC RX REV CODE- 636/320: Performed by: NURSE PRACTITIONER

## 2020-01-01 PROCEDURE — 99152 MOD SED SAME PHYS/QHP 5/>YRS: CPT | Performed by: INTERNAL MEDICINE

## 2020-01-01 PROCEDURE — 71260 CT THORAX DX C+: CPT

## 2020-01-01 PROCEDURE — 36592 COLLECT BLOOD FROM PICC: CPT

## 2020-01-01 PROCEDURE — A9575 INJ GADOTERATE MEGLUMI 0.1ML: HCPCS | Performed by: INTERNAL MEDICINE

## 2020-01-01 PROCEDURE — 99285 EMERGENCY DEPT VISIT HI MDM: CPT | Performed by: EMERGENCY MEDICINE

## 2020-01-01 PROCEDURE — 77030012699 HC VLV SUC CNTRL OCOA -A: Performed by: INTERNAL MEDICINE

## 2020-01-01 PROCEDURE — 77338 DESIGN MLC DEVICE FOR IMRT: CPT

## 2020-01-01 PROCEDURE — 83615 LACTATE (LD) (LDH) ENZYME: CPT

## 2020-01-01 PROCEDURE — 36430 TRANSFUSION BLD/BLD COMPNT: CPT

## 2020-01-01 PROCEDURE — 99283 EMERGENCY DEPT VISIT LOW MDM: CPT

## 2020-01-01 PROCEDURE — 88172 CYTP DX EVAL FNA 1ST EA SITE: CPT

## 2020-01-01 PROCEDURE — 74011000302 HC RX REV CODE- 302: Performed by: INTERNAL MEDICINE

## 2020-01-01 PROCEDURE — 87106 FUNGI IDENTIFICATION YEAST: CPT

## 2020-01-01 PROCEDURE — 77030031139 HC SUT VCRL2 J&J -A

## 2020-01-01 PROCEDURE — 74011250636 HC RX REV CODE- 250/636: Performed by: ANESTHESIOLOGY

## 2020-01-01 PROCEDURE — 82105 ALPHA-FETOPROTEIN SERUM: CPT

## 2020-01-01 PROCEDURE — 87070 CULTURE OTHR SPECIMN AEROBIC: CPT

## 2020-01-01 PROCEDURE — 74011000258 HC RX REV CODE- 258: Performed by: EMERGENCY MEDICINE

## 2020-01-01 PROCEDURE — 77334 RADIATION TREATMENT AID(S): CPT

## 2020-01-01 RX ORDER — AMLODIPINE BESYLATE 5 MG/1
5 TABLET ORAL DAILY
Status: DISCONTINUED | OUTPATIENT
Start: 2020-01-01 | End: 2020-01-01

## 2020-01-01 RX ORDER — PROPOFOL 10 MG/ML
INJECTION, EMULSION INTRAVENOUS
Status: DISCONTINUED | OUTPATIENT
Start: 2020-01-01 | End: 2020-01-01 | Stop reason: HOSPADM

## 2020-01-01 RX ORDER — CEPHALEXIN 500 MG/1
500 CAPSULE ORAL 3 TIMES DAILY
Status: DISCONTINUED | OUTPATIENT
Start: 2020-01-01 | End: 2020-01-01

## 2020-01-01 RX ORDER — IPRATROPIUM BROMIDE AND ALBUTEROL SULFATE 2.5; .5 MG/3ML; MG/3ML
3 SOLUTION RESPIRATORY (INHALATION)
Status: DISCONTINUED | OUTPATIENT
Start: 2020-01-01 | End: 2020-01-01

## 2020-01-01 RX ORDER — SODIUM CHLORIDE 0.9 % (FLUSH) 0.9 %
10 SYRINGE (ML) INJECTION
Status: COMPLETED | OUTPATIENT
Start: 2020-01-01 | End: 2020-01-01

## 2020-01-01 RX ORDER — LORAZEPAM 2 MG/ML
1 INJECTION INTRAMUSCULAR ONCE
Status: COMPLETED | OUTPATIENT
Start: 2020-01-01 | End: 2020-01-01

## 2020-01-01 RX ORDER — FUROSEMIDE 10 MG/ML
20 INJECTION INTRAMUSCULAR; INTRAVENOUS ONCE
Status: COMPLETED | OUTPATIENT
Start: 2020-01-01 | End: 2020-01-01

## 2020-01-01 RX ORDER — MECLIZINE HYDROCHLORIDE 25 MG/1
TABLET ORAL
COMMUNITY

## 2020-01-01 RX ORDER — LIDOCAINE HYDROCHLORIDE AND EPINEPHRINE 10; 10 MG/ML; UG/ML
2-100 INJECTION, SOLUTION INFILTRATION; PERINEURAL ONCE
Status: COMPLETED | OUTPATIENT
Start: 2020-01-01 | End: 2020-01-01

## 2020-01-01 RX ORDER — POTASSIUM CHLORIDE 20 MEQ/1
20 TABLET, EXTENDED RELEASE ORAL DAILY
Status: DISCONTINUED | OUTPATIENT
Start: 2020-01-01 | End: 2020-01-01

## 2020-01-01 RX ORDER — GADOTERATE MEGLUMINE 376.9 MG/ML
10 INJECTION INTRAVENOUS
Status: COMPLETED | OUTPATIENT
Start: 2020-01-01 | End: 2020-01-01

## 2020-01-01 RX ORDER — VANCOMYCIN/0.9 % SOD CHLORIDE 750 MG/250
750 PLASTIC BAG, INJECTION (ML) INTRAVENOUS
Status: DISCONTINUED | OUTPATIENT
Start: 2020-01-01 | End: 2020-01-01

## 2020-01-01 RX ORDER — LORAZEPAM 2 MG/ML
0.5 INJECTION INTRAMUSCULAR
Status: COMPLETED | OUTPATIENT
Start: 2020-01-01 | End: 2020-01-01

## 2020-01-01 RX ORDER — CEFEPIME HYDROCHLORIDE 2 G/1
2 INJECTION, POWDER, FOR SOLUTION INTRAVENOUS EVERY 12 HOURS
Status: DISCONTINUED | OUTPATIENT
Start: 2020-01-01 | End: 2020-01-01 | Stop reason: SDUPTHER

## 2020-01-01 RX ORDER — LIDOCAINE HYDROCHLORIDE 20 MG/ML
INJECTION, SOLUTION EPIDURAL; INFILTRATION; INTRACAUDAL; PERINEURAL AS NEEDED
Status: DISCONTINUED | OUTPATIENT
Start: 2020-01-01 | End: 2020-01-01 | Stop reason: HOSPADM

## 2020-01-01 RX ORDER — MORPHINE SULFATE 2 MG/ML
3 INJECTION, SOLUTION INTRAMUSCULAR; INTRAVENOUS
Status: DISCONTINUED | OUTPATIENT
Start: 2020-01-01 | End: 2020-01-01

## 2020-01-01 RX ORDER — PROPOFOL 10 MG/ML
INJECTION, EMULSION INTRAVENOUS AS NEEDED
Status: DISCONTINUED | OUTPATIENT
Start: 2020-01-01 | End: 2020-01-01 | Stop reason: HOSPADM

## 2020-01-01 RX ORDER — HYDROCODONE BITARTRATE AND ACETAMINOPHEN 5; 325 MG/1; MG/1
1 TABLET ORAL
Qty: 10 TAB | Refills: 0 | Status: SHIPPED | OUTPATIENT
Start: 2020-01-01 | End: 2020-01-01 | Stop reason: DRUGHIGH

## 2020-01-01 RX ORDER — AMOXICILLIN 250 MG
1 CAPSULE ORAL DAILY
Status: DISCONTINUED | OUTPATIENT
Start: 2020-01-01 | End: 2020-01-01

## 2020-01-01 RX ORDER — SODIUM CHLORIDE 9 MG/ML
250 INJECTION, SOLUTION INTRAVENOUS AS NEEDED
Status: DISCONTINUED | OUTPATIENT
Start: 2020-01-01 | End: 2020-01-01

## 2020-01-01 RX ORDER — ONDANSETRON 8 MG/1
8 TABLET, ORALLY DISINTEGRATING ORAL
Status: DISCONTINUED | OUTPATIENT
Start: 2020-01-01 | End: 2020-01-01

## 2020-01-01 RX ORDER — SENNOSIDES 8.6 MG/1
1 TABLET ORAL DAILY
COMMUNITY

## 2020-01-01 RX ORDER — FENTANYL CITRATE 50 UG/ML
100 INJECTION, SOLUTION INTRAMUSCULAR; INTRAVENOUS ONCE
Status: DISCONTINUED | OUTPATIENT
Start: 2020-01-01 | End: 2020-01-01 | Stop reason: HOSPADM

## 2020-01-01 RX ORDER — POTASSIUM CITRATE 10 MEQ/1
10 TABLET, EXTENDED RELEASE ORAL 2 TIMES DAILY
COMMUNITY

## 2020-01-01 RX ORDER — SODIUM CHLORIDE 0.9 % (FLUSH) 0.9 %
10 SYRINGE (ML) INJECTION AS NEEDED
Status: DISCONTINUED | OUTPATIENT
Start: 2020-01-01 | End: 2020-01-01 | Stop reason: HOSPADM

## 2020-01-01 RX ORDER — NALOXONE HYDROCHLORIDE 0.4 MG/ML
0.4 INJECTION, SOLUTION INTRAMUSCULAR; INTRAVENOUS; SUBCUTANEOUS ONCE
Status: COMPLETED | OUTPATIENT
Start: 2020-01-01 | End: 2020-01-01

## 2020-01-01 RX ORDER — SODIUM CHLORIDE, SODIUM LACTATE, POTASSIUM CHLORIDE, CALCIUM CHLORIDE 600; 310; 30; 20 MG/100ML; MG/100ML; MG/100ML; MG/100ML
75 INJECTION, SOLUTION INTRAVENOUS CONTINUOUS
Status: DISCONTINUED | OUTPATIENT
Start: 2020-01-01 | End: 2020-01-01 | Stop reason: HOSPADM

## 2020-01-01 RX ORDER — CEPHALEXIN 500 MG/1
500 CAPSULE ORAL 3 TIMES DAILY
Qty: 21 CAP | Refills: 0 | Status: SHIPPED | OUTPATIENT
Start: 2020-01-01 | End: 2020-01-01

## 2020-01-01 RX ORDER — VANCOMYCIN HYDROCHLORIDE
1250 ONCE
Status: COMPLETED | OUTPATIENT
Start: 2020-01-01 | End: 2020-01-01

## 2020-01-01 RX ORDER — VANCOMYCIN/0.9 % SOD CHLORIDE 750 MG/250
750 PLASTIC BAG, INJECTION (ML) INTRAVENOUS EVERY 12 HOURS
Status: DISCONTINUED | OUTPATIENT
Start: 2020-01-01 | End: 2020-01-01

## 2020-01-01 RX ORDER — PREDNISONE 10 MG/1
TABLET ORAL DAILY
COMMUNITY

## 2020-01-01 RX ORDER — LORAZEPAM 2 MG/ML
0.5 INJECTION INTRAMUSCULAR
Status: ACTIVE | OUTPATIENT
Start: 2020-01-01 | End: 2020-01-01

## 2020-01-01 RX ORDER — MAGNESIUM SULFATE HEPTAHYDRATE 40 MG/ML
2 INJECTION, SOLUTION INTRAVENOUS ONCE
Status: COMPLETED | OUTPATIENT
Start: 2020-01-01 | End: 2020-01-01

## 2020-01-01 RX ORDER — POLYETHYLENE GLYCOL 3350 17 G/17G
17 POWDER, FOR SOLUTION ORAL DAILY
Status: DISCONTINUED | OUTPATIENT
Start: 2020-01-01 | End: 2020-01-01

## 2020-01-01 RX ORDER — HEPARIN 100 UNIT/ML
300 SYRINGE INTRAVENOUS AS NEEDED
Status: DISCONTINUED | OUTPATIENT
Start: 2020-01-01 | End: 2020-01-01

## 2020-01-01 RX ORDER — LIDOCAINE HYDROCHLORIDE 10 MG/ML
0.1 INJECTION INFILTRATION; PERINEURAL AS NEEDED
Status: DISCONTINUED | OUTPATIENT
Start: 2020-01-01 | End: 2020-01-01 | Stop reason: HOSPADM

## 2020-01-01 RX ORDER — HYDROMORPHONE HYDROCHLORIDE 1 MG/ML
0.5 INJECTION, SOLUTION INTRAMUSCULAR; INTRAVENOUS; SUBCUTANEOUS
Status: DISCONTINUED | OUTPATIENT
Start: 2020-01-01 | End: 2020-01-01 | Stop reason: HOSPADM

## 2020-01-01 RX ORDER — DEXAMETHASONE SODIUM PHOSPHATE 4 MG/ML
8 INJECTION, SOLUTION INTRA-ARTICULAR; INTRALESIONAL; INTRAMUSCULAR; INTRAVENOUS; SOFT TISSUE ONCE
Status: COMPLETED | OUTPATIENT
Start: 2020-01-01 | End: 2020-01-01

## 2020-01-01 RX ORDER — SODIUM CHLORIDE 0.9 % (FLUSH) 0.9 %
10 SYRINGE (ML) INJECTION AS NEEDED
Status: DISCONTINUED | OUTPATIENT
Start: 2020-01-01 | End: 2020-01-01

## 2020-01-01 RX ORDER — CARVEDILOL 6.25 MG/1
TABLET ORAL 2 TIMES DAILY WITH MEALS
COMMUNITY

## 2020-01-01 RX ORDER — SODIUM CHLORIDE 9 MG/ML
25 INJECTION, SOLUTION INTRAVENOUS CONTINUOUS
Status: DISCONTINUED | OUTPATIENT
Start: 2020-01-01 | End: 2020-01-01

## 2020-01-01 RX ORDER — HEPARIN SODIUM (PORCINE) LOCK FLUSH IV SOLN 100 UNIT/ML 100 UNIT/ML
300 SOLUTION INTRAVENOUS AS NEEDED
Status: DISCONTINUED | OUTPATIENT
Start: 2020-01-01 | End: 2020-01-01 | Stop reason: HOSPADM

## 2020-01-01 RX ORDER — IPRATROPIUM BROMIDE 0.5 MG/2.5ML
0.5 SOLUTION RESPIRATORY (INHALATION)
Status: DISCONTINUED | OUTPATIENT
Start: 2020-01-01 | End: 2020-01-01

## 2020-01-01 RX ORDER — ACETAMINOPHEN 500 MG
1000 TABLET ORAL ONCE
Status: COMPLETED | OUTPATIENT
Start: 2020-01-01 | End: 2020-01-01

## 2020-01-01 RX ORDER — MORPHINE SULFATE 15 MG/1
TABLET ORAL
COMMUNITY

## 2020-01-01 RX ORDER — CEPHALEXIN 500 MG/1
500 CAPSULE ORAL 3 TIMES DAILY
Qty: 30 CAP | Refills: 0 | Status: SHIPPED | OUTPATIENT
Start: 2020-01-01 | End: 2020-01-01

## 2020-01-01 RX ORDER — FUROSEMIDE 10 MG/ML
20 INJECTION INTRAMUSCULAR; INTRAVENOUS DAILY PRN
Status: DISCONTINUED | OUTPATIENT
Start: 2020-01-01 | End: 2020-01-01

## 2020-01-01 RX ORDER — ALBUTEROL SULFATE 0.83 MG/ML
2.5 SOLUTION RESPIRATORY (INHALATION)
Status: DISCONTINUED | OUTPATIENT
Start: 2020-01-01 | End: 2020-01-01

## 2020-01-01 RX ORDER — HYDROCODONE BITARTRATE AND ACETAMINOPHEN 5; 325 MG/1; MG/1
2 TABLET ORAL ONCE
Status: COMPLETED | OUTPATIENT
Start: 2020-01-01 | End: 2020-01-01

## 2020-01-01 RX ORDER — AMLODIPINE BESYLATE 5 MG/1
5 TABLET ORAL DAILY
COMMUNITY

## 2020-01-01 RX ORDER — ALBUTEROL SULFATE 0.83 MG/ML
2.5 SOLUTION RESPIRATORY (INHALATION)
Status: DISCONTINUED | OUTPATIENT
Start: 2020-01-01 | End: 2020-01-01 | Stop reason: CLARIF

## 2020-01-01 RX ORDER — NALOXONE HYDROCHLORIDE 0.4 MG/ML
0.04 INJECTION, SOLUTION INTRAMUSCULAR; INTRAVENOUS; SUBCUTANEOUS AS NEEDED
Status: DISCONTINUED | OUTPATIENT
Start: 2020-01-01 | End: 2020-01-01

## 2020-01-01 RX ORDER — CARVEDILOL 6.25 MG/1
6.25 TABLET ORAL 2 TIMES DAILY WITH MEALS
Status: DISCONTINUED | OUTPATIENT
Start: 2020-01-01 | End: 2020-01-01

## 2020-01-01 RX ORDER — ACETAMINOPHEN 500 MG
1000 TABLET ORAL ONCE
Status: CANCELLED | OUTPATIENT
Start: 2020-01-01 | End: 2020-01-01

## 2020-01-01 RX ORDER — MORPHINE SULFATE 2 MG/ML
1 INJECTION, SOLUTION INTRAMUSCULAR; INTRAVENOUS
Status: DISCONTINUED | OUTPATIENT
Start: 2020-01-01 | End: 2020-01-01

## 2020-01-01 RX ORDER — LORAZEPAM 0.5 MG/1
0.25 TABLET ORAL
Status: DISCONTINUED | OUTPATIENT
Start: 2020-01-01 | End: 2020-01-01

## 2020-01-01 RX ORDER — ONDANSETRON 2 MG/ML
4 INJECTION INTRAMUSCULAR; INTRAVENOUS
Status: CANCELLED | OUTPATIENT
Start: 2020-01-01 | End: 2020-01-01

## 2020-01-01 RX ORDER — ALBUTEROL SULFATE 0.83 MG/ML
SOLUTION RESPIRATORY (INHALATION)
COMMUNITY

## 2020-01-01 RX ORDER — HYDROMORPHONE HYDROCHLORIDE 1 MG/ML
0.5 INJECTION, SOLUTION INTRAMUSCULAR; INTRAVENOUS; SUBCUTANEOUS ONCE
Status: COMPLETED | OUTPATIENT
Start: 2020-01-01 | End: 2020-01-01

## 2020-01-01 RX ORDER — LIDOCAINE HYDROCHLORIDE 40 MG/ML
SOLUTION TOPICAL AS NEEDED
Status: DISCONTINUED | OUTPATIENT
Start: 2020-01-01 | End: 2020-01-01 | Stop reason: HOSPADM

## 2020-01-01 RX ORDER — POTASSIUM CHLORIDE 20 MEQ/1
40 TABLET, EXTENDED RELEASE ORAL DAILY
Status: DISCONTINUED | OUTPATIENT
Start: 2020-01-01 | End: 2020-01-01

## 2020-01-01 RX ORDER — GUAIFENESIN 400 MG/1
400 TABLET ORAL EVERY 4 HOURS
COMMUNITY

## 2020-01-01 RX ORDER — FUROSEMIDE 20 MG/1
20 TABLET ORAL DAILY
Status: DISCONTINUED | OUTPATIENT
Start: 2020-01-01 | End: 2020-01-01

## 2020-01-01 RX ORDER — ATORVASTATIN CALCIUM 40 MG/1
80 TABLET, FILM COATED ORAL DAILY
Status: DISCONTINUED | OUTPATIENT
Start: 2020-01-01 | End: 2020-01-01

## 2020-01-01 RX ORDER — SERTRALINE HYDROCHLORIDE 25 MG/1
TABLET, FILM COATED ORAL DAILY
COMMUNITY

## 2020-01-01 RX ORDER — MORPHINE SULFATE 100 MG/5ML
10 SOLUTION ORAL
Status: DISCONTINUED | OUTPATIENT
Start: 2020-01-01 | End: 2020-01-01

## 2020-01-01 RX ORDER — HYDROCORTISONE SODIUM SUCCINATE 100 MG/2ML
100 INJECTION, POWDER, FOR SOLUTION INTRAMUSCULAR; INTRAVENOUS AS NEEDED
Status: ACTIVE | OUTPATIENT
Start: 2020-01-01 | End: 2020-01-01

## 2020-01-01 RX ORDER — CEFEPIME HYDROCHLORIDE 2 G/1
2 INJECTION, POWDER, FOR SOLUTION INTRAVENOUS EVERY 12 HOURS
Status: DISCONTINUED | OUTPATIENT
Start: 2020-01-01 | End: 2020-01-01 | Stop reason: CLARIF

## 2020-01-01 RX ORDER — OLANZAPINE 5 MG/1
10 TABLET, ORALLY DISINTEGRATING ORAL
Status: DISCONTINUED | OUTPATIENT
Start: 2020-01-01 | End: 2020-01-01

## 2020-01-01 RX ORDER — POTASSIUM CHLORIDE 29.8 MG/ML
40 INJECTION INTRAVENOUS ONCE
Status: COMPLETED | OUTPATIENT
Start: 2020-01-01 | End: 2020-01-01

## 2020-01-01 RX ORDER — LORAZEPAM 2 MG/ML
1 INJECTION INTRAMUSCULAR
Status: DISCONTINUED | OUTPATIENT
Start: 2020-01-01 | End: 2020-01-01 | Stop reason: HOSPADM

## 2020-01-01 RX ORDER — ONDANSETRON 2 MG/ML
8 INJECTION INTRAMUSCULAR; INTRAVENOUS ONCE
Status: COMPLETED | OUTPATIENT
Start: 2020-01-01 | End: 2020-01-01

## 2020-01-01 RX ORDER — NYSTATIN 100000 [USP'U]/ML
500000 SUSPENSION ORAL 4 TIMES DAILY
Status: DISCONTINUED | OUTPATIENT
Start: 2020-01-01 | End: 2020-01-01

## 2020-01-01 RX ORDER — ENOXAPARIN SODIUM 100 MG/ML
30 INJECTION SUBCUTANEOUS EVERY 24 HOURS
Status: DISCONTINUED | OUTPATIENT
Start: 2020-01-01 | End: 2020-01-01

## 2020-01-01 RX ORDER — ALBUTEROL SULFATE 0.83 MG/ML
2.5 SOLUTION RESPIRATORY (INHALATION)
Status: DISCONTINUED | OUTPATIENT
Start: 2020-01-01 | End: 2020-01-01 | Stop reason: SDUPTHER

## 2020-01-01 RX ORDER — ALBUTEROL SULFATE 90 UG/1
1 AEROSOL, METERED RESPIRATORY (INHALATION)
COMMUNITY

## 2020-01-01 RX ORDER — OXYCODONE AND ACETAMINOPHEN 5; 325 MG/1; MG/1
1 TABLET ORAL AS NEEDED
Status: DISCONTINUED | OUTPATIENT
Start: 2020-01-01 | End: 2020-01-01 | Stop reason: HOSPADM

## 2020-01-01 RX ORDER — SODIUM CHLORIDE 0.9 % (FLUSH) 0.9 %
5-40 SYRINGE (ML) INJECTION EVERY 8 HOURS
Status: DISCONTINUED | OUTPATIENT
Start: 2020-01-01 | End: 2020-01-01 | Stop reason: HOSPADM

## 2020-01-01 RX ORDER — FUROSEMIDE 10 MG/ML
20 INJECTION INTRAMUSCULAR; INTRAVENOUS DAILY
Status: DISCONTINUED | OUTPATIENT
Start: 2020-01-01 | End: 2020-01-01

## 2020-01-01 RX ORDER — PREDNISONE 10 MG/1
10 TABLET ORAL
Status: DISCONTINUED | OUTPATIENT
Start: 2020-01-01 | End: 2020-01-01

## 2020-01-01 RX ORDER — LANOLIN ALCOHOL/MO/W.PET/CERES
400 CREAM (GRAM) TOPICAL 3 TIMES DAILY
Status: DISCONTINUED | OUTPATIENT
Start: 2020-01-01 | End: 2020-01-01

## 2020-01-01 RX ORDER — ONDANSETRON 2 MG/ML
INJECTION INTRAMUSCULAR; INTRAVENOUS AS NEEDED
Status: DISCONTINUED | OUTPATIENT
Start: 2020-01-01 | End: 2020-01-01 | Stop reason: HOSPADM

## 2020-01-01 RX ORDER — MORPHINE SULFATE 2 MG/ML
5 INJECTION, SOLUTION INTRAMUSCULAR; INTRAVENOUS
Status: DISCONTINUED | OUTPATIENT
Start: 2020-01-01 | End: 2020-01-01

## 2020-01-01 RX ORDER — GUAIFENESIN 600 MG/1
1200 TABLET, EXTENDED RELEASE ORAL EVERY 12 HOURS
Status: DISCONTINUED | OUTPATIENT
Start: 2020-01-01 | End: 2020-01-01

## 2020-01-01 RX ORDER — MORPHINE SULFATE 60 MG/1
60 CAPSULE, EXTENDED RELEASE ORAL DAILY
COMMUNITY

## 2020-01-01 RX ORDER — SODIUM CHLORIDE, SODIUM LACTATE, POTASSIUM CHLORIDE, CALCIUM CHLORIDE 600; 310; 30; 20 MG/100ML; MG/100ML; MG/100ML; MG/100ML
1000 INJECTION, SOLUTION INTRAVENOUS CONTINUOUS
Status: CANCELLED | OUTPATIENT
Start: 2020-01-01 | End: 2020-01-01

## 2020-01-01 RX ORDER — SODIUM CHLORIDE 9 MG/ML
75 INJECTION, SOLUTION INTRAVENOUS CONTINUOUS
Status: DISCONTINUED | OUTPATIENT
Start: 2020-01-01 | End: 2020-01-01

## 2020-01-01 RX ORDER — EPINEPHRINE 1 MG/ML
0.3 INJECTION, SOLUTION, CONCENTRATE INTRAVENOUS AS NEEDED
Status: ACTIVE | OUTPATIENT
Start: 2020-01-01 | End: 2020-01-01

## 2020-01-01 RX ORDER — MORPHINE SULFATE 2 MG/ML
4 INJECTION, SOLUTION INTRAMUSCULAR; INTRAVENOUS
Status: DISCONTINUED | OUTPATIENT
Start: 2020-01-01 | End: 2020-01-01 | Stop reason: HOSPADM

## 2020-01-01 RX ORDER — LORAZEPAM 2 MG/ML
1 INJECTION INTRAMUSCULAR
Status: DISCONTINUED | OUTPATIENT
Start: 2020-01-01 | End: 2020-01-01

## 2020-01-01 RX ORDER — ENOXAPARIN SODIUM 100 MG/ML
40 INJECTION SUBCUTANEOUS EVERY 24 HOURS
Status: DISCONTINUED | OUTPATIENT
Start: 2020-01-01 | End: 2020-01-01

## 2020-01-01 RX ORDER — POTASSIUM CHLORIDE 29.8 MG/ML
20 INJECTION INTRAVENOUS ONCE
Status: COMPLETED | OUTPATIENT
Start: 2020-01-01 | End: 2020-01-01

## 2020-01-01 RX ORDER — ACETAMINOPHEN 325 MG/1
650 TABLET ORAL
Status: DISCONTINUED | OUTPATIENT
Start: 2020-01-01 | End: 2020-01-01

## 2020-01-01 RX ORDER — ATORVASTATIN CALCIUM 80 MG/1
80 TABLET, FILM COATED ORAL DAILY
COMMUNITY

## 2020-01-01 RX ORDER — GUAIFENESIN 600 MG/1
1200 TABLET, EXTENDED RELEASE ORAL 2 TIMES DAILY
Status: DISCONTINUED | OUTPATIENT
Start: 2020-01-01 | End: 2020-01-01

## 2020-01-01 RX ORDER — ALBUTEROL SULFATE 0.83 MG/ML
2.5 SOLUTION RESPIRATORY (INHALATION)
Status: DISCONTINUED | OUTPATIENT
Start: 2020-01-01 | End: 2020-01-01 | Stop reason: HOSPADM

## 2020-01-01 RX ORDER — GUAIFENESIN 400 MG/1
TABLET ORAL
COMMUNITY

## 2020-01-01 RX ORDER — DIPHENHYDRAMINE HYDROCHLORIDE 50 MG/ML
25 INJECTION, SOLUTION INTRAMUSCULAR; INTRAVENOUS AS NEEDED
Status: DISCONTINUED | OUTPATIENT
Start: 2020-01-01 | End: 2020-01-01

## 2020-01-01 RX ORDER — PROCHLORPERAZINE MALEATE 10 MG
10 TABLET ORAL
Status: DISCONTINUED | OUTPATIENT
Start: 2020-01-01 | End: 2020-01-01

## 2020-01-01 RX ORDER — CEPHALEXIN 500 MG/1
500 CAPSULE ORAL 3 TIMES DAILY
COMMUNITY

## 2020-01-01 RX ORDER — EPINEPHRINE 1 MG/ML
0.3 INJECTION, SOLUTION, CONCENTRATE INTRAVENOUS AS NEEDED
Status: DISCONTINUED | OUTPATIENT
Start: 2020-01-01 | End: 2020-01-01

## 2020-01-01 RX ORDER — LORAZEPAM 0.5 MG/1
0.5 TABLET ORAL
Status: DISCONTINUED | OUTPATIENT
Start: 2020-01-01 | End: 2020-01-01

## 2020-01-01 RX ORDER — POTASSIUM CHLORIDE 20 MEQ/1
40 TABLET, EXTENDED RELEASE ORAL 2 TIMES DAILY
Status: DISCONTINUED | OUTPATIENT
Start: 2020-01-01 | End: 2020-01-01

## 2020-01-01 RX ORDER — DIPHENHYDRAMINE HYDROCHLORIDE 50 MG/ML
50 INJECTION, SOLUTION INTRAMUSCULAR; INTRAVENOUS AS NEEDED
Status: ACTIVE | OUTPATIENT
Start: 2020-01-01 | End: 2020-01-01

## 2020-01-01 RX ORDER — POTASSIUM CHLORIDE 20 MEQ/1
20 TABLET, EXTENDED RELEASE ORAL 2 TIMES DAILY
Status: DISCONTINUED | OUTPATIENT
Start: 2020-01-01 | End: 2020-01-01

## 2020-01-01 RX ORDER — NALOXONE HYDROCHLORIDE 0.4 MG/ML
0.2 INJECTION, SOLUTION INTRAMUSCULAR; INTRAVENOUS; SUBCUTANEOUS AS NEEDED
Status: DISCONTINUED | OUTPATIENT
Start: 2020-01-01 | End: 2020-01-01 | Stop reason: HOSPADM

## 2020-01-01 RX ORDER — HYDROCORTISONE SODIUM SUCCINATE 100 MG/2ML
100 INJECTION, POWDER, FOR SOLUTION INTRAMUSCULAR; INTRAVENOUS AS NEEDED
Status: DISCONTINUED | OUTPATIENT
Start: 2020-01-01 | End: 2020-01-01

## 2020-01-01 RX ORDER — SODIUM CHLORIDE 0.9 % (FLUSH) 0.9 %
5-40 SYRINGE (ML) INJECTION AS NEEDED
Status: DISCONTINUED | OUTPATIENT
Start: 2020-01-01 | End: 2020-01-01 | Stop reason: HOSPADM

## 2020-01-01 RX ORDER — PREDNISONE 20 MG/1
40 TABLET ORAL
Status: DISCONTINUED | OUTPATIENT
Start: 2020-01-01 | End: 2020-01-01

## 2020-01-01 RX ORDER — MORPHINE SULFATE 30 MG/1
60 TABLET, FILM COATED, EXTENDED RELEASE ORAL EVERY 12 HOURS
Status: DISCONTINUED | OUTPATIENT
Start: 2020-01-01 | End: 2020-01-01

## 2020-01-01 RX ORDER — SERTRALINE HYDROCHLORIDE 50 MG/1
25 TABLET, FILM COATED ORAL DAILY
Status: DISCONTINUED | OUTPATIENT
Start: 2020-01-01 | End: 2020-01-01

## 2020-01-01 RX ORDER — SODIUM CHLORIDE, SODIUM LACTATE, POTASSIUM CHLORIDE, CALCIUM CHLORIDE 600; 310; 30; 20 MG/100ML; MG/100ML; MG/100ML; MG/100ML
INJECTION, SOLUTION INTRAVENOUS
Status: DISCONTINUED | OUTPATIENT
Start: 2020-01-01 | End: 2020-01-01 | Stop reason: HOSPADM

## 2020-01-01 RX ORDER — SODIUM CHLORIDE 9 MG/ML
50 INJECTION, SOLUTION INTRAVENOUS CONTINUOUS
Status: DISCONTINUED | OUTPATIENT
Start: 2020-01-01 | End: 2020-01-01

## 2020-01-01 RX ORDER — HYDROMORPHONE HYDROCHLORIDE 2 MG/ML
0.5 INJECTION, SOLUTION INTRAMUSCULAR; INTRAVENOUS; SUBCUTANEOUS
Status: CANCELLED | OUTPATIENT
Start: 2020-01-01

## 2020-01-01 RX ORDER — ALBUTEROL SULFATE 0.83 MG/ML
2.5 SOLUTION RESPIRATORY (INHALATION) AS NEEDED
Status: CANCELLED | OUTPATIENT
Start: 2020-01-01

## 2020-01-01 RX ORDER — ALBUTEROL SULFATE 0.83 MG/ML
2.5 SOLUTION RESPIRATORY (INHALATION) AS NEEDED
Status: DISCONTINUED | OUTPATIENT
Start: 2020-01-01 | End: 2020-01-01

## 2020-01-01 RX ORDER — MORPHINE SULFATE 15 MG/1
15 TABLET ORAL
Status: DISCONTINUED | OUTPATIENT
Start: 2020-01-01 | End: 2020-01-01

## 2020-01-01 RX ORDER — CEFAZOLIN SODIUM/WATER 2 G/20 ML
SYRINGE (ML) INTRAVENOUS AS NEEDED
Status: DISCONTINUED | OUTPATIENT
Start: 2020-01-01 | End: 2020-01-01 | Stop reason: HOSPADM

## 2020-01-01 RX ORDER — ACETAMINOPHEN 500 MG
TABLET ORAL
COMMUNITY

## 2020-01-01 RX ORDER — MORPHINE SULFATE 2 MG/ML
4 INJECTION, SOLUTION INTRAMUSCULAR; INTRAVENOUS
Status: DISCONTINUED | OUTPATIENT
Start: 2020-01-01 | End: 2020-01-01

## 2020-01-01 RX ORDER — MORPHINE SULFATE 2 MG/ML
2 INJECTION, SOLUTION INTRAMUSCULAR; INTRAVENOUS ONCE
Status: COMPLETED | OUTPATIENT
Start: 2020-01-01 | End: 2020-01-01

## 2020-01-01 RX ORDER — OXYCODONE HYDROCHLORIDE 5 MG/1
5 TABLET ORAL
Status: CANCELLED | OUTPATIENT
Start: 2020-01-01 | End: 2020-01-01

## 2020-01-01 RX ORDER — MORPHINE SULFATE 30 MG/1
30 TABLET, FILM COATED, EXTENDED RELEASE ORAL EVERY 12 HOURS
Status: DISCONTINUED | OUTPATIENT
Start: 2020-01-01 | End: 2020-01-01

## 2020-01-01 RX ORDER — LIDOCAINE HYDROCHLORIDE 10 MG/ML
0.1 INJECTION INFILTRATION; PERINEURAL AS NEEDED
Status: CANCELLED | OUTPATIENT
Start: 2020-01-01

## 2020-01-01 RX ORDER — MIDAZOLAM HYDROCHLORIDE 1 MG/ML
2 INJECTION, SOLUTION INTRAMUSCULAR; INTRAVENOUS ONCE
Status: DISCONTINUED | OUTPATIENT
Start: 2020-01-01 | End: 2020-01-01 | Stop reason: HOSPADM

## 2020-01-01 RX ORDER — DIPHENHYDRAMINE HCL 25 MG
25 CAPSULE ORAL
Status: DISCONTINUED | OUTPATIENT
Start: 2020-01-01 | End: 2020-01-01

## 2020-01-01 RX ORDER — MORPHINE SULFATE 2 MG/ML
1 INJECTION, SOLUTION INTRAMUSCULAR; INTRAVENOUS
Status: COMPLETED | OUTPATIENT
Start: 2020-01-01 | End: 2020-01-01

## 2020-01-01 RX ORDER — ONDANSETRON 2 MG/ML
8 INJECTION INTRAMUSCULAR; INTRAVENOUS ONCE
Status: DISCONTINUED | OUTPATIENT
Start: 2020-01-01 | End: 2020-01-01

## 2020-01-01 RX ORDER — HALOPERIDOL 5 MG/ML
5 INJECTION INTRAMUSCULAR
Status: DISCONTINUED | OUTPATIENT
Start: 2020-01-01 | End: 2020-01-01

## 2020-01-01 RX ORDER — CEFAZOLIN SODIUM/WATER 2 G/20 ML
2 SYRINGE (ML) INTRAVENOUS ONCE
Status: DISCONTINUED | OUTPATIENT
Start: 2020-01-01 | End: 2020-01-01 | Stop reason: HOSPADM

## 2020-01-01 RX ADMIN — ALBUTEROL SULFATE 2.5 MG: 2.5 SOLUTION RESPIRATORY (INHALATION) at 14:22

## 2020-01-01 RX ADMIN — FUROSEMIDE 20 MG: 10 INJECTION, SOLUTION INTRAMUSCULAR; INTRAVENOUS at 07:58

## 2020-01-01 RX ADMIN — ALBUTEROL SULFATE 2.5 MG: 2.5 SOLUTION RESPIRATORY (INHALATION) at 07:41

## 2020-01-01 RX ADMIN — SENNOSIDES AND DOCUSATE SODIUM 1 TABLET: 8.6; 5 TABLET ORAL at 07:58

## 2020-01-01 RX ADMIN — SERTRALINE HYDROCHLORIDE 25 MG: 50 TABLET ORAL at 08:24

## 2020-01-01 RX ADMIN — METHYLPREDNISOLONE SODIUM SUCCINATE 40 MG: 40 INJECTION, POWDER, FOR SOLUTION INTRAMUSCULAR; INTRAVENOUS at 12:03

## 2020-01-01 RX ADMIN — MORPHINE SULFATE 60 MG: 30 TABLET, FILM COATED, EXTENDED RELEASE ORAL at 08:01

## 2020-01-01 RX ADMIN — VANCOMYCIN HYDROCHLORIDE 750 MG: 10 INJECTION, POWDER, LYOPHILIZED, FOR SOLUTION INTRAVENOUS at 02:16

## 2020-01-01 RX ADMIN — LORAZEPAM 0.25 MG: 0.5 TABLET ORAL at 21:52

## 2020-01-01 RX ADMIN — LIDOCAINE HYDROCHLORIDE: 40 SOLUTION TOPICAL at 10:38

## 2020-01-01 RX ADMIN — ENOXAPARIN SODIUM 30 MG: 30 INJECTION SUBCUTANEOUS at 10:25

## 2020-01-01 RX ADMIN — GUAIFENESIN 1200 MG: 600 TABLET ORAL at 07:37

## 2020-01-01 RX ADMIN — CARVEDILOL 6.25 MG: 6.25 TABLET, FILM COATED ORAL at 17:23

## 2020-01-01 RX ADMIN — NYSTATIN 500000 UNITS: 100000 SUSPENSION ORAL at 20:22

## 2020-01-01 RX ADMIN — MORPHINE SULFATE 60 MG: 30 TABLET, FILM COATED, EXTENDED RELEASE ORAL at 20:56

## 2020-01-01 RX ADMIN — ETOPOSIDE 163.2 MG: 20 INJECTION INTRAVENOUS at 19:56

## 2020-01-01 RX ADMIN — POLYETHYLENE GLYCOL 3350 17 G: 17 POWDER, FOR SOLUTION ORAL at 07:38

## 2020-01-01 RX ADMIN — METHYLPREDNISOLONE SODIUM SUCCINATE 40 MG: 40 INJECTION, POWDER, FOR SOLUTION INTRAMUSCULAR; INTRAVENOUS at 11:07

## 2020-01-01 RX ADMIN — GUAIFENESIN 1200 MG: 600 TABLET ORAL at 08:24

## 2020-01-01 RX ADMIN — Medication 10 ML: at 15:20

## 2020-01-01 RX ADMIN — ALBUTEROL SULFATE 2.5 MG: 2.5 SOLUTION RESPIRATORY (INHALATION) at 08:03

## 2020-01-01 RX ADMIN — ALBUTEROL SULFATE 2.5 MG: 2.5 SOLUTION RESPIRATORY (INHALATION) at 19:10

## 2020-01-01 RX ADMIN — CEFEPIME HYDROCHLORIDE 2 G: 2 INJECTION, POWDER, FOR SOLUTION INTRAVENOUS at 08:25

## 2020-01-01 RX ADMIN — ALBUTEROL SULFATE 2.5 MG: 2.5 SOLUTION RESPIRATORY (INHALATION) at 12:19

## 2020-01-01 RX ADMIN — ALBUTEROL SULFATE 2.5 MG: 2.5 SOLUTION RESPIRATORY (INHALATION) at 08:24

## 2020-01-01 RX ADMIN — FUROSEMIDE 20 MG: 20 TABLET ORAL at 07:42

## 2020-01-01 RX ADMIN — SERTRALINE HYDROCHLORIDE 25 MG: 50 TABLET ORAL at 07:58

## 2020-01-01 RX ADMIN — GUAIFENESIN 1200 MG: 600 TABLET ORAL at 09:13

## 2020-01-01 RX ADMIN — IOPAMIDOL 100 ML: 755 INJECTION, SOLUTION INTRAVENOUS at 15:20

## 2020-01-01 RX ADMIN — IPRATROPIUM BROMIDE AND ALBUTEROL SULFATE 3 ML: .5; 3 SOLUTION RESPIRATORY (INHALATION) at 07:09

## 2020-01-01 RX ADMIN — AMLODIPINE BESYLATE 5 MG: 5 TABLET ORAL at 11:09

## 2020-01-01 RX ADMIN — GUAIFENESIN 1200 MG: 600 TABLET ORAL at 08:01

## 2020-01-01 RX ADMIN — POTASSIUM CHLORIDE 40 MEQ: 20 TABLET, EXTENDED RELEASE ORAL at 17:04

## 2020-01-01 RX ADMIN — DEXAMETHASONE SODIUM PHOSPHATE 8 MG: 4 INJECTION, SOLUTION INTRAMUSCULAR; INTRAVENOUS at 16:56

## 2020-01-01 RX ADMIN — MORPHINE SULFATE 4 MG: 2 INJECTION, SOLUTION INTRAMUSCULAR; INTRAVENOUS at 14:12

## 2020-01-01 RX ADMIN — PREDNISONE 40 MG: 20 TABLET ORAL at 08:13

## 2020-01-01 RX ADMIN — MORPHINE SULFATE 15 MG: 15 TABLET ORAL at 19:22

## 2020-01-01 RX ADMIN — VANCOMYCIN HYDROCHLORIDE 500 MG: 1 INJECTION, POWDER, LYOPHILIZED, FOR SOLUTION INTRAVENOUS at 21:34

## 2020-01-01 RX ADMIN — ENOXAPARIN SODIUM 40 MG: 40 INJECTION SUBCUTANEOUS at 15:22

## 2020-01-01 RX ADMIN — ALBUTEROL SULFATE 2.5 MG: 2.5 SOLUTION RESPIRATORY (INHALATION) at 08:38

## 2020-01-01 RX ADMIN — PHENYLEPHRINE HYDROCHLORIDE 200 MCG: 10 INJECTION INTRAVENOUS at 10:31

## 2020-01-01 RX ADMIN — SODIUM CHLORIDE 75 ML/HR: 900 INJECTION, SOLUTION INTRAVENOUS at 12:27

## 2020-01-01 RX ADMIN — ATORVASTATIN CALCIUM 80 MG: 40 TABLET, FILM COATED ORAL at 08:00

## 2020-01-01 RX ADMIN — ACETAMINOPHEN 650 MG: 325 TABLET, FILM COATED ORAL at 04:24

## 2020-01-01 RX ADMIN — SODIUM CHLORIDE, SODIUM LACTATE, POTASSIUM CHLORIDE, AND CALCIUM CHLORIDE: 600; 310; 30; 20 INJECTION, SOLUTION INTRAVENOUS at 14:45

## 2020-01-01 RX ADMIN — OLANZAPINE 10 MG: 5 TABLET, ORALLY DISINTEGRATING ORAL at 20:40

## 2020-01-01 RX ADMIN — AMLODIPINE BESYLATE 5 MG: 5 TABLET ORAL at 08:23

## 2020-01-01 RX ADMIN — VANCOMYCIN HYDROCHLORIDE 750 MG: 10 INJECTION, POWDER, LYOPHILIZED, FOR SOLUTION INTRAVENOUS at 21:43

## 2020-01-01 RX ADMIN — CARVEDILOL 6.25 MG: 6.25 TABLET, FILM COATED ORAL at 17:21

## 2020-01-01 RX ADMIN — MORPHINE SULFATE 60 MG: 30 TABLET, FILM COATED, EXTENDED RELEASE ORAL at 19:33

## 2020-01-01 RX ADMIN — SODIUM CHLORIDE 1000 ML: 900 INJECTION, SOLUTION INTRAVENOUS at 11:49

## 2020-01-01 RX ADMIN — TBO-FILGRASTIM 300 MCG: 300 INJECTION, SOLUTION SUBCUTANEOUS at 17:11

## 2020-01-01 RX ADMIN — ATORVASTATIN CALCIUM 80 MG: 40 TABLET, FILM COATED ORAL at 07:59

## 2020-01-01 RX ADMIN — AMLODIPINE BESYLATE 5 MG: 5 TABLET ORAL at 09:13

## 2020-01-01 RX ADMIN — ALBUTEROL SULFATE 2.5 MG: 2.5 SOLUTION RESPIRATORY (INHALATION) at 08:17

## 2020-01-01 RX ADMIN — ENOXAPARIN SODIUM 40 MG: 40 INJECTION SUBCUTANEOUS at 17:48

## 2020-01-01 RX ADMIN — SODIUM CHLORIDE 75 ML/HR: 900 INJECTION, SOLUTION INTRAVENOUS at 02:00

## 2020-01-01 RX ADMIN — METHYLPREDNISOLONE SODIUM SUCCINATE 40 MG: 40 INJECTION, POWDER, FOR SOLUTION INTRAMUSCULAR; INTRAVENOUS at 20:40

## 2020-01-01 RX ADMIN — MORPHINE SULFATE 15 MG: 15 TABLET ORAL at 04:50

## 2020-01-01 RX ADMIN — POTASSIUM CHLORIDE 40 MEQ: 20 TABLET, EXTENDED RELEASE ORAL at 08:24

## 2020-01-01 RX ADMIN — NYSTATIN 500000 UNITS: 100000 SUSPENSION ORAL at 12:04

## 2020-01-01 RX ADMIN — MORPHINE SULFATE 60 MG: 30 TABLET, FILM COATED, EXTENDED RELEASE ORAL at 08:12

## 2020-01-01 RX ADMIN — Medication 400 MG: at 15:34

## 2020-01-01 RX ADMIN — METHYLPREDNISOLONE SODIUM SUCCINATE 40 MG: 40 INJECTION, POWDER, FOR SOLUTION INTRAMUSCULAR; INTRAVENOUS at 05:22

## 2020-01-01 RX ADMIN — NYSTATIN 500000 UNITS: 100000 SUSPENSION ORAL at 19:34

## 2020-01-01 RX ADMIN — MORPHINE SULFATE 15 MG: 15 TABLET ORAL at 08:48

## 2020-01-01 RX ADMIN — ATORVASTATIN CALCIUM 80 MG: 40 TABLET, FILM COATED ORAL at 08:24

## 2020-01-01 RX ADMIN — SENNOSIDES AND DOCUSATE SODIUM 1 TABLET: 8.6; 5 TABLET ORAL at 08:17

## 2020-01-01 RX ADMIN — CEFEPIME HYDROCHLORIDE 2 G: 2 INJECTION, POWDER, FOR SOLUTION INTRAVENOUS at 23:34

## 2020-01-01 RX ADMIN — TBO-FILGRASTIM 300 MCG: 300 INJECTION, SOLUTION SUBCUTANEOUS at 17:22

## 2020-01-01 RX ADMIN — AMLODIPINE BESYLATE 5 MG: 5 TABLET ORAL at 08:22

## 2020-01-01 RX ADMIN — POTASSIUM CHLORIDE 40 MEQ: 400 INJECTION, SOLUTION INTRAVENOUS at 05:07

## 2020-01-01 RX ADMIN — LORAZEPAM 0.25 MG: 0.5 TABLET ORAL at 00:12

## 2020-01-01 RX ADMIN — MORPHINE SULFATE 3 MG: 2 INJECTION, SOLUTION INTRAMUSCULAR; INTRAVENOUS at 22:18

## 2020-01-01 RX ADMIN — POTASSIUM CHLORIDE 40 MEQ: 20 TABLET, EXTENDED RELEASE ORAL at 08:30

## 2020-01-01 RX ADMIN — CEFEPIME HYDROCHLORIDE 2 G: 2 INJECTION, POWDER, FOR SOLUTION INTRAVENOUS at 12:02

## 2020-01-01 RX ADMIN — PHENYLEPHRINE HYDROCHLORIDE 100 MCG: 10 INJECTION INTRAVENOUS at 15:10

## 2020-01-01 RX ADMIN — POLYETHYLENE GLYCOL 3350 17 G: 17 POWDER, FOR SOLUTION ORAL at 08:10

## 2020-01-01 RX ADMIN — VANCOMYCIN HYDROCHLORIDE 1000 MG: 1 INJECTION, POWDER, LYOPHILIZED, FOR SOLUTION INTRAVENOUS at 02:53

## 2020-01-01 RX ADMIN — VANCOMYCIN HYDROCHLORIDE 750 MG: 10 INJECTION, POWDER, LYOPHILIZED, FOR SOLUTION INTRAVENOUS at 03:11

## 2020-01-01 RX ADMIN — ATORVASTATIN CALCIUM 80 MG: 40 TABLET, FILM COATED ORAL at 07:36

## 2020-01-01 RX ADMIN — ENOXAPARIN SODIUM 30 MG: 30 INJECTION SUBCUTANEOUS at 11:11

## 2020-01-01 RX ADMIN — POTASSIUM CHLORIDE 40 MEQ: 20 TABLET, EXTENDED RELEASE ORAL at 08:01

## 2020-01-01 RX ADMIN — MORPHINE SULFATE 3 MG: 2 INJECTION, SOLUTION INTRAMUSCULAR; INTRAVENOUS at 14:04

## 2020-01-01 RX ADMIN — MORPHINE SULFATE 60 MG: 30 TABLET, FILM COATED, EXTENDED RELEASE ORAL at 08:00

## 2020-01-01 RX ADMIN — METHYLPREDNISOLONE SODIUM SUCCINATE 40 MG: 40 INJECTION, POWDER, FOR SOLUTION INTRAMUSCULAR; INTRAVENOUS at 00:03

## 2020-01-01 RX ADMIN — CEFEPIME HYDROCHLORIDE 2 G: 2 INJECTION, POWDER, FOR SOLUTION INTRAVENOUS at 08:47

## 2020-01-01 RX ADMIN — VANCOMYCIN HYDROCHLORIDE 750 MG: 10 INJECTION, POWDER, LYOPHILIZED, FOR SOLUTION INTRAVENOUS at 02:51

## 2020-01-01 RX ADMIN — MORPHINE SULFATE 15 MG: 15 TABLET ORAL at 13:43

## 2020-01-01 RX ADMIN — ETOPOSIDE 163.2 MG: 20 INJECTION, SOLUTION, CONCENTRATE INTRAVENOUS at 11:45

## 2020-01-01 RX ADMIN — MORPHINE SULFATE 5 MG: 2 INJECTION, SOLUTION INTRAMUSCULAR; INTRAVENOUS at 00:35

## 2020-01-01 RX ADMIN — LIDOCAINE HYDROCHLORIDE,EPINEPHRINE BITARTRATE 200 MG: 10; .01 INJECTION, SOLUTION INFILTRATION; PERINEURAL at 14:00

## 2020-01-01 RX ADMIN — LIDOCAINE HYDROCHLORIDE 100 MG: 20 INJECTION, SOLUTION EPIDURAL; INFILTRATION; INTRACAUDAL; PERINEURAL at 10:25

## 2020-01-01 RX ADMIN — ENOXAPARIN SODIUM 30 MG: 30 INJECTION SUBCUTANEOUS at 11:36

## 2020-01-01 RX ADMIN — PREDNISONE 40 MG: 20 TABLET ORAL at 07:36

## 2020-01-01 RX ADMIN — ENOXAPARIN SODIUM 30 MG: 30 INJECTION SUBCUTANEOUS at 11:35

## 2020-01-01 RX ADMIN — CEPHALEXIN 500 MG: 500 CAPSULE ORAL at 17:48

## 2020-01-01 RX ADMIN — SODIUM CHLORIDE 50 ML/HR: 900 INJECTION, SOLUTION INTRAVENOUS at 12:01

## 2020-01-01 RX ADMIN — CEFEPIME HYDROCHLORIDE 2 G: 2 INJECTION, POWDER, FOR SOLUTION INTRAVENOUS at 00:24

## 2020-01-01 RX ADMIN — Medication 10 ML: at 20:34

## 2020-01-01 RX ADMIN — LORAZEPAM 1 MG: 2 INJECTION INTRAMUSCULAR; INTRAVENOUS at 19:29

## 2020-01-01 RX ADMIN — ALBUTEROL SULFATE 2.5 MG: 2.5 SOLUTION RESPIRATORY (INHALATION) at 02:30

## 2020-01-01 RX ADMIN — GUAIFENESIN 1200 MG: 600 TABLET ORAL at 20:21

## 2020-01-01 RX ADMIN — VANCOMYCIN HYDROCHLORIDE 1000 MG: 1 INJECTION, POWDER, LYOPHILIZED, FOR SOLUTION INTRAVENOUS at 09:45

## 2020-01-01 RX ADMIN — NYSTATIN 500000 UNITS: 100000 SUSPENSION ORAL at 13:17

## 2020-01-01 RX ADMIN — ATORVASTATIN CALCIUM 80 MG: 40 TABLET, FILM COATED ORAL at 11:09

## 2020-01-01 RX ADMIN — LORAZEPAM 1 MG: 2 INJECTION INTRAMUSCULAR; INTRAVENOUS at 02:44

## 2020-01-01 RX ADMIN — CEFEPIME HYDROCHLORIDE 2 G: 2 INJECTION, POWDER, FOR SOLUTION INTRAVENOUS at 01:09

## 2020-01-01 RX ADMIN — ATORVASTATIN CALCIUM 80 MG: 40 TABLET, FILM COATED ORAL at 08:23

## 2020-01-01 RX ADMIN — GUAIFENESIN 1200 MG: 600 TABLET ORAL at 19:35

## 2020-01-01 RX ADMIN — CEFEPIME HYDROCHLORIDE 2 G: 2 INJECTION, POWDER, FOR SOLUTION INTRAVENOUS at 00:35

## 2020-01-01 RX ADMIN — GUAIFENESIN 1200 MG: 600 TABLET ORAL at 11:09

## 2020-01-01 RX ADMIN — TIOTROPIUM BROMIDE INHALATION SPRAY 2 PUFF: 3.12 SPRAY, METERED RESPIRATORY (INHALATION) at 08:18

## 2020-01-01 RX ADMIN — CEFEPIME HYDROCHLORIDE 2 G: 2 INJECTION, POWDER, FOR SOLUTION INTRAVENOUS at 00:46

## 2020-01-01 RX ADMIN — Medication 10 ML: at 13:29

## 2020-01-01 RX ADMIN — CARVEDILOL 6.25 MG: 6.25 TABLET, FILM COATED ORAL at 17:05

## 2020-01-01 RX ADMIN — SODIUM CHLORIDE 50 ML/HR: 900 INJECTION, SOLUTION INTRAVENOUS at 12:04

## 2020-01-01 RX ADMIN — GUAIFENESIN 1200 MG: 600 TABLET ORAL at 17:21

## 2020-01-01 RX ADMIN — LORAZEPAM 0.25 MG: 0.5 TABLET ORAL at 08:47

## 2020-01-01 RX ADMIN — SERTRALINE HYDROCHLORIDE 25 MG: 50 TABLET ORAL at 09:13

## 2020-01-01 RX ADMIN — MORPHINE SULFATE 3 MG: 2 INJECTION, SOLUTION INTRAMUSCULAR; INTRAVENOUS at 11:11

## 2020-01-01 RX ADMIN — CARVEDILOL 6.25 MG: 6.25 TABLET, FILM COATED ORAL at 08:09

## 2020-01-01 RX ADMIN — Medication 2 G: at 14:55

## 2020-01-01 RX ADMIN — SENNOSIDES AND DOCUSATE SODIUM 1 TABLET: 8.6; 5 TABLET ORAL at 08:49

## 2020-01-01 RX ADMIN — ACETAMINOPHEN 650 MG: 325 TABLET, FILM COATED ORAL at 17:46

## 2020-01-01 RX ADMIN — ENOXAPARIN SODIUM 30 MG: 30 INJECTION SUBCUTANEOUS at 11:37

## 2020-01-01 RX ADMIN — FUROSEMIDE 20 MG: 10 INJECTION, SOLUTION INTRAMUSCULAR; INTRAVENOUS at 06:09

## 2020-01-01 RX ADMIN — NYSTATIN 500000 UNITS: 100000 SUSPENSION ORAL at 08:45

## 2020-01-01 RX ADMIN — ENOXAPARIN SODIUM 30 MG: 30 INJECTION SUBCUTANEOUS at 10:39

## 2020-01-01 RX ADMIN — MORPHINE SULFATE 60 MG: 30 TABLET, FILM COATED, EXTENDED RELEASE ORAL at 07:42

## 2020-01-01 RX ADMIN — ALBUTEROL SULFATE 2.5 MG: 2.5 SOLUTION RESPIRATORY (INHALATION) at 08:27

## 2020-01-01 RX ADMIN — ONDANSETRON 8 MG: 2 INJECTION INTRAMUSCULAR; INTRAVENOUS at 17:45

## 2020-01-01 RX ADMIN — VANCOMYCIN HYDROCHLORIDE 1250 MG: 10 INJECTION, POWDER, LYOPHILIZED, FOR SOLUTION INTRAVENOUS at 14:59

## 2020-01-01 RX ADMIN — GUAIFENESIN 1200 MG: 600 TABLET ORAL at 08:50

## 2020-01-01 RX ADMIN — ATORVASTATIN CALCIUM 80 MG: 40 TABLET, FILM COATED ORAL at 08:09

## 2020-01-01 RX ADMIN — METHYLPREDNISOLONE SODIUM SUCCINATE 40 MG: 40 INJECTION, POWDER, FOR SOLUTION INTRAMUSCULAR; INTRAVENOUS at 17:23

## 2020-01-01 RX ADMIN — SERTRALINE HYDROCHLORIDE 25 MG: 50 TABLET ORAL at 08:01

## 2020-01-01 RX ADMIN — TIOTROPIUM BROMIDE INHALATION SPRAY 2 PUFF: 3.12 SPRAY, METERED RESPIRATORY (INHALATION) at 07:57

## 2020-01-01 RX ADMIN — CARVEDILOL 6.25 MG: 6.25 TABLET, FILM COATED ORAL at 08:17

## 2020-01-01 RX ADMIN — METHYLPREDNISOLONE SODIUM SUCCINATE 40 MG: 40 INJECTION, POWDER, FOR SOLUTION INTRAMUSCULAR; INTRAVENOUS at 16:56

## 2020-01-01 RX ADMIN — FUROSEMIDE 20 MG: 20 TABLET ORAL at 08:13

## 2020-01-01 RX ADMIN — GUAIFENESIN 1200 MG: 600 TABLET ORAL at 21:34

## 2020-01-01 RX ADMIN — CEFEPIME HYDROCHLORIDE 2 G: 2 INJECTION, POWDER, FOR SOLUTION INTRAVENOUS at 11:11

## 2020-01-01 RX ADMIN — MORPHINE SULFATE 60 MG: 30 TABLET, FILM COATED, EXTENDED RELEASE ORAL at 21:32

## 2020-01-01 RX ADMIN — Medication 400 MG: at 21:35

## 2020-01-01 RX ADMIN — MORPHINE SULFATE 15 MG: 15 TABLET ORAL at 01:30

## 2020-01-01 RX ADMIN — ALBUTEROL SULFATE 2.5 MG: 2.5 SOLUTION RESPIRATORY (INHALATION) at 14:21

## 2020-01-01 RX ADMIN — PREDNISONE 40 MG: 20 TABLET ORAL at 07:58

## 2020-01-01 RX ADMIN — SERTRALINE HYDROCHLORIDE 25 MG: 50 TABLET ORAL at 07:37

## 2020-01-01 RX ADMIN — CEFEPIME HYDROCHLORIDE 2 G: 2 INJECTION, POWDER, FOR SOLUTION INTRAVENOUS at 13:50

## 2020-01-01 RX ADMIN — CEFEPIME HYDROCHLORIDE 2 G: 2 INJECTION, POWDER, FOR SOLUTION INTRAVENOUS at 00:37

## 2020-01-01 RX ADMIN — SERTRALINE HYDROCHLORIDE 25 MG: 50 TABLET ORAL at 08:08

## 2020-01-01 RX ADMIN — SENNOSIDES AND DOCUSATE SODIUM 1 TABLET: 8.6; 5 TABLET ORAL at 08:09

## 2020-01-01 RX ADMIN — CEPHALEXIN 500 MG: 500 CAPSULE ORAL at 21:32

## 2020-01-01 RX ADMIN — ONDANSETRON 4 MG: 2 INJECTION INTRAMUSCULAR; INTRAVENOUS at 10:43

## 2020-01-01 RX ADMIN — ALBUTEROL SULFATE 2.5 MG: 2.5 SOLUTION RESPIRATORY (INHALATION) at 21:13

## 2020-01-01 RX ADMIN — LORAZEPAM 0.5 MG: 0.5 TABLET ORAL at 08:44

## 2020-01-01 RX ADMIN — ALBUTEROL SULFATE 2.5 MG: 2.5 SOLUTION RESPIRATORY (INHALATION) at 02:05

## 2020-01-01 RX ADMIN — ALBUTEROL SULFATE 2.5 MG: 2.5 SOLUTION RESPIRATORY (INHALATION) at 19:30

## 2020-01-01 RX ADMIN — POTASSIUM CHLORIDE 20 MEQ: 20 TABLET, EXTENDED RELEASE ORAL at 15:00

## 2020-01-01 RX ADMIN — MORPHINE SULFATE 60 MG: 30 TABLET, FILM COATED, EXTENDED RELEASE ORAL at 21:02

## 2020-01-01 RX ADMIN — CEFEPIME HYDROCHLORIDE 2 G: 2 INJECTION, POWDER, FOR SOLUTION INTRAVENOUS at 08:48

## 2020-01-01 RX ADMIN — ATORVASTATIN CALCIUM 80 MG: 40 TABLET, FILM COATED ORAL at 07:41

## 2020-01-01 RX ADMIN — ACETAMINOPHEN 650 MG: 325 TABLET, FILM COATED ORAL at 23:34

## 2020-01-01 RX ADMIN — MORPHINE SULFATE 60 MG: 30 TABLET, FILM COATED, EXTENDED RELEASE ORAL at 20:44

## 2020-01-01 RX ADMIN — SERTRALINE HYDROCHLORIDE 25 MG: 50 TABLET ORAL at 08:17

## 2020-01-01 RX ADMIN — Medication 400 MG: at 07:42

## 2020-01-01 RX ADMIN — MORPHINE SULFATE 5 MG: 2 INJECTION, SOLUTION INTRAMUSCULAR; INTRAVENOUS at 18:51

## 2020-01-01 RX ADMIN — DIPHENHYDRAMINE HYDROCHLORIDE 25 MG: 25 CAPSULE ORAL at 17:46

## 2020-01-01 RX ADMIN — MAGNESIUM SULFATE HEPTAHYDRATE 2 G: 40 INJECTION, SOLUTION INTRAVENOUS at 11:03

## 2020-01-01 RX ADMIN — MORPHINE SULFATE 3 MG: 2 INJECTION, SOLUTION INTRAMUSCULAR; INTRAVENOUS at 02:15

## 2020-01-01 RX ADMIN — MORPHINE SULFATE 60 MG: 30 TABLET, FILM COATED, EXTENDED RELEASE ORAL at 07:58

## 2020-01-01 RX ADMIN — METHYLPREDNISOLONE SODIUM SUCCINATE 40 MG: 40 INJECTION, POWDER, FOR SOLUTION INTRAMUSCULAR; INTRAVENOUS at 00:24

## 2020-01-01 RX ADMIN — TBO-FILGRASTIM 300 MCG: 300 INJECTION, SOLUTION SUBCUTANEOUS at 16:29

## 2020-01-01 RX ADMIN — HYDROMORPHONE HYDROCHLORIDE 0.5 MG: 1 INJECTION, SOLUTION INTRAMUSCULAR; INTRAVENOUS; SUBCUTANEOUS at 18:29

## 2020-01-01 RX ADMIN — ALBUTEROL SULFATE 2.5 MG: 2.5 SOLUTION RESPIRATORY (INHALATION) at 18:43

## 2020-01-01 RX ADMIN — METHYLPREDNISOLONE SODIUM SUCCINATE 40 MG: 40 INJECTION, POWDER, FOR SOLUTION INTRAMUSCULAR; INTRAVENOUS at 17:27

## 2020-01-01 RX ADMIN — METHYLPREDNISOLONE SODIUM SUCCINATE 40 MG: 40 INJECTION, POWDER, FOR SOLUTION INTRAMUSCULAR; INTRAVENOUS at 08:29

## 2020-01-01 RX ADMIN — OLANZAPINE 10 MG: 5 TABLET, ORALLY DISINTEGRATING ORAL at 21:00

## 2020-01-01 RX ADMIN — SODIUM CHLORIDE 100 ML: 900 INJECTION, SOLUTION INTRAVENOUS at 18:36

## 2020-01-01 RX ADMIN — HYDROCODONE BITARTRATE AND ACETAMINOPHEN 2 TABLET: 5; 325 TABLET ORAL at 17:54

## 2020-01-01 RX ADMIN — Medication 10 ML: at 21:02

## 2020-01-01 RX ADMIN — POTASSIUM CHLORIDE 40 MEQ: 20 TABLET, EXTENDED RELEASE ORAL at 17:32

## 2020-01-01 RX ADMIN — GUAIFENESIN 1200 MG: 600 TABLET ORAL at 17:26

## 2020-01-01 RX ADMIN — CARVEDILOL 6.25 MG: 6.25 TABLET, FILM COATED ORAL at 17:48

## 2020-01-01 RX ADMIN — CEFEPIME HYDROCHLORIDE 2 G: 2 INJECTION, POWDER, FOR SOLUTION INTRAVENOUS at 13:35

## 2020-01-01 RX ADMIN — FUROSEMIDE 20 MG: 10 INJECTION, SOLUTION INTRAMUSCULAR; INTRAVENOUS at 19:05

## 2020-01-01 RX ADMIN — CARVEDILOL 6.25 MG: 6.25 TABLET, FILM COATED ORAL at 17:32

## 2020-01-01 RX ADMIN — GUAIFENESIN 1200 MG: 600 TABLET ORAL at 07:58

## 2020-01-01 RX ADMIN — MORPHINE SULFATE 1 MG: 2 INJECTION, SOLUTION INTRAMUSCULAR; INTRAVENOUS at 16:14

## 2020-01-01 RX ADMIN — MORPHINE SULFATE 60 MG: 30 TABLET, FILM COATED, EXTENDED RELEASE ORAL at 20:33

## 2020-01-01 RX ADMIN — CEFEPIME HYDROCHLORIDE 2 G: 2 INJECTION, POWDER, FOR SOLUTION INTRAVENOUS at 14:11

## 2020-01-01 RX ADMIN — SENNOSIDES AND DOCUSATE SODIUM 1 TABLET: 8.6; 5 TABLET ORAL at 08:14

## 2020-01-01 RX ADMIN — ENOXAPARIN SODIUM 30 MG: 30 INJECTION SUBCUTANEOUS at 11:34

## 2020-01-01 RX ADMIN — MORPHINE SULFATE 10 MG: 100 SOLUTION ORAL at 21:11

## 2020-01-01 RX ADMIN — IPRATROPIUM BROMIDE AND ALBUTEROL SULFATE 3 ML: .5; 3 SOLUTION RESPIRATORY (INHALATION) at 11:34

## 2020-01-01 RX ADMIN — CEPHALEXIN 500 MG: 500 CAPSULE ORAL at 09:13

## 2020-01-01 RX ADMIN — METHYLPREDNISOLONE SODIUM SUCCINATE 40 MG: 40 INJECTION, POWDER, FOR SOLUTION INTRAMUSCULAR; INTRAVENOUS at 21:03

## 2020-01-01 RX ADMIN — IPRATROPIUM BROMIDE AND ALBUTEROL SULFATE 3 ML: .5; 3 SOLUTION RESPIRATORY (INHALATION) at 08:12

## 2020-01-01 RX ADMIN — VANCOMYCIN HYDROCHLORIDE 1000 MG: 1 INJECTION, POWDER, LYOPHILIZED, FOR SOLUTION INTRAVENOUS at 03:39

## 2020-01-01 RX ADMIN — PROPOFOL 180 MCG/KG/MIN: 10 INJECTION, EMULSION INTRAVENOUS at 10:30

## 2020-01-01 RX ADMIN — PREDNISONE 10 MG: 10 TABLET ORAL at 09:13

## 2020-01-01 RX ADMIN — POTASSIUM CHLORIDE 20 MEQ: 20 TABLET, EXTENDED RELEASE ORAL at 08:49

## 2020-01-01 RX ADMIN — MORPHINE SULFATE 15 MG: 15 TABLET ORAL at 03:22

## 2020-01-01 RX ADMIN — CEFEPIME HYDROCHLORIDE 2 G: 2 INJECTION, POWDER, FOR SOLUTION INTRAVENOUS at 00:00

## 2020-01-01 RX ADMIN — MORPHINE SULFATE 60 MG: 30 TABLET, FILM COATED, EXTENDED RELEASE ORAL at 08:17

## 2020-01-01 RX ADMIN — GUAIFENESIN 1200 MG: 600 TABLET ORAL at 17:53

## 2020-01-01 RX ADMIN — MORPHINE SULFATE 5 MG: 2 INJECTION, SOLUTION INTRAMUSCULAR; INTRAVENOUS at 09:10

## 2020-01-01 RX ADMIN — MORPHINE SULFATE 15 MG: 15 TABLET ORAL at 00:50

## 2020-01-01 RX ADMIN — IOPAMIDOL 75 ML: 755 INJECTION, SOLUTION INTRAVENOUS at 14:31

## 2020-01-01 RX ADMIN — ALBUTEROL SULFATE 2.5 MG: 2.5 SOLUTION RESPIRATORY (INHALATION) at 22:50

## 2020-01-01 RX ADMIN — MORPHINE SULFATE 5 MG: 2 INJECTION, SOLUTION INTRAMUSCULAR; INTRAVENOUS at 23:24

## 2020-01-01 RX ADMIN — ALBUTEROL SULFATE 2.5 MG: 2.5 SOLUTION RESPIRATORY (INHALATION) at 21:28

## 2020-01-01 RX ADMIN — Medication 10 ML: at 14:30

## 2020-01-01 RX ADMIN — NYSTATIN 500000 UNITS: 100000 SUSPENSION ORAL at 17:00

## 2020-01-01 RX ADMIN — PROPOFOL 20 MG: 10 INJECTION, EMULSION INTRAVENOUS at 15:11

## 2020-01-01 RX ADMIN — ALBUTEROL SULFATE 2.5 MG: 2.5 SOLUTION RESPIRATORY (INHALATION) at 19:32

## 2020-01-01 RX ADMIN — VANCOMYCIN HYDROCHLORIDE 750 MG: 10 INJECTION, POWDER, LYOPHILIZED, FOR SOLUTION INTRAVENOUS at 14:52

## 2020-01-01 RX ADMIN — Medication 400 MG: at 19:34

## 2020-01-01 RX ADMIN — POTASSIUM CHLORIDE: 2 INJECTION, SOLUTION, CONCENTRATE INTRAVENOUS at 17:24

## 2020-01-01 RX ADMIN — ALBUTEROL SULFATE 2.5 MG: 2.5 SOLUTION RESPIRATORY (INHALATION) at 07:55

## 2020-01-01 RX ADMIN — ALBUTEROL SULFATE 2.5 MG: 2.5 SOLUTION RESPIRATORY (INHALATION) at 17:57

## 2020-01-01 RX ADMIN — ALBUTEROL SULFATE 2.5 MG: 2.5 SOLUTION RESPIRATORY (INHALATION) at 13:48

## 2020-01-01 RX ADMIN — LORAZEPAM 0.25 MG: 0.5 TABLET ORAL at 15:00

## 2020-01-01 RX ADMIN — NYSTATIN 500000 UNITS: 100000 SUSPENSION ORAL at 13:07

## 2020-01-01 RX ADMIN — VANCOMYCIN HYDROCHLORIDE 750 MG: 10 INJECTION, POWDER, LYOPHILIZED, FOR SOLUTION INTRAVENOUS at 20:55

## 2020-01-01 RX ADMIN — MORPHINE SULFATE 60 MG: 30 TABLET, FILM COATED, EXTENDED RELEASE ORAL at 20:40

## 2020-01-01 RX ADMIN — PREDNISONE 40 MG: 20 TABLET ORAL at 07:42

## 2020-01-01 RX ADMIN — GUAIFENESIN 1200 MG: 600 TABLET ORAL at 21:02

## 2020-01-01 RX ADMIN — LORAZEPAM 1 MG: 2 INJECTION INTRAMUSCULAR; INTRAVENOUS at 07:45

## 2020-01-01 RX ADMIN — ENOXAPARIN SODIUM 30 MG: 30 INJECTION SUBCUTANEOUS at 12:01

## 2020-01-01 RX ADMIN — ALBUTEROL SULFATE 2.5 MG: 2.5 SOLUTION RESPIRATORY (INHALATION) at 14:50

## 2020-01-01 RX ADMIN — POTASSIUM CHLORIDE 20 MEQ: 20 TABLET, EXTENDED RELEASE ORAL at 08:22

## 2020-01-01 RX ADMIN — ALBUTEROL SULFATE 2.5 MG: 2.5 SOLUTION RESPIRATORY (INHALATION) at 03:04

## 2020-01-01 RX ADMIN — DIATRIZOATE MEGLUMINE AND DIATRIZOATE SODIUM 10 ML: 660; 100 LIQUID ORAL; RECTAL at 15:20

## 2020-01-01 RX ADMIN — VANCOMYCIN HYDROCHLORIDE 1000 MG: 1 INJECTION, POWDER, LYOPHILIZED, FOR SOLUTION INTRAVENOUS at 16:08

## 2020-01-01 RX ADMIN — CEFEPIME HYDROCHLORIDE 2 G: 2 INJECTION, POWDER, FOR SOLUTION INTRAVENOUS at 17:22

## 2020-01-01 RX ADMIN — CEFEPIME HYDROCHLORIDE 2 G: 2 INJECTION, POWDER, FOR SOLUTION INTRAVENOUS at 16:29

## 2020-01-01 RX ADMIN — PREDNISONE 40 MG: 20 TABLET ORAL at 08:48

## 2020-01-01 RX ADMIN — Medication 400 MG: at 16:25

## 2020-01-01 RX ADMIN — ATORVASTATIN CALCIUM 80 MG: 40 TABLET, FILM COATED ORAL at 08:14

## 2020-01-01 RX ADMIN — SERTRALINE HYDROCHLORIDE 25 MG: 50 TABLET ORAL at 08:22

## 2020-01-01 RX ADMIN — ALBUTEROL SULFATE 2.5 MG: 2.5 SOLUTION RESPIRATORY (INHALATION) at 07:54

## 2020-01-01 RX ADMIN — GUAIFENESIN 1200 MG: 600 TABLET ORAL at 08:12

## 2020-01-01 RX ADMIN — ALBUTEROL SULFATE 2.5 MG: 2.5 SOLUTION RESPIRATORY (INHALATION) at 16:05

## 2020-01-01 RX ADMIN — ALBUTEROL SULFATE 2.5 MG: 2.5 SOLUTION RESPIRATORY (INHALATION) at 19:46

## 2020-01-01 RX ADMIN — MORPHINE SULFATE 60 MG: 30 TABLET, FILM COATED, EXTENDED RELEASE ORAL at 20:26

## 2020-01-01 RX ADMIN — MORPHINE SULFATE 15 MG: 15 TABLET ORAL at 17:59

## 2020-01-01 RX ADMIN — GUAIFENESIN 1200 MG: 600 TABLET ORAL at 07:41

## 2020-01-01 RX ADMIN — CARVEDILOL 6.25 MG: 6.25 TABLET, FILM COATED ORAL at 08:24

## 2020-01-01 RX ADMIN — METHYLPREDNISOLONE SODIUM SUCCINATE 40 MG: 40 INJECTION, POWDER, FOR SOLUTION INTRAMUSCULAR; INTRAVENOUS at 03:33

## 2020-01-01 RX ADMIN — LORAZEPAM 1 MG: 2 INJECTION INTRAMUSCULAR; INTRAVENOUS at 01:38

## 2020-01-01 RX ADMIN — NYSTATIN 500000 UNITS: 100000 SUSPENSION ORAL at 08:15

## 2020-01-01 RX ADMIN — DEXAMETHASONE SODIUM PHOSPHATE 8 MG: 4 INJECTION, SOLUTION INTRAMUSCULAR; INTRAVENOUS at 11:08

## 2020-01-01 RX ADMIN — ALBUTEROL SULFATE 2.5 MG: 2.5 SOLUTION RESPIRATORY (INHALATION) at 20:42

## 2020-01-01 RX ADMIN — IPRATROPIUM BROMIDE AND ALBUTEROL SULFATE 3 ML: .5; 3 SOLUTION RESPIRATORY (INHALATION) at 20:31

## 2020-01-01 RX ADMIN — MORPHINE SULFATE 5 MG: 2 INJECTION, SOLUTION INTRAMUSCULAR; INTRAVENOUS at 20:39

## 2020-01-01 RX ADMIN — POLYETHYLENE GLYCOL 3350 17 G: 17 POWDER, FOR SOLUTION ORAL at 08:47

## 2020-01-01 RX ADMIN — MORPHINE SULFATE 4 MG: 2 INJECTION, SOLUTION INTRAMUSCULAR; INTRAVENOUS at 00:30

## 2020-01-01 RX ADMIN — FUROSEMIDE 20 MG: 20 TABLET ORAL at 07:36

## 2020-01-01 RX ADMIN — GUAIFENESIN 1200 MG: 600 TABLET ORAL at 08:14

## 2020-01-01 RX ADMIN — FUROSEMIDE 20 MG: 10 INJECTION, SOLUTION INTRAMUSCULAR; INTRAVENOUS at 15:29

## 2020-01-01 RX ADMIN — LORAZEPAM 0.25 MG: 0.5 TABLET ORAL at 17:00

## 2020-01-01 RX ADMIN — NYSTATIN 500000 UNITS: 100000 SUSPENSION ORAL at 07:35

## 2020-01-01 RX ADMIN — MORPHINE SULFATE 5 MG: 2 INJECTION, SOLUTION INTRAMUSCULAR; INTRAVENOUS at 18:08

## 2020-01-01 RX ADMIN — MORPHINE SULFATE 60 MG: 30 TABLET, FILM COATED, EXTENDED RELEASE ORAL at 07:37

## 2020-01-01 RX ADMIN — IOPAMIDOL 80 ML: 755 INJECTION, SOLUTION INTRAVENOUS at 18:37

## 2020-01-01 RX ADMIN — ALBUTEROL SULFATE 2.5 MG: 2.5 SOLUTION RESPIRATORY (INHALATION) at 14:47

## 2020-01-01 RX ADMIN — MORPHINE SULFATE 15 MG: 15 TABLET ORAL at 19:24

## 2020-01-01 RX ADMIN — CEFEPIME HYDROCHLORIDE 2 G: 2 INJECTION, POWDER, FOR SOLUTION INTRAVENOUS at 16:25

## 2020-01-01 RX ADMIN — CEFEPIME HYDROCHLORIDE 2 G: 2 INJECTION, POWDER, FOR SOLUTION INTRAVENOUS at 17:09

## 2020-01-01 RX ADMIN — MORPHINE SULFATE 60 MG: 30 TABLET, FILM COATED, EXTENDED RELEASE ORAL at 09:13

## 2020-01-01 RX ADMIN — Medication 10 ML: at 21:35

## 2020-01-01 RX ADMIN — MORPHINE SULFATE 10 MG: 100 SOLUTION ORAL at 17:30

## 2020-01-01 RX ADMIN — MORPHINE SULFATE 15 MG: 15 TABLET ORAL at 17:51

## 2020-01-01 RX ADMIN — POTASSIUM CHLORIDE 40 MEQ: 400 INJECTION, SOLUTION INTRAVENOUS at 05:02

## 2020-01-01 RX ADMIN — SERTRALINE HYDROCHLORIDE 25 MG: 50 TABLET ORAL at 07:42

## 2020-01-01 RX ADMIN — MORPHINE SULFATE 5 MG: 2 INJECTION, SOLUTION INTRAMUSCULAR; INTRAVENOUS at 08:44

## 2020-01-01 RX ADMIN — ACETAMINOPHEN 650 MG: 325 TABLET, FILM COATED ORAL at 07:47

## 2020-01-01 RX ADMIN — IPRATROPIUM BROMIDE AND ALBUTEROL SULFATE 3 ML: .5; 3 SOLUTION RESPIRATORY (INHALATION) at 07:36

## 2020-01-01 RX ADMIN — NALOXONE HYDROCHLORIDE 0.4 MG: 0.4 INJECTION, SOLUTION INTRAMUSCULAR; INTRAVENOUS; SUBCUTANEOUS at 18:12

## 2020-01-01 RX ADMIN — PROPOFOL 30 MG: 10 INJECTION, EMULSION INTRAVENOUS at 15:00

## 2020-01-01 RX ADMIN — MORPHINE SULFATE 60 MG: 30 TABLET, FILM COATED, EXTENDED RELEASE ORAL at 20:21

## 2020-01-01 RX ADMIN — METHYLPREDNISOLONE SODIUM SUCCINATE 40 MG: 40 INJECTION, POWDER, FOR SOLUTION INTRAMUSCULAR; INTRAVENOUS at 05:02

## 2020-01-01 RX ADMIN — SERTRALINE HYDROCHLORIDE 25 MG: 50 TABLET ORAL at 08:00

## 2020-01-01 RX ADMIN — HYDROMORPHONE HYDROCHLORIDE 0.5 MG: 1 INJECTION, SOLUTION INTRAMUSCULAR; INTRAVENOUS; SUBCUTANEOUS at 09:54

## 2020-01-01 RX ADMIN — OLANZAPINE 10 MG: 5 TABLET, ORALLY DISINTEGRATING ORAL at 21:18

## 2020-01-01 RX ADMIN — ATORVASTATIN CALCIUM 80 MG: 40 TABLET, FILM COATED ORAL at 08:12

## 2020-01-01 RX ADMIN — LORAZEPAM 1 MG: 2 INJECTION INTRAMUSCULAR; INTRAVENOUS at 23:41

## 2020-01-01 RX ADMIN — TUBERCULIN PURIFIED PROTEIN DERIVATIVE 5 UNITS: 5 INJECTION, SOLUTION INTRADERMAL at 13:43

## 2020-01-01 RX ADMIN — CARVEDILOL 6.25 MG: 6.25 TABLET, FILM COATED ORAL at 17:26

## 2020-01-01 RX ADMIN — POTASSIUM CHLORIDE: 2 INJECTION, SOLUTION, CONCENTRATE INTRAVENOUS at 12:12

## 2020-01-01 RX ADMIN — ALBUTEROL SULFATE 2.5 MG: 2.5 SOLUTION RESPIRATORY (INHALATION) at 14:39

## 2020-01-01 RX ADMIN — POLYETHYLENE GLYCOL 3350 17 G: 17 POWDER, FOR SOLUTION ORAL at 07:42

## 2020-01-01 RX ADMIN — VANCOMYCIN HYDROCHLORIDE 750 MG: 10 INJECTION, POWDER, LYOPHILIZED, FOR SOLUTION INTRAVENOUS at 21:02

## 2020-01-01 RX ADMIN — MORPHINE SULFATE 10 MG: 100 SOLUTION ORAL at 10:39

## 2020-01-01 RX ADMIN — ALBUTEROL SULFATE 2.5 MG: 2.5 SOLUTION RESPIRATORY (INHALATION) at 08:10

## 2020-01-01 RX ADMIN — CEFEPIME HYDROCHLORIDE 2 G: 2 INJECTION, POWDER, FOR SOLUTION INTRAVENOUS at 01:57

## 2020-01-01 RX ADMIN — METHYLPREDNISOLONE SODIUM SUCCINATE 40 MG: 40 INJECTION, POWDER, FOR SOLUTION INTRAMUSCULAR; INTRAVENOUS at 16:08

## 2020-01-01 RX ADMIN — ACETAMINOPHEN 650 MG: 325 TABLET, FILM COATED ORAL at 22:55

## 2020-01-01 RX ADMIN — ALBUTEROL SULFATE 2.5 MG: 2.5 SOLUTION RESPIRATORY (INHALATION) at 08:12

## 2020-01-01 RX ADMIN — PREDNISONE 40 MG: 20 TABLET ORAL at 08:16

## 2020-01-01 RX ADMIN — POTASSIUM CHLORIDE 40 MEQ: 20 TABLET, EXTENDED RELEASE ORAL at 08:16

## 2020-01-01 RX ADMIN — PROPOFOL 30 MG: 10 INJECTION, EMULSION INTRAVENOUS at 14:57

## 2020-01-01 RX ADMIN — POLYETHYLENE GLYCOL 3350 17 G: 17 POWDER, FOR SOLUTION ORAL at 08:30

## 2020-01-01 RX ADMIN — MORPHINE SULFATE 60 MG: 30 TABLET, FILM COATED, EXTENDED RELEASE ORAL at 08:24

## 2020-01-01 RX ADMIN — SERTRALINE HYDROCHLORIDE 25 MG: 50 TABLET ORAL at 09:06

## 2020-01-01 RX ADMIN — SODIUM CHLORIDE, SODIUM LACTATE, POTASSIUM CHLORIDE, AND CALCIUM CHLORIDE 75 ML/HR: 600; 310; 30; 20 INJECTION, SOLUTION INTRAVENOUS at 09:47

## 2020-01-01 RX ADMIN — LORAZEPAM 0.5 MG: 0.5 TABLET ORAL at 01:40

## 2020-01-01 RX ADMIN — MORPHINE SULFATE 60 MG: 30 TABLET, FILM COATED, EXTENDED RELEASE ORAL at 09:05

## 2020-01-01 RX ADMIN — LIDOCAINE HYDROCHLORIDE 80 MG: 20 INJECTION, SOLUTION EPIDURAL; INFILTRATION; INTRACAUDAL; PERINEURAL at 14:54

## 2020-01-01 RX ADMIN — MORPHINE SULFATE 15 MG: 15 TABLET ORAL at 00:53

## 2020-01-01 RX ADMIN — MORPHINE SULFATE 15 MG: 15 TABLET ORAL at 01:20

## 2020-01-01 RX ADMIN — GADOTERATE MEGLUMINE 10 ML: 376.9 INJECTION INTRAVENOUS at 13:29

## 2020-01-01 RX ADMIN — GUAIFENESIN 1200 MG: 600 TABLET ORAL at 17:04

## 2020-01-01 RX ADMIN — CEFEPIME HYDROCHLORIDE 2 G: 2 INJECTION, POWDER, FOR SOLUTION INTRAVENOUS at 12:00

## 2020-01-01 RX ADMIN — SODIUM CHLORIDE 81.6 MG: 900 INJECTION, SOLUTION INTRAVENOUS at 23:04

## 2020-01-01 RX ADMIN — SERTRALINE HYDROCHLORIDE 25 MG: 50 TABLET ORAL at 08:12

## 2020-01-01 RX ADMIN — VANCOMYCIN HYDROCHLORIDE 500 MG: 1 INJECTION, POWDER, LYOPHILIZED, FOR SOLUTION INTRAVENOUS at 22:43

## 2020-01-01 RX ADMIN — NYSTATIN 500000 UNITS: 100000 SUSPENSION ORAL at 22:00

## 2020-01-01 RX ADMIN — ENOXAPARIN SODIUM 30 MG: 30 INJECTION SUBCUTANEOUS at 11:55

## 2020-01-01 RX ADMIN — POLYETHYLENE GLYCOL 3350 17 G: 17 POWDER, FOR SOLUTION ORAL at 07:58

## 2020-01-01 RX ADMIN — PROPOFOL 150 MG: 10 INJECTION, EMULSION INTRAVENOUS at 10:25

## 2020-01-01 RX ADMIN — POLYETHYLENE GLYCOL 3350 17 G: 17 POWDER, FOR SOLUTION ORAL at 08:16

## 2020-01-01 RX ADMIN — GUAIFENESIN 1200 MG: 600 TABLET ORAL at 20:33

## 2020-01-01 RX ADMIN — PHENYLEPHRINE HYDROCHLORIDE 100 MCG: 10 INJECTION INTRAVENOUS at 15:05

## 2020-01-01 RX ADMIN — MORPHINE SULFATE 5 MG: 2 INJECTION, SOLUTION INTRAMUSCULAR; INTRAVENOUS at 22:25

## 2020-01-01 RX ADMIN — LORAZEPAM 0.5 MG: 0.5 TABLET ORAL at 09:25

## 2020-01-01 RX ADMIN — LORAZEPAM 0.25 MG: 0.5 TABLET ORAL at 13:55

## 2020-01-01 RX ADMIN — CEFEPIME HYDROCHLORIDE 2 G: 2 INJECTION, POWDER, FOR SOLUTION INTRAVENOUS at 09:23

## 2020-01-01 RX ADMIN — LORAZEPAM 1 MG: 2 INJECTION INTRAMUSCULAR; INTRAVENOUS at 12:38

## 2020-01-01 RX ADMIN — LORAZEPAM 0.5 MG: 2 INJECTION INTRAMUSCULAR; INTRAVENOUS at 16:14

## 2020-01-01 RX ADMIN — ATORVASTATIN CALCIUM 80 MG: 40 TABLET, FILM COATED ORAL at 09:13

## 2020-01-01 RX ADMIN — MORPHINE SULFATE 5 MG: 2 INJECTION, SOLUTION INTRAMUSCULAR; INTRAVENOUS at 05:34

## 2020-01-01 RX ADMIN — MORPHINE SULFATE 5 MG: 2 INJECTION, SOLUTION INTRAMUSCULAR; INTRAVENOUS at 16:52

## 2020-01-01 RX ADMIN — MORPHINE SULFATE 15 MG: 15 TABLET ORAL at 10:04

## 2020-01-01 RX ADMIN — PROPOFOL 30 MG: 10 INJECTION, EMULSION INTRAVENOUS at 15:06

## 2020-01-01 RX ADMIN — SODIUM CHLORIDE 100 ML: 900 INJECTION, SOLUTION INTRAVENOUS at 15:20

## 2020-01-01 RX ADMIN — PREDNISONE 40 MG: 20 TABLET ORAL at 08:30

## 2020-01-01 RX ADMIN — MORPHINE SULFATE 2 MG: 2 INJECTION, SOLUTION INTRAMUSCULAR; INTRAVENOUS at 02:53

## 2020-01-01 RX ADMIN — MORPHINE SULFATE 60 MG: 30 TABLET, FILM COATED, EXTENDED RELEASE ORAL at 21:18

## 2020-01-01 RX ADMIN — MORPHINE SULFATE 10 MG: 100 SOLUTION ORAL at 22:26

## 2020-01-01 RX ADMIN — IPRATROPIUM BROMIDE AND ALBUTEROL SULFATE 3 ML: .5; 3 SOLUTION RESPIRATORY (INHALATION) at 19:32

## 2020-01-01 RX ADMIN — FUROSEMIDE 20 MG: 10 INJECTION, SOLUTION INTRAMUSCULAR; INTRAVENOUS at 17:21

## 2020-01-01 RX ADMIN — ATORVASTATIN CALCIUM 80 MG: 40 TABLET, FILM COATED ORAL at 08:48

## 2020-01-01 RX ADMIN — METHYLPREDNISOLONE SODIUM SUCCINATE 40 MG: 40 INJECTION, POWDER, FOR SOLUTION INTRAMUSCULAR; INTRAVENOUS at 09:56

## 2020-01-01 RX ADMIN — CEFEPIME HYDROCHLORIDE 2 G: 2 INJECTION, POWDER, FOR SOLUTION INTRAVENOUS at 13:43

## 2020-01-01 RX ADMIN — CEFEPIME HYDROCHLORIDE 2 G: 2 INJECTION, POWDER, FOR SOLUTION INTRAVENOUS at 15:22

## 2020-01-01 RX ADMIN — Medication 10 ML: at 18:36

## 2020-01-01 RX ADMIN — IPRATROPIUM BROMIDE AND ALBUTEROL SULFATE 3 ML: .5; 3 SOLUTION RESPIRATORY (INHALATION) at 01:00

## 2020-01-01 RX ADMIN — ACETAMINOPHEN 1000 MG: 500 TABLET, FILM COATED ORAL at 18:29

## 2020-01-01 RX ADMIN — FUROSEMIDE 20 MG: 20 TABLET ORAL at 09:01

## 2020-01-01 RX ADMIN — PROPOFOL 20 MG: 10 INJECTION, EMULSION INTRAVENOUS at 15:02

## 2020-01-01 RX ADMIN — MORPHINE SULFATE 15 MG: 15 TABLET ORAL at 20:21

## 2020-01-01 RX ADMIN — AMLODIPINE BESYLATE 5 MG: 5 TABLET ORAL at 08:17

## 2020-01-01 RX ADMIN — ETOPOSIDE 163.2 MG: 20 INJECTION, SOLUTION, CONCENTRATE INTRAVENOUS at 14:57

## 2020-01-01 RX ADMIN — MORPHINE SULFATE 60 MG: 30 TABLET, FILM COATED, EXTENDED RELEASE ORAL at 08:22

## 2020-01-01 RX ADMIN — MORPHINE SULFATE 15 MG: 15 TABLET ORAL at 12:00

## 2020-01-01 RX ADMIN — ALBUTEROL SULFATE 2.5 MG: 2.5 SOLUTION RESPIRATORY (INHALATION) at 19:25

## 2020-01-01 RX ADMIN — PREDNISONE 10 MG: 10 TABLET ORAL at 08:12

## 2020-01-01 RX ADMIN — TIOTROPIUM BROMIDE INHALATION SPRAY 2 PUFF: 3.12 SPRAY, METERED RESPIRATORY (INHALATION) at 08:59

## 2020-01-01 RX ADMIN — SODIUM CHLORIDE 150 MG: 900 INJECTION, SOLUTION INTRAVENOUS at 17:45

## 2020-01-01 RX ADMIN — MORPHINE SULFATE 5 MG: 2 INJECTION, SOLUTION INTRAMUSCULAR; INTRAVENOUS at 11:37

## 2020-01-01 RX ADMIN — POTASSIUM CHLORIDE 40 MEQ: 20 TABLET, EXTENDED RELEASE ORAL at 07:58

## 2020-01-01 RX ADMIN — CARVEDILOL 6.25 MG: 6.25 TABLET, FILM COATED ORAL at 08:22

## 2020-01-01 RX ADMIN — NYSTATIN 500000 UNITS: 100000 SUSPENSION ORAL at 13:36

## 2020-01-01 RX ADMIN — GUAIFENESIN 1200 MG: 600 TABLET ORAL at 21:16

## 2020-01-01 RX ADMIN — AMLODIPINE BESYLATE 5 MG: 5 TABLET ORAL at 08:09

## 2020-01-01 RX ADMIN — MAGNESIUM SULFATE HEPTAHYDRATE 2 G: 40 INJECTION, SOLUTION INTRAVENOUS at 10:25

## 2020-01-01 RX ADMIN — ALBUTEROL SULFATE 2.5 MG: 2.5 SOLUTION RESPIRATORY (INHALATION) at 01:49

## 2020-01-01 RX ADMIN — MORPHINE SULFATE 15 MG: 15 TABLET ORAL at 13:55

## 2020-01-01 RX ADMIN — MORPHINE SULFATE 5 MG: 2 INJECTION, SOLUTION INTRAMUSCULAR; INTRAVENOUS at 10:09

## 2020-01-01 RX ADMIN — Medication 400 MG: at 07:37

## 2020-01-01 RX ADMIN — NYSTATIN 500000 UNITS: 100000 SUSPENSION ORAL at 16:25

## 2020-01-01 RX ADMIN — ALBUTEROL SULFATE 2.5 MG: 2.5 SOLUTION RESPIRATORY (INHALATION) at 20:13

## 2020-01-01 RX ADMIN — CEFEPIME HYDROCHLORIDE 2 G: 2 INJECTION, POWDER, FOR SOLUTION INTRAVENOUS at 13:34

## 2020-01-01 RX ADMIN — MORPHINE SULFATE 5 MG: 2 INJECTION, SOLUTION INTRAMUSCULAR; INTRAVENOUS at 02:40

## 2020-01-01 RX ADMIN — SODIUM CHLORIDE 500 ML: 900 INJECTION, SOLUTION INTRAVENOUS at 15:29

## 2020-01-01 RX ADMIN — SODIUM CHLORIDE 50 ML/HR: 900 INJECTION, SOLUTION INTRAVENOUS at 11:04

## 2020-01-01 RX ADMIN — MORPHINE SULFATE 15 MG: 15 TABLET ORAL at 15:29

## 2020-01-01 RX ADMIN — Medication 400 MG: at 08:48

## 2020-01-01 RX ADMIN — MORPHINE SULFATE 60 MG: 30 TABLET, FILM COATED, EXTENDED RELEASE ORAL at 21:49

## 2020-01-01 RX ADMIN — SODIUM CHLORIDE 100 ML: 900 INJECTION, SOLUTION INTRAVENOUS at 14:31

## 2020-01-01 RX ADMIN — LORAZEPAM 0.5 MG: 0.5 TABLET ORAL at 01:56

## 2020-01-01 RX ADMIN — SENNOSIDES AND DOCUSATE SODIUM 1 TABLET: 8.6; 5 TABLET ORAL at 07:42

## 2020-01-01 RX ADMIN — NYSTATIN 500000 UNITS: 100000 SUSPENSION ORAL at 16:29

## 2020-01-01 RX ADMIN — MORPHINE SULFATE 15 MG: 15 TABLET ORAL at 15:46

## 2020-01-01 RX ADMIN — CEFEPIME HYDROCHLORIDE 2 G: 2 INJECTION, POWDER, FOR SOLUTION INTRAVENOUS at 02:00

## 2020-01-01 RX ADMIN — DEXAMETHASONE SODIUM PHOSPHATE 8 MG: 4 INJECTION, SOLUTION INTRAMUSCULAR; INTRAVENOUS at 13:14

## 2020-01-01 RX ADMIN — VANCOMYCIN HYDROCHLORIDE 500 MG: 1 INJECTION, POWDER, LYOPHILIZED, FOR SOLUTION INTRAVENOUS at 09:30

## 2020-01-01 RX ADMIN — MORPHINE SULFATE 15 MG: 15 TABLET ORAL at 00:59

## 2020-01-01 RX ADMIN — SENNOSIDES AND DOCUSATE SODIUM 1 TABLET: 8.6; 5 TABLET ORAL at 10:30

## 2020-01-01 RX ADMIN — SODIUM CHLORIDE 25 ML/HR: 900 INJECTION, SOLUTION INTRAVENOUS at 11:45

## 2020-01-01 RX ADMIN — CEFEPIME HYDROCHLORIDE 2 G: 2 INJECTION, POWDER, FOR SOLUTION INTRAVENOUS at 09:10

## 2020-01-01 RX ADMIN — MORPHINE SULFATE 5 MG: 2 INJECTION, SOLUTION INTRAMUSCULAR; INTRAVENOUS at 18:14

## 2020-01-01 RX ADMIN — SODIUM CHLORIDE 75 ML/HR: 900 INJECTION, SOLUTION INTRAVENOUS at 23:13

## 2020-01-01 RX ADMIN — NYSTATIN 500000 UNITS: 100000 SUSPENSION ORAL at 17:11

## 2020-01-01 RX ADMIN — MORPHINE SULFATE 60 MG: 30 TABLET, FILM COATED, EXTENDED RELEASE ORAL at 08:09

## 2020-01-01 RX ADMIN — ALBUTEROL SULFATE 2.5 MG: 2.5 SOLUTION RESPIRATORY (INHALATION) at 17:41

## 2020-01-01 RX ADMIN — VANCOMYCIN HYDROCHLORIDE 1250 MG: 10 INJECTION, POWDER, LYOPHILIZED, FOR SOLUTION INTRAVENOUS at 15:42

## 2020-01-01 RX ADMIN — SENNOSIDES AND DOCUSATE SODIUM 1 TABLET: 8.6; 5 TABLET ORAL at 07:36

## 2020-01-01 RX ADMIN — LORAZEPAM 0.25 MG: 0.5 TABLET ORAL at 10:25

## 2020-01-01 RX ADMIN — ALBUTEROL SULFATE 2.5 MG: 2.5 SOLUTION RESPIRATORY (INHALATION) at 14:43

## 2020-01-01 RX ADMIN — ALBUTEROL SULFATE 2.5 MG: 2.5 SOLUTION RESPIRATORY (INHALATION) at 14:17

## 2020-01-01 RX ADMIN — ALBUTEROL SULFATE 2.5 MG: 2.5 SOLUTION RESPIRATORY (INHALATION) at 09:15

## 2020-01-01 RX ADMIN — POTASSIUM CHLORIDE 40 MEQ: 20 TABLET, EXTENDED RELEASE ORAL at 17:05

## 2020-01-01 RX ADMIN — VANCOMYCIN HYDROCHLORIDE 500 MG: 1 INJECTION, POWDER, LYOPHILIZED, FOR SOLUTION INTRAVENOUS at 11:12

## 2020-01-01 RX ADMIN — SERTRALINE HYDROCHLORIDE 25 MG: 50 TABLET ORAL at 08:49

## 2020-01-01 RX ADMIN — LORAZEPAM 0.25 MG: 0.5 TABLET ORAL at 05:52

## 2020-01-01 RX ADMIN — ONDANSETRON 8 MG: 8 TABLET, ORALLY DISINTEGRATING ORAL at 13:17

## 2020-01-01 RX ADMIN — SODIUM CHLORIDE 100 ML/HR: 900 INJECTION, SOLUTION INTRAVENOUS at 16:39

## 2020-01-01 RX ADMIN — MORPHINE SULFATE 4 MG: 2 INJECTION, SOLUTION INTRAMUSCULAR; INTRAVENOUS at 10:58

## 2020-01-01 RX ADMIN — ACETAMINOPHEN 650 MG: 325 TABLET, FILM COATED ORAL at 06:30

## 2020-01-01 RX ADMIN — Medication 400 MG: at 15:46

## 2020-01-01 RX ADMIN — LORAZEPAM 0.5 MG: 0.5 TABLET ORAL at 17:33

## 2020-01-01 RX ADMIN — MORPHINE SULFATE 60 MG: 30 TABLET, FILM COATED, EXTENDED RELEASE ORAL at 08:49

## 2020-01-01 RX ADMIN — POLYETHYLENE GLYCOL 3350 17 G: 17 POWDER, FOR SOLUTION ORAL at 10:25

## 2020-01-01 RX ADMIN — GUAIFENESIN 1200 MG: 600 TABLET ORAL at 08:22

## 2020-01-01 RX ADMIN — ATORVASTATIN CALCIUM 80 MG: 40 TABLET, FILM COATED ORAL at 08:01

## 2020-01-01 RX ADMIN — CEFEPIME HYDROCHLORIDE 2 G: 2 INJECTION, POWDER, FOR SOLUTION INTRAVENOUS at 00:17

## 2020-01-01 RX ADMIN — PHENYLEPHRINE HYDROCHLORIDE 100 MCG: 10 INJECTION INTRAVENOUS at 15:00

## 2020-01-01 RX ADMIN — VANCOMYCIN HYDROCHLORIDE 750 MG: 10 INJECTION, POWDER, LYOPHILIZED, FOR SOLUTION INTRAVENOUS at 10:39

## 2020-01-01 RX ADMIN — MORPHINE SULFATE 15 MG: 15 TABLET ORAL at 17:42

## 2020-01-01 RX ADMIN — CEFEPIME HYDROCHLORIDE 2 G: 2 INJECTION, POWDER, FOR SOLUTION INTRAVENOUS at 00:56

## 2020-01-01 RX ADMIN — LORAZEPAM 0.25 MG: 0.5 TABLET ORAL at 23:00

## 2020-01-01 RX ADMIN — MORPHINE SULFATE 5 MG: 2 INJECTION, SOLUTION INTRAMUSCULAR; INTRAVENOUS at 08:34

## 2020-01-01 RX ADMIN — Medication 400 MG: at 21:02

## 2020-01-01 RX ADMIN — MORPHINE SULFATE 15 MG: 15 TABLET ORAL at 18:38

## 2020-01-01 RX ADMIN — ALBUTEROL SULFATE 2.5 MG: 2.5 SOLUTION RESPIRATORY (INHALATION) at 05:10

## 2020-01-01 RX ADMIN — FUROSEMIDE 20 MG: 10 INJECTION, SOLUTION INTRAMUSCULAR; INTRAVENOUS at 08:16

## 2020-01-01 RX ADMIN — MORPHINE SULFATE 15 MG: 15 TABLET ORAL at 00:28

## 2020-01-01 RX ADMIN — ONDANSETRON 8 MG: 8 TABLET, ORALLY DISINTEGRATING ORAL at 11:09

## 2020-01-01 RX ADMIN — SERTRALINE HYDROCHLORIDE 25 MG: 50 TABLET ORAL at 07:46

## 2020-01-01 RX ADMIN — CEFEPIME HYDROCHLORIDE 2 G: 2 INJECTION, POWDER, FOR SOLUTION INTRAVENOUS at 01:23

## 2020-01-01 RX ADMIN — GUAIFENESIN 1200 MG: 600 TABLET ORAL at 01:10

## 2020-01-01 RX ADMIN — MORPHINE SULFATE 5 MG: 2 INJECTION, SOLUTION INTRAMUSCULAR; INTRAVENOUS at 20:40

## 2020-01-01 RX ADMIN — PHENYLEPHRINE HYDROCHLORIDE 50 MCG: 10 INJECTION INTRAVENOUS at 15:03

## 2020-01-01 RX ADMIN — LORAZEPAM 0.25 MG: 0.5 TABLET ORAL at 18:35

## 2020-01-01 RX ADMIN — CEFEPIME HYDROCHLORIDE 2 G: 2 INJECTION, POWDER, FOR SOLUTION INTRAVENOUS at 14:54

## 2020-01-01 RX ADMIN — Medication 400 MG: at 08:13

## 2020-01-01 RX ADMIN — MORPHINE SULFATE 15 MG: 15 TABLET ORAL at 06:07

## 2020-01-01 RX ADMIN — PROPOFOL 50 MG: 10 INJECTION, EMULSION INTRAVENOUS at 14:54

## 2020-01-01 RX ADMIN — IPRATROPIUM BROMIDE AND ALBUTEROL SULFATE 3 ML: .5; 3 SOLUTION RESPIRATORY (INHALATION) at 11:22

## 2020-01-01 RX ADMIN — IPRATROPIUM BROMIDE AND ALBUTEROL SULFATE 3 ML: .5; 3 SOLUTION RESPIRATORY (INHALATION) at 04:30

## 2020-01-01 RX ADMIN — ENOXAPARIN SODIUM 40 MG: 40 INJECTION SUBCUTANEOUS at 16:56

## 2020-01-01 RX ADMIN — POTASSIUM CHLORIDE 20 MEQ: 400 INJECTION, SOLUTION INTRAVENOUS at 14:19

## 2020-01-01 RX ADMIN — TIOTROPIUM BROMIDE INHALATION SPRAY 2 PUFF: 3.12 SPRAY, METERED RESPIRATORY (INHALATION) at 09:25

## 2020-01-01 RX ADMIN — SODIUM CHLORIDE 50 ML/HR: 900 INJECTION, SOLUTION INTRAVENOUS at 13:35

## 2020-01-01 RX ADMIN — NYSTATIN 500000 UNITS: 100000 SUSPENSION ORAL at 07:41

## 2020-01-01 RX ADMIN — FUROSEMIDE 20 MG: 10 INJECTION, SOLUTION INTRAMUSCULAR; INTRAVENOUS at 08:08

## 2020-01-01 RX ADMIN — MORPHINE SULFATE 15 MG: 15 TABLET ORAL at 03:28

## 2020-01-01 RX ADMIN — CARVEDILOL 6.25 MG: 6.25 TABLET, FILM COATED ORAL at 08:01

## 2020-01-01 RX ADMIN — IPRATROPIUM BROMIDE AND ALBUTEROL SULFATE 3 ML: .5; 3 SOLUTION RESPIRATORY (INHALATION) at 20:03

## 2020-01-01 RX ADMIN — LORAZEPAM 1 MG: 2 INJECTION INTRAMUSCULAR; INTRAVENOUS at 09:42

## 2020-01-01 RX ADMIN — TIOTROPIUM BROMIDE INHALATION SPRAY 2 PUFF: 3.12 SPRAY, METERED RESPIRATORY (INHALATION) at 08:10

## 2020-01-01 RX ADMIN — NYSTATIN 500000 UNITS: 100000 SUSPENSION ORAL at 21:34

## 2020-01-01 RX ADMIN — ATORVASTATIN CALCIUM 80 MG: 40 TABLET, FILM COATED ORAL at 08:17

## 2020-01-01 RX ADMIN — AMLODIPINE BESYLATE 5 MG: 5 TABLET ORAL at 08:01

## 2020-01-01 RX ADMIN — TBO-FILGRASTIM 300 MCG: 300 INJECTION, SOLUTION SUBCUTANEOUS at 16:25

## 2020-01-01 RX ADMIN — DEXAMETHASONE SODIUM PHOSPHATE 12 MG: 4 INJECTION, SOLUTION INTRAMUSCULAR; INTRAVENOUS at 18:13

## 2020-01-01 RX ADMIN — HEPARIN SODIUM (PORCINE) LOCK FLUSH IV SOLN 100 UNIT/ML 300 UNITS: 100 SOLUTION at 15:01

## 2020-01-13 NOTE — ED PROVIDER NOTES
Patient is a 82-year-old female with history of prior CABG, pacemaker, COPD who comes to the emergency department today complaining of a cough for about a week. Productive of white sputum. Also with some pain in the right chest.  Patient also admitted to the medical student that she snorted cocaine about 3 days ago. The history is provided by the patient. Cough   This is a new problem. The current episode started more than 1 week ago. The problem occurs every few minutes. The problem has not changed since onset. The cough is productive of sputum. There has been no fever. Associated symptoms include chest pain and shortness of breath. Pertinent negatives include no chills, no rhinorrhea, no sore throat, no nausea and no vomiting. She has tried nothing for the symptoms. She is a smoker. Past Medical History:   Diagnosis Date    Abnormal MRI of head 2015    Frontal Lobe periventricular WM disease.  Ruled out for MS by Neurology    ASCUS with positive high risk HPV cervical     Bipolar disorder ([address]     Borderline personality disorder ([address]     CAD     Cerebrovascular disease     Chronic diastolic congestive heart failure (HCC)     Chronic hepatitis C (HCC)     Treated, in remission    Chronic pain     COPD (chronic obstructive pulmonary disease) ([address]     Moderate    Dyslipidemia     Heart attack ([address] 2011    HTN     IBS (irritable bowel syndrome)     Pacemaker     Sick Sinus Syndrome    S/P CABG x 3     Subclavian artery stenosis, right ([address] 2016    Moderate to severe    Vertebral artery stenosis 2016    Asymptomatic Left Severe       Past Surgical History:   Procedure Laterality Date    APPENDECTOMY      COLONOSCOPY N/A 6/22/2018    COLONOSCOPY/ 25 performed by Akin Ramirez MD at [address] [place] [address], [facility]    Abruption, CPD    HX CORONARY ARTERY BYPASS GRAFT  07/2011    3 vessel    HX HEART CATHETERIZATION      7/2012, 8/2012-occluded grafts    HX HERNIA REPAIR      HX ORTHOPAEDIC      left leg    HX PACEMAKER      HX TUBAL LIGATION      MD COLSC FLX W/RMVL OF TUMOR POLYP LESION SNARE TQ  6/22/2018              Family History:   Problem Relation Age of Onset   [employer] Cancer Mother         CAD, Lung cancer    Hypertension Father         DM, CAD    Other Sister     Hypertension Brother     Other Son         Adopted.        Social History     Socioeconomic History    Marital status:      Spouse name: Not on file    Number of children: 2    Years of education: Not on file    Highest education level: Not on file   Occupational History    Occupation: Disabled   Social Needs    Financial resource strain: Not on file    Food insecurity:     Worry: Not on file     Inability: Not on file   Codility needs:     Medical: Not on file     Non-medical: Not on file   Tobacco Use    Smoking status: Current Every Day Smoker     Packs/day: 0.50     Years: 38.00     Pack years: 19.00     Types: Cigarettes    Smokeless tobacco: Never Used   Substance and Sexual Activity    Alcohol use: No     Alcohol/week: 2.0 standard drinks     Types: 2 Cans of beer per week     Frequency: Never     Binge frequency: Never    Drug use: Yes     Types: Cocaine     Comment: positive ampheteamine 12/6    Sexual activity: Yes     Partners: Male   Lifestyle    Physical activity:     Days per week: Not on file     Minutes per session: Not on file    Stress: Not on file   Relationships    Social connections:     Talks on phone: Not on file     Gets together: Not on file     Attends [employer] service: Not on file     Active member of club or organization: Not on file     Attends meetings of clubs or organizations: Not on file     Relationship status: Not on file    Intimate partner violence:     Fear of current or ex partner: Not on file     Emotionally abused: Not on file     Physically abused: Not on file     Forced sexual activity: Not on file   Other Topics Concern  Not on file   Social History Narrative     and lives with roommate. 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 Has a son in his 19's. Adopted son. ALLERGIES: Lithium analogues; Morphine (pf); and Other medication    Review of Systems   Constitutional: Negative for chills, fatigue and fever. HENT: Negative for congestion, rhinorrhea and sore throat. Eyes: Negative for pain, discharge and visual disturbance. Respiratory: Positive for cough and shortness of breath. Cardiovascular: Positive for chest pain. Negative for palpitations. Gastrointestinal: Negative for abdominal pain, diarrhea, nausea and vomiting. Endocrine: Negative for polydipsia and polyuria. Genitourinary: Negative for dysuria, frequency and urgency. Musculoskeletal: Negative for back pain and neck pain. Skin: Negative for rash. Neurological: Negative for seizures, syncope and weakness. Hematological: Negative. Vitals:    01/13/20 1602 01/13/20 1635   BP: 101/70 129/70   Pulse: 78 88   Resp: 18 16   Temp: 99.3 °F (37.4 °C)    SpO2: 97% 98%   Weight: 42.1 kg (92 lb 12.8 oz)    Height: 5' 3\" (1.6 m)             Physical Exam  Vitals signs and nursing note reviewed. Constitutional:       Appearance: Normal appearance. She is well-developed. Comments: Chronically ill-appearing   HENT:      Head: Normocephalic and atraumatic. Eyes:      Conjunctiva/sclera: Conjunctivae normal.      Pupils: Pupils are equal, round, and reactive to light. Neck:      Musculoskeletal: Normal range of motion and neck supple. Cardiovascular:      Rate and Rhythm: Normal rate and regular rhythm. Pulses: Normal pulses. Pulmonary:      Effort: Pulmonary effort is normal.      Breath sounds: Normal breath sounds. Comments: Tender on the right lateral ribs  Abdominal:      Palpations: Abdomen is soft. Tenderness: There is no tenderness.  There is no guarding or rebound. Musculoskeletal: Normal range of motion. General: No deformity. Right lower leg: No edema. Left lower leg: No edema. Lymphadenopathy:      Cervical: No cervical adenopathy. Skin:     General: Skin is warm and dry. Capillary Refill: Capillary refill takes less than 2 seconds. Coloration: Skin is pale. Findings: No rash. Neurological:      Mental Status: She is alert and oriented to person, place, and time. GCS: GCS eye subscore is 4. GCS verbal subscore is 5. GCS motor subscore is 6. Cranial Nerves: No cranial nerve deficit. Sensory: No sensory deficit. MDM  Number of Diagnoses or Management Options  Diagnosis management comments: Chest x-ray shows a new widened superior mediastinum compared to previous with chronic COPD changes. EKG reviewed by me shows normal sinus rhythm with a rate of 82 there is some nonspecific ST wave changes but this is unchanged compared to previous EKG. CAT scan of the chest will be ordered for further evaluation    6:24 PM  vitals have remained stable. She was given Tylenol for the fever of 99. Laboratory evaluation including CBC and BMP is unremarkable. Awaiting CAT scan of the chest due to abnormal mediastinum noted on chest x-ray today. She is repeatedly asking for pain medicine, prior records were reviewed and she did have cardiac catheterization last month that showed stable anatomy and no new intervention    6:51 PM  She is tender on the right sided ribs and clinically I think this may all be chest wall pain. She was a difficult IV access and then when she went to CT her IV blew. Her other IV did not work. CAT scan tech did the scan without contrast first and we will review it to evaluate the mediastinum. If necessary we may have to replace an IV and go back. Voice dictation software was used during the making of this note.   This software is not perfect and grammatical and other typographical errors may be present. This note has been proofread, but may still contain errors. Ashlyn Raymond MD; 1/13/2020 @6:55 PM   ===================================================================    7:07 PM  CT scan of the chest shows a new anterior mediastinal mass. Discussed the case with the pulmonologist on-call. Given the fact that her labs are normal and she is hemodynamically stable I think she can be discharged home and wanted to call the office tomorrow to schedule an appointment and follow-up.          Amount and/or Complexity of Data Reviewed  Clinical lab tests: ordered and reviewed  Tests in the radiology section of CPT®: ordered and reviewed  Review and summarize past medical records: yes  Independent visualization of images, tracings, or specimens: yes    Risk of Complications, Morbidity, and/or Mortality  Presenting problems: moderate  Diagnostic procedures: moderate  Management options: low    Patient Progress  Patient progress: stable         Procedures

## 2020-01-13 NOTE — ED TRIAGE NOTES
States \"my lungs have been hurting me. \"  States reports dry cough. Also reports thinks she has a blockage in her neck. EMS reports RA saturation of 98%.

## 2020-01-14 NOTE — ED NOTES
I have reviewed discharge instructions with the patient. The patient verbalized understanding. Patient left ED via Discharge Method: ambulatory to Home with self. Son to take patient home. Opportunity for questions and clarification provided. Patient given 0 scripts. To continue your aftercare when you leave the hospital, you may receive an automated call from our care team to check in on how you are doing. This is a free service and part of our promise to provide the best care and service to meet your aftercare needs.  If you have questions, or wish to unsubscribe from this service please call [phone]. Thank you for Choosing our [facility] Emergency Department.

## 2020-01-15 NOTE — H&P (VIEW-ONLY)
[address] [facility]. [facility], [address] [facility] 
[phone] Patient Name:  Veronica Garces YOB: 1965 Office Visit 1/15/2020 CHIEF COMPLAINT:   
Chief Complaint Patient presents with  Lung Mass  Shortness of Breath HISTORY OF PRESENT ILLNESS:   
The patient is a 48year old white female who is seen an urgent work in appointment for a new anterior mediastinal mass. This is approximately 3.9 cm and depresses the trachea and extends up to the right supraclavicular region. She has a history of COPD GOLD stage II, PE, CAD, S/P CABG, and history of cocaine abuse. She has a 38 pack year history of cigarette smoking and continues to smoke 1 pack of cigarettes daily. States that the Chantix makes her \"crazy\" and cannot tolerate it. Patient complains of significant right arm pain and shoulder pain. This is worse when she tries to sleep. She has ongoing KIRK that is worse than baseline. Denies any cough or wheezing. She has lost about 10 pounds in the past 6 months. Lasted used cocaine 3 days ago. Past Medical History:  
Diagnosis Date  Abnormal MRI of head 2015 Frontal Lobe periventricular WM disease. Ruled out for MS by Neurology  ASCUS with positive high risk HPV cervical   
 Bipolar disorder ([address]  Borderline personality disorder ([address]  CAD  Cerebrovascular disease  Chronic diastolic congestive heart failure ([address]  Chronic hepatitis C ([address] Treated, in remission  Chronic pain  COPD (chronic obstructive pulmonary disease) (HCC) Moderate  Dyslipidemia  Heart attack ([address] 2011  
 HTN   
 IBS (irritable bowel syndrome)  Pacemaker Sick Sinus Syndrome  S/P CABG x 3   
 Subclavian artery stenosis, right ([address] 2016 Moderate to severe  Vertebral artery stenosis 2016 Asymptomatic Left Severe Problem List  Date Reviewed: 12/19/2019 Codes Class Noted Hypokalemia ICD-10-CM: E87.6 ICD-9-CM: 276.8  12/7/2019 Chest pain ICD-10-CM: R07.9 ICD-9-CM: 786.50  12/6/2019 Chronic tension-type headache, intractable ICD-10-CM: J01.038 ICD-9-CM: 339.12  5/13/2019 Cigarette nicotine dependence without complication ODX-04-CW: V22.062 ICD-9-CM: 305.1  7/3/2018 COPD, moderate ([address] (Chronic) ICD-10-CM: J44.9 ICD-9-CM: 496  Unknown Recurrent UTI ICD-10-CM: N39.0 ICD-9-CM: 599.0  4/24/2018 Gross hematuria ICD-10-CM: R31.0 ICD-9-CM: 599.71  4/24/2018 Urinary retention ICD-10-CM: R33.9 ICD-9-CM: 788.20  4/24/2018 Sick sinus syndrome (HCC) (Chronic) ICD-10-CM: I49.5 ICD-9-CM: 427.81  9/29/2017 IBS (irritable bowel syndrome) ICD-10-CM: K58.9 ICD-9-CM: 564.1  Unknown 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 
ICD-9-CM: 433.20  12/28/2016 Encounter for medication management ICD-10-CM: M51.838 ICD-9-CM: V58.69  12/28/2016 Neuropathy (Chronic) ICD-10-CM: G62.9 ICD-9-CM: 355.9  12/28/2016 Overview Signed 7/28/2017  2:20 PM by William Ortega MD  
  Last Assessment & Plan:  
Patient is currently on gabapentin 600 mg. Medication may cause increased drowsiness with Rispirdol, however pt has no complaints today. Intractable migraine with aura without status migrainosus ICD-10-CM: G43.119 ICD-9-CM: 346.01  12/28/2016 Peripheral vascular disease (HCC) (Chronic) ICD-10-CM: I73.9 ICD-9-CM: 443.9  12/1/2016 Overview Signed 12/1/2016  4:23 PM by Myla Barros MD  
  CTA (2/11/16): Moderate to severe stenosis in the proximal right subclavian artery. Borderline personality disorder ([facility] ICD-10-CM: F60.3 ICD-9-CM: 301.83  Unknown Overview Signed 7/28/2017  2:20 PM by William Ortega MD  
  Last Assessment & Plan: 1. Continue inpatient hospitalization 2.  Lithium level 1.5. Will hold am dose and change to 300mg PO BID. Will recheck in 3 days. 3.  Continue Neurontin 4. Consider Vistaril PRN if indicated 5. Encourage active participation in groups and on the milieu 6. Consider discharge and aftercare needs 7. Encourage abstinence from drugs of abuse Cerebrovascular disease ICD-10-CM: I67.9 ICD-9-CM: 437.9  Unknown Overview Signed 12/1/2016  4:23 PM by Colby Clark MD  
  CTA of the neck (2/11/16) : No significant stenosis of the right carotid artery. 37% stenosis in the proximal left internal carotid artery. Severe stenosis at the origin of the left vertebral artery Chronic hepatitis C ([facility] ICD-10-CM: B18.2 ICD-9-CM: 070.54  Unknown Chronic pain ICD-10-CM: G89.29 ICD-9-CM: 338.29  Unknown Overview Signed 7/28/2017  2:20 PM by Jonathan Hawkins MD  
  Last Assessment & Plan:  
Patient has history of chronic neck and back pain secondary to degenerative changes. She was recently prescribed a 15 day supply of Dilaudid 2 mg TID by \"pain management doctor\". This script was filled on 5.8. 17. Plan: - Given patient's history of substance use, she would largely benefit from detox of narcotics. Will try conservative methods while inpatient for back pain as well as neck and shoulder pain. Continue Ultram 100 mg Q6H while we await permission to speak to pain management doctor. Katie [facility]ot office in regards to patient's pain management contract. Patient has been dismissed from his services for positive UDS. - Avoid Toradol given her elevated Creatinine and glucose at time of admission. A1c normal at 5.4. ASCUS with positive high risk HPV cervical ICD-10-CM: R87.610, R87.810 ICD-9-CM: 795.01, 795.05  Unknown Pacemaker ICD-10-CM: Z95.0 ICD-9-CM: V45.01  8/10/2016 Overview Signed 8/10/2016 10:47 AM by Colby Clark MD  
  1. [employer]  :  Due to sick sinus syndrome. Chronic diastolic congestive heart failure (HCC) (Chronic) ICD-10-CM: I50.32 
ICD-9-CM: 428.32, 428.0  8/9/2016 Overview Addendum 8/27/2019  5:11 PM by Shreyas Barrett MD  
  Admitted [facility] 8/12/11 with CP and elevated BNP at 352. 
1.  Echo (8/3/11):  EF > 55%. Mild LVH. Pseudonormal LV filling pattern. Bi-atrial enlargement. .  Mild mitral regurgitation. 2.  Echo (8/22/19):  EF 65-70%. Indeterminant diastolic function. Mild LAE. Coronary atherosclerosis of native coronary vessel (Chronic) ICD-10-CM: I25.10 ICD-9-CM: 414.01  8/9/2016 Overview Addendum 12/9/2019  7:53 AM by Shreyas Barrett MD  
  1.  3 Vessel CABG (7/8/11):  MIR to LAD. LIMA in Y graft to superior and inferior diagonal. 
2.  Cath (8/31/12): Occluded grafts. FFR of LAD 0.89. Similar stenosis of diagonal but no FFR done. 3.  LHC (11/1/17): Mild to moderate nonhemodynamically significant CAD. Normal IFR of LAD (40%: 0.98) and Diagonal (50%: 0.98) 4. LHC (12/7/19): Stable CAD. Tobacco use disorder (Chronic) ICD-10-CM: A16.190 ICD-9-CM: 305.1  Unknown HTN (hypertension), benign (Chronic) ICD-10-CM: I10 
ICD-9-CM: 401.1  8/9/2016 Dyslipidemia (Chronic) ICD-10-CM: M93.6 ICD-9-CM: 272.4  8/9/2016 Abnormal MRI of head ICD-10-CM: R93.0 ICD-9-CM: 793.0  1/1/2015 Overview Signed 8/27/2016  4:28 PM by Sadaf Cross MD  
  Frontal Lobe periventricular WM disease. Ruled out for MS by Neurology Cocaine abuse in remission [facility]) ICD-10-CM: F14.11 ICD-9-CM: 305.63  9/18/2012 S/P CABG x 3 (Chronic) ICD-10-CM: Z95.1 ICD-9-CM: V45.81  9/18/2012 Bipolar disorder (HCC) (Chronic) ICD-10-CM: F31.9 ICD-9-CM: 296.80  9/18/2012 Past Surgical History:  
Procedure Laterality Date  APPENDECTOMY  COLONOSCOPY N/A 6/22/2018 COLONOSCOPY/ 25 performed by Marisa Isidro MD at [address] [place] [address] Abruption, CPD  HX CORONARY ARTERY BYPASS GRAFT  07/2011  
 3 vessel  HX HEART CATHETERIZATION    
 7/2012, 8/2012-occluded grafts  HX HERNIA REPAIR    
 HX ORTHOPAEDIC    
 left leg  HX PACEMAKER    
 HX TUBAL LIGATION    
 FL COLSC FLX W/RMVL OF TUMOR POLYP LESION SNARE TQ  6/22/2018 No flowsheet data found. Social History Socioeconomic History  Marital status:  Spouse name: Not on file  Number of children: 2  
 Years of education: Not on file  Highest education level: Not on file Occupational History  Occupation: Disabled Social Needs  Financial resource strain: Not on file  Food insecurity:  
  Worry: Not on file Inability: Not on file  Transportation needs:  
  Medical: Not on file Non-medical: Not on file Tobacco Use  Smoking status: Current Every Day Smoker Packs/day: 0.50 Years: 38.00 Pack years: 19.00 Types: Cigarettes  Smokeless tobacco: Never Used Substance and Sexual Activity  Alcohol use: No  
  Alcohol/week: 2.0 standard drinks Types: 2 Cans of beer per week Frequency: Never Binge frequency: Never  Drug use: Yes Types: Cocaine Comment: positive ampheteamine 12/6  Sexual activity: Yes  
  Partners: Male Lifestyle  Physical activity:  
  Days per week: Not on file Minutes per session: Not on file  Stress: Not on file Relationships  Social connections:  
  Talks on phone: Not on file Gets together: Not on file Attends Confucianism service: Not on file Active member of club or organization: Not on file Attends meetings of clubs or organizations: Not on file Relationship status: Not on file  Intimate partner violence:  
  Fear of current or ex partner: Not on file Emotionally abused: Not on file Physically abused: Not on file Forced sexual activity: Not on file Other Topics Concern  Not on file Social History Narrative  and lives with roommate. 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 Has a son in his 19's. Adopted son. Family History Problem Relation Age of Onset  Cancer Mother   
     CAD, Lung cancer  Hypertension Father DM, CAD  Other Sister  Hypertension Brother  Other Son Adopted. Allergies Allergen Reactions  Lithium Analogues Unknown (comments)  Morphine (Pf) Rash  Other Medication Swelling Whatever holds Vit d pill together Current Outpatient Medications Medication Sig  predniSONE (DELTASONE) 10 mg tablet Take 10 mg by mouth daily (with breakfast).  HYDROcodone-acetaminophen (NORCO) 7.5-325 mg per tablet Take 1 Tab by mouth every six (6) hours as needed for Pain for up to 5 days. Max Daily Amount: 4 Tabs.  varenicline (CHANTIX STARTER CASSIE) 0.5 mg (11)- 1 mg (42) DsPk Use as directed on starter pack.  tiotropium-olodaterol (STIOLTO RESPIMAT) 2.5-2.5 mcg/actuation inhaler Take 2 Inhalation by inhalation two (2) times a day.  potassium chloride (KLOR-CON) 10 mEq tablet Take 1 Tab by mouth two (2) times a day.  carvedilol (COREG) 6.25 mg tablet Take 1 Tab by mouth two (2) times daily (with meals).  amLODIPine (NORVASC) 5 mg tablet Take 1 Tab by mouth daily.  atorvastatin (LIPITOR) 80 mg tablet Take 1 Tab by mouth daily.  ipratropium (ATROVENT HFA) 17 mcg/actuation inhaler Take 2 Puffs by inhalation four (4) times daily.  nitroglycerin (NITROSTAT) 0.4 mg SL tablet by SubLINGual route every five (5) minutes as needed.  aspirin delayed-release 81 mg tablet Take 1 Tab by mouth daily. (Patient taking differently: Take 81 mg by mouth daily. Patient takes half of an 81 mg tablet)  loratadine 10 mg cap Take 1 Cap by mouth daily. (Patient taking differently: Take 2 Caps by mouth daily.) No current facility-administered medications for this visit. REVIEW OF SYSTEMS: 
Review of Systems Constitutional: Positive for chills, fever, malaise/fatigue and weight loss. Cardiovascular: Positive for chest pain and palpitations. Negative for claudication, leg swelling and PND. Gastrointestinal: Positive for constipation, nausea and vomiting. Negative for abdominal pain, diarrhea and heartburn. Neurological: Positive for dizziness, tremors, weakness and headaches. Negative for seizures. PHYSICAL EXAM: 
 
Vitals:  
 01/15/20 4779 BP: 118/77 BP 1 Location: Left arm BP Patient Position: Sitting Pulse: 78 Resp: 16 Temp: 98 °F (36.7 °C) TempSrc: Temporal  
SpO2: 96% Weight: 92 lb (41.7 kg) Height: 5' 3\" (1.6 m) Body mass index is 16.3 kg/m². GENERAL APPEARANCE: 
 The patient is normal weight and in no respiratory distress. HEENT: 
 PERRL. Conjunctivae unremarkable. Nasal mucosa is without epistaxis, exudate, or polyps. Gums and dentition are unremarkable. There is no oropharyngeal narrowing. TMs are clear. NECK/LYMPHATIC: 
 Symmetrical with no elevation of jugular venous pulsation. Trachea midline. No thyroid enlargement. No cervical adenopathy. LUNGS: 
 Normal respiratory effort with symmetrical lung expansion. Breath sounds minimally decreased bilaterally, but clear. HEART: 
 There is a regular rate and rhythm. No murmur, rub, or gallop. There is no edema in the lower extremities. ABDOMEN: 
 Soft and non-tender. No hepatosplenomegaly. Bowel sounds are normal.   
 NEURO: 
 The patient is alert and oriented to person, place, and time. Memory appears intact and mood is normal.  No gross sensorimotor deficits are present. DIAGNOSTIC TESTS: 
 PCXR:  
Results for orders placed during the hospital encounter of 12/06/19 XR CHEST PORT Narrative Chest X-ray INDICATION: Chest pain A portable AP view of the chest was obtained. FINDINGS: The lungs are clear. There are no infiltrates or effusions. The heart 
size is normal.  There are sternotomy changes. Pacemaker is present. Peggyann Gang Impression IMPRESSION: No acute findings in the chest 
  
 
 
 CXR PA and lateral:   
Results for orders placed during the hospital encounter of 01/13/20 XR CHEST PA LAT Narrative EXAMINATION: CHEST RADIOGRAPH 1/13/2020 4:21 PM 
 
ACCESSION NUMBER: [id number] INDICATION: cough COMPARISON: Chest x-ray 12/7/2019, 12/6/2019, 10/29/2019, 8/2/2011 TECHNIQUE: PA and lateral views of the chest were obtained. FINDINGS: 
 
Removal of a left-sided central venous catheter. Unchanged cardiac pacemaker 
positioning. Cardiac Silhouette: There is new widening of the superior mediastinum in 
comparison to the 12/7/2019 examination. This includes thickening of the 
paratracheal stripe, right greater than left. The cardiac silhouette is 
otherwise stable in caliber. Prior median sternotomy and coronary artery bypass 
grafting. Lungs: No focal airspace disease. Pleura: No pleural effusion. No pneumothorax. Osseous Structures: Thoracic spine spondylosis. Upper Abdomen: Abdominal aortic atherosclerosis. Impression IMPRESSION: 
 
1. There is new widening of the superior mediastinum in comparison to recent 
prior chest x-rays. Further evaluation with a chest CT with intravenous contrast 
is recommended. 2. No focal airspace disease. 3. Chronic findings as above. VOICE DICTATED BY: Dr. Helga Vazquez Screening chest CT: No results found for this or any previous visit. CT of chest without contrast:    
Results for orders placed during the hospital encounter of 01/13/20 CT CHEST WO CONT Narrative CT CHEST WITHOUT CONTRAST 1/13/2020 HISTORY: Cough. Abnormal chest x-ray.  
 
TECHNIQUE: Noncontrast axial images were obtained through the chest. Coronal 
 reformatted images were generated. All CT scans at this facility used dose 
modulation, iterative reconstruction and/or weight based dosing when appropriate 
to reduce radiation dose to as low as reasonably achievable. This study was 
ordered with contrast, however, there was an infiltration of the IV preventing 
effective administration of contrast. 
 
COMPARISON: PA lateral chest x-ray 1/13/2020 and CTA chest May 21, 2008 FINDINGS: There is a new anterior mediastinal mass that contains areas of 
decreased attenuation, suggestive of necrosis. This measures approximately 3.9 
cm in AP diameter (image 18) and depresses the trachea. This extends up to the 
right supraclavicular region. A cardiac pacemaker device is present. The lungs and pleural spaces are clear without consolidation or effusions. A few 
paraseptal cyst are present in both lung apices. Included images of the upper abdomen demonstrate normal-appearing adrenal 
glands. Discrete contrast is present in both renal collecting systems. There are 
no aggressive osseous lesions. Impression IMPRESSION: Incompletely characterized anterior mediastinal mass that is new 
compared to May 2008. Considerations include lymphoma or metastatic adenopathy. This extends to the right supraclavicular region but appears to be separate from 
the thyroid gland. Pulmonology and surgical evaluation are recommended. [address] ASSESSMENT:  (Medical Decision Making) ICD-10-CM ICD-9-CM 1. Mediastinal mass New. Further work up is needed J98.59 786.6 2. Dyspnea on exertion R06.09 786.09 AMB POC PLETHYSMOGRAPHY LUNG VOLUMES W/WO AIRWAY RESIST  
   AMB POC SPIROMETRY W/BRONCHODILATOR AMB POC SPIROMETRY AMB POC DIFFUSING CAPACITY AMB POC GAS DILUTION(LUNG VOLUMES) INHAL RX, AIRWAY OBST/DX SPUTUM INDUCT 3. COPD, moderate ([address] J44.9 496 4. Cigarette nicotine dependence without complication Ongoing F17.210 305.1 5. Chest wall pain Secondary to #1 R07.89 786.52 HYDROcodone-acetaminophen (NORCO) 7.5-325 mg per tablet DISCONTINUED: HYDROcodone-acetaminophen (NORCO) 7.5-325 mg per tablet PLAN: 
Reviewed CT with Dr. Balaji Ansari. Will arrange for PET and EBUS. FTs soon. Discussed with patient that she may need surgery if EBUS is nondiagnostic. Prednisone 10 mg daily for now to help with symptomatic relief of dyspnea. Will give limited supply of Norco 7.5 mg, #20, one po q 6 hours prn pain, no refills. Advised to take stool softner daily. I have reviewed the patient's controlled substance prescription history, as maintained in the [facility], so that the prescription for a controlled substance can be given. Orders Placed This Encounter  AMB POC PLETHYSMOGRAPHY LUNG VOLUMES W/WO AIRWAY RESIST ([id number])  AMB POC SPIROMETRY W/BRONCHODILATOR ([id number])  AMB POC SPIROMETRY ([id number])  AMB POC DIFFUSING CAPACITY ([id number])  AMB POC GAS DILUTION(LUNG VOLUMES) ([id number])  INHAL RX, AIRWAY OBST/DX SPUTUM INDUCT ([id number])  predniSONE (DELTASONE) 10 mg tablet  DISCONTD: HYDROcodone-acetaminophen (NORCO) 7.5-325 mg per tablet  HYDROcodone-acetaminophen (NORCO) 7.5-325 mg per tablet Follow up with us in 6 weeks. Collaborating physician is Dr. Kranthi Winters. Over 50% of today's office visit was spent in face to face time reviewing test results/records, prognosis, importance of compliance, education about disease process, benefits of medications, instructions for management of acute flare-ups, and follow up plans. Total time spent was 45 minutes. SIERRA Doshi, NP Electronically signed Dictated using voice recognition software.   Proof read but unrecognized errors may exist.

## 2020-01-22 PROBLEM — C34.82 MALIGNANT NEOPLASM OF OVERLAPPING SITES OF LEFT LUNG (HCC): Status: ACTIVE | Noted: 2020-01-01

## 2020-01-22 NOTE — PROGRESS NOTES
Dilaudid 0.5 mg given per anesthesia order. Pt place on 2L nasal cannula and oxygen saturation monitoring at this time.

## 2020-01-22 NOTE — PROCEDURES
PROCEDURE Bronchoscopy with Endobronchial Ultrasound Guided Fine Needle Aspiration Of Medistinal Lymph nodes and airway inspection. INDICATION Diagnosis of Mediastinal Lymphadenopathy/Staging of Lung Cancer POST OP DIAGNOSIS: 
4R location tumor was  biopsied and positive  for malignancy on SATNAM- non small cell cancer. Based on CT chest/PET CT / and EBUS pt is a STAGE [T4,N3,Mx] as of now IIIC [facility] lung Cancer. Patient already scheduled with oncology, will need rad onc, whole body PETCT, brain MRI and tumor board discussion ANESTHESIA 
GETA 
 
AIRWAY INSPECTION After obtaining informed consent, using a bite block, an Olympus Q 180 viedeo bronchoscope was  introduced into the trachea through the vocal cords without complications. RIGHT 
LOCATION NORM/ABNORMAL DESCRIPTION  
VOCAL CORDS NL   
TRACHEA NL   
YENI NL   
RMSB NL   
RUL NL   
BI NL   
RML NL   
SUP SEGM RLL NL   
MED BASAL NL   
ANTERIOR BASAL NL   
LATERAL BASAL NL   
POSTERIOR BASAL NL   
      
      
       
 
 
      
 
 
      LEFT 
LOCATION NORMAL/ABNORMAL TYPE  
LMSB NL   
VERO NL   
LINGULA NL   
SUPERIOR DIVISION NL   
SUPERIOR SEG LLL NL   
TAMMIE-MEDIAL LLL NL   
LATERAL LLL NL   
POSTERIOR LLL NL   
 
 
      
 
 
 
 
 
EBUS After completing the airway inspection an Olympus  F EBUS bronchoscope was introduced into the trachea through the vocal chords without complication. The balloon was inflated with saline and a mediastinal inspection commenced: STATION SIZE IN CM Tumor extending from level 1 to 4 LN location ~4 After identifying targets the following samples were obtained: STATION PASS# LYMPHOCYTES MALIGNANT ATYPICAL GRANULOMA [address]  
4R location tumor 1 - ++++ Non small cell - -   
        
 2 -- -- -- -- In toto for cell block and IHC  
 3 -- -- -- -- \"  
 4 -- -- -- -- \"  
 5 -- -- -- -- \"  
 6 -- -- -- -- \"  
 7 -- -- -- -- \"  
 8 -- -- -- -- \"  
 
 © 2018 [employer] and/or its affiliates. All Rights Reserved. T, Jailyn Olivas, and CATHIE descriptors for the eighth edition of TNM classification for lung cancer T: Primary tumor Tx Primary tumor cannot be assessed or tumor proven by presence of malignant cells in sputum or bronchial washings but not visualized by imaging or bronchoscopy T0 No evidence of primary tumor Tis Carcinoma in situ T1 Tumor ? 3 cm in greatest dimension surrounded by lung or visceral pleura without bronchoscopic evidence of invasion more proximal than the lobar bronchus (ie, not in the main bronchus)*  
T1a(mi) Minimally invasive adenocarcinoma¶  
T1a Tumor ? 1 cm in greatest dimension*  
T1b Tumor >1 cm but ?2 cm in greatest dimension*  
T1c Tumor >2 cm but ?3 cm in greatest dimension*  
T2 Tumor >3 cm but ?5 cm or tumor with any of the following features:? 
· Involves main bronchus regardless of distance from the bhargavi but without involvement of the bhargavi · Invades visceral pleura · Associated with atelectasis or obstructive pneumonitis that extends to the hilar region, involving part or all of the lung  
T2a Tumor >3 cm but ?4 cm in greatest dimension T2b Tumor >4 cm but ?5 cm in greatest dimension T3 Tumor >5 cm but ?7 cm in greatest dimension or associated with separate tumor nodule(s) in the same lobe as the primary tumor or directly invades any of the following structures: chest wall (including the parietal pleura and superior sulcus tumors), phrenic nerve, parietal pericardium T4 Tumor >7 cm in greatest dimension or associated with separate tumor nodule(s) in a different ipsilateral lobe than that of the primary tumor or invades any of the following structures: diaphragm, mediastinum, heart, great vessels, trachea, recurrent laryngeal nerve, esophagus, vertebral body, and bhargavi N: Regional lymph node involvement Nx Regional lymph nodes cannot be assessed N0 No regional lymph node metastasis N1 Metastasis in ipsilateral peribronchial and/or ipsilateral hilar lymph nodes and intrapulmonary nodes, including involvement by direct extension N2 Metastasis in ipsilateral mediastinal and/or subcarinal lymph node(s) N3 Metastasis in contralateral mediastinal, contralateral hilar, ipsilateral or contralateral scalene, or supraclavicular lymph node(s) M: Distant metastasis M0 No distant metastasis M1 Distant metastasis present M1a Separate tumor nodule(s) in a contralateral lobe; tumor with pleural or pericardial nodule(s) or malignant pleural or pericardial effusion? M1b Single extrathoracic metastasis§ M1c Multiple extrathoracic metastases in one or more organs Stage groupings Occult carcinoma TX N0 M0 Stage 0 Tis N0 M0 Stage IA1 T1a(mi) N0 M0  
 T1a N0 M0 Stage IA2 T1b N0 M0 Stage IA3 T1c N0 M0 Stage IB T2a N0 M0 Stage IIA T2b N0 M0 Stage IIB T1a to c N1 M0  
 T2a N1 M0  
 T2b N1 M0  
 T3 N0 M0 Stage IIIA T1a to c N2 M0  
 T2a to b N2 M0  
 T3 N1 M0  
 T4 N0 M0  
 T4 N1 M0 Stage IIIB T1a to c N3 M0  
 T2a to b N3 M0  
 T3 N2 M0  
 T4 N2 M0 Stage IIIC T3 N3 M0  
 T4 N3 M0 Stage SUSANA Any T Any N M1a Any T Any N M1b Stage IVB Any T Any N M1c  
NOTE: Changes to the seventh edition are in bold. TNM: tumor, node, metastasis; Tis: carcinoma in situ; T1a(mi): minimally invasive adenocarcinoma. * The uncommon superficial spreading tumor of any size with its invasive component limited to the bronchial wall, which may extend proximal to the main bronchus, is also classified as T1a. ¶ Solitary adenocarcinoma, ?3 cm with a predominately lepidic pattern and ?5 mm invasion in any one focus. ? T2 tumors with these features are classified as T2a if ?4 cm in greatest dimension or if size cannot be determined, and T2b if >4 cm but ?5 cm in greatest dimension. ? Most pleural (pericardial) effusions with lung cancer are due to tumor. In a few patients, however, multiple microscopic examinations of pleural (pericardial) fluid are negative for tumor and the fluid is nonbloody and not an exudate. When these elements and clinical judgment dictate that the effusion is not related to the tumor, the effusion should be excluded as a staging descriptor. § This includes involvement of a single distant (nonregional) lymph node. Reproduced from: Panfilo Eaton et al. The IASLC Lung Cancer Staging Project: Proposals for Revision of the TNM Stage Groupings in the Forthcoming (Eighth) Edition of the TNM Classification for Lung Cancer. J Thorac Oncol 2016; 11:39. Table used with the permission of [employer]. All rights reserved. Graphic 401371 Version 1.0 The procedure was completed without complication and the patient tolerated the procedure well. EBL: 5ml Maria Fernanda Singh MD.

## 2020-01-22 NOTE — ANESTHESIA POSTPROCEDURE EVALUATION
Procedure(s): BRONCHOSCOPY 
ENDOSCOPIC BRONCHOSCOPY ULTRASOUND (EBUS) FINE NEEDLE ASPIRATION. general 
 
Anesthesia Post Evaluation Multimodal analgesia: multimodal analgesia used between 6 hours prior to anesthesia start to PACU discharge Patient location during evaluation: PACU Patient participation: complete - patient participated Level of consciousness: awake and alert Pain management: adequate Airway patency: patent Anesthetic complications: no 
Cardiovascular status: acceptable Respiratory status: acceptable Hydration status: acceptable Post anesthesia nausea and vomiting:  none Vitals Value Taken Time /70 1/22/2020 11:36 AM  
Temp 37.2 °C (99 °F) 1/22/2020 11:36 AM  
Pulse 78 1/22/2020 11:40 AM  
Resp 16 1/22/2020 11:36 AM  
SpO2 93 % 1/22/2020 11:40 AM  
Vitals shown include unvalidated device data.

## 2020-01-22 NOTE — INTERVAL H&P NOTE
H&P Update: 
Charbel Needs was seen and examined. History and physical has been reviewed. The patient has been examined.  There have been no significant clinical changes since the completion of the originally dated History and Physical.

## 2020-01-22 NOTE — ANESTHESIA PREPROCEDURE EVALUATION
Anesthetic History No history of anesthetic complications Review of Systems / Medical History Patient summary reviewed and pertinent labs reviewed Pulmonary COPD: moderate Smoker (Current) Neuro/Psych CVA: no residual symptoms Psychiatric history ([place]) Cardiovascular Hypertension: well controlled Dysrhythmias Pacemaker (SSS), CAD, PAD, CABG (CABG in 2011) and hyperlipidemia Exercise tolerance: >4 METS Comments: Subclavian artery stenosis Anterior mediastinal mass GI/Hepatic/Renal 
  
 
Hepatitis: type C Liver disease Endo/Other Other Findings Physical Exam 
 
Airway Mallampati: II 
TM Distance: 4 - 6 cm Neck ROM: normal range of motion Mouth opening: Normal 
 
 Cardiovascular Rhythm: regular Rate: normal 
 
 
 
 Dental 
 
Dentition: Full upper dentures and Full lower dentures Pulmonary Breath sounds clear to auscultation Abdominal 
GI exam deferred Other Findings Anesthetic Plan ASA: 3 Anesthesia type: general 
 
 
 
 
Induction: Intravenous Anesthetic plan and risks discussed with: Patient

## 2020-01-22 NOTE — DISCHARGE INSTRUCTIONS
RESPIRATORY CARE - BRONCHOSCOPY/EBUS/BIOPSIES - DISCHARGE INSTRUCTIONS      You received a lot of numbing medication for your throat and nose, and you also received medication to make you sleepy during your procedure. Because of this and because the bronchoscopy may have irritated your airways, we ask that you follow these directions:    1. Do not eat or drink until  12 noon . After that, you may have what you please. You may want to try some liquids first, because your throat may be a little sore. 2. Medication may cause drowsiness for several hours, therefore:  · Do not drive or operate machinery for remainder of the day. · No alcohol today  · Do not make any important or legal decisions for 24 hours  · Do not sign any legal documents for 24 hours    3. You may cough up more mucus than usual and you may see some blood, but this is expected and should subside by the following day. 4. If severe throat irritation, coughing, or bleeding continue, call your doctor. 5.         If you run a fever greater than 102, take tylenol and motrin then call [facility] at [phone]. 6.         Dr. Harvey Jacobo has asked that you:                A. Call the doctor's office at [phone] for any questions or problems that may occur. B. Oncology will be in touch with you in regards to follow up from today's procedure. Discharge Medications:  Any additional instructions:  Resume all medication as before procedure using caution with any pain medication.   Instructions given to Tonia Mercado and other family members    After general anesthesia or intravenous sedation, for 24 hours or while taking prescription Narcotics:  · Limit your activities  · A responsible adult needs to be with you for the next 24 hours  · Do not drive and operate hazardous machinery  · Do not make important personal or business decisions  · Do  not drink alcoholic beverages  · If you have not urinated within 8 hours after discharge, please contact your surgeon on call. · If you have sleep apnea and have a CPAP machine, please use it for all naps and sleeping. · Please use caution when taking narcotics and any of your home medications that may cause drowsiness. *  Please give a list of your current medications to your Primary Care Provider. *  Please update this list whenever your medications are discontinued, doses are      changed, or new medications (including over-the-counter products) are added. *  Please carry medication information at all times in case of emergency situations. These are general instructions for a healthy lifestyle:  No smoking/ No tobacco products/ Avoid exposure to second hand smoke  Surgeon General's Warning:  Quitting smoking now greatly reduces serious risk to your health. Obesity, smoking, and sedentary lifestyle greatly increases your risk for illness  A healthy diet, regular physical exercise & weight monitoring are important for maintaining a healthy lifestyle    You may be retaining fluid if you have a history of heart failure or if you experience any of the following symptoms:  Weight gain of 3 pounds or more overnight or 5 pounds in a week, increased swelling in our hands or feet or shortness of breath while lying flat in bed. Please call your doctor as soon as you notice any of these symptoms; do not wait until your next office visit.

## 2020-01-23 NOTE — ED NOTES
I have reviewed discharge instructions with the patient. The patient verbalized understanding. Patient left ED via Discharge Method: ambulatory to Home with self. Opportunity for questions and clarification provided. Patient given 2 scripts. To continue your aftercare when you leave the hospital, you may receive an automated call from our care team to check in on how you are doing. This is a free service and part of our promise to provide the best care and service to meet your aftercare needs.  If you have questions, or wish to unsubscribe from this service please call [phone]. Thank you for Choosing our [address] [place] [address] Emergency Department.

## 2020-01-23 NOTE — DISCHARGE INSTRUCTIONS
Your chest x-ray is clear and your blood work is unremarkable. We will treat with antibiotics in case there is a small infection in the area and I am providing you a short course of pain medication. Follow-up with your primary care team in the next week for reevaluation. Come back to the emergency department if you have any other issues before then.

## 2020-01-23 NOTE — ED PROVIDER NOTES
[facility] Emergency Department Arrival Date/Time: No admission date for patient encounter. Charbel Gillespie  MRN: [id number] YOB: 1965   48 y.o. female [facility] EMERGENCY DEPT Room/bed info not found  Seen on 1/23/2020 @ 4:16 PM   
 
Today's Chief Complaint:  
Chief Complaint Patient presents with  Neck Pain TRIAGE Provider NOTE:   
 
61-year-old female recently diagnosed with a lung mass and had a biopsy performed yesterday presenting for worsening right-sided chest wall pain and swelling near the right side of her neck. Biopsy was performed yesterday But today she developed the right-sided pain On exam she does have a palpable Soft mass in the supraclavicular area And she has decreased breath sounds on the right side as compared to the left. Concern for pneumothorax. Establish an IV, basic labs and chest x-ray Mario Harris MD; 1/23/2020 @4:16 PM============================ Charbel Gillespie is a 48 y.o. female seen on 1/23/2020 at 4:16 PM in the [facility] EMERGENCY DEPT  
HPI:   
Pleasant 61-year-old female presenting for pain and swelling in the right side of her neck. 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 
Biopsy was performed yesterday without event Since then she has had increasing pain in the area with swelling over the right neck She denies fevers, chills, difficulty breathing. Her voice is normal.  She is taken nothing for it. Review of Systems: Review of Systems Hematological: Positive for adenopathy. All other systems reviewed and are negative. Past Medical History: Primary Care Doctor: Hola Mccord MD 
Meds, PMH, PSHx, SocHx at end of this note Allergies: Allergies Allergen Reactions  Lithium Analogues Unknown (comments)  Morphine (Pf) Rash  Other Medication Swelling Whatever holds Vit d pill together Key Anti-Platelet Anticoagulant Meds aspirin delayed-release 81 mg tablet Take 1 Tab by mouth daily. Physical Exam:  Nursing documentation reviewed. Vitals:  
 01/23/20 1614 BP: 121/69 Pulse: 85 Resp: 18 Temp: 98.6 °F (37 °C) SpO2: 96% Vital signs were reviewed. Physical Exam 
Vitals signs and nursing note reviewed. Constitutional:   
   Appearance: She is well-developed. HENT:  
   Head: Normocephalic and atraumatic. Eyes:  
   Conjunctiva/sclera: Conjunctivae normal.  
   Pupils: Pupils are equal, round, and reactive to light. Neck: Musculoskeletal: Normal range of motion and neck supple. Comments: Obvious asymmetry with swelling in the supraclavicular area on the right, mildly tender, not firm, mobile, no crepitus, no ecchymosis, no warmth Cardiovascular:  
   Rate and Rhythm: Normal rate and regular rhythm. Pulmonary:  
   Effort: Pulmonary effort is normal. No respiratory distress. Breath sounds: Wheezing present. Comments: Slightly decreased breath sounds on the right as compared to the left Abdominal:  
   General: Bowel sounds are normal.  
   Palpations: Abdomen is soft. Musculoskeletal: Normal range of motion. Lymphadenopathy:  
   Cervical: Cervical adenopathy present. Skin: 
   General: Skin is warm and dry. Neurological:  
   Mental Status: She is alert and oriented to person, place, and time. MEDICAL DECISION MAKING:  
Differential Diagnosis: MDM Number of Diagnoses or Management Options Neck pain:  
Diagnosis management comments: 70-year-old female presenting for pain and swelling in the right side of the neck after a biopsy. Concern for pneumothorax. Amount and/or Complexity of Data Reviewed Clinical lab tests: ordered and reviewed (Results for orders placed or performed during the hospital encounter of 01/23/20 
-CBC WITH AUTOMATED DIFF Result                      Value             Ref Range WBC                         14.3 (H)          4.3 - 11.1 K* 
     RBC                         4.14              4.05 - 5.2 M* HGB                         12.6              11.7 - 15.4 * HCT                         39.5              35.8 - 46.3 % MCV                         95.4              79.6 - 97.8 * MCH                         30.4              26.1 - 32.9 * MCHC                        31.9              31.4 - 35.0 * RDW                         12.8              11.9 - 14.6 % PLATELET                    382               150 - 450 K/* MPV                         9.8               9.4 - 12.3 FL ABSOLUTE NRBC               0.00              0.0 - 0.2 K/* DF                          AUTOMATED NEUTROPHILS                 68                43 - 78 % LYMPHOCYTES                 22                13 - 44 % MONOCYTES                   9                 4.0 - 12.0 % EOSINOPHILS                 0 (L)             0.5 - 7.8 % BASOPHILS                   1                 0.0 - 2.0 % IMMATURE GRANULOCYTES       0                 0.0 - 5.0 %   
     ABS. NEUTROPHILS            9.8 (H)           1.7 - 8.2 K/* ABS. LYMPHOCYTES            3.1               0.5 - 4.6 K/* ABS. MONOCYTES              1.3               0.1 - 1.3 K/* ABS. EOSINOPHILS            0.1               0.0 - 0.8 K/* ABS. BASOPHILS              0.1               0.0 - 0.2 K/* ABS. IMM. GRANS.            0.1               0.0 - 0.5 K/* 
-METABOLIC PANEL, COMPREHENSIVE Result                      Value             Ref Range Sodium                      136               136 - 145 mm* Potassium                   4.2               3.5 - 5.1 mm* Chloride                    102               98 - 107 mmo*      CO2                         22                21 - 32 mmol* 
 Anion gap                   12                7 - 16 mmol/L Glucose                     101 (H)           65 - 100 mg/* BUN                         17                6 - 23 MG/DL Creatinine                  1.03 (H)          0.6 - 1.0 MG* 
     GFR est AA                  >60               >60 ml/min/1* GFR est non-AA              60 (L)            >60 ml/min/1* Calcium                     9.6               8.3 - 10.4 M* Bilirubin, total            0.9               0.2 - 1.1 MG* ALT (SGPT)                  12                12 - 65 U/L   
     AST (SGOT)                  28                15 - 37 U/L Alk. phosphatase            69                50 - 136 U/L Protein, total              8.0               6.3 - 8.2 g/* Albumin                     2.8 (L)           3.5 - 5.0 g/* Globulin                    5.2 (H)           2.3 - 3.5 g/* A-G Ratio                   0.5 (L)           1.2 - 3.5     
) Tests in the radiology section of CPT®: ordered and reviewed (Xr Chest Pa Lat Result Date: 1/23/2020 Chest 2 view CLINICAL INDICATION: Follow-up of abnormal chest radiograph with wide mediastinum, right chest wall pain after biopsy, moderate and pleuritic COMPARISON: 1/13/2020 radiograph and CT TECHNIQUE: Upright PA  and lateral views of the chest FINDINGS: Lung volumes are well inflated. Cardiomediastinal silhouette and hilar contours are stable with previous cardiac surgery again evident. There is no evidence of a pneumothorax. The lungs are unchanged again demonstrating the right anterior mediastinal mass similar to prior. No evidence of a developing consolidation, effusion or CHF. There is no evidence of tuberculosis. No acute osseous abnormalities are seen. Bone density is diffusely low. IMPRESSION: 1. No pneumothorax or acute airspace disease after biopsy.  2. Stable right anterior mediastinal mass further evaluated on the recent CT. Candance Huger Xr Chest Pa Lat Result Date: 1/13/2020 EXAMINATION: CHEST RADIOGRAPH 1/13/2020 4:21 PM ACCESSION NUMBER: [id number] INDICATION: cough COMPARISON: Chest x-ray 12/7/2019, 12/6/2019, 10/29/2019, 8/2/2011 TECHNIQUE: PA and lateral views of the chest were obtained. FINDINGS: Removal of a left-sided central venous catheter. Unchanged cardiac pacemaker positioning. Cardiac Silhouette: There is new widening of the superior mediastinum in comparison to the 12/7/2019 examination. This includes thickening of the paratracheal stripe, right greater than left. The cardiac silhouette is otherwise stable in caliber. Prior median sternotomy and coronary artery bypass grafting. Lungs: No focal airspace disease. Pleura: No pleural effusion. No pneumothorax. Osseous Structures: Thoracic spine spondylosis. Upper Abdomen: Abdominal aortic atherosclerosis. IMPRESSION: 1. There is new widening of the superior mediastinum in comparison to recent prior chest x-rays. Further evaluation with a chest CT with intravenous contrast is recommended. 2. No focal airspace disease. 3. Chronic findings as above. VOICE DICTATED BY: Dr. Carlos Conner Ct Chest Wo Cont Result Date: 1/13/2020 CT CHEST WITHOUT CONTRAST 1/13/2020 HISTORY: Cough. Abnormal chest x-ray. TECHNIQUE: Noncontrast axial images were obtained through the chest. Coronal reformatted images were generated. All CT scans at this facility used dose modulation, iterative reconstruction and/or weight based dosing when appropriate to reduce radiation dose to as low as reasonably achievable.  This study was ordered with contrast, however, there was an infiltration of the IV preventing effective administration of contrast. COMPARISON: PA lateral chest x-ray 1/13/2020 and CTA chest May 21, 2008 FINDINGS: There is a new anterior mediastinal mass that contains areas of decreased attenuation, suggestive of necrosis. This measures approximately 3.9 cm in AP diameter (image 18) and depresses the trachea. This extends up to the right supraclavicular region. A cardiac pacemaker device is present. The lungs and pleural spaces are clear without consolidation or effusions. A few paraseptal cyst are present in both lung apices. Included images of the upper abdomen demonstrate normal-appearing adrenal glands. Discrete contrast is present in both renal collecting systems. There are no aggressive osseous lesions. IMPRESSION: Incompletely characterized anterior mediastinal mass that is new compared to May 2008. Considerations include lymphoma or metastatic adenopathy. This extends to the right supraclavicular region but appears to be separate from the thyroid gland. Pulmonology and surgical evaluation are recommended. [id number]  
 
) Risk of Complications, Morbidity, and/or Mortality Presenting problems: moderate Diagnostic procedures: moderate Management options: moderate General comments: Patient's chest x-ray is clear. Lab work is unremarkable except for a mild leukocytosis. I will cover the patient with antibiotics and a short course of pain medication. Patient Progress Patient progress: improved Data:   
 
Lab findings during this visit: No results found for this or any previous visit (from the past 24 hour(s)). Radiology studies during this visit:  
XR CHEST PA LAT    (Results Pending) Medications given in the ED: Medications - No data to display Procedure Documentation: Procedures Assessment and Plan:   
 
Other ED Course Notes:  
  
 
Past Medical History:   
 
Past Medical History:  
Diagnosis Date  Abnormal MRI of head 2015 Frontal Lobe periventricular WM disease. Ruled out for MS by Neurology  ASCUS with positive high risk HPV cervical   
 Bipolar disorder ([address]  Borderline personality disorder ([address]  CAD   
  Cerebrovascular disease  Chronic diastolic congestive heart failure ([facility]  Chronic hepatitis C ([facility] Treated, in remission  Chronic pain  COPD (chronic obstructive pulmonary disease) (HCC) Moderate  Dyslipidemia  Heart attack ([facility] 2011  
 HTN   
 IBS (irritable bowel syndrome)  Pacemaker Sick Sinus Syndrome  S/P CABG x 3   
 Subclavian artery stenosis, right ([address] 2016 Moderate to severe  Vertebral artery stenosis 2016 Asymptomatic Left Severe Past Surgical History:  
Procedure Laterality Date  APPENDECTOMY  COLONOSCOPY N/A 6/22/2018 COLONOSCOPY/ 25 performed by Arcadio Shanks MD at [address] [place] [address], 2000 Abruption, CPD  HX CORONARY ARTERY BYPASS GRAFT  07/2011  
 3 vessel  HX HEART CATHETERIZATION    
 7/2012, 8/2012-occluded grafts  HX HERNIA REPAIR    
 HX ORTHOPAEDIC    
 left leg  HX PACEMAKER    
 HX TUBAL LIGATION    
 NM COLSC FLX W/RMVL OF TUMOR POLYP LESION SNARE TQ  6/22/2018 Social History Tobacco Use  Smoking status: Current Every Day Smoker Packs/day: 0.50 Years: 38.00 Pack years: 19.00 Types: Cigarettes  Smokeless tobacco: Never Used Substance Use Topics  Alcohol use: No  
  Alcohol/week: 2.0 standard drinks Types: 2 Cans of beer per week Frequency: Never Binge frequency: Never  Drug use: Yes Types: Cocaine Comment: positive ampheteamine 12/6 Home Medication:  
Cannot display prior to admission medications because the patient has not been admitted in this contact.

## 2020-01-31 NOTE — PROGRESS NOTES
Phoned patient and advised of all upcoming appointments, MRI, RadOnc, port placement, etc. As well as instructions for procedures and testing. Pt is not on blood thinners and is no longer taking Aspirin 81mg. Pt VU. Encouraged to call with questions. Navigation will continue to follow.

## 2020-02-03 NOTE — CONSULTS
Patient: Tsering Huber MRN: [id number]  SSN: [id number] YOB: 1965  Age: 48 y.o. Sex: female Other Providers: Nathalia Newsome MD 
 
CHIEF COMPLAINT: Weight loss, cough, and shortness of breath DIAGNOSIS: Limited stage SCLC, T4N3M0, Stage IIIB. PREVIOUS TREATMENT: 
1) None HISTORY OF PRESENT ILLNESS:  Tsering Huber is a 48 y.o. female who I am seeing at the request of Dr. Marga Wyman for lung cancer. She initially presented to the [facility] ED on 1/13/20 reporting a one week history of cough productive of white sputum, shortness of breath, and right-sided chest pain. Chest x-ray identified new widening of the superior mediastinum in comparison to recent prior chest x-rays and chest CT without contrast showed an incompletely characterized anterior mediastinal mass extending to the right supraclavicular region, but appearing to be separate from the thyroid gland, measuring approximately 3.9 cm in AP diameter. Pulmonology and surgical evaluation were recommended. Patient was discharged with instructions to follow-up with pulmonary as an outpatient. On 1/15/20 she followed up with pulmonary and PET and EBUS with Dr. Aidee Lugo arranged. On 1/22/20 she underwent bronchoscopy with endobronchial ultrasound guided fine needle aspiration of mediastinal lymph nodes and airway inspection with Dr. Monserrat Florentino. The 4R location tumor was biopsied and positive for malignancy on SATNAM. Cytology from FNA of 2R-4R location of lung mass revealed the presence of malignant cells. Immunohistochemical features were suggestive of carcinoma of hepatic origin given staining for cytoplasmic TTF-1 as well as arginase. Pathologist recommended clinical and imaging correlation, and noted that additional tissue may be necessary for further evaluation given relatively low cellularity of the cell block preparation.   PET CT 1/30/20 demonstrated HM activity in the mediastinal mass along with a right para-mediastinal mass invading into the mediastinum. There was also a right supraclavicular mass. He was referred to oncology and met with Dr. Alexandra Arguello 1/31/20. He outlined a course of chemoradiation feeling she had limited stage SCLC and referred her to our office outlining a course of chemotherapy and radiation. We met her in initial consultation 2/3/20. She was completing a brain MRI the same day as our consultation. She was in pain in multiple locations that had previously been described including the neck and chest.      
 
PAST MEDICAL HISTORY:   
Past Medical History:  
Diagnosis Date  Abnormal MRI of head 2015 Frontal Lobe periventricular WM disease. Ruled out for MS by Neurology  ASCUS with positive high risk HPV cervical   
 Bipolar disorder ([address]  Borderline personality disorder ([address]  CAD  Cerebrovascular disease  Chronic diastolic congestive heart failure ([address]  Chronic hepatitis C ([address] Treated, in remission  Chronic pain  COPD (chronic obstructive pulmonary disease) (HCC) Moderate  Dyslipidemia  Heart attack ([address] 2011  
 HTN   
 IBS (irritable bowel syndrome)  Pacemaker Sick Sinus Syndrome  S/P CABG x 3   
 Subclavian artery stenosis, right ([address] 2016 Moderate to severe  Vertebral artery stenosis 2016 Asymptomatic Left Severe The patient denies history of collagen vascular diseases, pacemaker insertion, prior radiation or prior chemotherapy. PAST SURGICAL HISTORY:  
Past Surgical History:  
Procedure Laterality Date  APPENDECTOMY  COLONOSCOPY N/A 6/22/2018 COLONOSCOPY/ 25 performed by Jalen Gleason MD at [address] [place] [address], [facility] Abruption, CPD  HX CORONARY ARTERY BYPASS GRAFT  07/2011  
 3 vessel  HX HEART CATHETERIZATION    
 7/2012, 8/2012-occluded grafts  HX HERNIA REPAIR    
 HX ORTHOPAEDIC    
 left leg  HX PACEMAKER    
 HX TUBAL LIGATION    
  WA COLSC FLX W/RMVL OF TUMOR POLYP LESION SNARE TQ  6/22/2018 MEDICATIONS:  
 
Current Outpatient Medications:  
  morphine IR (MS IR) 15 mg tablet, Take 1 Tab by mouth every four (4) hours as needed for Pain for up to 7 days. Max Daily Amount: 90 mg., Disp: 42 Tab, Rfl: 0 
  prochlorperazine (COMPAZINE) 10 mg tablet, Take 1 Tab by mouth every six (6) hours as needed for Nausea. Indications: nausea and vomiting caused by cancer drugs, nausea and vomiting, Disp: 90 Tab, Rfl: 3 
  ondansetron hcl (ZOFRAN) 8 mg tablet, Take 1 Tab by mouth every eight (8) hours as needed for Nausea. Indications: nausea and vomiting caused by cancer drugs, Disp: 60 Tab, Rfl: 3 
  lidocaine-prilocaine (EMLA) topical cream, Apply  to affected area as needed for Pain., Disp: 30 g, Rfl: 0 
  predniSONE (DELTASONE) 10 mg tablet, Take 10 mg by mouth daily (with breakfast). , Disp: 30 Tab, Rfl: 1   varenicline (CHANTIX STARTER CASSIE) 0.5 mg (11)- 1 mg (42) DsPk, Use as directed on starter pack. , Disp: 1 Dose Pack, Rfl: 0 
  tiotropium-olodaterol (STIOLTO RESPIMAT) 2.5-2.5 mcg/actuation inhaler, Take 2 Inhalation by inhalation two (2) times a day., Disp: , Rfl:  
  loratadine 10 mg cap, Take 1 Cap by mouth daily. (Patient taking differently: Take 2 Caps by mouth daily.), Disp: 30 Cap, Rfl: 2 
  potassium chloride (KLOR-CON) 10 mEq tablet, Take 1 Tab by mouth two (2) times a day., Disp: 180 Tab, Rfl: 1   carvedilol (COREG) 6.25 mg tablet, Take 1 Tab by mouth two (2) times daily (with meals). , Disp: 180 Tab, Rfl: 3 
  amLODIPine (NORVASC) 5 mg tablet, Take 1 Tab by mouth daily. , Disp: 90 Tab, Rfl: 3 
  atorvastatin (LIPITOR) 80 mg tablet, Take 1 Tab by mouth daily. , Disp: 90 Tab, Rfl: 3 
  ipratropium (ATROVENT HFA) 17 mcg/actuation inhaler, Take 2 Puffs by inhalation four (4) times daily. , Disp: 1 Inhaler, Rfl: 11 
  nitroglycerin (NITROSTAT) 0.4 mg SL tablet, by SubLINGual route every five (5) minutes as needed. , Disp: , Rfl:  
  aspirin delayed-release 81 mg tablet, Take 1 Tab by mouth daily. (Patient taking differently: Take 81 mg by mouth daily. Patient takes half of an 81 mg tablet), Disp: 30 Tab, Rfl: 11 ALLERGIES:  
Allergies Allergen Reactions  Lithium Analogues Unknown (comments)  Morphine (Pf) Rash IV only can take Oral  
 Other Medication Swelling Whatever holds Vit d pill together SOCIAL HISTORY:  
Social History Socioeconomic History  Marital status:  Spouse name: Not on file  Number of children: 2  
 Years of education: Not on file  Highest education level: Not on file Occupational History  Occupation: Disabled Social Needs  Financial resource strain: Not on file  Food insecurity:  
  Worry: Not on file Inability: Not on file  Transportation needs:  
  Medical: Not on file Non-medical: Not on file Tobacco Use  Smoking status: Current Every Day Smoker Packs/day: 0.50 Years: 38.00 Pack years: 19.00 Types: Cigarettes  Smokeless tobacco: Never Used Substance and Sexual Activity  Alcohol use: No  
  Alcohol/week: 2.0 standard drinks Types: 2 Cans of beer per week Frequency: Never Binge frequency: Never  Drug use: Yes Types: Cocaine Comment: positive ampheteamine 12/6  Sexual activity: Yes  
  Partners: Male Lifestyle  Physical activity:  
  Days per week: Not on file Minutes per session: Not on file  Stress: Not on file Relationships  Social connections:  
  Talks on phone: Not on file Gets together: Not on file Attends [employer] service: Not on file Active member of club or organization: Not on file Attends meetings of clubs or organizations: Not on file Relationship status: Not on file  Intimate partner violence:  
  Fear of current or ex partner: Not on file Emotionally abused: Not on file Physically abused: Not on file Forced sexual activity: Not on file Other Topics Concern  Not on file Social History Narrative  and lives with roommate. 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 Has a son in his 19's. Adopted son. FAMILY HISTORY:  
Family History Problem Relation Age of Onset  Cancer Mother   
     CAD, Lung cancer  Hypertension Father DM, CAD  Other Sister  Hypertension Brother  Other Son Adopted. REVIEW OF SYSTEMS: Please see the completed review of systems sheet in the chart that I have reviewed today. PHYSICAL EXAMINATION:  
ECOG Performance status 0 
VITAL SIGNS:  
Visit Vitals /74 Pulse 83 Temp 97.6 °F (36.4 °C) Resp 16 Wt 45.4 kg (100 lb) SpO2 95% BMI 19.53 kg/m² GENERAL: The patient is well-developed, ambulatory, alert and in no acute distress. HEENT: Head is normocephalic, atraumatic. Pupils are equal, round and reactive to light and accommodation. Extraocular movement intact. Hearing is intact bilaterally to finger rub. Oral cavity reveals no lesions. Mucous membranes are moist. NECK: Neck is supple with no masses. CARDIOVASCULAR: Heart is regular rate and rhythm. There are no murmurs rubs or gallups. Radial pulses are 2+ RESPIRATORY: Lungs are clear to auscultation and percussion. There is normal respiratory effort. GASTROINTESTINAL: The abdomen is soft, non-tender, nondistended with no hepatospelnomagaly. Digital rectal examination: deferred LYMPHATIC: There is no cervical, supraclavicular or axillary lymphadenopathy bilaterally. MUSCULOSKELETAL: Extremities reveal no cyanosis, clubbing or edema.  is 5+/5. NEURO:  Cranial nerves II-XII grossly intact. Muscular strength and sensation are intact throughout all four extremities. PATHOLOGY:   
[facility] CYTOLOGY REPORT 1/22/2020 DIAGNOSIS  
 2R-4R Location Mass, Lung, Fine Needle Aspiration:  
MALIGNANT CELLS PRESENT. Cell block: Malignant cells. STF-IMMUNOHISTOCHEMISTRY Immunohistochemical Stain Panel: INTERPRETATION: Immunohistochemical features are suggestive of carcinoma of hepatic origin given staining for cytoplasmic TTF-1 as well as arginase. Clinical and imaging correlation is recommended. Additional tissue may be necessary for further evaluation given relatively low cellularity of cell block preparation. ANTIBODY/TEST Ninfa Field FOR Magaly Showers Cytokeratin 7               Lung, breast upper GI, serous ovary                          Positive Cytokeratin 20             Colon, mucinous ovary, urothelium                            Negative Cytokeratin 5/6            Squamous cell carcinomas                                         Negative  
p40                              Squamous cell carcinoma                                          Negative Napsin                         Lung adenocarcinoma                                                Negative TTF-1                          Lung adenocarcinoma and thyroid carcinomas         Positive cytoplasm,  
            negative nuclear S-100                           Melanoma                                                                   No significant staining Giana Wu                           [id number] carcinomas                                       Negative Cytokeratin 8/18          Epithelial marker                                                         [id number] Arginase                      Hepatocellular marker                                                SVYWLMUN LACHELLE Tolbert receptor [employer] GATA3                        Breast and urothelial carcinoma                                 Negative GCDFP-15                  Breast tissue, breast carcinoma                                 Negative Mammaglobin                         Breast tissue, breast carcinoma                                 Negative MART-1/Melan A        Melanocytic marker                                                     Negative [place] 8                           Renal cell carcinoma                                                  Negative Pablo [id number] marker                                                     Negative Synaptophysin            Neuroendocrine marker                                              Negative WT-1                           Mesothelial marker                                                     Negative LABORATORY:  
Lab Results Component Value Date/Time Sodium 133 (L) 01/30/2020 11:33 AM  
 Potassium 3.4 (L) 01/30/2020 11:33 AM  
 Chloride 102 01/30/2020 11:33 AM  
 CO2 23 01/30/2020 11:33 AM  
 Anion gap 8 01/30/2020 11:33 AM  
 Glucose 111 (H) 01/30/2020 11:33 AM  
 BUN 13 01/30/2020 11:33 AM  
 Creatinine 0.73 01/30/2020 11:33 AM  
 GFR est AA >60 01/30/2020 11:33 AM  
 GFR est non-AA >60 01/30/2020 11:33 AM  
 Calcium 9.2 01/30/2020 11:33 AM  
 Magnesium 1.3 (LL) 12/07/2019 02:02 AM  
 Phosphorus 2.1 (L) 10/07/2017 02:13 PM  
 Albumin 2.7 (L) 01/30/2020 11:33 AM  
 Protein, total 7.5 01/30/2020 11:33 AM  
 Globulin 4.8 (H) 01/30/2020 11:33 AM  
 A-G Ratio 0.6 (L) 01/30/2020 11:33 AM  
 AST (SGOT) 19 01/30/2020 11:33 AM  
 ALT (SGPT) 13 01/30/2020 11:33 AM  
 
Lab Results Component Value Date/Time WBC 18.6 (H) 01/30/2020 11:33 AM  
 HGB 11.0 (L) 01/30/2020 11:33 AM  
 HCT 32.8 (L) 01/30/2020 11:33 AM  
 PLATELET 672 (H) 85/55/8993 11:33 AM  
 
 
RADIOLOGY:   
I have personally reviewed the imaging and agree with the reports below. Ct Chest Wo Cont Result Date: 1/13/2020 CT CHEST WITHOUT CONTRAST 1/13/2020 HISTORY: Cough. Abnormal chest x-ray. TECHNIQUE: Noncontrast axial images were obtained through the chest. Coronal reformatted images were generated. All CT scans at this facility used dose modulation, iterative reconstruction and/or weight based dosing when appropriate to reduce radiation dose to as low as reasonably achievable. This study was ordered with contrast, however, there was an infiltration of the IV preventing effective administration of contrast. COMPARISON: PA lateral chest x-ray 1/13/2020 and CTA chest May 21, 2008 FINDINGS: There is a new anterior mediastinal mass that contains areas of decreased attenuation, suggestive of necrosis. This measures approximately 3.9 cm in AP diameter (image 18) and depresses the trachea. This extends up to the right supraclavicular region. A cardiac pacemaker device is present. The lungs and pleural spaces are clear without consolidation or effusions. A few paraseptal cyst are present in both lung apices. Included images of the upper abdomen demonstrate normal-appearing adrenal glands. Discrete contrast is present in both renal collecting systems. There are no aggressive osseous lesions. IMPRESSION: Incompletely characterized anterior mediastinal mass that is new compared to May 2008. Considerations include lymphoma or metastatic adenopathy. This extends to the right supraclavicular region but appears to be separate from the thyroid gland. Pulmonology and surgical evaluation are recommended. [address] Pet/ct Tumor Image Skull Thigh W (ini) Result Date: 1/31/2020 PET/CT: 1/30/2020 INDICATION: Initial diagnosis of mediastinal mass. TECHNIQUE: After oral administration of gastroview and intravenous administration of 14.47 mCi of F18 FDG, noncontrast CT images were obtained for attenuation correction and for fusion with emission PET images. A series of overlapping emission PET images were then obtained beginning 60 minutes after injection of FDG.  The area imaged spanned the region from the skull base to the mid thighs. All CT scans performed at this facility use one or all of the following: Automated exposure control, adjustment of the mA and/or kVp according to patient's size, iterative reconstruction. COMPARISON: CT chest without contrast 1/13/2020 FINDINGS: NECK/CHEST: There is a large hypermetabolic soft tissue mass within right supraclavicular soft tissues seen on axial image 45 measuring 4.9 cm x 2.9 cm in size whose appearance is most consistent with a metastatic lymph node. There appears to be a  fat plane between the thyroid gland and this mass and therefore this is not felt to represent a potential thyroid lesion. No worrisome activity is otherwise seen in the neck. There is a large mass centered at the right paramediastinal border best appreciated on axial image 64. The margins are suboptimally visualized on this noncontrast study although this is estimated to measure approximately 7.5 cm x 5 cm in size. Activity is only seen in the periphery of this mass suggesting a necrotic tumor. This mass clearly invades the mediastinum. Tumor is inseparable from the aortic arch and some central arch vessels. This mass attenuates the distal trachea although this remains patent at this time. Increased activity is seen within a precarinal mediastinal lymph node best appreciated on axial image 73 which measures 2.8 cm x 2 cm in size consistent with a metastatic mediastinal lymph node. No adenopathy is otherwise suggested. No separate concerning pulmonary lesion is seen. Mild benign muscle activity is seen in right paravertebral musculature. ABDOMEN/PELVIS: Only physiologic activity is seen in the abdomen and pelvis. No focal hypermetabolic hepatic lesion is seen. No concerning adrenal lesion is seen. No adenopathy is seen. Outer to advanced atherosclerotic ossification is seen of the abdominal aorta. BONES: No aggressive or hypermetabolic osseous lesion is seen. IMPRESSION: 1. Large central necrotic mass centered at the right paramediastinal border. A central necrotic mass can can suggest a potential squamous cell malignancy. This clearly invades the mediastinum and is inseparable from the aortic arch and some proximal central arch vessels. In addition, this attenuates the distal trachea although this remains patent at this time. 2. Enlarged, hypermetabolic precarinal mediastinal lymph node consistent with a metastatic lymph node. In addition, there is a large soft tissue mass in the right supra clavicular soft tissues measuring 4.9 cm x 2.9 cm which appears separate from the thyroid gland and is favored to represent an additional large metastatic lymph node. No evidence for metastatic disease is otherwise seen. IMPRESSION:  Philipp Sierra is a 48 y.o. female with limited stage small cell lung cancer. We discussed the natural history of lung cancer and the implications of various tumor and patient characteristics including tumor markers, stage with both primary tumor stage and involvement of hilar and mediastinal lymph nodes, and performance status. We discussed the role of surgery which is limited in patients with small cell lung cancer. There is substantial clinical data in support of concurrent chemotherapy delivered early during the course of treatment (generally starting with cycle 1 or 2 of chemotherapy) which comes at the cost of increased esophagitis. With her disease I do expect this to be an issue. We then discussed the logistics of radiation delivering definitive radiation to the grossly involved tumor and lymph nodes without elective zelda coverage over the course of 6 weeks delivering 45 Gy in 30 fractions at 1.5 Gy per fraction or 60 Gy at 2 Gy per fraction.   We reviewed Lavon et al suggesting improved survival with treatment twice daily but that the conventionally fractionated arm only treated to 45 Gy which is felt to be inadequate so the dosing is still unknown and part of a current clinical trial, CALGB 94052 which closed this year. Furthermore, the CONVERT trial (ASCO 2016) showed n odifference in 66 Gy daily vs 45 Gy BID so I prefer the once daily regimen except for patients motivated to be completed in 3 weeks. Furthermore she has transportation and social issues such that the once daily regimen will fit her best.  We discussed the potential toxicities related to the location of the disease but mainly including esophagitis, pneumonitis, pericarditis, fatigue, and rare but possible side effects such as brachial plexus or spinal cord injury. At the completion of our visit, all of the patient's questions were answered and informed consent was obtained. I will plan for CT simulation shortly with radiation to begin generally 7-10 days after this time. This start date will be coordinated with medical oncology. PLAN:   
1) Consented patient for treatment with external beam radiation after discussing risk, benefits, and side effects from treatment. 2) Reviewed available research treatment and cancer care protocols for which patient  may be eligible. Unfortunately there are no matching clinical trials available at this time. 3) Coordinate care and start date with medical oncology. 4) CT Simulation planned shortly with radiation to begin 7-10 days after this time. Von Gomez MD  
February 3, 2020

## 2020-02-03 NOTE — PROGRESS NOTES
Pt here today for the initial RT consult for a necrotic mediastinal mass with Dr. Jef Rowell. The plan is for pt to start chemo/RT, chemo is due for cycle one on 2/10/20--she is followed by Dr. Rayshawn Prakash in [address] [place] [address]. Pt has an MRI compatible (according to the progress notes) pacemaker in place. The 1/30/20 PET was positive for a central mediastinal necrotic mass and a large right supraclavicular mass. Pt completed an EBUS on 1/22/20 which was also positive for cancer. Pt smokes and recently tested positive for cocaine/amphetamines drug use. Transportation is being arranged by Narda BROWN. Leola Lacey is pt's nurse navigator. Pt has a pain management contract in Med Onc. She has an MRI Brain scheduled for today. An overview of RT was given. Pt stated that her chest pain is not under control and that she will call Leola Lacey today for assistance. Pt is aware of the CT/Sim appt on 2/6/20 at 1000. RT consents signed. Narda BROWN is aware of the CT/Sim appt and will arrange transportation. Palliative care also visited with pt today. If pt continues to test positive for cocaine and amphetamines at her weekly drug screening she will no longer receive narcotics/pain medicine from practitioners at this [facility], per palliative care.

## 2020-02-03 NOTE — PROGRESS NOTES
SW followed up with pt in radiation. Pt requested to speak with Palliative Care regarding ineffective pain regimen. SW completed referral, see palliative NP Dong Amador note. Pt requested no rides be made with [employer] at this time, as her brother can drive her to all appts starting next week. Pt requested SW facilitate ride to MRI today and ride to chemo education at [facility] tomorrow, 2/4. SW submitted ride requests to [facility] program. Pt to be notified directly of ride updates. KEVIN provided pt with updated, hardcopy of schedule. Pt verbalized understanding and appreciation. No other needs identified right now. SW intends to follow up.

## 2020-02-04 NOTE — PROGRESS NOTES
I spoke with Ms. Couch regarding her insurance coverage, potential oral medication authorizations, enrollment in the [address] [place] [address] ([facility]) and the [address] [facility] ([address]), and assistance organization resource sheet. Ms. Isreal Faith has [facility]. She is well covered for her IV infusions. Next, I spoke with Ms. Couch regarding potential oral medication authorizations. I told her that if she ever had any problems getting her oral medications filled to give the dedicated [facility] , [place], a call. Most of the time, it is simply an authorization that needs to be done with the insurance company. Next, I spoke with Ms. Couch regarding enrolling with [facility] and [facility]. I went over some of the services that [facility] and [facility] offers and the enrollment process. She said that [address] [place] [address] has already enrolled her into those supportive organizations. Next, I gave Ms. Couch flyers about the free Yoga classes for cancer survivors and the Oncology Massage program.  I let her know that she can get a free 30 minute massage. Lastly, I gave Ms. Couch a form with various resource organizations that could assist with specific needs (example:  transportation, lodging, preparing meals, home cleaning)              Ms. Isreal Faith expressed interest in spiritual support. I told her that we had Sister Khushbu Louis on site for that purpose. E-mail was sent to [employer] requesting a touch base with Ms. Couch. Patient expressed understanding of the information above and all questions were answered to her satisfaction.

## 2020-02-04 NOTE — PROGRESS NOTES
I went over the LPOA document with Ms. Khoa. I went over what it is, how it can help, and what \"power\" it grants. She agreed to put it on file and signed it. LPOA will be scanned into chart.

## 2020-02-05 NOTE — H&P
Department of Interventional Radiology  [phone]    History and Physical    Patient:  Shaun Kaye MRN:  [id number]  SSN:  [id number]    YOB: 1965  Age:  48 y.o. Sex:  female      Primary Care Provider:  Oskar Mason MD  Referring Physician:  Alivia Richardson MD    Subjective:     Chief Complaint: port    History of the Present Illness: The patient is a 48 y.o. female with a mediastinal mass, lung cancer who presents for venous chest port placement. C/o RUE, and right facial swelling for some time. Cocaine abuse, chronic HCV infection. NPO. Past Medical History:   Diagnosis Date    Abnormal MRI of head 2015    Frontal Lobe periventricular WM disease. Ruled out for MS by Neurology    ASCUS with positive high risk HPV cervical     Bipolar disorder ([address]     Borderline personality disorder ([address]     CAD     cabg 3 ves 2011-- 1. Normal left ventricular systolic function. Stable-appearing atherosclerotic coronary disease with stable mid LAD diagonal bifurcation disease, normal-appearing left circumflex and right coronary systems.  Cerebrovascular disease     Chronic diastolic congestive heart failure ([address]     8/2019 Echo LVEF 65-70%    Chronic hepatitis C ([address]     Treated, in remission    Chronic pain     Cocaine abuse ([address] 12/2019    COPD (chronic obstructive pulmonary disease) (HCC)     Moderate- awaiting Rx for inhalers, also dx lung cancer- no current supplemental oxygen    Dyslipidemia     Heart attack ([address] 2011    History of left heart catheterization 12/06/2019    1. Normal left ventricular systolic function. Stable-appearing atherosclerotic coronary disease with stable mid LAD diagonal bifurcation disease, normal-appearing left circumflex and right coronary systems.     HTN     IBS (irritable bowel syndrome)     Lung cancer (HCC)     Methamphetamine abuse ([address] 12/2019    Meth and cocaine abuse    Pacemaker     Sick Sinus Syndrome    Psychiatric disorder     S/P CABG x 3     S[facility] (sick sinus syndrome) [facility]     pacemaker    Subclavian artery stenosis, right ([address] 2016    Moderate to severe    Vertebral artery stenosis 2016    Asymptomatic Left Severe     Past Surgical History:   Procedure Laterality Date    APPENDECTOMY      COLONOSCOPY N/A 6/22/2018    COLONOSCOPY/ 25 performed by Doug Gerber MD at [address] [place] [address], [facility]    Abruption, CPD    HX CORONARY ARTERY BYPASS GRAFT  07/2011    3 vessel    HX HEART CATHETERIZATION      7/2012, 8/2012-occluded grafts    HX HERNIA REPAIR      HX ORTHOPAEDIC      left leg    HX PACEMAKER  2015~    [employer]- left chest- pacer check report 8/24/2019 Not pacer dep    HX TUBAL LIGATION      [place] COLSC FLX W/RMVL OF TUMOR POLYP LESION SNARE TQ  6/22/2018             Review of Systems:    Pertinent items are noted in the History of Present Illness. Prior to Admission medications    Medication Sig Start Date End Date Taking? Authorizing Provider   morphine IR (MS IR) 15 mg tablet Take 1 Tab by mouth every four (4) hours as needed for Pain for up to 7 days. Max Daily Amount: 90 mg. Patient taking differently: Take 30 mg by mouth every four (4) hours as needed for Pain. 1/31/20 2/7/20 Yes Sreekanth Collins NP   predniSONE (DELTASONE) 10 mg tablet Take 10 mg by mouth daily (with breakfast). 1/15/20  Yes Pete Santoyo NP   potassium chloride (KLOR-CON) 10 mEq tablet Take 1 Tab by mouth two (2) times a day. 10/9/19  Yes Sivakumar Moreno MD   carvedilol (COREG) 6.25 mg tablet Take 1 Tab by mouth two (2) times daily (with meals). 8/28/19  Yes Gaudencio Mantilla MD   amLODIPine (NORVASC) 5 mg tablet Take 1 Tab by mouth daily. 8/28/19  Yes Gaudencio Mantilla MD   atorvastatin (LIPITOR) 80 mg tablet Take 1 Tab by mouth daily. 8/28/19  Yes Gaudencio Mantilla MD   sertraline (ZOLOFT) 25 mg tablet Take 1 Tab by mouth daily.  Indications: anxiousness associated with depression 2/4/20   Julia Whelan NP   albuterol [facility] HFA) 90 mcg/actuation inhaler Take 2 Puffs by inhalation every four (4) hours as needed for Wheezing. 2/4/20   Shawn Bernheim, NP   prochlorperazine (COMPAZINE) 10 mg tablet Take 1 Tab by mouth every six (6) hours as needed for Nausea. Indications: nausea and vomiting caused by cancer drugs, nausea and vomiting 1/31/20   Vickie Seymour MD   ondansetron hcl [facility]) 8 mg tablet Take 1 Tab by mouth every eight (8) hours as needed for Nausea. Indications: nausea and vomiting caused by cancer drugs 1/31/20   Vickie Seymour MD   lidocaine-prilocaine (EMLA) topical cream Apply  to affected area as needed for Pain. 1/31/20   Vickie Seymour MD   tiotropium-olodaterol (STIOLTO RESPIMAT) 2.5-2.5 mcg/actuation inhaler Take 2 Inhalation by inhalation two (2) times a day. Provider, Historical   loratadine 10 mg cap Take 1 Cap by mouth daily. Patient taking differently: Take 2 Caps by mouth daily. 11/8/19   Ricardo Aly MD   ipratropium (ATROVENT HFA) 17 mcg/actuation inhaler Take 2 Puffs by inhalation four (4) times daily. Patient taking differently: Take 2 Puffs by inhalation four (4) times daily. Pt states she is out of samples and is waiting for Rx and insurance to cover 2/24/19   Shawn Bernheim, NP   nitroglycerin (NITROSTAT) 0.4 mg SL tablet by SubLINGual route every five (5) minutes as needed. Provider, Historical        Allergies   Allergen Reactions    Lithium Analogues Unknown (comments)    Morphine (Pf) Rash     Pt is currently taking Morphine 30mg QID    Other Medication Swelling     Whatever holds Vit d pill together       Family History   Problem Relation Age of Onset    Cancer Mother         CAD, Lung cancer    Hypertension Father         DM, CAD    Heart Disease Father     Other Sister     Hypertension Brother     Other Son         Adopted.     Heart Disease Maternal Grandmother     Heart Disease Maternal Grandfather Social History     Tobacco Use    Smoking status: Current Every Day Smoker     Packs/day: 0.50     Years: 38.00     Pack years: 19.00     Types: Cigarettes    Smokeless tobacco: Never Used   Substance Use Topics    Alcohol use: No     Alcohol/week: 2.0 standard drinks     Types: 2 Cans of beer per week     Frequency: Never     Binge frequency: Never        Objective:       Physical Examination:    Vitals:    02/05/20 1328   BP: 133/76   Pulse: 74   Resp: 16   Temp: 98.6 °F (37 °C)   SpO2: 94%   Weight: 45.4 kg (100 lb)   Height: 5' 1\" (1.549 m)       Pain Assessment  Pain Intensity 1: 7 (02/05/20 1323)               HEART: regular rate and rhythm  LUNG: clear to auscultation bilaterally  ABDOMEN: normal findings: soft, non-tender  EXTREMITIES: warm, trace RUE edema, +RUE, chest venous collateral engorgement.   left PPM    Laboratory:     Lab Results   Component Value Date/Time    Sodium 133 (L) 01/30/2020 11:33 AM    Sodium 136 01/23/2020 04:34 PM    Potassium 3.4 (L) 01/30/2020 11:33 AM    Potassium 4.2 01/23/2020 04:34 PM    Chloride 102 01/30/2020 11:33 AM    Chloride 102 01/23/2020 04:34 PM    CO2 23 01/30/2020 11:33 AM    CO2 22 01/23/2020 04:34 PM    Anion gap 8 01/30/2020 11:33 AM    Anion gap 12 01/23/2020 04:34 PM    Glucose 111 (H) 01/30/2020 11:33 AM    Glucose 101 (H) 01/23/2020 04:34 PM    BUN 13 01/30/2020 11:33 AM    BUN 17 01/23/2020 04:34 PM    Creatinine 0.73 01/30/2020 11:33 AM    Creatinine 1.03 (H) 01/23/2020 04:34 PM    GFR est AA >60 01/30/2020 11:33 AM    GFR est AA >60 01/23/2020 04:34 PM    GFR est non-AA >60 01/30/2020 11:33 AM    GFR est non-AA 60 (L) 01/23/2020 04:34 PM    Calcium 9.2 01/30/2020 11:33 AM    Calcium 9.6 01/23/2020 04:34 PM    Magnesium 1.3 (LL) 12/07/2019 02:02 AM    Magnesium 1.4 (LL) 11/01/2017 09:10 AM    Phosphorus 2.1 (L) 10/07/2017 02:13 PM    Phosphorus 3.5 09/18/2012 12:46 PM    Albumin 2.7 (L) 01/30/2020 11:33 AM    Albumin 2.8 (L) 01/23/2020 04:34 PM Protein, total 7.5 01/30/2020 11:33 AM    Protein, total 8.0 01/23/2020 04:34 PM    Globulin 4.8 (H) 01/30/2020 11:33 AM    Globulin 5.2 (H) 01/23/2020 04:34 PM    A-G Ratio 0.6 (L) 01/30/2020 11:33 AM    A-G Ratio 0.5 (L) 01/23/2020 04:34 PM    AST (SGOT) 19 01/30/2020 11:33 AM    AST (SGOT) 28 01/23/2020 04:34 PM    ALT (SGPT) 13 01/30/2020 11:33 AM    ALT (SGPT) 12 01/23/2020 04:34 PM     Lab Results   Component Value Date/Time    WBC 18.6 (H) 01/30/2020 11:33 AM    WBC 14.3 (H) 01/23/2020 04:34 PM    HGB 11.0 (L) 01/30/2020 11:33 AM    HGB 12.6 01/23/2020 04:34 PM    HCT 32.8 (L) 01/30/2020 11:33 AM    HCT 39.5 01/23/2020 04:34 PM    PLATELET 890 (H) 74/89/7980 11:33 AM    PLATELET 862 30/55/4192 04:34 PM     Lab Results   Component Value Date/Time    aPTT 54.1 (H) 12/07/2019 08:04 AM    aPTT 26.1 08/07/2013 12:40 AM    Prothrombin time 11.1 11/01/2017 09:10 AM    Prothrombin time 10.7 08/07/2013 12:40 AM    INR 1.0 11/01/2017 09:10 AM    INR 1.0 08/07/2013 12:40 AM       Assessment:     Lung cancer, mild SVC syndrome, mediastinal mass    Hospital Problems  Date Reviewed: 2/4/2020    None          Plan:     Planned Procedure: Will attempt left chest port    Risks, benefits, and alternatives reviewed with patient and she agrees to proceed with the procedure.       Signed By: Marko Mortensen PA-C     February 5, 2020

## 2020-02-05 NOTE — DISCHARGE INSTRUCTIONS
[address] [facility]  Department of Interventional Radiology  [facility]  [phone] Office  [phone] Fax  Implanted Port Discharge Instructions      General Instructions:   A port is like an implanted IV. They are usually ordered for patients who will be getting chemotherapy, but can also be used as an IV for long term antibiotics, large amounts of fluids, and/or blood products. Your blood can be drawn from your port for labs also. Those patients who do not have good veins find the ports convenient as they can get the IV they need with one stick. The port can be used long term, and the care is easy. The device is under the skin, and once the skin heals, care is minimal. All that is required is the nurse who accesses the port will need to flush it with heparinized saline after each use. Ports are usually placed in the chest wall, usually on the right side. But they can be place in the arms and in the abdomen. Home Care Instructions: If your port is in your arm, do not allow blood pressure or other IVs to be place in that arm. Do not allow bra straps or any clothing to rub the skin over the port. Do not bathe or swim until the skin has healed and if the port is accessed. Once it is healed, and when the port is not accessed, it is okay to bathe and swim. Restrict yourself to light activity for the first 5 days after getting the port put in, after that, resume normal activity slowly. You may resume your normal diet and medications. Follow-Up Instructions: Please see your oncologist, or whatever physician ordered the port as he/she has requested of you. Call If: You should call your Physician and/or the Radiology Nurse if you notice redness, pus, swelling, or pain from the area of your incision. Call if you should develop a fever. The nurses who access your port will know to call your doctor if the port does not seem to be working properly. You need to tell the nurses who use the port if you should have any pain or swelling at the site during an infusion. To Reach Us: Interventional Radiology General Nurse Discharge    After general anesthesia or intravenous sedation, for 24 hours or while taking prescription Narcotics:  · Limit your activities  · Do not drive and operate hazardous machinery  · Do not make important personal or business decisions  · Do  not drink alcoholic beverages  · If you have not urinated within 8 hours after discharge, please contact your surgeon on call. * Please give a list of your current medications to your Primary Care Provider. * Please update this list whenever your medications are discontinued, doses are     changed, or new medications (including over-the-counter products) are added. * Please carry medication information at all times in case of emergency situations. These are general instructions for a healthy lifestyle:    No smoking/ No tobacco products/ Avoid exposure to second hand smoke  Surgeon General's Warning:  Quitting smoking now greatly reduces serious risk to your health. Obesity, smoking, and sedentary lifestyle greatly increases your risk for illness  A healthy diet, regular physical exercise & weight monitoring are important for maintaining a healthy lifestyle    You may be retaining fluid if you have a history of heart failure or if you experience any of the following symptoms:  Weight gain of 3 pounds or more overnight or 5 pounds in a week, increased swelling in our hands or feet or shortness of breath while lying flat in bed. Please call your doctor as soon as you notice any of these symptoms; do not wait until your next office visit.     Recognize signs and symptoms of STROKE:  F-face looks uneven    A-arms unable to move or move unevenly    S-speech slurred or non-existent    T-time-call 911 as soon as signs and symptoms begin-DO NOT go       Back to bed or wait to see if you get better-TIME IS BRAIN. If you have any questions about your procedure, please call the Interventional Radiology department at [phone]. After business hours (5pm) and weekends, call the answering service at [phone] and ask for the Radiologist on call to be paged.      Patient Signature:  Date: 2/5/2020  Discharging Nurse: Tenzin Gauthier

## 2020-02-05 NOTE — ANESTHESIA POSTPROCEDURE EVALUATION
Procedure(s): 
IR ANESTHESIA/PORT INSERT PACEMAKER. 
 
total IV anesthesia Anesthesia Post Evaluation Multimodal analgesia: multimodal analgesia used between 6 hours prior to anesthesia start to PACU discharge Patient location during evaluation: bedside Patient participation: complete - patient participated Level of consciousness: awake and alert Pain management: adequate Airway patency: patent Anesthetic complications: no 
Cardiovascular status: acceptable Respiratory status: acceptable, spontaneous ventilation and nonlabored ventilation Hydration status: acceptable Post anesthesia nausea and vomiting:  none No vitals data found for the desired time range.

## 2020-02-05 NOTE — ANESTHESIA PREPROCEDURE EVALUATION
Anesthetic History No history of anesthetic complications Review of Systems / Medical History Patient summary reviewed and pertinent labs reviewed Pulmonary COPD: moderate Smoker (Current) Neuro/Psych CVA: no residual symptoms Psychiatric history ([place]) Cardiovascular Hypertension: well controlled Dysrhythmias Pacemaker (SSS), CAD, PAD, CABG (CABG in 2011) and hyperlipidemia Exercise tolerance: >4 METS Comments: Subclavian artery stenosis Anterior mediastinal mass GI/Hepatic/Renal 
  
 
Hepatitis: type C Liver disease Endo/Other Cancer Other Findings Comments: Hx of meth and cocaine abuse Physical Exam 
 
Airway Mallampati: II 
TM Distance: 4 - 6 cm Neck ROM: normal range of motion Mouth opening: Normal 
 
 Cardiovascular Rhythm: regular Rate: normal 
 
 
 
 Dental 
 
Dentition: Full upper dentures and Full lower dentures Pulmonary Breath sounds clear to auscultation Abdominal 
GI exam deferred Other Findings Anesthetic Plan ASA: 3 Anesthesia type: total IV anesthesia Induction: Intravenous Anesthetic plan and risks discussed with: Patient

## 2020-02-05 NOTE — PROCEDURES
Department of Interventional Radiology  [phone]        Interventional Radiology Brief Procedure Note    Patient: Aramis Centeno MRN: [id number]  SSN: [id number]    YOB: 1965  Age: 48 y.o.   Sex: female      Date of Procedure: 2/5/2020    Pre-Procedure Diagnosis: lung cancer, mediastinal mass, SVC syndrome    Post-Procedure Diagnosis: SAME    Procedure(s): Venous Chest Port Placement    Brief Description of Procedure: US, fluoro guided left IJ tunneled chest port placed    Performed By: Norbert Richardson PA-C     Assistants: None    Anesthesia:TIVS/MAC    Estimated Blood Loss: Less than 10ml    Specimens:  None    Implants:  Chest Port Placement    Findings: catheter tip in right atrium     Complications: None    Recommendations: ok to use port     Follow Up: referring MD    Signed By: Norbert Richardson PA-C     February 5, 2020

## 2020-02-07 NOTE — ED TRIAGE NOTES
EMS called to home for chest pain on right side. Has port on right side and had dye placed through it on Wednesday. Has small cell lung cancer  to lymph nodes. 98.8 oral, bgl 142, bp 142/90, hr 70s.

## 2020-02-07 NOTE — DISCHARGE INSTRUCTIONS
Apply a warm compress to the area for 5 to 10 minutes 3 times a day. Start taking an aspirin daily for the next 7 days. Follow-up with your oncologist for further evaluation.

## 2020-02-07 NOTE — ED PROVIDER NOTES
72-year-old lady presents with concerns about pain along the right side of her chest and area where she had an IV placed a few days ago to get a CT scan performed. She has a large lung and mediastinal mass that is consistent with cancer. She had a left chest port placed because of this cancer but to do the CT scan they establish an IV in her right chest.  Is now red and inflamed and tender in that area. She said that she takes 60 mg of morphine extended release twice a day as well as 15 mg of morphine immediate release 3 times a day as needed. He says those medicines are not helping her pain. Of note her allergies list morphine but that is what she takes. She denies any fevers or chills. Elements of this note were created using speech recognition software. As such, errors of speech recognition may be present. Past Medical History:  
Diagnosis Date  Abnormal MRI of head 2015 Frontal Lobe periventricular WM disease. Ruled out for MS by Neurology  ASCUS with positive high risk HPV cervical   
 Bipolar disorder ([address]  Borderline personality disorder ([address]  CAD   
 cabg 3 ves 2011-- 1. Normal left ventricular systolic function. Stable-appearing atherosclerotic coronary disease with stable mid LAD diagonal bifurcation disease, normal-appearing left circumflex and right coronary systems.  Cerebrovascular disease  Chronic diastolic congestive heart failure ([address]   
 8/2019 Echo LVEF 65-70%  Chronic hepatitis C ([address] Treated, in remission  Chronic pain  Cocaine abuse ([address] 12/2019  COPD (chronic obstructive pulmonary disease) (HCC) Moderate- awaiting Rx for inhalers, also dx lung cancer- no current supplemental oxygen  Dyslipidemia  Heart attack [facility]) 2011  History of left heart catheterization 12/06/2019 1. Normal left ventricular systolic function. Stable-appearing atherosclerotic coronary disease with stable mid LAD diagonal bifurcation disease, normal-appearing left circumflex and right coronary systems.  HTN   
 IBS (irritable bowel syndrome)  Lung cancer ([facility]  Methamphetamine abuse ([facility] 12/2019 Meth and cocaine abuse  Pacemaker Sick Sinus Syndrome  Psychiatric disorder  S/P CABG x 3   
 SSS (sick sinus syndrome) ([facility]   
 pacemaker  Subclavian artery stenosis, right ([facility] 2016 Moderate to severe  Vertebral artery stenosis 2016 Asymptomatic Left Severe Past Surgical History:  
Procedure Laterality Date  APPENDECTOMY  COLONOSCOPY N/A 6/22/2018 COLONOSCOPY/ 25 performed by Jenna Kaur MD at [address] [place] [address] Abruption, CPD  HX CORONARY ARTERY BYPASS GRAFT  07/2011  
 3 vessel  HX HEART CATHETERIZATION    
 7/2012, 8/2012-occluded grafts  HX HERNIA REPAIR    
 HX ORTHOPAEDIC    
 left leg  HX PACEMAKER  2015~ [employer]- left chest- pacer check report 8/24/2019 Not pacer dep  HX TUBAL LIGATION    
 IR INSERT TUNL CVC W PORT OVER 5 YEARS  2/5/2020  AR COLSC FLX W/RMVL OF TUMOR POLYP LESION SNARE TQ  6/22/2018 Family History:  
Problem Relation Age of Onset  Cancer Mother   
     CAD, Lung cancer  Hypertension Father DM, CAD  Heart Disease Father  Other Sister  Hypertension Brother  Other Son Adopted.  Heart Disease Maternal Grandmother  Heart Disease Maternal Grandfather Social History Socioeconomic History  Marital status:  Spouse name: Not on file  Number of children: 2  
 Years of education: Not on file  Highest education level: Not on file Occupational History  Occupation: Disabled Social Needs  Financial resource strain: Not on file  Food insecurity:  
  Worry: Not on file Inability: Not on file  Transportation needs:  
  Medical: Not on file Non-medical: Not on file Tobacco Use  
  Smoking status: Current Every Day Smoker Packs/day: 0.50 Years: 38.00 Pack years: 19.00 Types: Cigarettes  Smokeless tobacco: Never Used Substance and Sexual Activity  Alcohol use: No  
  Alcohol/week: 2.0 standard drinks Types: 2 Cans of beer per week Frequency: Never Binge frequency: Never  Drug use: Yes Types: Cocaine Comment: positive ampheteamine 12/6  Sexual activity: Yes  
  Partners: Male Lifestyle  Physical activity:  
  Days per week: Not on file Minutes per session: Not on file  Stress: Not on file Relationships  Social connections:  
  Talks on phone: Not on file Gets together: Not on file Attends [employer] service: Not on file Active member of club or organization: Not on file Attends meetings of clubs or organizations: Not on file Relationship status: Not on file  Intimate partner violence:  
  Fear of current or ex partner: Not on file Emotionally abused: Not on file Physically abused: Not on file Forced sexual activity: Not on file Other Topics Concern  Not on file Social History Narrative  and lives with roommate. 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 Has a son in his 19's. Adopted son. ALLERGIES: Lithium analogues; Morphine (pf); and Other medication Review of Systems Constitutional: Negative for fatigue and fever. Respiratory: Positive for cough and shortness of breath. Patient says she always has a cough and shortness of breath Cardiovascular: Negative for chest pain and palpitations. Gastrointestinal: Negative for nausea and vomiting. Vitals:  
 02/07/20 1813 BP: 148/73 Pulse: 66 Resp: 18 SpO2: 97% Weight: 45.4 kg (100 lb) Height: 5' 1\" (1.549 m) Physical Exam 
Vitals signs and nursing note reviewed. Constitutional:   
   Appearance: Normal appearance. Cardiovascular:  
   Rate and Rhythm: Normal rate and regular rhythm. Pulmonary:  
   Comments: Diffuse rhonchi and wheezes Abdominal:  
   General: Bowel sounds are normal.  
   Palpations: Abdomen is soft. Skin: 
   Comments: Area of induration and erythema along the vein on her right chest wall consistent with a superficial phlebitis Neurological:  
   General: No focal deficit present. Mental Status: She is alert and oriented to person, place, and time. MDM Number of Diagnoses or Management Options Diagnosis management comments: I will treat with aspirin and antibiotics Procedures

## 2020-02-08 NOTE — ED NOTES
I have reviewed discharge instructions with the patient. The patient verbalized understanding. Patient left ED via Discharge Method: ambulatory to Home with self. Opportunity for questions and clarification provided. Patient given 1 scripts. To continue your aftercare when you leave the hospital, you may receive an automated call from our care team to check in on how you are doing. This is a free service and part of our promise to provide the best care and service to meet your aftercare needs.  If you have questions, or wish to unsubscribe from this service please call [phone]. Thank you for Choosing our [facility] Emergency Department.

## 2020-02-10 PROBLEM — C34.90 LUNG CANCER (HCC): Status: ACTIVE | Noted: 2020-01-01

## 2020-02-10 PROBLEM — R09.02 HYPOXIA: Status: ACTIVE | Noted: 2020-01-01

## 2020-02-10 NOTE — PROGRESS NOTES
Restarted hydration fluids for chemotherapy. Patient immediately calls out that she is having trouble breathing again with vigorous cough present. Lungs coarse. IVF stopped and 's pod called. Dr. Roberto Portillo to come and assess patient.

## 2020-02-10 NOTE — PROGRESS NOTES
2/10/2020  Pt seen today with Dr Rosa Perez follow up lung cancer. Due to start D1C1 Cisplatin/Etoposide today. Pt drowsy today, ready to start treatment. Noted sodium 123, Dr Rosa Perez discussed with pt, will monitor and return next week for labs and follow up, pt aware. Reviewed nausea/diarrhea/mouth care with pt, questions/concerns answered, info given. Encouraged to call with any questions/concerns. Navigation will continue to follow.

## 2020-02-10 NOTE — PROGRESS NOTES
KEVIN followed up briefly with pt in infusion. Pt appeared fatigued and closed eyes during visit. SW offered and provided information for [facility] of [address] [place] [address] emergency assistance [facility], [employer], and [facility] to assist with daily needs expressed at previous visit. Pt verbalized appreciation and no other needs identified. KEVIN intends to follow up PRN.

## 2020-02-10 NOTE — PROGRESS NOTES
Left port accessed per protocol with a 0.75 lock needle. Flushed with normal saline 10cc. Positive blood return. Labs drawn per order. Flushed with 10cc of normal saline and  
 
 
hep locked no. Still accessed yes Dressing applied yes - biopatch and tegaderm

## 2020-02-10 NOTE — H&P
[facility]: Admission H/P Chief Complaint:   
Cancer SOB 
SVC syndrome SIADH History of Present Illness: 
48 y.o. F consulted for cancer in 1/2020. She worked up neck/upper chest pain with CT finding of upper mediastinal mass, EBUS 1/22/20 with FNA showed malignant cells with broad IHC negativity except for suggestion of liver origin. She had active drug abuse as documented reason being discharged from her prior pain management.  
  
 
PET CT was scheduled to be performed on 1/30/20: IMPRESSION:  
1. Large central necrotic mass centered at the right paramediastinal border. A 
central necrotic mass can can suggest a potential squamous cell malignancy. This 
clearly invades the mediastinum and is inseparable from the aortic arch and some 
proximal central arch vessels. In addition, this attenuates the distal trachea 
although this remains patent at this time. 
  
2. Enlarged, hypermetabolic precarinal mediastinal lymph node consistent with a 
metastatic lymph node. In addition, there is a large soft tissue mass in the 
right supra clavicular soft tissues measuring 4.9 cm x 2.9 cm which appears 
separate from the thyroid gland and is favored to represent an additional large 
metastatic lymph node. No evidence for metastatic disease is otherwise seen. F CYTOLOGY REPORT 1/22/2020 DIAGNOSIS  
2R-4R Location Mass, Lung, Fine Needle Aspiration:  
MALIGNANT CELLS PRESENT. Cell block: Malignant cells. STF-IMMUNOHISTOCHEMISTRY Immunohistochemical Stain Panel: INTERPRETATION: Immunohistochemical features are suggestive of carcinoma of hepatic origin given staining for cytoplasmic TTF-1 as well as arginase. Clinical and imaging correlation is recommended. Additional tissue may be necessary for further evaluation given relatively low cellularity of cell block preparation. ANTIBODY/TEST       MARKER FOR                                                           RESULT Cytokeratin 7               Lung, breast upper GI, serous ovary                          Positive Cytokeratin 20             Colon, mucinous ovary, urothelium                            Negative Cytokeratin 5/6            Squamous cell carcinomas                                         Negative  
p40                              Squamous cell carcinoma                                          Negative Napsin                         Lung adenocarcinoma                                                Negative TTF-1                          Lung adenocarcinoma and thyroid carcinomas         Positive cytoplasm,  
            negative nuclear S-100                           Melanoma                                                                   No significant staining CDX2                           Gastrointestinal carcinomas                                       Negative Cytokeratin 8/18          Epithelial marker                                                         Positive Arginase                      Hepatocellular marker                                                Positive ER                               Estrogen receptor                                                       Negative GATA3                        Breast and urothelial carcinoma                                 Negative GCDFP-15                  Breast tissue, breast carcinoma                                 Negative Mammaglobin                         Breast tissue, breast carcinoma                                 Negative MART-1/Melan A        Melanocytic marker                                                     Negative Morse 8                           Renal cell carcinoma                                                  Negative SOX10                        Melanocytic marker                                                     Negative Synaptophysin            Neuroendocrine marker                                              Negative WT-1                           Mesothelial marker                                                     Negative 
  She presented to [facility] on 1/30/20, has missed the scheduled PET, reported moved in with her brother and a friend drove her to visit. We discussed her rather atypical cancer presentation, called radiology to urgently re-schedule PET to further define the origin of cancer and extent, discussed her narco management and she gave inconsistent answers regarding whether and when she used cocaine, also stated she stopped pain med herself rather then being discharged by Dr. Alexey Bonds, although she appeared in pain, involved SW and palliative care, signed narco contract, yet urine screen positive for cocaine, then she changed her story of last use 1 week ago but that should not lead to positive urine test either, discussed the breaking of contract and trust voids prescription of narco. PET 1/30/20 showed large right mediastinal/supraclavicle avid disease but no evidence of disease otherwise and no suspicious disease in the liver, I reviewed PET personally and discussed to have urgent excisional biopsy for more tissue of histology and molecular tests, meanwhile I would not rec treat this as liver cancer, but rather treat with protocol of limited stage SCLC or stage III NSCLC if brain MRI negative to aim at possible cure or durable control, she was agreeable and Adam will arrange for urgent biopsy/port/rad onc eval, start cisplatin/etoposide/XRT 2/10/20, hyponatremia and weight gain concern of SIADH and instructed not to drink excessively and monitor, SVC syndrome and monitor closely for rx response, however she was not able to tolerate pre-hydration for cisplatin and admit for SOB likely related to SVC syndrome. Review of Systems: 
Constitutional Denies fever or chills.   Denies weight loss or appetite changes. Denies fatigue. Denies anorexia. HEENT Neck pain. Denies trauma, bluring vision, hearing loss, ear pain, nosebleeds, sore throat, and ear discharge. Skin Denies lesions or rashes. Lungs Shortness of breath, cough Cardiovascular Denies chest pain, palpitations, orthopnea, claudication and leg swelling. Gastrointestinal Denies nausea, vomiting, bowel changes. Denies bloody or black stools. Denies abdominal pain.  Denies dysuria, frequency or hesitancy of urination Neuro Denies headaches, visual changes or ataxia. Denies dizziness, tingling, tremors, sensory change, speech change, focal weakness and headaches. Hematology Denies nasal/gum bleeding, denies easy bruise Endo Denies heat/cold intolerance, denies diabetes. MSK Denies back pain, swollen legs, myalgias and falls. Psychiatric/Behavioral Denies depression and substance abuse. The patient is not nervous/anxious. Allergies Allergen Reactions  Lithium Analogues Unknown (comments)  Morphine (Pf) Rash Pt is currently taking Morphine 30mg QID  Other Medication Swelling Whatever holds Vit d pill together Past Medical History:  
Diagnosis Date  Abnormal MRI of head 2015 Frontal Lobe periventricular WM disease. Ruled out for MS by Neurology  ASCUS with positive high risk HPV cervical   
 Bipolar disorder ([address]  Borderline personality disorder ([address]  CAD   
 cabg 3 ves 2011-- 1. Normal left ventricular systolic function. Stable-appearing atherosclerotic coronary disease with stable mid LAD diagonal bifurcation disease, normal-appearing left circumflex and right coronary systems.  Cerebrovascular disease  Chronic diastolic congestive heart failure ([address]   
 8/2019 Echo LVEF 65-70%  Chronic hepatitis C ([address] Treated, in remission  Chronic pain  Cocaine abuse ([address] 12/2019  COPD (chronic obstructive pulmonary disease) (HCC) Moderate- awaiting Rx for inhalers, also dx lung cancer- no current supplemental oxygen  Dyslipidemia  Heart attack [facility]) 2011  History of left heart catheterization 12/06/2019 1. Normal left ventricular systolic function. Stable-appearing atherosclerotic coronary disease with stable mid LAD diagonal bifurcation disease, normal-appearing left circumflex and right coronary systems.  HTN   
 IBS (irritable bowel syndrome)  Lung cancer [facility]  Methamphetamine abuse ([facility] 12/2019 Meth and cocaine abuse  Pacemaker Sick Sinus Syndrome  Psychiatric disorder  S/P CABG x 3   
 SSS (sick sinus syndrome) ([address]   
 pacemaker  Subclavian artery stenosis, right ([facility] 2016 Moderate to severe  Vertebral artery stenosis 2016 Asymptomatic Left Severe Past Surgical History:  
Procedure Laterality Date  APPENDECTOMY  COLONOSCOPY N/A 6/22/2018 COLONOSCOPY/ 25 performed by Kendell Partida MD at [address] [place] [address], [facility] Abruption, CPD  HX CORONARY ARTERY BYPASS GRAFT  07/2011  
 3 vessel  HX HEART CATHETERIZATION    
 7/2012, 8/2012-occluded grafts  HX HERNIA REPAIR    
 HX ORTHOPAEDIC    
 left leg  HX PACEMAKER  2015~ [facility]- left chest- pacer check report 8/24/2019 Not pacer dep  HX TUBAL LIGATION    
 IR INSERT TUNL CVC W PORT OVER 5 YEARS  2/5/2020  [place] COLSC FLX W/RMVL OF TUMOR POLYP LESION SNARE TQ  6/22/2018 Family History Problem Relation Age of Onset  Cancer Mother   
     CAD, Lung cancer  Hypertension Father DM, CAD  Heart Disease Father  Other Sister  Hypertension Brother  Other Son Adopted.  Heart Disease Maternal Grandmother  Heart Disease Maternal Grandfather Social History Socioeconomic History  Marital status:  Spouse name: Not on file  Number of children: 2  
 Years of education: Not on file  Highest education level: Not on file Occupational History  Occupation: Disabled Social Needs  Financial resource strain: Not on file  Food insecurity:  
  Worry: Not on file Inability: Not on file  Transportation needs:  
  Medical: Not on file Non-medical: Not on file Tobacco Use  Smoking status: Current Every Day Smoker Packs/day: 0.50 Years: 38.00 Pack years: 19.00 Types: Cigarettes  Smokeless tobacco: Never Used Substance and Sexual Activity  Alcohol use: No  
  Alcohol/week: 2.0 standard drinks Types: 2 Cans of beer per week Frequency: Never Binge frequency: Never  Drug use: Yes Types: Cocaine Comment: positive ampheteamine 12/6  Sexual activity: Yes  
  Partners: Male Lifestyle  Physical activity:  
  Days per week: Not on file Minutes per session: Not on file  Stress: Not on file Relationships  Social connections:  
  Talks on phone: Not on file Gets together: Not on file Attends Confucianism service: Not on file Active member of club or organization: Not on file Attends meetings of clubs or organizations: Not on file Relationship status: Not on file  Intimate partner violence:  
  Fear of current or ex partner: Not on file Emotionally abused: Not on file Physically abused: Not on file Forced sexual activity: Not on file Other Topics Concern  Not on file Social History Narrative  and lives with roommate. 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 Has a son in his 19's. Adopted son. Current Facility-Administered Medications Medication Dose Route Frequency Provider Last Rate Last Dose  albuterol (PROVENTIL VENTOLIN) nebulizer solution 2.5 mg  2.5 mg Nebulization Q4H PRN Jenae Musa NP      
 [START ON 2/11/2020] amLODIPine (NORVASC) tablet 5 mg  5 mg Oral DAILY Nancy Musa NP      
  [START ON 2/11/2020] atorvastatin (LIPITOR) tablet 80 mg  80 mg Oral DAILY Jenae Musa NP      
 carvediloL (COREG) tablet 6.25 mg  6.25 mg Oral BID WITH MEALS June Musa NP      
 cephALEXin (KEFLEX) capsule 500 mg  500 mg Oral TID June Musa NP      
 morphine CR (MS CONTIN) tablet 60 mg  60 mg Oral Q12H Jenae Musa NP      
 morphine IR (MS IR) tablet 15 mg  15 mg Oral Q4H PRN Jenae Musa NP      
 ondansetron (ZOFRAN ODT) tablet 8 mg  8 mg Oral Q8H PRN June Musa NP      
 [START ON 2/11/2020] predniSONE (DELTASONE) tablet 10 mg  10 mg Oral DAILY WITH BREAKFAST June Musa NP      
 prochlorperazine (COMPAZINE) tablet 10 mg  10 mg Oral Q6H PRN June Musa NP      
 [START ON 2/11/2020] sertraline (ZOLOFT) tablet 25 mg  25 mg Oral DAILY Jenae Musa NP      
 enoxaparin (LOVENOX) injection 40 mg  40 mg SubCUTAneous Q24H Jenae Musa NP      
 0.9% sodium chloride infusion  100 mL/hr IntraVENous CONTINUOUS Jenae Musa  mL/hr at 02/10/20 1639 100 mL/hr at 02/10/20 1639  ipratropium (ATROVENT) 0.02 % nebulizer solution 0.5 mg  0.5 mg Nebulization QID RT Jenae Musa NP      
 albuterol (PROVENTIL VENTOLIN) nebulizer solution 2.5 mg  2.5 mg Nebulization QID RT Jenae Musa NP      
 
 
OBJECTIVE: 
Visit Vitals /60 (BP 1 Location: Right arm, BP Patient Position: At rest) Pulse 68 Temp 98.8 °F (37.1 °C) Resp 20 SpO2 100% Physical Exam: 
Constitutional: Oriented to person, place, and time. Well-developed and well-nourished. HEENT: Normocephalic and atraumatic. Oropharynx is clear and moist.  
Conjunctivae and EOM are normal. Pupils are equal, round, and reactive to light. No scleral icterus. Neck supple. No JVD present. No tracheal deviation present. No thyromegaly present. Lymph node Right supraclavicle tender. No palpable submandibular, axillary and inguinal lymph nodes. Skin Warm and dry. No bruising and no rash noted. No erythema. No pallor. Respiratory Effort normal and breath sounds normal.  No respiratory distress. No wheezes. No rales. No tenderness. CVS Normal rate, regular rhythm and normal heart sounds. Exam reveals no gallop, no friction and no rub. No murmur heard. Abdomen Soft. Bowel sounds are normal. Exhibits no distension. There is no tenderness. There is no rebound and no guarding. Neuro Normal reflexes. No cranial nerve deficit. Exhibits normal muscle tone, 5 of 5 strength of all extremities. MSK Normal range of motion in general.  No edema and no tenderness. Psych Normal mood, affect, behavior, judgment and thought content Labs: 
Recent Results (from the past 24 hour(s)) CANCER AG 19-9 Collection Time: 02/10/20  9:50 AM  
Result Value Ref Range Cancer antigen 19-9 20.60 2.0 - 37.0 U/mL CEA Collection Time: 02/10/20  9:50 AM  
Result Value Ref Range CEA 21.3 (H) 0.0 - 3.0 ng/mL  
AFP, TUMOR MARKER Collection Time: 02/10/20  9:50 AM  
Result Value Ref Range AFP, Tumor marker 2.40 <8.0 ng/mL CBC WITH AUTOMATED DIFF Collection Time: 02/10/20  9:50 AM  
Result Value Ref Range WBC 13.9 (H) 4.3 - 11.1 K/uL  
 RBC 3.23 (L) 4.05 - 5.25 M/uL HGB 9.2 (L) 11.7 - 15.4 g/dL HCT 28.5 (L) 35.8 - 46.3 % MCV 88.2 79.6 - 97.8 FL  
 MCH 28.5 26.1 - 32.9 PG  
 MCHC 32.3 31.4 - 35.0 g/dL  
 RDW 12.9 11.9 - 14.6 % PLATELET 390 081 - 369 K/uL MPV 8.5 (L) 9.4 - 12.3 FL ABSOLUTE NRBC 0.00 0.0 - 0.2 K/uL  
 DF AUTOMATED NEUTROPHILS 81 (H) 43 - 78 % LYMPHOCYTES 10 (L) 13 - 44 % MONOCYTES 8 4.0 - 12.0 % EOSINOPHILS 0 (L) 0.5 - 7.8 % BASOPHILS 0 0.0 - 2.0 % IMMATURE GRANULOCYTES 0 0.0 - 5.0 %  
 ABS. NEUTROPHILS 11.3 (H) 1.7 - 8.2 K/UL  
 ABS. LYMPHOCYTES 1.4 0.5 - 4.6 K/UL  
 ABS. MONOCYTES 1.1 0.1 - 1.3 K/UL  
 ABS. EOSINOPHILS 0.0 0.0 - 0.8 K/UL  
 ABS. BASOPHILS 0.0 0.0 - 0.2 K/UL ABS. IMM. GRANS. 0.1 0.0 - 0.5 K/UL METABOLIC PANEL, COMPREHENSIVE Collection Time: 02/10/20  9:50 AM  
Result Value Ref Range Sodium 123 (LL) 136 - 145 mmol/L Potassium 4.3 3.5 - 5.1 mmol/L Chloride 92 (L) 98 - 107 mmol/L  
 CO2 26 21 - 32 mmol/L Anion gap 5 (L) 7 - 16 mmol/L Glucose 105 (H) 65 - 100 mg/dL BUN 15 6 - 23 MG/DL Creatinine 0.66 0.6 - 1.0 MG/DL  
 GFR est AA >60 >60 ml/min/1.73m2 GFR est non-AA >60 >60 ml/min/1.73m2 Calcium 8.7 8.3 - 10.4 MG/DL Bilirubin, total 0.7 0.2 - 1.1 MG/DL  
 ALT (SGPT) 13 12 - 65 U/L  
 AST (SGOT) 19 15 - 37 U/L Alk. phosphatase 81 50 - 136 U/L Protein, total 6.9 6.3 - 8.2 g/dL Albumin 2.2 (L) 3.5 - 5.0 g/dL Globulin 4.7 (H) 2.3 - 3.5 g/dL A-G Ratio 0.5 (L) 1.2 - 3.5 MAGNESIUM Collection Time: 02/10/20  9:50 AM  
Result Value Ref Range Magnesium 1.6 (L) 1.8 - 2.4 mg/dL Imaging: No results found for this or any previous visit. ASSESSMENT/PLAN: 
1. Malignant neoplasm of overlapping sites of left lung (HCC) C34.82 162.8    
2. Non-small cell carcinoma of lung (HCC) C34.90 162.9 CBC WITH AUTOMATED DIFF  
      METABOLIC PANEL, COMPREHENSIVE  
      MAGNESIUM 3. SIADH (syndrome of inappropriate ADH production) (HCC) E22.2 253. 6    
4. SVC syndrome I87.1 459. 2    
  
Problem List  Date Reviewed: 2/10/2020 Codes Class Noted Lung cancer [facility]) ICD-10-CM: C34.90 ICD-9-CM: 162.9  2/10/2020 Hypoxia ICD-10-CM: R09.02 
ICD-9-CM: 799.02  2/10/2020 Malignant neoplasm of overlapping sites of left lung [facility]) ICD-10-CM: C34.82 
ICD-9-CM: 162.8  1/22/2020 Hypokalemia ICD-10-CM: E87.6 ICD-9-CM: 276.8  12/7/2019 Chest pain ICD-10-CM: R07.9 ICD-9-CM: 786.50  12/6/2019 Chronic tension-type headache, intractable ICD-10-CM: V71.777 ICD-9-CM: 339.12  5/13/2019 Cigarette nicotine dependence without complication PRY-34-DZ: C42.979 ICD-9-CM: 305.1  7/3/2018 COPD, moderate ([facility] (Chronic) ICD-10-CM: J44.9 ICD-9-CM: 496  Unknown Recurrent UTI ICD-10-CM: N39.0 ICD-9-CM: 599.0  4/24/2018 Gross hematuria ICD-10-CM: R31.0 ICD-9-CM: 599.71  4/24/2018 Urinary retention ICD-10-CM: R33.9 ICD-9-CM: 788.20  4/24/2018 Sick sinus syndrome (HCC) (Chronic) ICD-10-CM: I49.5 ICD-9-CM: 427.81  9/29/2017 IBS (irritable bowel syndrome) ICD-10-CM: K58.9 ICD-9-CM: 564.1  Unknown 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 
ICD-9-CM: 433.20  12/28/2016 Encounter for medication management ICD-10-CM: U46.748 ICD-9-CM: V58.69  12/28/2016 Neuropathy (Chronic) ICD-10-CM: G62.9 ICD-9-CM: 355.9  12/28/2016 Overview Signed 7/28/2017  2:20 PM by Cristo Evans MD  
  Last Assessment & Plan:  
Patient is currently on gabapentin 600 mg. Medication may cause increased drowsiness with Rispirdol, however pt has no complaints today. Intractable migraine with aura without status migrainosus ICD-10-CM: G43.119 ICD-9-CM: 346.01  12/28/2016 Peripheral vascular disease (HCC) (Chronic) ICD-10-CM: I73.9 ICD-9-CM: 443.9  12/1/2016 Overview Signed 12/1/2016  4:23 PM by Merry Estrada MD  
  CTA (2/11/16): Moderate to severe stenosis in the proximal right subclavian artery. Borderline personality disorder ([facility] ICD-10-CM: F60.3 ICD-9-CM: 301.83  Unknown Overview Signed 7/28/2017  2:20 PM by Cristo Evans MD  
  Last Assessment & Plan: 1. Continue inpatient hospitalization 2. Lithium level 1.5. Will hold am dose and change to 300mg PO BID. Will recheck in 3 days. 3.  Continue Neurontin 4. Consider Vistaril PRN if indicated 5. Encourage active participation in groups and on the milieu 6. Consider discharge and aftercare needs 7. Encourage abstinence from drugs of abuse Cerebrovascular disease ICD-10-CM: I67.9 ICD-9-CM: 437.9  Unknown Overview Signed 12/1/2016  4:23 PM by Leticia Arzate MD  
  CTA of the neck (2/11/16) : No significant stenosis of the right carotid artery. 37% stenosis in the proximal left internal carotid artery. Severe stenosis at the origin of the left vertebral artery Chronic hepatitis C ([facility] ICD-10-CM: B18.2 ICD-9-CM: 070.54  Unknown Chronic pain ICD-10-CM: G89.29 ICD-9-CM: 338.29  Unknown Overview Signed 7/28/2017  2:20 PM by Annika Bear MD  
  Last Assessment & Plan:  
Patient has history of chronic neck and back pain secondary to degenerative changes. She was recently prescribed a 15 day supply of Dilaudid 2 mg TID by \"pain management doctor\". This script was filled on 5.8. 17. Plan: - Given patient's history of substance use, she would largely benefit from detox of narcotics. Will try conservative methods while inpatient for back pain as well as neck and shoulder pain. Continue Ultram 100 mg Q6H while we await permission to speak to pain management doctor. [facility]as[facility] Conroy office in regards to patient's pain management contract. Patient has been dismissed from his services for positive UDS. - Avoid Toradol given her elevated Creatinine and glucose at time of admission. A1c normal at 5.4. ASCUS with positive high risk HPV cervical ICD-10-CM: R87.610, R87.810 ICD-9-CM: 795.01, 795.05  Unknown Pacemaker ICD-10-CM: Z95.0 ICD-9-CM: V45.01  8/10/2016 Overview Signed 8/10/2016 10:47 AM by Leticia Arzate MD  
  1. [employer]  :  Due to sick sinus syndrome. Chronic diastolic congestive heart failure (HCC) (Chronic) ICD-10-CM: I50.32 
ICD-9-CM: 428.32, 428.0  8/9/2016 Overview Addendum 8/27/2019  5:11 PM by Leticia Arzate MD  
  Admitted [facility] 8/12/11 with CP and elevated BNP at 352. 
1.  Echo (8/3/11):  EF > 55%. Mild LVH. Pseudonormal LV filling pattern. Bi-atrial enlargement. .  Mild mitral regurgitation. 2.  Echo (8/22/19):  EF 65-70%. Indeterminant diastolic function. Mild LAE. Coronary atherosclerosis of native coronary vessel (Chronic) ICD-10-CM: I25.10 ICD-9-CM: 414.01  8/9/2016 Overview Addendum 12/9/2019  7:53 AM by Jerson Malik MD  
  1.  3 Vessel CABG (7/8/11):  MIR to LAD. LIMA in Y graft to superior and inferior diagonal. 
2.  Cath (8/31/12): Occluded grafts. FFR of LAD 0.89. Similar stenosis of diagonal but no FFR done. 3.  LHC (11/1/17): Mild to moderate nonhemodynamically significant CAD. Normal IFR of LAD (40%: 0.98) and Diagonal (50%: 0.98) 4. LHC (12/7/19): Stable CAD. Tobacco use disorder (Chronic) ICD-10-CM: D69.637 ICD-9-CM: 305.1  Unknown HTN (hypertension), benign (Chronic) ICD-10-CM: I10 
ICD-9-CM: 401.1  8/9/2016 Dyslipidemia (Chronic) ICD-10-CM: R35.0 ICD-9-CM: 272.4  8/9/2016 Abnormal MRI of head ICD-10-CM: R93.0 ICD-9-CM: 793.0  1/1/2015 Overview Signed 8/27/2016  4:28 PM by Wyatt Castellanos MD  
  Frontal Lobe periventricular WM disease. Ruled out for MS by Neurology Cocaine abuse in remission [facility]) ICD-10-CM: F14.11 ICD-9-CM: 305.63  9/18/2012 S/P CABG x 3 (Chronic) ICD-10-CM: Z95.1 ICD-9-CM: V45.81  9/18/2012 Bipolar disorder (HCC) (Chronic) ICD-10-CM: F31.9 ICD-9-CM: 296.80  9/18/2012  
   
  
48 y.o. F consulted for cancer in 1/2020. She worked up neck/upper chest pain with CT finding of upper mediastinal mass, EBUS 1/22/20 with FNA showed malignant cells with broad IHC negativity except for suggestion of liver origin. She had active drug abuse as documented reason being discharged from her prior pain management. She presented to [facility] on 1/30/20, has missed the scheduled PET, reported moved in with her brother and a friend drove her to visit.  We discussed her rather atypical cancer presentation, called radiology to urgently re-schedule PET to further define the origin of cancer and extent, discussed her narco management and she gave inconsistent answers regarding whether and when she used cocaine, also stated she stopped pain med herself rather then being discharged by Dr. Bhavin Young, although she appeared in pain, involved SW and palliative care, signed narco contract, yet urine screen positive for cocaine, then she changed her story of last use 1 week ago but that should not lead to positive urine test either, discussed the breaking of contract and trust voids prescription of narco. PET 1/30/20 showed large right mediastinal/supraclavicle avid disease but no evidence of disease otherwise and no suspicious disease in the liver, I reviewed PET personally and discussed to have urgent excisional biopsy for more tissue of histology and molecular tests, meanwhile I would not rec treat this as liver cancer, but rather treat with protocol of limited stage SCLC or stage III NSCLC if brain MRI negative to aim at possible cure or durable control, she was agreeable and Adam will arrange for urgent biopsy/port/rad onc eval, start cisplatin/etoposide/XRT 2/10/20, hyponatremia and weight gain concern of SIADH and instructed not to drink excessively and monitor, SVC syndrome and monitor closely for rx response, however she was not able to tolerate pre-hydration for cisplatin and admit for SOB likely related to SVC syndrome, administer chemo inpt with slow rate, may dosing XRT upfront for SVC relief, IR eval if needed. Debbie Fonseca M.D. Damian Al [address] [place] [address] Office : [phone] Fax : [phone]

## 2020-02-10 NOTE — PROGRESS NOTES
Patient arrived to dept for D1C1 Cisplatin and Etoposide from Dr. Sai Ritchie office. Note patient with Na level 123 today. Patient is drowsy but arouses for short periods of time. Moist cough noted with patient c/o \"swelling to neck. \"  Patient with neck vein distention to right jugular region and bruising to right chest region. O2 Sat 91-92% on room air when awake.

## 2020-02-10 NOTE — PROGRESS NOTES
TRANSFER - OUT REPORT:    Verbal report given to Antionette Horan(name) on [facility] [place] [facility]  being transferred to 522(unit) for urgent transfer       Report consisted of patients Situation, Background, Assessment and   Recommendations(SBAR). Information from the following report(s) Kardex was reviewed with the receiving nurse. Opportunity for questions and clarification was provided.       Patient transported with:   O2 @ 2 liters per NC via [address] transport

## 2020-02-10 NOTE — PROGRESS NOTES
Patient receiving hydration fluid for chemotherapy and calls out that she can't breathe. Call bell on lap in reach. O2 sat 88-91% on room air and O2 applied at 1 liter per nc with sat increasing to 94-95%. Patient cough vigorously and is able to talk.

## 2020-02-10 NOTE — PROGRESS NOTES
Dr. Godfrey Fair here to see patient. Aware that Oxygen level drops to % while sleeping and does not have O2 support at home. Tono BROWN notified of O2 sats and need for home O2 support. Ordered to hold chemotherapy and admit patient to [facility].  Patient advised and agreeable to admission.

## 2020-02-10 NOTE — PROGRESS NOTES
Verbal report received from Roddy Donahue at the cancer center. TRANSFER - IN REPORT: 
 
Verbal report received from oliver White(name) on Nannette John  being received from Cancer center (unit) for change in patient condition(SOB) Report consisted of patients Situation, Background, Assessment and  
Recommendations(SBAR). Information from the following report(s) SBAR was reviewed with the receiving nurse. Opportunity for questions and clarification was provided. Assessment completed upon patients arrival to unit and care assumed. 2L NC Patient was laced in room and she had two record numbers. Spoke with admissions and Dorothy Gamble., RN. Other chart was charted on but it changed in the system.

## 2020-02-10 NOTE — PROGRESS NOTES
Patient ambulated to restroom approx 35 feet and returned with O2 Sat 85% on room air after ambulating. O2 placed at 2 liter per NC with O2 sat increasing to 97-98%.

## 2020-02-10 NOTE — PROGRESS NOTES
Reviewed chemotherapy. Patient unable to stay awake for any length of time to understand education. Dozes off during explanations.

## 2020-02-11 NOTE — PROGRESS NOTES
[facility] Inpatient Hematology / Oncology Progress Note Admission Date: 2/10/2020  3:39 PM 
Reason for Admission/Hospital Course: Lung cancer ([address] [C34.90] Hypoxia [R09.02] 
 
 
24 Hour Events: 
Afebrile, hypotensive at times On 2 LPM NC 
 
ROS: 
+ cough 
+shortness of breath 
+ pain 10 point review of systems is otherwise negative with the exception of the elements mentioned above in the HPI. Allergies Allergen Reactions  Lithium Analogues Unknown (comments)  Morphine (Pf) Rash Pt is currently taking Morphine 30mg QID  Other Medication Swelling Whatever holds Vit d pill together OBJECTIVE: 
Patient Vitals for the past 8 hrs: 
 BP Temp Pulse Resp SpO2 Weight  
20 0859 94/52  76     
20 0814     98 %   
20 0730 (!) 85/55 99.6 °F (37.6 °C) 73 17 97 %   
20 0305     97 %   
20 0250 91/62 98.9 °F (37.2 °C) 74 19 98 %   
20 0141 90/57 98.9 °F (37.2 °C) 73 22 97 % 105 lb 3.2 oz (47.7 kg) Temp (24hrs), Av °F (37.2 °C), Min:98.2 °F (36.8 °C), Max:99.9 °F (37.7 °C) 
 
701 -  1900 In: 609 [I.V.:609] Out: - Physical Exam: 
Constitutional: Well developed, well nourished female in no acute distress, sitting comfortably in the hospital bed. HEENT: Normocephalic and atraumatic. Oropharynx is clear, mucous membranes are moist.  Pupils are equal, round, and reactive to light. Extraocular muscles are intact. Sclerae anicteric. Neck supple without JVD. No thyromegaly present. Lymph node 
 deferred. Skin Warm and dry. No bruising and no rash noted. No erythema. No pallor. Respiratory Lungs are clear to auscultation bilaterally without wheezes, rales or rhonchi, normal air exchange without accessory muscle use. CVS Normal rate, regular rhythm and normal S1 and S2. No murmurs, gallops, or rubs. Abdomen Soft, nontender and nondistended, normoactive bowel sounds.   No palpable mass. No hepatosplenomegaly. Neuro Grossly nonfocal with no obvious sensory or motor deficits. MSK Normal range of motion in general.  No edema and no tenderness. Psych Appropriate mood and affect. Labs: 
   
Recent Labs  
  02/11/20 
0249 02/10/20 
0950 WBC 10.0 13.9*  
RBC 2.91* 3.23* HGB 8.4* 9.2* HCT 26.3* 28.5* MCV 90.4 88.2 MCH 28.9 28.5 MCHC 31.9 32.3 RDW 13.1 12.9  380 GRANS 74 81* LYMPH 14 10* MONOS 11 8 EOS 0* 0*  
BASOS 0 0 IG 0 0  
DF AUTOMATED AUTOMATED ANEU 7.5 11.3* ABL 1.4 1.4 ABM 1.1 1.1 ROSA ISELA 0.0 0.0 ABB 0.0 0.0 AIG 0.0 0.1 Recent Labs  
  02/11/20 
0249 02/10/20 
0950 * 123* K 3.8 4.3 CL 96* 92* CO2 29 26 AGAP 6* 5*  
GLU 95 105* BUN 14 15 CREA 0.58* 0.66 GFRAA >60 >60 GFRNA >60 >60  
CA 8.4 8.7 SGOT 17 19 AP 69 81  
TP 6.0* 6.9 ALB 2.0* 2.2*  
GLOB 4.0* 4.7* AGRAT 0.5* 0.5* MG  --  1.6* Imaging: 
 
Medications: 
Current Facility-Administered Medications Medication Dose Route Frequency  guaiFENesin ER (MUCINEX) tablet 1,200 mg  1,200 mg Oral BID  albuterol (PROVENTIL VENTOLIN) nebulizer solution 2.5 mg  2.5 mg Nebulization Q4H PRN  
 amLODIPine (NORVASC) tablet 5 mg  5 mg Oral DAILY  atorvastatin (LIPITOR) tablet 80 mg  80 mg Oral DAILY  carvediloL (COREG) tablet 6.25 mg  6.25 mg Oral BID WITH MEALS  cephALEXin (KEFLEX) capsule 500 mg  500 mg Oral TID  morphine CR (MS CONTIN) tablet 60 mg  60 mg Oral Q12H  
 morphine IR (MS IR) tablet 15 mg  15 mg Oral Q4H PRN  
 ondansetron (ZOFRAN ODT) tablet 8 mg  8 mg Oral Q8H PRN  predniSONE (DELTASONE) tablet 10 mg  10 mg Oral DAILY WITH BREAKFAST  prochlorperazine (COMPAZINE) tablet 10 mg  10 mg Oral Q6H PRN  
 sertraline (ZOLOFT) tablet 25 mg  25 mg Oral DAILY  enoxaparin (LOVENOX) injection 40 mg  40 mg SubCUTAneous Q24H  
 0.9% sodium chloride infusion  75 mL/hr IntraVENous CONTINUOUS  
  albuterol-ipratropium (DUO-NEB) 2.5 MG-0.5 MG/3 ML  3 mL Nebulization QID RT  
 
 
 
ASSESSMENT: 
 
Problem List  Date Reviewed: 2/10/2020 Codes Class Noted Lung cancer [facility]) ICD-10-CM: C34.90 ICD-9-CM: 162.9  2/10/2020 Hypoxia ICD-10-CM: R09.02 
ICD-9-CM: 799.02  2/10/2020 Malignant neoplasm of overlapping sites of left lung [facility]) ICD-10-CM: C34.82 
ICD-9-CM: 162.8  1/22/2020 Hypokalemia ICD-10-CM: E87.6 ICD-9-CM: 276.8  12/7/2019 Chest pain ICD-10-CM: R07.9 ICD-9-CM: 786.50  12/6/2019 Chronic tension-type headache, intractable ICD-10-CM: O07.266 ICD-9-CM: 339.12  5/13/2019 Cigarette nicotine dependence without complication FXN-68-TQ: P65.184 ICD-9-CM: 305.1  7/3/2018 COPD, moderate ([address] (Chronic) ICD-10-CM: J44.9 ICD-9-CM: 496  Unknown Recurrent UTI ICD-10-CM: N39.0 ICD-9-CM: 599.0  4/24/2018 Gross hematuria ICD-10-CM: R31.0 ICD-9-CM: 599.71  4/24/2018 Urinary retention ICD-10-CM: R33.9 ICD-9-CM: 788.20  4/24/2018 Sick sinus syndrome (HCC) (Chronic) ICD-10-CM: I49.5 ICD-9-CM: 427.81  9/29/2017 IBS (irritable bowel syndrome) ICD-10-CM: K58.9 ICD-9-CM: 564.1  Unknown 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 
ICD-9-CM: 433.20  12/28/2016 Encounter for medication management ICD-10-CM: O37.215 ICD-9-CM: V58.69  12/28/2016 Neuropathy (Chronic) ICD-10-CM: G62.9 ICD-9-CM: 355.9  12/28/2016 Overview Signed 7/28/2017  2:20 PM by Annika Bear MD  
  Last Assessment & Plan:  
Patient is currently on gabapentin 600 mg. Medication may cause increased drowsiness with Rispirdol, however pt has no complaints today. Intractable migraine with aura without status migrainosus ICD-10-CM: G43.119 ICD-9-CM: 346.01  12/28/2016 Peripheral vascular disease (HCC) (Chronic) ICD-10-CM: I73.9 ICD-9-CM: 443.9  12/1/2016 Overview Signed 12/1/2016  4:23 PM by Carlyle Valverde MD  
  CTA (2/11/16): Moderate to severe stenosis in the proximal right subclavian artery. Borderline personality disorder ([facility] ICD-10-CM: F60.3 ICD-9-CM: 301.83  Unknown Overview Signed 7/28/2017  2:20 PM by Hammad Lowe MD  
  Last Assessment & Plan: 1. Continue inpatient hospitalization 2. Lithium level 1.5. Will hold am dose and change to 300mg PO BID. Will recheck in 3 days. 3.  Continue Neurontin 4. Consider Vistaril PRN if indicated 5. Encourage active participation in groups and on the milieu 6. Consider discharge and aftercare needs 7. Encourage abstinence from drugs of abuse Cerebrovascular disease ICD-10-CM: I67.9 ICD-9-CM: 437.9  Unknown Overview Signed 12/1/2016  4:23 PM by Carlyle Valverde MD  
  CTA of the neck (2/11/16) : No significant stenosis of the right carotid artery. 37% stenosis in the proximal left internal carotid artery. Severe stenosis at the origin of the left vertebral artery Chronic hepatitis C ([facility] ICD-10-CM: B18.2 ICD-9-CM: 070.54  Unknown Chronic pain ICD-10-CM: G89.29 ICD-9-CM: 338.29  Unknown Overview Signed 7/28/2017  2:20 PM by Hammad Lowe MD  
  Last Assessment & Plan:  
Patient has history of chronic neck and back pain secondary to degenerative changes. She was recently prescribed a 15 day supply of Dilaudid 2 mg TID by \"pain management doctor\". This script was filled on 5.8. 17. Plan: - Given patient's history of substance use, she would largely benefit from detox of narcotics. Will try conservative methods while inpatient for back pain as well as neck and shoulder pain. Continue Ultram 100 mg Q6H while we await permission to speak to pain management doctor. Noemi Ng office in regards to patient's pain management contract. Patient has been dismissed from his services for positive UDS. - Avoid Toradol given her elevated Creatinine and glucose at time of admission. A1c normal at 5.4. ASCUS with positive high risk HPV cervical ICD-10-CM: R87.610, R87.810 ICD-9-CM: 795.01, 795.05  Unknown Pacemaker ICD-10-CM: Z95.0 ICD-9-CM: V45.01  8/10/2016 Overview Signed 8/10/2016 10:47 AM by Mary Alice Koch MD  
  1. [employer]  :  Due to sick sinus syndrome. Chronic diastolic congestive heart failure (HCC) (Chronic) ICD-10-CM: I50.32 
ICD-9-CM: 428.32, 428.0  8/9/2016 Overview Addendum 8/27/2019  5:11 PM by Mary Alice Koch MD  
  Admitted [facility] 8/12/11 with CP and elevated BNP at 352. 
1.  Echo (8/3/11):  EF > 55%. Mild LVH. Pseudonormal LV filling pattern. Bi-atrial enlargement. .  Mild mitral regurgitation. 2.  Echo (8/22/19):  EF 65-70%. Indeterminant diastolic function. Mild LAE. Coronary atherosclerosis of native coronary vessel (Chronic) ICD-10-CM: I25.10 ICD-9-CM: 414.01  8/9/2016 Overview Addendum 12/9/2019  7:53 AM by Mary Alice Koch MD  
  1.  3 Vessel CABG (7/8/11):  MIR to LAD. LIMA in Y graft to superior and inferior diagonal. 
2.  Cath (8/31/12): Occluded grafts. FFR of LAD 0.89. Similar stenosis of diagonal but no FFR done. 3.  LHC (11/1/17): Mild to moderate nonhemodynamically significant CAD. Normal IFR of LAD (40%: 0.98) and Diagonal (50%: 0.98) 4. LHC (12/7/19): Stable CAD. Tobacco use disorder (Chronic) ICD-10-CM: F21.317 ICD-9-CM: 305.1  Unknown HTN (hypertension), benign (Chronic) ICD-10-CM: I10 
ICD-9-CM: 401.1  8/9/2016 Dyslipidemia (Chronic) ICD-10-CM: M17.5 ICD-9-CM: 272.4  8/9/2016 Abnormal MRI of head ICD-10-CM: R93.0 ICD-9-CM: 793.0  1/1/2015 Overview Signed 8/27/2016  4:28 PM by Sarah Hickey MD  
  Frontal Lobe periventricular WM disease. Ruled out for MS by Neurology  Cocaine abuse in remission [facility]) ICD-10-CM: F14.11 
 ICD-9-CM: 305.63  9/18/2012 S/P CABG x 3 (Chronic) ICD-10-CM: Z95.1 ICD-9-CM: V45.81  9/18/2012 Bipolar disorder (HCC) (Chronic) ICD-10-CM: F31.9 ICD-9-CM: 296.80  9/18/2012  
   
  
 
   
48 y.o. F consulted for cancer in 1/2020. She worked up neck/upper chest pain with CT finding of upper mediastinal mass, EBUS 1/22/20 with FNA showed malignant cells with broad IHC negativity except for suggestion of liver origin. She had active drug abuse as documented reason being discharged from her prior pain management. She presented to [facility] on 1/30/20, has missed the scheduled PET, reported moved in with her brother and a friend drove her to visit.  We discussed her rather atypical cancer presentation, called radiology to urgently re-schedule PET to further define the origin of cancer and extent, discussed her narco management and she gave inconsistent answers regarding whether and when she used cocaine, also stated she stopped pain med herself rather then being discharged by Dr. Manny Cespedes, although she appeared in pain, involved SW and palliative care, signed narco contract, yet urine screen positive for cocaine, then she changed her story of last use 1 week ago but that should not lead to positive urine test either, discussed the breaking of contract and trust voids prescription of narco. PET 1/30/20 showed large right mediastinal/supraclavicle avid disease but no evidence of disease otherwise and no suspicious disease in the liver, I reviewed PET personally and discussed to have urgent excisional biopsy for more tissue of histology and molecular tests, meanwhile I would not rec treat this as liver cancer, but rather treat with protocol of limited stage SCLC or stage III NSCLC if brain MRI negative to aim at possible cure or durable control, she was agreeable and Adam will arrange for urgent biopsy/port/rad onc eval, start cisplatin/etoposide/XRT 2/10/20, hyponatremia and weight gain concern of SIADH and instructed not to drink excessively and monitor, SVC syndrome and monitor closely for rx response, however she was not able to tolerate pre-hydration for cisplatin and admit for SOB likely related to SVC syndrome, administer chemo inpt with slow rate, may dosing XRT upfront for SVC relief, IR eval if needed. PLAN: 
Lung cancer 
-supposed to start Cis/VP16/XRT yesterday. 2/11 plan for chemo tomorrow if echo ok. XRT plan is not completed yet. Shortness of breath/Cough 
- on 2 LPM NC. Mucinex BID. CXR with known malignancy and RLL infiltrate. 2/11 start cef/vanc for possible PNA. Check echo. ?SIDAH/hyponatremia - On IV fluids at this time with some improvement. Check urine sodium. Consult nephro Continue home medications Cari SOPs Lovenox for DVT prophylaxis Goals and plan of care reviewed with the patient. All questions answered to the best of our ability. Jenae Musa, AINSLEYP-C [facility] Hematology and Oncology [address] [place] [address] Office : [phone] Fax : [phone]

## 2020-02-11 NOTE — PROGRESS NOTES
02/10/20 1447 Dual Skin Pressure Injury Assessment Dual Skin Pressure Injury Assessment WDL Second Care Provider (Based on [address] [place] [address]) Summer Lo, RN Skin Integumentary Skin Integumentary (WDL) X Pressure  Injury Documentation No Pressure Injury Noted-Pressure Ulcer Prevention Initiated Skin Color Ecchymosis (comment); Red 
(scattered R chest ) Skin Condition/Temp Dry; Warm  
Skin Integrity Intact Turgor Non-tenting Hair Growth Present Varicosities Absent

## 2020-02-11 NOTE — PROGRESS NOTES
Called by pt to room for c/o \"I can't breathe. \" 93% 3 L NC, RR 20, HR 65, 89/48. Pt able to talk in complete sentences. B/L breath sounds coarse. Pt feeling like she is being \"choked\" by O2 tubing. RN loosened tubing and provided verbal reassurance. Fredo Burch, Primary RN notified and at bedside.

## 2020-02-11 NOTE — PROGRESS NOTES
END OF SHIFT NOTE: 
 
Intake/Output 02/11 0701 - 02/11 1900 In: 2534 [P.O.:480; I.V.:771] Out: 300 [Urine:300] Voiding: YES Catheter: NO 
Drain:   
 
 
 
 
 
Stool:  0 occurrences. Emesis:  0 occurrences. VITAL SIGNS Patient Vitals for the past 12 hrs: 
 Temp Pulse Resp BP SpO2  
02/11/20 1753  66  (!) 89/48 93 % 02/11/20 1505 97.9 °F (36.6 °C) 71 18 132/53 93 % 02/11/20 1134     95 % 02/11/20 1109 98.7 °F (37.1 °C) 72 17 91/57 96 % 02/11/20 0859  76  94/52   
02/11/20 0814     98 % 02/11/20 0730 99.6 °F (37.6 °C) 73 17 (!) 85/55 97 % Pain Assessment Pain 1 Pain Scale 1: Numeric (0 - 10) (02/11/20 1527) Pain Intensity 1: 0 (02/11/20 1527) Patient Stated Pain Goal: 0 (02/11/20 1527) Pain Reassessment 1: Yes (02/11/20 1527) Pain Location 1: Back;Neck; Shoulder (02/11/20 0859) Pain Orientation 1: Left;Right; Lower (02/11/20 0859) Pain Description 1: Aching (02/11/20 0859) Pain Intervention(s) 1: Medication (see MAR) (02/11/20 0859) Ambulating Yes Additional Information: Pt in bed complaining that no one has helped her all day. Pt received her medications though out the day. And was checked on every other hour by this RN. Called placed to Adonica Curling to get anxiety medication on board. Ativan 0.5 mg PO ordered. No complaints ordered at this time. Shift report given to Nadia Kirby RN oncoming nurse at the bedside.  
 
Oj Mack RN

## 2020-02-11 NOTE — PROGRESS NOTES
Initial visit by  to convey care and concern and encourage patient that  services are available if desired. No needs were voiced during the visit. Abbreviated visit to allow rest. Chaplains remain available for support. Joseph Ryder MDiv Board Certified [address] [place] [address]

## 2020-02-11 NOTE — PROGRESS NOTES
Pt in bed resting with eyes closed the pt appears to be lethargic and had a BP of 80/51. The pressure was rechecked and is now up to 100/45, the provider has been notified and orders received to increase fluids to 75 ml/hr. 2129-Called it the room by pt stating \"I can't breathe\", pt sats on 2 liter were 96% respiratory was called called in for a Prn treatment, pt is encouraged to cough. Pt has a weak cough and is unable to move all secretions. Will continue to monitor. 0050-Pt requested medication for pain, Gave 15 mg of Morphine IR po. The pt has a very coarse wet cough and appears to be unable to clear completely. The pt requested Mucinex, the provider was paged and notified. Medication was administered. The pt is concerned of fluid in her lungs and requesting a fluid pill. Current IVF decreased to 25 ml/hr. Will contact the provider. 0120-Entered room to check on the pt. The pt is agitated and states \"no one is doing anything for me, no one is checking on me, no one has even given me a drink\" reminded the pt this nurse and assistant has been in and one of room. Reminded pt of the multiple drinks on the bedside table, drinks then opened for better access for the pt. Pt falls asleep during this conversation. Will continue to monitor. END OF SHIFT NOTE: 
 
Intake/Output 02/10 1901 - 02/11 0700 In: 120 [P.O.:120] Out: 1000 [Urine:1000] Voiding: YES Catheter: NO 
Drain:   
 
 
 
 
 
Stool:  0 occurrences. Emesis:  0 occurrences. VITAL SIGNS Patient Vitals for the past 12 hrs: 
 Temp Pulse Resp BP SpO2  
02/11/20 0305     97 % 02/11/20 0250 98.9 °F (37.2 °C) 74 19 91/62 98 % 02/11/20 0141 98.9 °F (37.2 °C) 73 22 90/57 97 % 02/10/20 2220 99.9 °F (37.7 °C) 78 20 90/47 98 % 02/10/20 2033     94 % 02/10/20 2032     94 % 02/10/20 1945  68 20 100/45 100 % 02/10/20 1901 98.8 °F (37.1 °C) 67 22 (!) 80/51 98 % Pain Assessment Pain 1 
 Pain Scale 1: Numeric (0 - 10) (02/11/20 0344) Pain Intensity 1: 0 (02/11/20 0344) Patient Stated Pain Goal: 0 (02/11/20 0344) Pain Reassessment 1: Patient resting w/respiratory rate greater than 10 (02/11/20 0141) Pain Location 1: Chest(when coughing) (02/11/20 0052) Pain Description 1: Aching (02/11/20 0052) Pain Intervention(s) 1: Medication (see MAR) (02/11/20 0052) Ambulating Yes Additional Information:  
 
Shift report given to oncoming nurse at the bedside.  
 
Tomeka Interiano RN

## 2020-02-11 NOTE — PROGRESS NOTES
Pharmacokinetic Consult to Pharmacist 
 
Elisabeth Langemariah is a 48 y.o. female being treated for possible pneumonia with vancomycin and cefepime. Weight: 47.7 kg (105 lb 3.2 oz) Lab Results Component Value Date/Time BUN 14 02/11/2020 02:49 AM  
 Creatinine 0.58 (L) 02/11/2020 02:49 AM  
 WBC 10.0 02/11/2020 02:49 AM  
 Lactic Acid (POC) 1.2 01/31/2018 05:13 PM  
  
Estimated Creatinine Clearance: 67.5 mL/min (A) (by C-G formula based on SCr of 0.58 mg/dL (L)). Day 1 of vancomycin. Goal trough is 15-20. Vancomycin dose initiated at 1250 mg once, followed by 1000 mg q12h. Will continue to follow patient. Thank you, Raquel Syed, PharmD, [facility] Clinical Staff Pharmacist 
[phone]

## 2020-02-11 NOTE — PROGRESS NOTES
END OF SHIFT NOTE: 
 
Intake/Output No intake/output data recorded. Voiding: YES Catheter: NO 
Drain:   
 
 
 
 
 
Stool:  0 occurrences. Emesis:  0 occurrences. VITAL SIGNS Patient Vitals for the past 12 hrs: 
 Temp Pulse Resp BP SpO2  
02/10/20 1901 98.8 °F (37.1 °C) 67 22 (!) 80/51 98 % 02/10/20 1555 98.8 °F (37.1 °C) 68 20 102/60 100 % Pain Assessment Pain 1 Pain Scale 1: Numeric (0 - 10) (02/10/20 1751) Pain Intensity 1: 10 (02/10/20 1751) Patient Stated Pain Goal: 0 (02/10/20 1447) Pain Location 1: Generalized (02/10/20 1751) Pain Description 1: Aching (02/10/20 1751) Pain Intervention(s) 1: Medication (see MAR) (02/10/20 1751) Ambulating Yes Additional Information:Patient SOB and very drowsy. patient will wake up to answer questions and state that she can not breathe, but 02 stats have all been 95% and above on 2LNC. She like to hold mouth open. Encouraged patient to breathe in through nose and out mouth. Patient has two charts which admission assessment was completed. Called admitting and Antionette MANRIQUEZ Was able to correct issue. Shift report given to oncoming nurse at the bedside.  
 
Meri Patel RN

## 2020-02-12 NOTE — PROGRESS NOTES
RT called for STAT ABG. ABG performed, Dr. Villanueva [place] reviewed results. Placed patient on Airvo 50L/55% due to patient's increased WOB. Patient NT suctioned. Copious amounts of thick, tan secretion obtained. Patient tolerating Airvo well. No distress noted at this time.

## 2020-02-12 NOTE — PROGRESS NOTES
[facility] NURSE PROGRESS REPORT SUBJECTIVE: Called to assess patient secondary to nurse concern. MEWS Score: 1 (02/12/20 0240) Vitals:  
 02/11/20 2300 02/12/20 0206 02/12/20 0240 02/12/20 7441 BP: 121/65  118/72 Pulse: 83  76 Resp: 18  19 Temp: 98.6 °F (37 °C)  97.8 °F (36.6 °C) SpO2: 93% 93% 93% 96% Weight: 46.9 kg (103 lb 6.4 oz) LAB DATA: 
 
Recent Labs  
  02/12/20 
0208 02/11/20 
0249 02/10/20 
0950 * 131* 123* K 3.6 3.8 4.3 CL 97* 96* 92* CO2 27 29 26 AGAP 8 6* 5* * 95 105* BUN 9 14 15 CREA 0.53* 0.58* 0.66 GFRAA >60 >60 >60 GFRNA >60 >60 >60  
CA 8.7 8.4 8.7 MG  --   --  1.6* ALB 2.1* 2.0* 2.2* TP 6.6 6.0* 6.9 GLOB 4.5* 4.0* 4.7* AGRAT 0.5* 0.5* 0.5* ALT 10* 11* 13 Recent Labs  
  02/12/20 
0208 02/11/20 
0249 02/10/20 
0950 WBC 13.0* 10.0 13.9* HGB 8.9* 8.4* 9.2* HCT 28.0* 26.3* 28.5*  349 380 OBJECTIVE: On arrival to room, I found patient to be talking in her sleep, sitting in bed. Pain Assessment Pain Intensity 1: 8 (02/12/20 0450) Pain Location 1: Back, Chest, Neck Pain Intervention(s) 1: Medication (see MAR) Patient Stated Pain Goal: 0 
   
 
ASSESSMENT:  Patient sitting up in bed was talking to herself and pointing in her sleep. When name called, she aroused and is oriented to person, place, and time; calm. Lung sounds coarse. Patient states, \"I don't feel like I'm breathing well at all. \" O2 sat 95% on 6L NC. Respirations slightly labored on inhalation. Has received morphine IR @ 0450 and PRN breathing tx @ 0510. Also received 0.5mg po ativan for anxiety earlier in shift. Patient is on antibiotics, steroids, and receiving mucinex. Serum sodium 132. 500cc UOP noted in chart overnight. NS infusing @ 75mL/hr. No edema noted. Nephro consult for today. WBC back up to 13.0, no fever. PLAN:  Will continue to follow per outreach protocol. Consider pulmonary consult for increasing O2 needs. Will discuss with primary RN.

## 2020-02-12 NOTE — PROGRESS NOTES
Problem: Falls - Risk of 
Goal: *Absence of Falls Description Document Marsha Cline Fall Risk and appropriate interventions in the flowsheet. Outcome: Progressing Towards Goal 
Note: Fall Risk Interventions: 
  
 
Mentation Interventions: Door open when patient unattended Medication Interventions: Evaluate medications/consider consulting pharmacy, Patient to call before getting OOB Problem: Patient Education: Go to Patient Education Activity Goal: Patient/Family Education Outcome: Progressing Towards Goal 
  
Problem: Pressure Injury - Risk of 
Goal: *Prevention of pressure injury Description Document Patricio Scale and appropriate interventions in the flowsheet. Outcome: Progressing Towards Goal 
Note: Pressure Injury Interventions: 
Sensory Interventions: Assess need for specialty bed, Discuss PT/OT consult with provider, Pressure redistribution bed/mattress (bed type) Activity Interventions: Increase time out of bed, Pressure redistribution bed/mattress(bed type), PT/OT evaluation Mobility Interventions: HOB 30 degrees or less, Pressure redistribution bed/mattress (bed type), PT/OT evaluation Nutrition Interventions: Document food/fluid/supplement intake, Offer support with meals,snacks and hydration Friction and Shear Interventions: Apply protective barrier, creams and emollients, Foam dressings/transparent film/skin sealants, HOB 30 degrees or less Problem: Patient Education: Go to Patient Education Activity Goal: Patient/Family Education Outcome: Progressing Towards Goal 
  
Problem: Breathing Pattern - Ineffective Goal: *Absence of hypoxia Outcome: Progressing Towards Goal

## 2020-02-12 NOTE — CONSULTS
Nephrology consult Admission Date: 
2/10/2020 Admission Diagnosis Lung cancer ([facility] [C34.90] Hypoxia [R09.02] We are asked by Kings Carranza NP History of Present Illness: Ms. Guerda Blakely is a 48 y.o female with PMH significant for Bipolar disorder, chronic diastolic CHF, hx illicit drug abuse, COPD, HTN, IBS, lung cancer, sick sinus syndrome with pacemaker, s/p CABG x3, and chronic hepatitis C admitted with worsening shortness of breath from oncology infusion center. CT revealed upper mediastinal mass, EBUS 1/22/20 with FNA malignant cells. PET 1/30/20 showed large right mediastinal/supraclavicle avid disease but no evidence of disease otherwise and no suspicious disease in the liver. Urgent biopsy/port/rad onc eval, start cisplatin/etoposide/XRT 2/10/20 per oncology. cxr with RLL infiltrate and mediastinal masses. We are consulted for hyponatremia. From a renal standpoint creatinine on admission was 0.66, GFR >60, SNa 123->131->132, urine lytes pending, home meds significant for zoloft, not on HCTZ. She has been on NS at 75 ml/hr. SBP on admission in the 80s-90s Patient seen and examined on rounds, ECHO being performed at the bedside, pt is drowsy but arousable complaints of shortness of breath pt oriented to self, follows commands, complaints of thirst and does not report if she is or not drinking excessive amounts of water. Reports has been on zoloft for 4 months, denies CP, fever/ chills, N/V/D or edema. Past Medical History:  
Diagnosis Date  Abnormal MRI of head 2015 Frontal Lobe periventricular WM disease. Ruled out for MS by Neurology  ASCUS with positive high risk HPV cervical   
 Bipolar disorder ([facility] [facility]  Borderline personality disorder ([facility] [facility]  CAD   
 cabg 3 ves 2011-- 1. Normal left ventricular systolic function. Stable-appearing atherosclerotic coronary disease with stable mid LAD diagonal bifurcation disease, normal-appearing left circumflex and right coronary systems.  Cerebrovascular disease  Chronic diastolic congestive heart failure ([address]   
 8/2019 Echo LVEF 65-70%  Chronic hepatitis C ([facility] [facility] Treated, in remission  Chronic pain  Cocaine abuse ([facility] [facility] 12/2019  COPD (chronic obstructive pulmonary disease) (HCC) Moderate- awaiting Rx for inhalers, also dx lung cancer- no current supplemental oxygen  Dyslipidemia  Heart attack [facility]) 2011  History of left heart catheterization 12/06/2019 1. Normal left ventricular systolic function. Stable-appearing atherosclerotic coronary disease with stable mid LAD diagonal bifurcation disease, normal-appearing left circumflex and right coronary systems.  HTN   
 IBS (irritable bowel syndrome)  Lung cancer ([facility] [facility]  Methamphetamine abuse ([facility] [address] 12/2019 Meth and cocaine abuse  Pacemaker Sick Sinus Syndrome  Psychiatric disorder  S/P CABG x 3   
 SSS (sick sinus syndrome) ([facility] [address]   
 pacemaker  Subclavian artery stenosis, right ([facility] [address] 2016 Moderate to severe  Vertebral artery stenosis 2016 Asymptomatic Left Severe Past Surgical History:  
Procedure Laterality Date  APPENDECTOMY  COLONOSCOPY N/A 6/22/2018 COLONOSCOPY/ 25 performed by Nolan Foster MD at [address] [place] [address], [facility] Abruption, CPD  HX CORONARY ARTERY BYPASS GRAFT  07/2011  
 3 vessel  HX HEART CATHETERIZATION    
 7/2012, 8/2012-occluded grafts  HX HERNIA REPAIR    
 HX ORTHOPAEDIC    
 left leg  HX PACEMAKER  2015~ [email or web address]- left chest- pacer check report 8/24/2019 Not pacer dep  HX TUBAL LIGATION    
 IR INSERT TUNL CVC W PORT OVER 5 YEARS  2/5/2020  DC COLSC FLX W/RMVL OF TUMOR POLYP LESION SNARE TQ  6/22/2018 Current Facility-Administered Medications Medication Dose Route Frequency  albuterol-ipratropium (DUO-NEB) 2.5 MG-0.5 MG/3 ML  3 mL Nebulization Q4H RT  
 morphine injection 1 mg  1 mg IntraVENous ONCE PRN  
  [START ON 2/13/2020] Vancomycin Trough Reminder   Other ONCE  
 guaiFENesin ER (MUCINEX) tablet 1,200 mg  1,200 mg Oral BID  cefepime (MAXIPIME) 2 g in 0.9% sodium chloride (MBP/ADV) 100 mL  2 g IntraVENous Q12H  
 vancomycin (VANCOCIN) 1,000 mg in 0.9% sodium chloride (MBP/ADV) 250 mL  1,000 mg IntraVENous Q12H  
 LORazepam (ATIVAN) tablet 0.5 mg  0.5 mg Oral Q6H PRN  
 albuterol (PROVENTIL VENTOLIN) nebulizer solution 2.5 mg  2.5 mg Nebulization Q4H PRN  
 amLODIPine (NORVASC) tablet 5 mg  5 mg Oral DAILY  atorvastatin (LIPITOR) tablet 80 mg  80 mg Oral DAILY  carvediloL (COREG) tablet 6.25 mg  6.25 mg Oral BID WITH MEALS  morphine CR (MS CONTIN) tablet 60 mg  60 mg Oral Q12H  
 morphine IR (MS IR) tablet 15 mg  15 mg Oral Q4H PRN  
 ondansetron (ZOFRAN ODT) tablet 8 mg  8 mg Oral Q8H PRN  predniSONE (DELTASONE) tablet 10 mg  10 mg Oral DAILY WITH BREAKFAST  prochlorperazine (COMPAZINE) tablet 10 mg  10 mg Oral Q6H PRN  
 sertraline (ZOLOFT) tablet 25 mg  25 mg Oral DAILY  enoxaparin (LOVENOX) injection 40 mg  40 mg SubCUTAneous Q24H  
 0.9% sodium chloride infusion  75 mL/hr IntraVENous CONTINUOUS Allergies Allergen Reactions  Lithium Analogues Unknown (comments)  Morphine (Pf) Rash Pt is currently taking Morphine 30mg QID  Other Medication Swelling Whatever holds Vit d pill together Social History Tobacco Use  Smoking status: Current Every Day Smoker Packs/day: 0.50 Years: 38.00 Pack years: 19.00 Types: Cigarettes  Smokeless tobacco: Never Used Substance Use Topics  Alcohol use: No  
  Alcohol/week: 2.0 standard drinks Types: 2 Cans of beer per week Frequency: Never Binge frequency: Never Family History Problem Relation Age of Onset  Cancer Mother   
     CAD, Lung cancer  Hypertension Father DM, CAD  Heart Disease Father  Other Sister  Hypertension Brother  Other Son   
 Adopted.  Heart Disease Maternal Grandmother  Heart Disease Maternal Grandfather Review of Systems Gen - no fever, no chills, appetite poor HEENT - no sore throat CV - no chest pain, no palpitation, no orthopnea Lung - + shortness of breath, no cough, no hemoptysis Abd - no tenderness, no nausea/vomiting, no bloody stool Ext - no edema, no cyanosis Musculoskeletal - no joint pain, + neck pain Neurologic - no headaches, + dizziness, no seizures Psychiatric - no anxiety, no depression Skin - no rashes, no pupura Genitourinary - no decreased urine output, no dysuria Objective:  
 
Vitals:  
 02/12/20 0711 02/12/20 0752 02/12/20 1125 02/12/20 1134 BP:  98/50  111/72 Pulse:  61  62 Resp:  17  21 Temp:  98 °F (36.7 °C)  97.3 °F (36.3 °C) SpO2: 91% 94% 91% 93% Weight:      
 
 
Intake/Output Summary (Last 24 hours) at 2/12/2020 1231 Last data filed at 2/12/2020 1139 Gross per 24 hour Intake 1343 ml Output 1800 ml Net -457 ml Physical Exam 
GEN :in no distress, alert and oriented to self, follows commands HEENT: anicteric sclerae, Mucous membranes are moist. 
Neck - supple without JVD 
CV - regular rate, no rub Lung - crackles bilaterally, with expiratory wheezes, lungs expand symmetrically Abd - soft, nontender, bowel sounds present Ext - no clubbing, no cyanosis, no edema Neurologic - deferred Genitourinary - bladder nonpalpable Skin - no rashes, no purpura Psychiatric: Normal mood and affect. Data Review:  
Recent Labs  
  02/12/20 0208 02/11/20 
0249 02/10/20 
0950 WBC 13.0* 10.0 13.9* HGB 8.9* 8.4* 9.2* HCT 28.0* 26.3* 28.5*  349 380 Recent Labs  
  02/12/20 0208 02/11/20 
0249 02/10/20 
0950 * 131* 123* K 3.6 3.8 4.3 CL 97* 96* 92* CO2 27 29 26 BUN 9 14 15 CREA 0.53* 0.58* 0.66 * 95 105* CA 8.7 8.4 8.7 MG  --   --  1.6* No results for input(s): PH, PCO2, PO2, PCO2 in the last 72 hours. Problem List:  
 
Patient Active Problem List  
 Diagnosis Date Noted  Lung cancer ([facility] 02/10/2020  Hypoxia 02/10/2020  Mal[facility] of overlapping sites of left lung [facility] 01/22/2020  Hypokalemia 12/07/2019  Chest pain 12/06/2019  Chronic tension-type headache, intractable 05/13/2019  Cigarette nicotine dependence without complication 78/55/4547  COPD, moderate ([facility]  Recurrent UTI 04/24/2018  Gross hematuria 04/24/2018  Urinary retention 04/24/2018  Sick sinus syndrome ([facility] 09/29/2017  IBS (irritable bowel syndrome)  Stenosis of vertebral artery without cerebral infarction 12/28/2016  Encounter for medication management 12/28/2016  Neuropathy 12/28/2016  Intractable migraine with aura without status migrainosus 12/28/2016  Peripheral vascular disease [facility] 12/01/2016  Borderline personality disorder ([facility]  Cerebrovascular disease  Chronic hepatitis C ([facility]  Chronic pain  ASCUS with positive high risk HPV cervical   
 Pacemaker 08/10/2016  Chronic diastolic congestive heart failure [facility] 08/09/2016  Coronary atherosclerosis of native coronary vessel 08/09/2016  
 HTN (hypertension), benign 08/09/2016  Dyslipidemia 08/09/2016  Tobacco use disorder  Abnormal MRI of head 01/01/2015  Cocaine abuse in remission [facility] 09/18/2012  S/P CABG x 3 09/18/2012  Bipolar disorder ([facility] 09/18/2012 Impression: 
 
Plan: 1. Hyponatremia likely SIADH- in the setting of lung cancer, check Sosm, Uosm, and Stu. Consider weaning off SSRI. SNa 123->131->132, improving with gentle NS- trend closely 2. Lung cancer plan for chemo tomorrow if ECHO ok per oncology 3. Shortness of breath and cough- on cefepime and vanco for ? pneumonia -ECHO pending, pulmonary to follow

## 2020-02-12 NOTE — CONSULTS
CONSULT NOTE [facility] 2/12/2020 Date of Admission:  2/10/2020 The patient's chart is reviewed and the patient is discussed with the staff. Subjective:  
 
Patient is a 48 y.o.  female seen and evaluated at the request of Dr. Rayna Donahue. Pt is well known to [facility] with a history of cocaine abuse(as recent as 1/10/2020 reportedly, + UDS on 1/30/2020), tobacco abuse 1/2 ppd x 38 years, COPD, chronic pain, DLD, chronic hepatitis C, chronic diastolic CHF, CAD, bipolar disorder with borderline personality disorder, CAD s/ CABG, and ASCUS with positive high risk HPV cervical. Pt presented to the ER on 1/13/2020 with cough, shortness of breath and R sided chest pain. CXR identified new widening of the superior mediastinum in comparison to recent prior chest x-rays. Chest CT showed an incompletely characterized anterior mediastinal mass extending to the right supraclavicular region, but appearing to be separate from the thyroid gland, measuring approximately 3.9 cm in AP diameter. Pulmonology and surgical evaluation were recommended. Patient was discharged with instructions to follow-up with pulmonary outpatient. Pt was seen and underwent bronch with EBUS and FNA with airway inspection. The 4R location tumor was biopsied and positive for malignancy on SATNAM. Cytology from FNA of 2R-4R location of lung mass, revealed the presence of malignant cells. Immunohistochemical features were suggestive of carcinoma of hepatic origin given staining for cytoplasmic TTF-1 as well as arginase. Pathologist recommended clinical and imaging correlation, and noted that additional tissue may be necessary for further evaluation given relatively low cellularity of cell block preparation.  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 Pt had enlarged hypermetabolic mediastinal LN and soft tissue mass in the R supraclavicular soft tissues  Measuring 4.9x2.9cm separate from thyroid. Pt was referred to oncology and admitted on 2/10/2020 as pt could not tolerate IVF prior to chemo secondary to dyspnea and acute respiratory failure. Pt's Na was 123 with concern for SIADH. Nephrology was consulted. An echo was ordered and is pending. Pt has had increased O2 requirements and increased cough. We were consulted to assist.  
Pt is lying in bed and unable to answer any questions coherently. Pt will open eyes and then closes them back again. Pt will start to say something and her sentence trails off and she falls back to sleep. She then starts picking at her O2 tubing and tries to get out of bed. Pt reportedly alert and oriented yesterday but her son stayed overnight. Pt has UDS ordered and pending given change in status and history of drug abuse. Pt is not on O2 at home. We will check stat ABG. Review of Systems Review of systems not obtained due to patient factors. Patient Active Problem List  
Diagnosis Code  Acute respiratory failure with hypoxia (HCC) J96.01  
 Cocaine abuse in remission ([facility] F14.11  
 S/P CABG x 3 Z95.1  Bipolar disorder ([facility] [facility] F31.9  Chronic diastolic congestive heart failure (HCC) I50.32  
 Coronary atherosclerosis of native coronary vessel I25.10  Tobacco use disorder F17.200  
 HTN (hypertension), benign I10  Dyslipidemia E78.5  Pacemaker Z95.0  Borderline personality disorder ([facility] [facility] F60.3  Cerebrovascular disease I67.9  Chronic hepatitis C ([facility] B18.2  Chronic pain G89.29  
 ASCUS with positive high risk HPV cervical R87.610, R87.810  
 Abnormal MRI of head R93.0  Peripheral vascular disease (HCC) I73.9  Stenosis of vertebral artery without cerebral infarction I65.09  
  Encounter for medication management I69.214  Neuropathy G62.9  
 Intractable migraine with aura without status migrainosus G43.119  
 IBS (irritable bowel syndrome) K58.9  Sick sinus syndrome (HCC) I49.5  Recurrent UTI N39.0  Gross hematuria R31.0  
 Urinary retention R33.9  COPD exacerbation ([facility] [address] J44.1  Cigarette nicotine dependence without complication L57.502  Chronic tension-type headache, intractable G44.221  
 Chest pain R07.9  Hypokalemia E87.6  Malignant neoplasm of overlapping sites of left lung (HCC) C34.82  
 Lung cancer (HCC) C34.90  Hypoxia R09.02 Home DME company none. Prior to Admission Medications Prescriptions Last Dose Informant Patient Reported? Taking? albuterol (PROAIR HFA) 90 mcg/actuation inhaler 2/9/2020 at Unknown time  No Yes Sig: Take 2 Puffs by inhalation every four (4) hours as needed for Wheezing. amLODIPine (NORVASC) 5 mg tablet 2/9/2020 at Unknown time  No Yes Sig: Take 1 Tab by mouth daily. atorvastatin (LIPITOR) 80 mg tablet 2/9/2020 at Unknown time  No Yes Sig: Take 1 Tab by mouth daily. carvedilol (COREG) 6.25 mg tablet 2/9/2020 at Unknown time  No Yes Sig: Take 1 Tab by mouth two (2) times daily (with meals). cephALEXin (KEFLEX) 500 mg capsule   No No  
Sig: Take 1 Cap by mouth three (3) times daily for 7 days. guaiFENesin (ORGANIDIN) 400 mg tablet 2/9/2020 at Unknown time  Yes Yes Sig: Take 400 mg by mouth every four (4) hours. ipratropium (ATROVENT HFA) 17 mcg/actuation inhaler 2/9/2020 at Unknown time  No Yes Sig: Take 2 Puffs by inhalation four (4) times daily. Patient taking differently: Take 2 Puffs by inhalation four (4) times daily. Pt states she is out of samples and is waiting for Rx and insurance to cover  
loratadine 10 mg cap   No No  
Sig: Take 1 Cap by mouth daily. Patient taking differently: Take 2 Caps by mouth daily.   
morphine CR (MS CONTIN) 60 mg CR tablet   No No  
 Sig: Take 1 Tab by mouth every twelve (12) hours for 7 days. Max Daily Amount: 120 mg.  
morphine IR (MS IR) 15 mg tablet   No No  
Sig: Take 1 Tab by mouth every four to six (4-6) hours as needed for Pain for up to 7 days. Max Daily Amount: 90 mg.  
naloxone (NARCAN) 4 mg/actuation nasal spray   No No  
Sig: Use 1 spray intranasally, then discard. Repeat with new spray every 2 min as needed for opioid overdose symptoms, alternating nostrils. nitroglycerin (NITROSTAT) 0.4 mg SL tablet Unknown at Unknown time  Yes No  
Sig: by SubLINGual route every five (5) minutes as needed. ondansetron hcl (ZOFRAN) 8 mg tablet Unknown at Unknown time  No No  
Sig: Take 1 Tab by mouth every eight (8) hours as needed for Nausea. Indications: nausea and vomiting caused by cancer drugs  
potassium chloride (KLOR-CON) 10 mEq tablet 2/9/2020 at Unknown time  No Yes Sig: Take 1 Tab by mouth two (2) times a day. predniSONE (DELTASONE) 10 mg tablet 2/9/2020 at Unknown time  No Yes Sig: Take 10 mg by mouth daily (with breakfast). prochlorperazine (COMPAZINE) 10 mg tablet Unknown at Unknown time  No No  
Sig: Take 1 Tab by mouth every six (6) hours as needed for Nausea. Indications: nausea and vomiting caused by cancer drugs, nausea and vomiting  
senna (SENOKOT) 8.6 mg tablet 2/9/2020 at Unknown time  Yes Yes Sig: Take 1 Tab by mouth daily. sertraline (ZOLOFT) 25 mg tablet 2/9/2020 at Unknown time  No Yes Sig: Take 1 Tab by mouth daily. Indications: anxiousness associated with depression  
tiotropium-olodaterol (STIOLTO RESPIMAT) 2.5-2.5 mcg/actuation inhaler 2/9/2020 at Unknown time  Yes Yes Sig: Take 2 Inhalation by inhalation two (2) times a day. Facility-Administered Medications: None Past Medical History:  
Diagnosis Date  Abnormal MRI of head 2015 Frontal Lobe periventricular WM disease. Ruled out for MS by Neurology  ASCUS with positive high risk HPV cervical   
 Bipolar disorder ([facility] [address]  Borderline personality disorder ([address]  CAD   
 cabg 3 ves 2011-- 1. Normal left ventricular systolic function. Stable-appearing atherosclerotic coronary disease with stable mid LAD diagonal bifurcation disease, normal-appearing left circumflex and right coronary systems.  Cerebrovascular disease  Chronic diastolic congestive heart failure ([address]   
 8/2019 Echo LVEF 65-70%  Chronic hepatitis C ([address] Treated, in remission  Chronic pain  Cocaine abuse ([address] 12/2019  COPD (chronic obstructive pulmonary disease) (HCC) Moderate- awaiting Rx for inhalers, also dx lung cancer- no current supplemental oxygen  Dyslipidemia  Heart attack [facility]) 2011  History of left heart catheterization 12/06/2019 1. Normal left ventricular systolic function. Stable-appearing atherosclerotic coronary disease with stable mid LAD diagonal bifurcation disease, normal-appearing left circumflex and right coronary systems.  HTN   
 IBS (irritable bowel syndrome)  Lung cancer ([address]  Methamphetamine abuse ([address] 12/2019 Meth and cocaine abuse  Pacemaker Sick Sinus Syndrome  Psychiatric disorder  S/P CABG x 3   
 SSS (sick sinus syndrome) ([address]   
 pacemaker  Subclavian artery stenosis, right ([address] 2016 Moderate to severe  Vertebral artery stenosis 2016 Asymptomatic Left Severe Past Surgical History:  
Procedure Laterality Date  APPENDECTOMY  COLONOSCOPY N/A 6/22/2018 COLONOSCOPY/ 25 performed by Sharron Mcdermott MD at [address] [place] [address], [facility] Abruption, CPD  HX CORONARY ARTERY BYPASS GRAFT  07/2011  
 3 vessel  HX HEART CATHETERIZATION    
 7/2012, 8/2012-occluded grafts  HX HERNIA REPAIR    
 HX ORTHOPAEDIC    
 left leg  HX PACEMAKER  2015~ [employer]- left chest- pacer check report 8/24/2019 Not pacer dep  HX TUBAL LIGATION    
 IR INSERT TUNL CVC W PORT OVER 5 YEARS  2/5/2020  MT COLSC FLX W/RMVL OF TUMOR POLYP LESION SNARE TQ  6/22/2018 Social History Socioeconomic History  Marital status:  Spouse name: Not on file  Number of children: 2  
 Years of education: Not on file  Highest education level: Not on file Occupational History  Occupation: Disabled Social Needs  Financial resource strain: Not on file  Food insecurity:  
  Worry: Not on file Inability: Not on file  Transportation needs:  
  Medical: Not on file Non-medical: Not on file Tobacco Use  Smoking status: Current Every Day Smoker Packs/day: 0.50 Years: 38.00 Pack years: 19.00 Types: Cigarettes  Smokeless tobacco: Never Used Substance and Sexual Activity  Alcohol use: No  
  Alcohol/week: 2.0 standard drinks Types: 2 Cans of beer per week Frequency: Never Binge frequency: Never  Drug use: Yes Types: Cocaine Comment: positive ampheteamine 12/6  Sexual activity: Yes  
  Partners: Male Lifestyle  Physical activity:  
  Days per week: Not on file Minutes per session: Not on file  Stress: Not on file Relationships  Social connections:  
  Talks on phone: Not on file Gets together: Not on file Attends Gnosticist service: Not on file Active member of club or organization: Not on file Attends meetings of clubs or organizations: Not on file Relationship status: Not on file  Intimate partner violence:  
  Fear of current or ex partner: Not on file Emotionally abused: Not on file Physically abused: Not on file Forced sexual activity: Not on file Other Topics Concern  Not on file Social History Narrative  and lives with roommate. 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 Has a son in his 19's. Adopted son. Family History Problem Relation Age of Onset  Cancer Mother   
     CAD, Lung cancer  Hypertension Father DM, CAD  Heart Disease Father  Other Sister  Hypertension Brother  Other Son Adopted.  Heart Disease Maternal Grandmother  Heart Disease Maternal Grandfather Allergies Allergen Reactions  Lithium Analogues Unknown (comments)  Morphine (Pf) Rash Pt is currently taking Morphine 30mg QID  Other Medication Swelling Whatever holds Vit d pill together Current Facility-Administered Medications Medication Dose Route Frequency  albuterol-ipratropium (DUO-NEB) 2.5 MG-0.5 MG/3 ML  3 mL Nebulization Q4H RT  
 morphine injection 1 mg  1 mg IntraVENous ONCE PRN  
 [START ON 2/13/2020] Vancomycin Trough Reminder   Other ONCE  
 guaiFENesin ER (MUCINEX) tablet 1,200 mg  1,200 mg Oral BID  cefepime (MAXIPIME) 2 g in 0.9% sodium chloride (MBP/ADV) 100 mL  2 g IntraVENous Q12H  
 vancomycin (VANCOCIN) 1,000 mg in 0.9% sodium chloride (MBP/ADV) 250 mL  1,000 mg IntraVENous Q12H  
 LORazepam (ATIVAN) tablet 0.5 mg  0.5 mg Oral Q6H PRN  
 albuterol (PROVENTIL VENTOLIN) nebulizer solution 2.5 mg  2.5 mg Nebulization Q4H PRN  
 amLODIPine (NORVASC) tablet 5 mg  5 mg Oral DAILY  atorvastatin (LIPITOR) tablet 80 mg  80 mg Oral DAILY  carvediloL (COREG) tablet 6.25 mg  6.25 mg Oral BID WITH MEALS  morphine CR (MS CONTIN) tablet 60 mg  60 mg Oral Q12H  
 morphine IR (MS IR) tablet 15 mg  15 mg Oral Q4H PRN  
 ondansetron (ZOFRAN ODT) tablet 8 mg  8 mg Oral Q8H PRN  predniSONE (DELTASONE) tablet 10 mg  10 mg Oral DAILY WITH BREAKFAST  prochlorperazine (COMPAZINE) tablet 10 mg  10 mg Oral Q6H PRN  
 sertraline (ZOLOFT) tablet 25 mg  25 mg Oral DAILY  enoxaparin (LOVENOX) injection 40 mg  40 mg SubCUTAneous Q24H  
 0.9% sodium chloride infusion  75 mL/hr IntraVENous CONTINUOUS Objective:  
 
Vitals:  
 02/12/20 0711 02/12/20 0752 02/12/20 1125 02/12/20 1134 BP:  98/50  111/72 Pulse:  61  62 Resp:  17  21 Temp:  98 °F (36.7 °C)  97.3 °F (36.3 °C) SpO2: 91% 94% 91% 93% Weight: PHYSICAL EXAM  
 
Constitutional:  Pt is lying in bed on 6L O2 
EENMT:  Sclera clear, pupils equal, oral mucosa moist 
Respiratory: coarse rhonchi and wheezing anteriorly Cardiovascular:  RRR without M,G,R 
Gastrointestinal: soft and non-tender; with positive bowel sounds. Musculoskeletal: warm without cyanosis. There is no lower extremity edema. Skin:  no jaundice or rashes Neurologic: no gross neuro deficits Psychiatric:  Not alert or oriented CXR:   
 
 
 
 
Recent Labs  
  02/12/20 
0208 02/11/20 
0249 02/10/20 
0950 WBC 13.0* 10.0 13.9* HGB 8.9* 8.4* 9.2* HCT 28.0* 26.3* 28.5*  349 380 Recent Labs  
  02/12/20 
0208 02/11/20 
0249 02/10/20 
0950 * 131* 123* K 3.6 3.8 4.3 CL 97* 96* 92* * 95 105* CO2 27 29 26 BUN 9 14 15 CREA 0.53* 0.58* 0.66  
MG  --   --  1.6*  
CA 8.7 8.4 8.7 ALB 2.1* 2.0* 2.2* TBILI 0.4 0.5 0.7 ALT 10* 11* 13 SGOT 20 17 19 No results for input(s): PH, PCO2, PO2, HCO3, PHI, PCO2I, PO2I, HCO3I in the last 72 hours. No results for input(s): LCAD, LAC in the last 72 hours. Assessment:  (Medical Decision Making) Hospital Problems  Date Reviewed: 2/12/2020 Codes Class Noted POA Lung cancer [facility]) ICD-10-CM: C34.90 ICD-9-CM: 162.9  2/10/2020 Unknown Per primary, awaiting chemo initiation Hypoxia ICD-10-CM: R09.02 
ICD-9-CM: 799.02  2/10/2020 Unknown On 6L O2 presently, will need ABG and probable optiflow COPD exacerbation ([facility] [address] ICD-10-CM: J44.1 ICD-9-CM: 491.21  Unknown Yes On cefepime and vanc, will add solumedrol Acute respiratory failure with hypoxia ([facility] [address] ICD-10-CM: J96.01 
ICD-9-CM: 518.81  7/12/2011 Yes Check ABG and very high likelihood for ICU Plan:  (Medical Decision Making) --check stat ABG 
--check stat CXR 
--continue vanc and maxipime 
--start solumedrol  
--UDS pending --very low threshold for transfer to the ICU More than 50% of the time documented was spent in face-to-face contact with the patient and in the care of the patient on the floor/unit where the patient is located. Thank you very much for this referral.  We appreciate the opportunity to participate in this patient's care. Will follow along with above stated plan. SPARKLE Perez Lungs : b/l rhonchi Heart S1 and S2 audible, no murmers or rubs appreciated Other Patient looks unwell. Head tilted back and likely done since mass is compressing her airway. ABG now and will likely start AIRVO vs BIPAP and may need to goto the ICU afterwards. She did get radiation today and needs her chemo as soon as possible. Needs her Chemo/radiation as soon as possible. I have spoken with and examined the patient. I have reviewed the history, examination, assessment, and plan and agree with the above. David Aldana MD 
 
 
This note was signed electronically. Errors are unfortunately her likely due to dictation software.

## 2020-02-12 NOTE — PROGRESS NOTES
Critical Care Outreach Nurse Progress Report: 
 
Subjective: In to assess pt secondary to respiratory status. MEWS Score: 2 (02/12/20 1134) Vitals:  
 02/12/20 0752 02/12/20 1125 02/12/20 1134 02/12/20 1433 BP: 98/50  111/72 Pulse: 61  62 Resp: 17  21 Temp: 98 °F (36.7 °C)  97.3 °F (36.3 °C) SpO2: 94% 91% 93% 98% Weight:      
  
 
Objective: Patient in bed Pain Intensity 1: 0 (02/12/20 1451) Pain Location 1: Back, Chest, Neck Pain Intervention(s) 1: Medication (see MAR) Patient Stated Pain Goal: 0 Assessment: Patient with increased work of breathing. Respiratory distress after radiation today. Pulmonary seeing patient now. ABG pending. Plan: Will follow per outreach protocol.

## 2020-02-12 NOTE — PROGRESS NOTES
Critical Care Outreach Nurse Progress Report: 
 
Subjective: In to assess pt MEWS Score: 2 (02/12/20 1134) Vitals:  
 02/12/20 0752 02/12/20 1125 02/12/20 1134 02/12/20 1433 BP: 98/50  111/72 Pulse: 61  62 Resp: 17  21 Temp: 98 °F (36.7 °C)  97.3 °F (36.3 °C) SpO2: 94% 91% 93% 98% Weight:      
  
 
Objective: Patient lying in bed. Pain Intensity 1: 0 (02/12/20 1451) Pain Location 1: Back, Chest, Neck Pain Intervention(s) 1: Medication (see MAR) Patient Stated Pain Goal: 0 Assessment: Patient on Airvo and still showing signs of confusion. Tolerating Airvo. Vital signs stable. Plan: Will monitor per outreach protocol.

## 2020-02-12 NOTE — PROGRESS NOTES
[facility] Hematology & Oncology Inpatient Hematology / Oncology Progress Note Admission Date: 2/10/2020  3:39 PM 
Reason for Admission/Hospital Course: Lung cancer ([address] [C34.90] Hypoxia [R09.02] 
 
 
24 Hour Events: 
Afebrile, hypotensive at times Oxygen needs increased overnight On 5LPM NC Echo pending Over to XRT for rescanning ROS: 
+ cough 
+shortness of breath 
+ pain 10 point review of systems is otherwise negative with the exception of the elements mentioned above in the HPI. Allergies Allergen Reactions  Lithium Analogues Unknown (comments)  Morphine (Pf) Rash Pt is currently taking Morphine 30mg QID  Other Medication Swelling Whatever holds Vit d pill together OBJECTIVE: 
Patient Vitals for the past 8 hrs: 
 BP Temp Pulse Resp SpO2  
20 0752 98/50 98 °F (36.7 °C) 61 17 94 % 20 0711     91 % 20 0513     96 % 20 0240 118/72 97.8 °F (36.6 °C) 76 19 93 % 20 0206     93 % Temp (24hrs), Av.1 °F (36.7 °C), Min:97.5 °F (36.4 °C), Max:98.7 °F (37.1 °C) 
 
 07 -  1900 In: 581 [P.O.:120; I.V.:461] Out: - Physical Exam: 
Constitutional: Ill appearing  female in no acute distress, sitting comfortably in the hospital bed. Sleeping during rounds this AM, but opens eyes when spoken too. HEENT: Normocephalic and atraumatic. Oropharynx is clear, mucous membranes are moist.  Pupils are equal, round, and reactive to light. Extraocular muscles are intact. Sclerae anicteric. Neck supple without JVD. No thyromegaly present. Lymph node 
 deferred. Skin Warm and dry. No bruising and no rash noted. No erythema. No pallor. Respiratory Lungs are coarse; normal air exchange without accessory muscle use. On nasal cannula at 5 LPM. CVS Normal rate, regular rhythm and normal S1 and S2. No murmurs, gallops, or rubs. Abdomen Soft, nontender and nondistended, normoactive bowel sounds. No palpable mass. No hepatosplenomegaly. Neuro Grossly nonfocal with no obvious sensory or motor deficits. MSK Normal range of motion in general.  No edema and no tenderness. Psych Appropriate mood and affect. Labs: 
   
Recent Labs  
  02/12/20 0208 02/11/20 
0249 02/10/20 
0950 WBC 13.0* 10.0 13.9*  
RBC 3.13* 2.91* 3.23* HGB 8.9* 8.4* 9.2* HCT 28.0* 26.3* 28.5* MCV 89.5 90.4 88.2 MCH 28.4 28.9 28.5 MCHC 31.8 31.9 32.3 RDW 13.1 13.1 12.9  349 380 GRANS 80* 74 81* LYMPH 10* 14 10* MONOS 9 11 8 EOS 0* 0* 0*  
BASOS 0 0 0 IG 1 0 0  
DF AUTOMATED AUTOMATED AUTOMATED ANEU 10.4* 7.5 11.3* ABL 1.3 1.4 1.4 ABM 1.2 1.1 1.1 ROSA ISELA 0.0 0.0 0.0 ABB 0.0 0.0 0.0 AIG 0.1 0.0 0.1 Recent Labs  
  02/12/20 0208 02/11/20 
0249 02/10/20 
0950 * 131* 123* K 3.6 3.8 4.3 CL 97* 96* 92* CO2 27 29 26 AGAP 8 6* 5* * 95 105* BUN 9 14 15 CREA 0.53* 0.58* 0.66 GFRAA >60 >60 >60 GFRNA >60 >60 >60  
CA 8.7 8.4 8.7 SGOT 20 17 19 AP 71 69 81  
TP 6.6 6.0* 6.9 ALB 2.1* 2.0* 2.2*  
GLOB 4.5* 4.0* 4.7* AGRAT 0.5* 0.5* 0.5* MG  --   --  1.6* Imaging: 
 
Medications: 
Current Facility-Administered Medications Medication Dose Route Frequency  albuterol-ipratropium (DUO-NEB) 2.5 MG-0.5 MG/3 ML  3 mL Nebulization Q4H RT  
 morphine injection 1 mg  1 mg IntraVENous ONCE PRN  
 [START ON 2/13/2020] Vancomycin Trough Reminder   Other ONCE  
 guaiFENesin ER (MUCINEX) tablet 1,200 mg  1,200 mg Oral BID  cefepime (MAXIPIME) 2 g in 0.9% sodium chloride (MBP/ADV) 100 mL  2 g IntraVENous Q12H  
 vancomycin (VANCOCIN) 1,000 mg in 0.9% sodium chloride (MBP/ADV) 250 mL  1,000 mg IntraVENous Q12H  
 LORazepam (ATIVAN) tablet 0.5 mg  0.5 mg Oral Q6H PRN  
 albuterol (PROVENTIL VENTOLIN) nebulizer solution 2.5 mg  2.5 mg Nebulization Q4H PRN  
  amLODIPine (NORVASC) tablet 5 mg  5 mg Oral DAILY  atorvastatin (LIPITOR) tablet 80 mg  80 mg Oral DAILY  carvediloL (COREG) tablet 6.25 mg  6.25 mg Oral BID WITH MEALS  morphine CR (MS CONTIN) tablet 60 mg  60 mg Oral Q12H  
 morphine IR (MS IR) tablet 15 mg  15 mg Oral Q4H PRN  
 ondansetron (ZOFRAN ODT) tablet 8 mg  8 mg Oral Q8H PRN  predniSONE (DELTASONE) tablet 10 mg  10 mg Oral DAILY WITH BREAKFAST  prochlorperazine (COMPAZINE) tablet 10 mg  10 mg Oral Q6H PRN  
 sertraline (ZOLOFT) tablet 25 mg  25 mg Oral DAILY  enoxaparin (LOVENOX) injection 40 mg  40 mg SubCUTAneous Q24H  
 0.9% sodium chloride infusion  75 mL/hr IntraVENous CONTINUOUS  
 
 
 
ASSESSMENT: 
 
Problem List  Date Reviewed: 2/10/2020 Codes Class Noted Lung cancer [facility]) ICD-10-CM: C34.90 ICD-9-CM: 162.9  2/10/2020 Hypoxia ICD-10-CM: R09.02 
ICD-9-CM: 799.02  2/10/2020 Malignant neoplasm of overlapping sites of left lung [facility]) ICD-10-CM: C34.82 
ICD-9-CM: 162.8  1/22/2020 Hypokalemia ICD-10-CM: E87.6 ICD-9-CM: 276.8  12/7/2019 Chest pain ICD-10-CM: R07.9 ICD-9-CM: 786.50  12/6/2019 Chronic tension-type headache, intractable ICD-10-CM: R32.910 ICD-9-CM: 339.12  5/13/2019 Cigarette nicotine dependence without complication CDE-69-ZO: V91.053 ICD-9-CM: 305.1  7/3/2018 COPD, moderate ([address] (Chronic) ICD-10-CM: J44.9 ICD-9-CM: 496  Unknown Recurrent UTI ICD-10-CM: N39.0 ICD-9-CM: 599.0  4/24/2018 Gross hematuria ICD-10-CM: R31.0 ICD-9-CM: 599.71  4/24/2018 Urinary retention ICD-10-CM: R33.9 ICD-9-CM: 788.20  4/24/2018 Sick sinus syndrome (HCC) (Chronic) ICD-10-CM: I49.5 ICD-9-CM: 427.81  9/29/2017 IBS (irritable bowel syndrome) ICD-10-CM: K58.9 ICD-9-CM: 564.1  Unknown 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 
ICD-9-CM: 433.20  12/28/2016 Encounter for medication management ICD-10-CM: G53.938 ICD-9-CM: V58.69  12/28/2016 Neuropathy (Chronic) ICD-10-CM: G62.9 ICD-9-CM: 355.9  12/28/2016 Overview Signed 7/28/2017  2:20 PM by Juana Smith MD  
  Last Assessment & Plan:  
Patient is currently on gabapentin 600 mg. Medication may cause increased drowsiness with Rispirdol, however pt has no complaints today. Intractable migraine with aura without status migrainosus ICD-10-CM: G43.119 ICD-9-CM: 346.01  12/28/2016 Peripheral vascular disease (HCC) (Chronic) ICD-10-CM: I73.9 ICD-9-CM: 443.9  12/1/2016 Overview Signed 12/1/2016  4:23 PM by Jessie House MD  
  CTA (2/11/16): Moderate to severe stenosis in the proximal right subclavian artery. Borderline personality disorder [facility] ICD-10-CM: F60.3 ICD-9-CM: 301.83  Unknown Overview Signed 7/28/2017  2:20 PM by Juana Smith MD  
  Last Assessment & Plan: 1. Continue inpatient hospitalization 2. Lithium level 1.5. Will hold am dose and change to 300mg PO BID. Will recheck in 3 days. 3.  Continue Neurontin 4. Consider Vistaril PRN if indicated 5. Encourage active participation in groups and on the milieu 6. Consider discharge and aftercare needs 7. Encourage abstinence from drugs of abuse Cerebrovascular disease ICD-10-CM: I67.9 ICD-9-CM: 437.9  Unknown Overview Signed 12/1/2016  4:23 PM by Jessie House MD  
  CTA of the neck (2/11/16) : No significant stenosis of the right carotid artery. 37% stenosis in the proximal left internal carotid artery. Severe stenosis at the origin of the left vertebral artery Chronic hepatitis C ([facility] ICD-10-CM: B18.2 ICD-9-CM: 070.54  Unknown Chronic pain ICD-10-CM: G89.29 ICD-9-CM: 338.29  Unknown  Overview Signed 7/28/2017  2:20 PM by Juana Smith MD  
  Last Assessment & Plan:  
Patient has history of chronic neck and back pain secondary to degenerative changes. She was recently prescribed a 15 day supply of Dilaudid 2 mg TID by \"pain management doctor\". This script was filled on 5.8. 17. Plan: - Given patient's history of substance use, she would largely benefit from detox of narcotics. Will try conservative methods while inpatient for back pain as well as neck and shoulder pain. Continue Ultram 100 mg Q6H while we await permission to speak to pain management doctor. Nelly Sorto office in regards to patient's pain management contract. Patient has been dismissed from his services for positive UDS. - Avoid Toradol given her elevated Creatinine and glucose at time of admission. A1c normal at 5.4. ASCUS with positive high risk HPV cervical ICD-10-CM: R87.610, R87.810 ICD-9-CM: 795.01, 795.05  Unknown Pacemaker ICD-10-CM: Z95.0 ICD-9-CM: V45.01  8/10/2016 Overview Signed 8/10/2016 10:47 AM by Jerson Malik MD  
  1. [employer]  :  Due to sick sinus syndrome. Chronic diastolic congestive heart failure (HCC) (Chronic) ICD-10-CM: I50.32 
ICD-9-CM: 428.32, 428.0  8/9/2016 Overview Addendum 8/27/2019  5:11 PM by Jerson Malik MD  
  [place] [facility] [place] and elevated BNP at 352. 
1.  Echo (8/3/11):  EF > 55%. Mild LVH. Pseudonormal LV filling pattern. Bi-atrial enlargement. .  Mild mitral regurgitation. 2.  Echo (8/22/19):  EF 65-70%. Indeterminant diastolic function. Mild LAE. Coronary atherosclerosis of native coronary vessel (Chronic) ICD-10-CM: I25.10 ICD-9-CM: 414.01  8/9/2016 Overview Addendum 12/9/2019  7:53 AM by Jerson Malik MD  
  1.  3 Vessel CABG (7/8/11):  MIR to LAD. LIMA in Y graft to superior and inferior diagonal. 
2.  Cath (8/31/12): Occluded grafts. FFR of LAD 0.89. Similar stenosis of diagonal but no FFR done. 3.  LHC (11/1/17): Mild to moderate nonhemodynamically significant CAD. Normal IFR of LAD (40%: 0.98) and Diagonal (50%: 0.98) 4. [facility] (12/7/19): Stable CAD. Tobacco use disorder (Chronic) ICD-10-CM: Y79.744 ICD-9-CM: 305.1  Unknown HTN (hypertension), benign (Chronic) ICD-10-CM: I10 
ICD-9-CM: 401.1  8/9/2016 Dyslipidemia (Chronic) ICD-10-CM: B82.0 ICD-9-CM: 272.4  8/9/2016 Abnormal MRI of head ICD-10-CM: R93.0 ICD-9-CM: 793.0  1/1/2015 Overview Signed 8/27/2016  4:28 PM by Francisco Quispe MD  
  Frontal Lobe periventricular WM disease. Ruled out for MS by Neurology Cocaine abuse in remission [facility]) ICD-10-CM: F14.11 ICD-9-CM: 305.63  9/18/2012 S/P CABG x 3 (Chronic) ICD-10-CM: Z95.1 ICD-9-CM: V45.81  9/18/2012 Bipolar disorder (HCC) (Chronic) ICD-10-CM: F31.9 ICD-9-CM: 296.80  9/18/2012  
   
  
 
   
48 y.o. F consulted for cancer in 1/2020. She worked up neck/upper chest pain with CT finding of upper mediastinal mass, EBUS 1/22/20 with FNA showed malignant cells with broad IHC negativity except for suggestion of liver origin. She had active drug abuse as documented reason being discharged from her prior pain management. She presented to [facility] on 1/30/20, has missed the scheduled PET, reported moved in with her brother and a friend drove her to visit.  We discussed her rather atypical cancer presentation, called radiology to urgently re-schedule PET to further define the origin of cancer and extent, discussed her narco management and she gave inconsistent answers regarding whether and when she used cocaine, also stated she stopped pain med herself rather then being discharged by Dr. Tequila Barrera, although she appeared in pain, involved SW and palliative care, signed narco contract, yet urine screen positive for cocaine, then she changed her story of last use 1 week ago but that should not lead to positive urine test either, discussed the breaking of contract and trust voids prescription of narco. PET 1/30/20 showed large right mediastinal/supraclavicle avid disease but no evidence of disease otherwise and no suspicious disease in the liver, I reviewed PET personally and discussed to have urgent excisional biopsy for more tissue of histology and molecular tests, meanwhile I would not rec treat this as liver cancer, but rather treat with protocol of limited stage SCLC or stage III NSCLC if brain MRI negative to aim at possible cure or durable control, she was agreeable and Adam will arrange for urgent biopsy/port/rad onc eval, start cisplatin/etoposide/XRT 2/10/20, hyponatremia and weight gain concern of SIADH and instructed not to drink excessively and monitor, SVC syndrome and monitor closely for rx response, however she was not able to tolerate pre-hydration for cisplatin and admit for SOB likely related to SVC syndrome, administer chemo inpt with slow rate, may dosing XRT upfront for SVC relief, IR eval if needed. PLAN: 
Lung cancer 
-supposed to start Cis/VP16/XRT yesterday. 2/11 plan for chemo tomorrow if echo ok. XRT plan is not completed yet. 2/12 start chemo today if echo ok. Over to XRT for rescanning today. Shortness of breath/Cough 
- on 2 LPM NC. Mucinex BID. CXR with known malignancy and RLL infiltrate. 2/11 start cef/vanc for possible PNA. Check echo. 2/12 continues on cef/vanc. Echo pending. Increased oxygen needs overnight, up to 5 LPM. pulm consult pending. Check BNP for completeness. Echo still pending. ?SIDAH/hyponatremia - On IV fluids at this time with some improvement. Check urine sodium. Consult nephro 2/12 neph consult pending.  today. Continue home medications Cari SOPs Lovenox for DVT prophylaxis Goals and plan of care reviewed with the patient. All questions answered to the best of our ability. Jenae Musa, EDDY-ROBBIE [facility] Hematology and Oncology [id number] [address] Office : [phone] Fax : [phone]

## 2020-02-12 NOTE — PROGRESS NOTES
2107- spoke with the pt family ( Son and Friend) the pt voices concern of not knowing what is going on, explained to the family next step in plan of care will continue to monitor. 2200- sat with the pt and explained reason for admission, pt states that no one has informed her of plan of care. This nurse explained the plan as best she could. Pt voices an understanding will continue to monitor. 0000- pt in bed alert, family at the bedside pt denies need at the present time will continue to monitor. 4931- entered room to check pt,  Pt awakes and states no needs then begins to cry with complaints of pain, morphine IR given po, pt encouraged to call should need arises. 0205-called into room after pt had a \"coughing spell\" pt states \" I cant breathe\" the pt has a sat of 93 %, respiratory was called to give a prn breathing treatment. 0.5 ativan given po for anxiety. Will continue to monitor. 9434- called into room by pt, stating \" I can't breathe again\" entered room to pt with oxygen off, oxygen replaced, sat 83% on room air, sat quickly came up to 94% pt complained of pain 8/10 morphine IR given will continue to monitor. 6384- called into room by pt, Oxygen in place sats 85 % on 4 liters, oxygen increased to 6 liter and respiratory called to the bedside. Treatment given and oxygen is now at 7 liters, David Matt, NP called and notified of change in status. Will continue to monitor pt.  
 
0601- pt resting with eyes closed and audible wheezing, pt is talking in her sleep, pt also believes she is having conversations with others in room when no one is there. Will continue to monitor. 7377- Attempted to get in touch with the rover and was unsuccessful so the CC of ICU has been notified of the pt's change and increate in oxygen needs. END OF SHIFT NOTE: 
 
Intake/Output No intake/output data recorded. Voiding: YES Catheter: NO 
Drain:   
 
 
 
 
 
Stool:  0 occurrences. Emesis:  0 occurrences. VITAL SIGNS Patient Vitals for the past 12 hrs: 
 Temp Pulse Resp BP SpO2  
02/12/20 0711     91 % 02/12/20 0513     96 % 02/12/20 0240 97.8 °F (36.6 °C) 76 19 118/72 93 % 02/12/20 0206     93 % 02/11/20 2300 98.6 °F (37 °C) 83 18 121/65 93 % Pain Assessment Pain 1 Pain Scale 1: Numeric (0 - 10) (02/12/20 0450) Pain Intensity 1: 8 (02/12/20 0450) Patient Stated Pain Goal: 0 (02/12/20 0450) Pain Reassessment 1: Patient resting w/respiratory rate greater than 10 (02/12/20 0148) Pain Location 1: Back; Chest;Neck (02/12/20 0450) Pain Orientation 1: Right (02/12/20 0450) Pain Description 1: Aching (02/12/20 0450) Pain Intervention(s) 1: Medication (see MAR) (02/12/20 0450) Ambulating Yes Additional Information:  
 
Shift report given to oncoming nurse at the bedside.  
 
Mike Ledezma, RN

## 2020-02-13 NOTE — PROGRESS NOTES
END OF SHIFT NOTE: 
 
Intake/Output 02/12 1901 - 02/13 0700 In: 2665 [I.V.:2665] Out: 600 [Urine:600] Voiding: YES Catheter: NO 
Drain:   
 
 
 
 
 
Stool:  0 occurrences. Emesis:  0 occurrences. VITAL SIGNS Patient Vitals for the past 12 hrs: 
 Temp Pulse Resp BP SpO2  
02/13/20 0430     97 % 02/13/20 0301 97.3 °F (36.3 °C) 92 20 120/85 98 % 02/13/20 0130 97.5 °F (36.4 °C) 91 20 130/53 98 % 02/13/20 0100     98 % 02/12/20 2354 97.7 °F (36.5 °C) 70 18 94/65 100 % 02/12/20 2223 97.4 °F (36.3 °C) 76 16 90/56 100 % 02/12/20 2056 97.7 °F (36.5 °C) 80 18 117/63 100 % 02/12/20 2003     99 % 02/12/20 1915 97.7 °F (36.5 °C) 98 16 101/53 98 % Pain Assessment Pain 1 Pain Scale 1: Visual (02/13/20 0150) Pain Intensity 1: 0 (02/13/20 0150) Patient Stated Pain Goal: 0 (02/13/20 0150) Pain Reassessment 1: Yes (02/12/20 1451) Pain Location 1: Back; Chest;Neck (02/12/20 0450) Pain Orientation 1: Right (02/12/20 0450) Pain Description 1: Aching (02/12/20 0450) Pain Intervention(s) 1: Medication (see MAR) (02/12/20 0450) Ambulating No 
 
Additional Information:  
Was sedated early part of the shift;when pt wakes up;confused;restless;trying to get OOB & pull on her oxygen & IV tubings. Bilateral wrist restraints/mittens maintained. NP was notified for need of sedative to calm pt;x1 dose of IV Ativan didn't help much;X1 dose IV morphine calmed pt. Chemo Etoposide & Cisplatin given;no adverse reactions noted;monitored closely. Remains on airvo & sats on the high 90's;for transfer to ICU if O2 need increased. (BIPAP) + fluid balance >2000,prn IV Lasix given;to monitor output;did not void yet after dose given. Shift report given to oncoming nurse CallumRN at the bedside.  
 
Riaz Omalley RN

## 2020-02-13 NOTE — PROGRESS NOTES
[facility] Admission Date: 2/10/2020 Daily Progress Note: 2/13/2020 The patient's chart is reviewed and the patient is discussed with the staff. 48 y.o.  female seen and evaluated at the request of Dr. Marga Wyman. Pt is well known to [facility] with a history of cocaine abuse(as recent as 1/10/2020 reportedly, + UDS on 1/30/2020), tobacco abuse 1/2 ppd x 38 years, COPD, chronic pain, DLD, chronic hepatitis C, chronic diastolic CHF, CAD, bipolar disorder with borderline personality disorder, CAD s/ CABG, and ASCUS with positive high risk HPV cervical. Pt presented to the ER on 1/13/2020 with cough, shortness of breath and R sided chest pain. CXR identified new widening of the superior mediastinum in comparison to recent prior chest x-rays. Chest CT showed an incompletely characterized anterior mediastinal mass extending to the right supraclavicular region, but appearing to be separate from the thyroid gland, measuring approximately 3.9 cm in AP diameter. Pulmonology and surgical evaluation were recommended. Patient was discharged with instructions to follow-up with pulmonary outpatient. Pt was seen and underwent bronch with EBUS and FNA with airway inspection. The 4R location tumor was biopsied and positive for malignancy on SATNAM. Cytology from FNA of 2R-4R location of lung mass, revealed the presence of malignant cells. Immunohistochemical features were suggestive of carcinoma of hepatic origin given staining for cytoplasmic TTF-1 as well as arginase. Pathologist recommended clinical and imaging correlation, and noted that additional tissue may be necessary for further evaluation given relatively low cellularity of cell block preparation.  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 Pt had enlarged hypermetabolic mediastinal LN and soft tissue mass in the R supraclavicular soft tissues  Measuring 4.9x2.9cm separate from thyroid. Pt was referred to oncology and admitted on 2/10/2020 as pt could not tolerate IVF prior to chemo secondary to dyspnea and acute respiratory failure. Pt's Na was 123 with concern for SIADH. Nephrology was consulted. An echo was ordered and is pending. Pt has had increased O2 requirements and increased cough. We were consulted to assist.  
Pt is lying in bed and unable to answer any questions coherently. Pt will open eyes and then closes them back again. Pt will start to say something and her sentence trails off and she falls back to sleep. She then starts picking at her O2 tubing and tries to get out of bed. Pt reportedly alert and oriented yesterday but her son stayed overnight. Pt has UDS ordered and pending given change in status and history of drug abuse. Pt is not on O2 at home. We will check stat ABG. Subjective:  
 
Patient resting in bed, not interactive, on AirVo 40L. Not coughing or expectorating sputum. Blood gas revealed pH 7.34, CO2 47.2, O2 88, HCO3 25.8. Current Facility-Administered Medications Medication Dose Route Frequency  vancomycin (VANCOCIN) 750 mg in  mL infusion  750 mg IntraVENous Q12H  
 OLANZapine (ZyPREXA zydis) disintegrating tablet 10 mg  10 mg Oral QHS  morphine injection 4 mg  4 mg IntraVENous Q3H PRN  
 haloperidol lactate (HALDOL) injection 5 mg  5 mg IntraMUSCular Q6H PRN  
 naloxone (NARCAN) injection 0.04 mg  0.04 mg IntraVENous PRN  
 albuterol (PROVENTIL VENTOLIN) nebulizer solution 2.5 mg  2.5 mg Nebulization Q4H PRN  
 methylPREDNISolone (PF) (SOLU-MEDROL) injection 40 mg  40 mg IntraVENous Q6H  
 prochlorperazine (COMPAZINE) with saline injection 10 mg  10 mg IntraVENous ONCE PRN  
  furosemide (LASIX) injection 20 mg  20 mg IntraVENous DAILY PRN  
 guaiFENesin ER (MUCINEX) tablet 1,200 mg  1,200 mg Oral BID  cefepime (MAXIPIME) 2 g in 0.9% sodium chloride (MBP/ADV) 100 mL  2 g IntraVENous Q12H  
 LORazepam (ATIVAN) tablet 0.5 mg  0.5 mg Oral Q6H PRN  
 albuterol (PROVENTIL VENTOLIN) nebulizer solution 2.5 mg  2.5 mg Nebulization Q4H PRN  
 amLODIPine (NORVASC) tablet 5 mg  5 mg Oral DAILY  atorvastatin (LIPITOR) tablet 80 mg  80 mg Oral DAILY  carvediloL (COREG) tablet 6.25 mg  6.25 mg Oral BID WITH MEALS  morphine CR (MS CONTIN) tablet 60 mg  60 mg Oral Q12H  
 morphine IR (MS IR) tablet 15 mg  15 mg Oral Q4H PRN  
 ondansetron (ZOFRAN ODT) tablet 8 mg  8 mg Oral Q8H PRN  prochlorperazine (COMPAZINE) tablet 10 mg  10 mg Oral Q6H PRN  
 sertraline (ZOLOFT) tablet 25 mg  25 mg Oral DAILY  enoxaparin (LOVENOX) injection 40 mg  40 mg SubCUTAneous Q24H  
 0.9% sodium chloride infusion  75 mL/hr IntraVENous CONTINUOUS Review of Systems Unobtainable due to patient status. Objective:  
 
Vitals:  
 02/13/20 0430 02/13/20 0600 02/13/20 0737 02/13/20 0296 BP:  138/84 104/61 Pulse:  88 86 Resp:  22 20 Temp:  97.7 °F (36.5 °C) 97.5 °F (36.4 °C) SpO2: 97% 96% 100% 97% Weight:      
 
 
 
Intake/Output Summary (Last 24 hours) at 2/13/2020 1159 Last data filed at 2/13/2020 1001 Gross per 24 hour Intake 3255 ml Output 1600 ml Net 1655 ml Physical Exam:  
Constitution:  the patient is well developed and in no acute distress EENMT:  Sclera clear, pupils equal, oral mucosa dry and intact Respiratory: Rhonchi throughout, on AirVo 40L Cardiovascular:  RRR without M,G,R 
Gastrointestinal: soft and non-tender; with positive bowel sounds. Musculoskeletal: warm without cyanosis. There is no lower extremity edema. Skin:  no jaundice or rashes, no wounds Neurologic: no gross neuro deficits Psychiatric:  Lethargic and drowsy, not interacting CXR: 2/12/20 IMPRESSION:  
1. Increasing now small to moderate basilar pleural effusion. The etiology of 
effusion is uncertain although this could represent sequela of heart 
failure/fluid overload given the associated cardiomegaly and the rapid onset of 
this finding. CT CHEST W/CONTRAST:  2/5/2020 IMPRESSION: 
1. Large right supraclavicular mass as well as large mediastinal mass within the upper mediastinum, centered to the right of midline. 2. Compression of the SVC and brachiocephalic veins. ECHOCARDIOGRAM:  2/11/2020 SUMMARY: 
-  Left ventricle: Diastolic dysfunction was indeterminate with an average E/e' 22.6 Systolic function was normal. Ejection fraction was estimated in the  
Range of 55 % to 60 %. There were no regional wall motion abnormalities. -  Left atrium: The atrium was moderately dilated. -  Pericardium: There was no pericardial effusion LAB No results for input(s): GLUCPOC in the last 72 hours. No lab exists for component: [place] Recent Labs  
  02/13/20 
0201 02/12/20 
0208 02/11/20 
0249 WBC 10.6 13.0* 10.0 HGB 9.2* 8.9* 8.4* HCT 29.1* 28.0* 26.3*  
 400 349 Recent Labs  
  02/13/20 
0201 02/12/20 
0208 02/11/20 
0249 * 132* 131* K 4.0 3.6 3.8  97* 96* CO2 24 27 29 * 111* 95 BUN 8 9 14 CREA 0.46* 0.53* 0.58* CA 8.4 8.7 8.4 ALB 2.0* 2.1* 2.0*  
TBILI 0.4 0.4 0.5 ALT 12 10* 11* SGOT 22 20 17 Recent Labs  
  02/13/20 
1146 02/12/20 
1409 PHI 7.345* 7.347* PCO2I 47.2* 47.2*  
PO2I 88 59* HCO3I 25.8 25.9 No results for input(s): LCAD, LAC in the last 72 hours. Assessment:  (Medical Decision Making) Hospital Problems  Date Reviewed: 2/13/2020 Codes Class Noted POA Lung cancer [facility]) ICD-10-CM: C34.90 ICD-9-CM: 162.9  2/10/2020 Unknown Hypoxia ICD-10-CM: R09.02 
ICD-9-CM: 799.02  2/10/2020 Unknown COPD exacerbation ([facility] [address] ICD-10-CM: J44.1 ICD-9-CM: 491.21  Unknown Yes Acute respiratory failure with hypoxia ([facility] [address] ICD-10-CM: J96.01 
ICD-9-CM: 518.81  7/12/2011 Yes Plan:  (Medical Decision Making) --Continue AirVo, wean as tolerated 
--Change Duoneb to Albuterol 
--NT suction as needed 
--Solumedrol 40 mg Q6, continue for now 
--On Vanco and Cefepime day 3 
--UDS negative for cocaine this time 
--Concern for potential need for ICU sometime in the near future More than 50% of the time documented was spent in face-to-face contact with the patient and in the care of the patient on the floor/unit where the patient is located. Colleen Bo NP Lungs:  Stridor, AirVo 40L, 50%, confused in mits Heart:  RRR with no Murmur/Rubs/Gallops Additional Comments: Terrible respiratory status, On AirVo, but stridor and difficulty clearing secretions. She can't get radiation today and prognosis appears very poor. Her mass is large and impinges on right mainstem bronchus and there also appears to be tumor or mucus plug blocking up RLL airway on CT scan though was still aerated and now appears RLL is completely down. She either needs to move to unit for intubation and bronchoscopy or transition to comfort care. I called the number for the son in the record, but no one answered and the mailbox was full. I spoke to JOANA Rice with palliative care and she agreed to try and call the family now and see what they would like to do. She is lethargic, confused and cannot make decisions. Her prognosis appears very grim to me. I have spoken with and examined the patient. I agree with the above assessment and plan as documented. Quentin Aguilera MD 
 
 
 
 
Addendum 17:50 Case discussed with brother at bedside and son over the phone. Intubation and mechanical ventilation will not entirely solve the patients tracheal obstruction due to its extent. Patient is in no condition to undergo chemo Rx and radiation Rx has a delayed and not immediate effect. I explained that intubation and mechanical ventilation is likely to prolong her suffering given the circumstances and recommend against transfer to ICU and intubation. Recommend DNR status and comfort care. Both brother and son seemed to understand and son will call with decision.  
 
Christina Patel MD.

## 2020-02-13 NOTE — PROGRESS NOTES
[facility] Hematology & Oncology Inpatient Hematology / Oncology Progress Note Admission Date: 2/10/2020  3:39 PM 
Reason for Admission/Hospital Course: Lung cancer ([address] [C34.90] Hypoxia [R09.02] 
 
 
24 Hour Events: 
D2 Cis/VP16 XRT planning still ongoing Agitated overnight On hi flow NC at 40LPM  
 
ROS: 
+ cough 
+shortness of breath 
+ pain 10 point review of systems is otherwise negative with the exception of the elements mentioned above in the HPI. Allergies Allergen Reactions  Lithium Analogues Unknown (comments)  Morphine (Pf) Rash Pt is currently taking Morphine 30mg QID  Other Medication Swelling Whatever holds Vit d pill together OBJECTIVE: 
Patient Vitals for the past 8 hrs: 
 BP Temp Pulse Resp SpO2  
20 0738     97 % 20 0737 104/61 97.5 °F (36.4 °C) 86 20 100 % 20 0600 138/84 97.7 °F (36.5 °C) 88 22 96 % 20 0430     97 % 20 0301 120/85 97.3 °F (36.3 °C) 92 20 98 % 20 0130 130/53 97.5 °F (36.4 °C) 91 20 98 % Temp (24hrs), Av.6 °F (36.4 °C), Min:97.3 °F (36.3 °C), Max:97.9 °F (36.6 °C) 
 
 07 -  1900 In: 48 [P.O.:50] Out: 1000 [Urine:1000] Physical Exam: 
Constitutional: Ill appearing  female in no acute distress, sitting comfortably in the hospital bed. Sleeping during rounds this AM, but opens eyes when spoken too. HEENT: Normocephalic and atraumatic. Oropharynx is clear, mucous membranes are moist.  Pupils are equal, round, and reactive to light. Extraocular muscles are intact. Sclerae anicteric. Neck supple without JVD. No thyromegaly present. Lymph node 
 deferred. Skin Warm and dry. No bruising and no rash noted. No erythema. No pallor. Respiratory Lungs are coarse; normal air exchange without accessory muscle use. On high flow 40 LPM. CVS Normal rate, regular rhythm and normal S1 and S2. No murmurs, gallops, or rubs. Abdomen Soft, nontender and nondistended, normoactive bowel sounds. No palpable mass. No hepatosplenomegaly. Neuro Grossly nonfocal with no obvious sensory or motor deficits. MSK Normal range of motion in general.  No edema and no tenderness. Psych Appropriate mood and affect. Labs: 
   
Recent Labs  
  02/13/20 
0201 02/12/20 
0208 02/11/20 
0249 WBC 10.6 13.0* 10.0  
RBC 3.21* 3.13* 2.91* HGB 9.2* 8.9* 8.4* HCT 29.1* 28.0* 26.3*  
MCV 90.7 89.5 90.4 MCH 28.7 28.4 28.9 MCHC 31.6 31.8 31.9  
RDW 12.6 13.1 13.1  400 349 GRANS 96* 80* 74 LYMPH 3* 10* 14 MONOS 1* 9 11 EOS 0* 0* 0*  
BASOS 0 0 0 IG 0 1 0  
DF AUTOMATED AUTOMATED AUTOMATED ANEU 10.2* 10.4* 7.5 ABL 0.3* 1.3 1.4 ABM 0.1 1.2 1.1 ROSA ISELA 0.0 0.0 0.0 ABB 0.0 0.0 0.0 AIG 0.0 0.1 0.0 Recent Labs  
  02/13/20 
0201 02/12/20 
1269 02/11/20 
0249 02/10/20 
3047 * 132* 131* 123* K 4.0 3.6 3.8 4.3  97* 96* 92* CO2 24 27 29 26 AGAP 9 8 6* 5* * 111* 95 105* BUN 8 9 14 15 CREA 0.46* 0.53* 0.58* 0.66 GFRAA >60 >60 >60 >60 GFRNA >60 >60 >60 >60  
CA 8.4 8.7 8.4 8.7 SGOT 22 20 17 19 AP 70 71 69 81  
TP 6.7 6.6 6.0* 6.9 ALB 2.0* 2.1* 2.0* 2.2*  
GLOB 4.7* 4.5* 4.0* 4.7* AGRAT 0.4* 0.5* 0.5* 0.5* MG  --   --   --  1.6* Imaging: 
 
Medications: 
Current Facility-Administered Medications Medication Dose Route Frequency  vancomycin (VANCOCIN) 750 mg in  mL infusion  750 mg IntraVENous Q12H  
 albuterol-ipratropium (DUO-NEB) 2.5 MG-0.5 MG/3 ML  3 mL Nebulization Q4H RT  
 methylPREDNISolone (PF) (SOLU-MEDROL) injection 40 mg  40 mg IntraVENous Q6H  
 prochlorperazine (COMPAZINE) with saline injection 10 mg  10 mg IntraVENous ONCE PRN  
 furosemide (LASIX) injection 20 mg  20 mg IntraVENous DAILY PRN  
 guaiFENesin ER (MUCINEX) tablet 1,200 mg  1,200 mg Oral BID  cefepime (MAXIPIME) 2 g in 0.9% sodium chloride (MBP/ADV) 100 mL  2 g IntraVENous Q12H  
 LORazepam (ATIVAN) tablet 0.5 mg  0.5 mg Oral Q6H PRN  
 albuterol (PROVENTIL VENTOLIN) nebulizer solution 2.5 mg  2.5 mg Nebulization Q4H PRN  
 amLODIPine (NORVASC) tablet 5 mg  5 mg Oral DAILY  atorvastatin (LIPITOR) tablet 80 mg  80 mg Oral DAILY  carvediloL (COREG) tablet 6.25 mg  6.25 mg Oral BID WITH MEALS  morphine CR (MS CONTIN) tablet 60 mg  60 mg Oral Q12H  
 morphine IR (MS IR) tablet 15 mg  15 mg Oral Q4H PRN  
 ondansetron (ZOFRAN ODT) tablet 8 mg  8 mg Oral Q8H PRN  prochlorperazine (COMPAZINE) tablet 10 mg  10 mg Oral Q6H PRN  
 sertraline (ZOLOFT) tablet 25 mg  25 mg Oral DAILY  enoxaparin (LOVENOX) injection 40 mg  40 mg SubCUTAneous Q24H  
 0.9% sodium chloride infusion  75 mL/hr IntraVENous CONTINUOUS  
 
 
 
ASSESSMENT: 
 
Problem List  Date Reviewed: 2/12/2020 Codes Class Noted Lung cancer [facility]) ICD-10-CM: C34.90 ICD-9-CM: 162.9  2/10/2020 Hypoxia ICD-10-CM: R09.02 
ICD-9-CM: 799.02  2/10/2020 Malignant neoplasm of overlapping sites of left lung [facility]) ICD-10-CM: C34.82 
ICD-9-CM: 162.8  1/22/2020 Hypokalemia ICD-10-CM: E87.6 ICD-9-CM: 276.8  12/7/2019 Chest pain ICD-10-CM: R07.9 ICD-9-CM: 786.50  12/6/2019 Chronic tension-type headache, intractable ICD-10-CM: C62.779 ICD-9-CM: 339.12  5/13/2019 Cigarette nicotine dependence without complication EWD-23-CC: B40.968 ICD-9-CM: 305.1  7/3/2018 COPD exacerbation ([facility] ICD-10-CM: J44.1 ICD-9-CM: 491.21  Unknown Recurrent UTI ICD-10-CM: N39.0 ICD-9-CM: 599.0  4/24/2018 Gross hematuria ICD-10-CM: R31.0 ICD-9-CM: 599.71  4/24/2018 Urinary retention ICD-10-CM: R33.9 ICD-9-CM: 788.20  4/24/2018 Sick sinus syndrome (HCC) (Chronic) ICD-10-CM: I49.5 ICD-9-CM: 427.81  9/29/2017 IBS (irritable bowel syndrome) ICD-10-CM: K58.9 ICD-9-CM: 564.1  Unknown 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 
ICD-9-CM: 433.20  12/28/2016 Encounter for medication management ICD-10-CM: L53.473 ICD-9-CM: V58.69  12/28/2016 Neuropathy (Chronic) ICD-10-CM: G62.9 ICD-9-CM: 355.9  12/28/2016 Overview Signed 7/28/2017  2:20 PM by Reese Wiggins MD  
  Last Assessment & Plan:  
Patient is currently on gabapentin 600 mg. Medication may cause increased drowsiness with Rispirdol, however pt has no complaints today. Intractable migraine with aura without status migrainosus ICD-10-CM: G43.119 ICD-9-CM: 346.01  12/28/2016 Peripheral vascular disease (HCC) (Chronic) ICD-10-CM: I73.9 ICD-9-CM: 443.9  12/1/2016 Overview Signed 12/1/2016  4:23 PM by Jerson Malik MD  
  CTA (2/11/16): Moderate to severe stenosis in the proximal right subclavian artery. Borderline personality disorder ([facility] ICD-10-CM: F60.3 ICD-9-CM: 301.83  Unknown Overview Signed 7/28/2017  2:20 PM by Reese Wiggins MD  
  Last Assessment & Plan: 1. Continue inpatient hospitalization 2. Lithium level 1.5. Will hold am dose and change to 300mg PO BID. Will recheck in 3 days. 3.  Continue Neurontin 4. Consider Vistaril PRN if indicated 5. Encourage active participation in groups and on the milieu 6. Consider discharge and aftercare needs 7. Encourage abstinence from drugs of abuse Cerebrovascular disease ICD-10-CM: I67.9 ICD-9-CM: 437.9  Unknown Overview Signed 12/1/2016  4:23 PM by Jerson Malik MD  
  CTA of the neck (2/11/16) : No significant stenosis of the right carotid artery. 37% stenosis in the proximal left internal carotid artery. Severe stenosis at the origin of the left vertebral artery Chronic hepatitis C ([facility] ICD-10-CM: B18.2 ICD-9-CM: 070.54  Unknown Chronic pain ICD-10-CM: G89.29 ICD-9-CM: 338.29  Unknown Overview Signed 7/28/2017  2:20 PM by Cristiano Schwarz MD  
  Last Assessment & Plan:  
Patient has history of chronic neck and back pain secondary to degenerative changes. She was recently prescribed a 15 day supply of Dilaudid 2 mg TID by \"pain management doctor\". This script was filled on 5.8. 17. Plan: - Given patient's history of substance use, she would largely benefit from detox of narcotics. Will try conservative methods while inpatient for back pain as well as neck and shoulder pain. Continue Ultram 100 mg Q6H while we await permission to speak to pain management doctor. Saundra Galvez office in regards to patient's pain management contract. Patient has been dismissed from his services for positive UDS. - Avoid Toradol given her elevated Creatinine and glucose at time of admission. A1c normal at 5.4. ASCUS with positive high risk HPV cervical ICD-10-CM: R87.610, R87.810 ICD-9-CM: 795.01, 795.05  Unknown Pacemaker ICD-10-CM: Z95.0 ICD-9-CM: V45.01  8/10/2016 Overview Signed 8/10/2016 10:47 AM by Loki Mirza MD  
  1. [employer]  :  Due to sick sinus syndrome. Chronic diastolic congestive heart failure (HCC) (Chronic) ICD-10-CM: I50.32 
ICD-9-CM: 428.32, 428.0  8/9/2016 Overview Addendum 8/27/2019  5:11 PM by Loki Mirza MD  
  Admitted [facility] 8/12/11 with CP and elevated BNP at 352. 
1.  Echo (8/3/11):  EF > 55%. Mild LVH. Pseudonormal LV filling pattern. Bi-atrial enlargement. .  Mild mitral regurgitation. 2.  Echo (8/22/19):  EF 65-70%. Indeterminant diastolic function. Mild LAE. Coronary atherosclerosis of native coronary vessel (Chronic) ICD-10-CM: I25.10 ICD-9-CM: 414.01  8/9/2016 Overview Addendum 12/9/2019  7:53 AM by Loki Mirza MD  
  1.  3 Vessel CABG (7/8/11):  MIR to LAD.   LIMA in Y graft to superior and inferior diagonal. 
 2.  Cath (8/31/12): Occluded grafts. FFR of LAD 0.89. Similar stenosis of diagonal but no FFR done. 3.  LHC (11/1/17): Mild to moderate nonhemodynamically significant CAD. Normal IFR of LAD (40%: 0.98) and Diagonal (50%: 0.98) 4. LHC (12/7/19): Stable CAD. Tobacco use disorder (Chronic) ICD-10-CM: S36.653 ICD-9-CM: 305.1  Unknown HTN (hypertension), benign (Chronic) ICD-10-CM: I10 
ICD-9-CM: 401.1  8/9/2016 Dyslipidemia (Chronic) ICD-10-CM: H05.2 ICD-9-CM: 272.4  8/9/2016 Abnormal MRI of head ICD-10-CM: R93.0 ICD-9-CM: 793.0  1/1/2015 Overview Signed 8/27/2016  4:28 PM by Otto Rushing MD  
  Frontal Lobe periventricular WM disease. Ruled out for MS by Neurology Cocaine abuse in remission [facility]) ICD-10-CM: F14.11 ICD-9-CM: 305.63  9/18/2012 S/P CABG x 3 (Chronic) ICD-10-CM: Z95.1 ICD-9-CM: V45.81  9/18/2012 Bipolar disorder (HCC) (Chronic) ICD-10-CM: F31.9 ICD-9-CM: 296.80  9/18/2012 Acute respiratory failure with hypoxia [facility]) ICD-10-CM: J96.01 
ICD-9-CM: 518.81  7/12/2011  
   
  
 
   
48 y.o. F consulted for cancer in 1/2020. She worked up neck/upper chest pain with CT finding of upper mediastinal mass, EBUS 1/22/20 with FNA showed malignant cells with broad IHC negativity except for suggestion of liver origin. She had active drug abuse as documented reason being discharged from her prior pain management. She presented to [facility] on 1/30/20, has missed the scheduled PET, reported moved in with her brother and a friend drove her to visit.  We discussed her rather atypical cancer presentation, called radiology to urgently re-schedule PET to further define the origin of cancer and extent, discussed her narco management and she gave inconsistent answers regarding whether and when she used cocaine, also stated she stopped pain med herself rather then being discharged by Dr. Manny Cespedes, although she appeared in pain, involved SW and palliative care, signed narco contract, yet urine screen positive for cocaine, then she changed her story of last use 1 week ago but that should not lead to positive urine test either, discussed the breaking of contract and trust voids prescription of narco. PET 1/30/20 showed large right mediastinal/supraclavicle avid disease but no evidence of disease otherwise and no suspicious disease in the liver, I reviewed PET personally and discussed to have urgent excisional biopsy for more tissue of histology and molecular tests, meanwhile I would not rec treat this as liver cancer, but rather treat with protocol of limited stage SCLC or stage III NSCLC if brain MRI negative to aim at possible cure or durable control, she was agreeable and Adam will arrange for urgent biopsy/port/rad onc eval, start cisplatin/etoposide/XRT 2/10/20, hyponatremia and weight gain concern of SIADH and instructed not to drink excessively and monitor, SVC syndrome and monitor closely for rx response, however she was not able to tolerate pre-hydration for cisplatin and admit for SOB likely related to SVC syndrome, administer chemo inpt with slow rate, may dosing XRT upfront for SVC relief, IR eval if needed. PLAN: 
Lung cancer 
-supposed to start Cis/VP16/XRT yesterday. 2/11 plan for chemo tomorrow if echo ok. XRT plan is not completed yet. 2/12 start chemo today if echo ok. Over to XRT for rescanning today. 2/13 D2 Cis/VP16 Shortness of breath/Cough 
- on 2 LPM NC. Mucinex BID. CXR with known malignancy and RLL infiltrate. 2/11 start cef/vanc for possible PNA. Check echo. 2/12 continues on cef/vanc. Echo pending. Increased oxygen needs overnight, up to 5 LPM. pulm consult pending. Check BNP for completeness. Echo still pending. 2/13 echo ok with EF of 55-60%. BNP elevated.  Lasix 20 mg daily PRN if I>O by 500 cc. Lasix given this AM. Pulmonology also following as well. On high flow at 40 LPM. Continues on cef/vanc for possible PNA. Currently able to use bedpan for urination- may need jara placed. ?SIDAH/hyponatremia - On IV fluids at this time with some improvement. Check urine sodium. Consult nephro 2/12 neph consult pending.  today. 2/13 appreciate nephros recommendations. NA up to 133 today. Confusion 
-ongoing confusion noted. UDS pending. Continue home medications Cari SOPs Lovenox for DVT prophylaxis Goals and plan of care reviewed with the patient. All questions answered to the best of our ability. Addendum at 6326: Discussed patient with Dr. Vicente Aviles and Rachel Mitchell NP [facility]. Patient with ongoing decline and no living will or expressed wishes noted in the chart. Rachel Mitchell NP and I have called multiple family members to try and speak with someone. Rachel Mitchell was able to speak with friend [facility] who does not have a way to get in touch with family members. I was finally able to get in touch with her brother, Becky Jauregui, who is on her most recent ZAHIDA at [facility]. He states that he brought her to chemotherapy on Monday when it was decided that she would be admitted. He knew she has been in the hospital, but he has not spoken with her since Tuesday as he has been trying to reach her. We discussed that it would be great if he could come up to the hospital to discuss her current status and he states that he will try to get a ride to the hospital. Updated Dr. Vicente Aviles and Rachel Mitchell NP. Deniz's # is [id number]. Addendum at [phone]: I was able to get in touch with patient's Isra Acuna, who is on her ZAHIDA and is her emergency contact. He states she never discussed her wishes with him, but states that he does know she has an insurance policy that would go to her grandson.  He is unable to come up today to the hospital, but would like to make a call and then call me back in regards to code status. At this time, he is leaning towards full code and transferring her to ICU with ventilation support. Updated Dr. Sujata Hardy who agrees with transfer if family chooses full code. Addendum at 1700: Spoke with the son, Jesu Amezquita, who wishes that I speak with his other brother Gil Courts as he is only 21. Unable to reach brother Evchacorta Courts. Will continue to try to reach family. EDDY Iglesias-ROBBIE [facility] Hematology and Oncology [address] [place] [address] Office : [phone] Fax : [phone]

## 2020-02-13 NOTE — CONSULTS
Palliative Care Patient: Mattie Garcia MRN: [id number]  SSN: [id number] YOB: 1965  Age: 48 y.o. Sex: female Date of Request: 2/13/2020 Date of Consult:  2/13/2020 Reason for Consult:  pain and symptom management Requesting Physician: Delgado Doan NP Assessment/Plan:  
 
Principal Diagnosis:   
Delirium  F05 Additional Diagnoses: · Agitation  R45.1 · Debility, Unspecified  R53.81 
· Edema  R60.9 · Fatigue, Lethargy  R53.83 · Respiratory Failure, Acute on Chronic  J96.20 · Encounter for Palliative Care  Z51.5 Palliative Performance Scale (PPS): PPS: 40 Medical Decision Making:  
Reviewed and summarized notes from admission to present, as well as outpatient PC notes Discussed case with appropriate providersLicha Rizvi RN Reviewed laboratory and x-ray data from admission to present Pt lethargic, no acute distress noted. James Banda RN, at bedside. No visitors at bedside. Pt will awaken to voice. She is oriented to person and hospital, but thinks it is 1935 and does not know who the president is. Pt received D1C1 of cisplatin/etoposide last night, and is scheduled to go to radiation today. Unsure if she will be able to transport with current oxygen needs and confusion. Discussed with James Banda RN. Will provide Zyprexa ODT 10 mg q HS and Haldol 5 mg IV q 6 hours PRN for delirium/agitation. Will also provide Morphine 4 mg IV q 3 hours PRN, as pt intermittently is not able to take PO medications. Of note, pt has a son listed as next of kin. Will reach out to him to discuss pt's condition. Hopefully pt's mental status will improve enough that she can participate in the plan of care. Will continue to follow. 1430-  Attempted to call family listed in pt's paper chart. Pt has a son, Leticia Garcia- his phone ([phone]) went to [facility], which was full. Pt has 3 brothers, Tiarra Nelson and Yolanda Rojas.   Deniz's phone ([phone]) was consistently busy. I was able to speak to [facility], pt's childhood friend- her number is [phone]. [facility] states pt's brothers are not involved with her at all, and her son is very difficult to get in touch with. I updated [facility] on pt's condition, and our urgent need to get in touch with family regarding decisions. [facility] does not have any contact information for family, other than the address where they all live together. Our options are 1) Transfer pt to the ICU and continue aggressive care while we continue to reach out to family, which may require asking law enforcement to visit the home to ask family to make contact or 2) continue to make attempts to reach family by phone, and if unable to reach anyone, have 2 physicians make decisions for pt. Discussed with Dr Yuliya Frias and Odilia Suh NP. Will discuss findings with members of the interdisciplinary team.   
 
Thank you for this referral.    
 
  
. 
 
Subjective:  
 
History obtained from:  Patient, Care Provider and Chart Chief Complaint: AMS, lung CA History of Present Illness:  Ms Isreal Faith is a 49 yo female with PMH of newly discovered likely NSCLC, CAD, Bipolar, CHF, COPD, drug abuse, and other conditions listed below, who was directly admitted from [address] [place] [address] on 2/10/2020 with c/o new onset hypoxia and dyspnea. Pt was scheduled to start D1C1 of Cisplatin/Etoposide/XRT, however, was unable to receive due to her condition. Pt was directly admitted for further management. She has had worsening respiratory status and confusion, and is currently on Airvo. She was seen by Rad Onc yesterday, and received D1C1 of Cisplatin/Etoposide last night. She is currently confused. Advance Directive: No      
Code Status:  Full Code Health Care Power of : No - Patient does not have a [address] Past Medical History:  
Diagnosis Date  Abnormal MRI of head 2015 Frontal Lobe periventricular WM disease. Ruled out for MS by Neurology  ASCUS with positive high risk HPV cervical   
 Bipolar disorder ([address]  Borderline personality disorder ([address]  CAD   
 cabg 3 ves 2011-- 1. Normal left ventricular systolic function. Stable-appearing atherosclerotic coronary disease with stable mid LAD diagonal bifurcation disease, normal-appearing left circumflex and right coronary systems.  Cerebrovascular disease  Chronic diastolic congestive heart failure ([address]   
 8/2019 Echo LVEF 65-70%  Chronic hepatitis C ([address] Treated, in remission  Chronic pain  Cocaine abuse ([address] 12/2019  COPD (chronic obstructive pulmonary disease) (HCC) Moderate- awaiting Rx for inhalers, also dx lung cancer- no current supplemental oxygen  Dyslipidemia  Heart attack [facility]) 2011  History of left heart catheterization 12/06/2019 1. Normal left ventricular systolic function. Stable-appearing atherosclerotic coronary disease with stable mid LAD diagonal bifurcation disease, normal-appearing left circumflex and right coronary systems.  HTN   
 IBS (irritable bowel syndrome)  Lung cancer ([address]  Methamphetamine abuse ([address] 12/2019 Meth and cocaine abuse  Pacemaker Sick Sinus Syndrome  Psychiatric disorder  S/P CABG x 3   
 SSS (sick sinus syndrome) ([address]   
 pacemaker  Subclavian artery stenosis, right ([address] 2016 Moderate to severe  Vertebral artery stenosis 2016 Asymptomatic Left Severe Past Surgical History:  
Procedure Laterality Date  APPENDECTOMY  COLONOSCOPY N/A 6/22/2018 COLONOSCOPY/ 25 performed by Wilfred Charles MD at [address] [place] [address], [facility] Abruption, CPD  HX CORONARY ARTERY BYPASS GRAFT  07/2011  
 3 vessel  HX HEART CATHETERIZATION    
 7/2012, 8/2012-occluded grafts  HX HERNIA REPAIR    
 HX ORTHOPAEDIC    
 left leg  HX PACEMAKER  2015~  
 [employer]- left chest- pacer check report 8/24/2019 Not pacer dep  HX TUBAL LIGATION    
 IR INSERT TUNL CVC W PORT OVER 5 YEARS  2/5/2020  SD COLSC FLX W/RMVL OF TUMOR POLYP LESION SNARE TQ  6/22/2018 Family History Problem Relation Age of Onset  Cancer Mother   
     CAD, Lung cancer  Hypertension Father DM, CAD  Heart Disease Father  Other Sister  Hypertension Brother  Other Son Adopted.  Heart Disease Maternal Grandmother  Heart Disease Maternal Grandfather Social History Tobacco Use  Smoking status: Current Every Day Smoker Packs/day: 0.50 Years: 38.00 Pack years: 19.00 Types: Cigarettes  Smokeless tobacco: Never Used Substance Use Topics  Alcohol use: No  
  Alcohol/week: 2.0 standard drinks Types: 2 Cans of beer per week Frequency: Never Binge frequency: Never Prior to Admission medications Medication Sig Start Date End Date Taking? Authorizing Provider  
guaiFENesin (ORGANIDIN) 400 mg tablet Take 400 mg by mouth every four (4) hours. Yes Provider, Historical  
senna (SENOKOT) 8.6 mg tablet Take 1 Tab by mouth daily. Yes Provider, Historical  
sertraline (ZOLOFT) 25 mg tablet Take 1 Tab by mouth daily. Indications: anxiousness associated with depression 2/4/20  Yes Domo Kong NP  
albuterol (PROAIR HFA) 90 mcg/actuation inhaler Take 2 Puffs by inhalation every four (4) hours as needed for Wheezing. 2/4/20  Yes Jef Roth NP  
predniSONE (DELTASONE) 10 mg tablet Take 10 mg by mouth daily (with breakfast). 1/15/20  Yes Jef Roth NP  
tiotropium-olodaterol (STIOLTO RESPIMAT) 2.5-2.5 mcg/actuation inhaler Take 2 Inhalation by inhalation two (2) times a day. Yes Provider, Historical  
potassium chloride (KLOR-CON) 10 mEq tablet Take 1 Tab by mouth two (2) times a day.  10/9/19  Yes Guillermo Poole MD  
 carvedilol (COREG) 6.25 mg tablet Take 1 Tab by mouth two (2) times daily (with meals). 8/28/19  Yes Virgie Mantilla MD  
amLODIPine (NORVASC) 5 mg tablet Take 1 Tab by mouth daily. 8/28/19  Yes Virgie Mantilla MD  
atorvastatin (LIPITOR) 80 mg tablet Take 1 Tab by mouth daily. 8/28/19  Yes Virgie Mantilla MD  
ipratropium (ATROVENT HFA) 17 mcg/actuation inhaler Take 2 Puffs by inhalation four (4) times daily. Patient taking differently: Take 2 Puffs by inhalation four (4) times daily. Pt states she is out of samples and is waiting for Rx and insurance to cover 2/24/19  Yes Romana Chalet, NP  
cephALEXin (KEFLEX) 500 mg capsule Take 1 Cap by mouth three (3) times daily for 7 days. 2/7/20 2/14/20  Judy Wallace MD  
morphine IR (MS IR) 15 mg tablet Take 1 Tab by mouth every four to six (4-6) hours as needed for Pain for up to 7 days. Max Daily Amount: 90 mg. 2/6/20 2/13/20  Noemi Toth NP  
morphine CR (MS CONTIN) 60 mg CR tablet Take 1 Tab by mouth every twelve (12) hours for 7 days. Max Daily Amount: 120 mg. 2/6/20 2/13/20  Noemi Toth NP  
naloxone [facility]) 4 mg/actuation nasal spray Use 1 spray intranasally, then discard. Repeat with new spray every 2 min as needed for opioid overdose symptoms, alternating nostrils. 2/6/20   Noemi Toth NP  
prochlorperazine (COMPAZINE) 10 mg tablet Take 1 Tab by mouth every six (6) hours as needed for Nausea. Indications: nausea and vomiting caused by cancer drugs, nausea and vomiting 1/31/20   Rut Lynne MD  
ondansetron hcl [facility]) 8 mg tablet Take 1 Tab by mouth every eight (8) hours as needed for Nausea. Indications: nausea and vomiting caused by cancer drugs 1/31/20   Rut Lynne MD  
loratadine 10 mg cap Take 1 Cap by mouth daily. Patient taking differently: Take 2 Caps by mouth daily.  11/8/19   Leah Robbins MD  
nitroglycerin (NITROSTAT) 0.4 mg SL tablet by SubLINGual route every five (5) minutes as needed. Provider, Historical  
 
 
Allergies Allergen Reactions  Lithium Analogues Unknown (comments)  Morphine (Pf) Rash Pt is currently taking Morphine 30mg QID  Other Medication Swelling Whatever holds Vit d pill together Review of Systems: 
Review of systems not obtained due to patient factors- lethargic, AMS Objective:  
 
Visit Vitals /61 (BP 1 Location: Right arm, BP Patient Position: At rest) Pulse 86 Temp 97.5 °F (36.4 °C) Resp 20 Wt 103 lb 4.6 oz (46.9 kg) SpO2 97% BMI 19.52 kg/m² Physical Exam: 
 
General:  Lethargic. No acute distress. Eyes:  Conjunctivae/corneas clear Nose: Nares normal. Septum midline. Ailyn Leon Neck: Neck swelling Lungs:   Coarse bilaterally, unlabored Heart:  Regular rate and rhythm Abdomen:   Soft, non-tender, non-distended Extremities: Normal, atraumatic, no cyanosis or edema. Bilateral soft wrist restraints and hand mitts Skin: Skin color, texture, turgor normal.  
Neurologic: Nonfocal  
Psych: Lethargic. Oriented to self and hospital only Assessment:  
 
Hospital Problems  Date Reviewed: 2/12/2020 Codes Class Noted POA Lung cancer [facility]) ICD-10-CM: C34.90 ICD-9-CM: 162.9  2/10/2020 Unknown Hypoxia ICD-10-CM: R09.02 
ICD-9-CM: 799.02  2/10/2020 Unknown COPD exacerbation ([facility] ICD-10-CM: J44.1 ICD-9-CM: 491.21  Unknown Yes Acute respiratory failure with hypoxia ([facility] ICD-10-CM: J96.01 
ICD-9-CM: 518.81  7/12/2011 Yes Signed By: David Aranda NP February 13, 2020

## 2020-02-13 NOTE — PROGRESS NOTES
END OF SHIFT NOTE: 
 
Intake/Output 02/13 0701 - 02/13 1900 In: 5944 [P.O.:80; I.V.:936] Out: 1300 [Urine:1300] Voiding: YES Catheter: NO 
Drain:   
 
 
 
 
 
Stool:  0 occurrences. Emesis:  0 occurrences. VITAL SIGNS Patient Vitals for the past 12 hrs: 
 Temp Pulse Resp BP SpO2  
02/13/20 1401 97.8 °F (36.6 °C) 83 16 121/66 100 % 02/13/20 1207 97.5 °F (36.4 °C) 81 20 93/59 99 % 02/13/20 0738     97 % 02/13/20 0737 97.5 °F (36.4 °C) 86 20 104/61 100 % Pain Assessment Pain 1 Pain Scale 1: Visual (02/13/20 1442) Pain Intensity 1: 0 (02/13/20 1442) Patient Stated Pain Goal: 0 (02/13/20 1412) Pain Reassessment 1: Patient resting w/respiratory rate greater than 10 (02/13/20 1442) Pain Location 1: Generalized (02/13/20 1412) Pain Orientation 1: Right (02/12/20 0450) Pain Description 1: Aching (02/13/20 1412) Pain Intervention(s) 1: Medication (see MAR) (02/13/20 1412) Ambulating No 
 
Additional Information: patient still pulling at airvo and other lines. Patient and brother have stated they want the patient to be comfort measures at this time. Cannot get either son to call us back again. Shift report to be given to oncoming nurse at the bedside.  
 
Brant Mehta RN

## 2020-02-13 NOTE — PROGRESS NOTES
END OF SHIFT NOTE: 
 
Intake/Output No intake/output data recorded. Voiding: YES Catheter: NO 
Drain:   
 
 
 
 
 
Stool:  0 occurrences. Emesis:  0 occurrences. VITAL SIGNS Patient Vitals for the past 12 hrs: 
 Temp Pulse Resp BP SpO2  
02/12/20 1605     98 % 02/12/20 1603 97.9 °F (36.6 °C) 94 15 141/78 96 % 02/12/20 1433     98 % 02/12/20 1134 97.3 °F (36.3 °C) 62 21 111/72 93 % 02/12/20 1125     91 % 02/12/20 0752 98 °F (36.7 °C) 61 17 98/50 94 % Pain Assessment Pain 1 Pain Scale 1: Numeric (0 - 10) (02/12/20 1451) Pain Intensity 1: 0 (02/12/20 1451) Patient Stated Pain Goal: 0 (02/12/20 1451) Pain Reassessment 1: Yes (02/12/20 1451) Pain Location 1: Back; Chest;Neck (02/12/20 0450) Pain Orientation 1: Right (02/12/20 0450) Pain Description 1: Aching (02/12/20 0450) Pain Intervention(s) 1: Medication (see MAR) (02/12/20 0450) Ambulating Yes Additional Information: Pt very confused today. Radiation visit today. Pt became combative and increasingly confused. Airvo in place. Pt continued to pull airvo off. Restraints ordered. Pt continued to remove restraints and pull airvo off. Mitts added. Morphine and ativan given. Pt finally resting. Pre-Meds given. Chemo to start tonight. Order for ICU transfer in when available. No complaints noted at this time. Shift report given to Kiko Corbin RN oncoming nurse at the bedside.  
 
Bhavik Espinoza RN

## 2020-02-13 NOTE — PROGRESS NOTES
02/13/20 0225 Airway Clearance Suction Nasotracheal  
Sputum Method Obtained Nasal tracheal  
Sputum Amount Copious Sputum Color/Odor Bloody; Yellow Sputum Consistency Tenacious; Thick Airway Procedures  
$$ Airway Procedures Nasotracheal Aspiration

## 2020-02-13 NOTE — CDMP QUERY
Pt admitted with shortness of breath/lung cancer. Pt noted to have elevated pBNP. If possible, please document in progress notes and d/c summary if you are evaluating and /or treating any of the following: ? Acute on Chronic Systolic CHF ? Acute on Chronic Diastolic CHF ? Acute on Chronic Systolic and Diastolic CHF ? Acute Systolic CHF ? Acute Diastolic CHF ? Acute Systolic and Diastolic CHF ? Chronic Systolic CHF ? Chronic Diastolic CHF ? Chronic Systolic and Diastolic CHF 
? Other, please specify ? Clinically unable to determine The medical record reflects the following: 
  Risk Factors: chronic diastolic CHF, lung caner, possible pneumonia, chronic SSS, and PVD Clinical Indicators:  
--2/13 PN stating, \"BNP elevated. Lasix 20 mg daily PRN if I>O by 500 cc. Lasix given this AM. \" and \"no edema\" noted. --2/12 pBNP 3,655 
--2/12 chest xray stating, \" Increasing now small to moderate basilar pleural effusion. The etiology of effusion is uncertain although this could represent sequela of heart failure/fluid overload given the associated cardiomegaly and the rapid onset of this finding. \" 
--2/11 echo results, \"Ejection fraction was 
estimated in the range of 55 % to 60 %\" Treatment: increased oxygen supplementation, IV lasix, echo Thank you, Juliet Gomes, [address] [place] [address] RN 
[phone]

## 2020-02-14 NOTE — PROGRESS NOTES
Palliative Care Progress Note Patient: Madison Marr MRN: [id number]  SSN: [id number] YOB: 1965  Age: 48 y.o. Sex: female Assessment/Plan: Chief Complaint/Interval History: alert, mildly confused Principal Diagnosis: · Altered Mental Status R41.82 Additional Diagnoses: · Advance Care Planning Counseling Z71.89 
· Debility, Unspecified  R53.81 
· Delirium  F05 · Fatigue, Lethargy  R53.83 
· Frailty  R54 · Respiratory Failure, Acute on Chronic  J96.20 · Counseling, Encounter for Medical Advice  Z71.9 
· Encounter for Palliative Care  Z51.5 Palliative Performance Scale (PPS) PPS: 40 Medical Decision Making:  
Reviewed and summarized notes over last 24 hours Discussed case with appropriate providers- Dr Júnior Ennis; Johnnie Hodgkins, JOANA; Chris Ascencio Reviewed laboratory and x-ray data over last 24 hours Pt in bed, no acute distress noted. Brothers, Cony Noguera and Maryuri Sangfuad, at bedside. Introduced role of PC to brothers, and reviewed events of hospitalization. Pt was apparently able to complete a HCPOA last night, making Cony Big her agent. Pt also indicated she wanted to be a DNR, and did not want to go to the ICU. This was translated into comfort measures only, however, pt and family indicate they want to pursue further chemotherapy. Discussed with brothers, and pt as much as she was able to participate. All are in agreement that they want continue with chemotherapy, but want pt to remain a DNR and not transfer to the ICU. Counseled that   Discussed with Dr Júnior Ennis, Johnnie Hodgkins NP, and [facility]. Will continue to follow. Will discuss findings with members of the interdisciplinary team.   
 
  
More than 50% of this 35 minute visit was spent counseling and coordination of care as outlined above. Subjective:  
 
Review of Systems: A comprehensive review of systems was negative except for:  
Constitutional: Positive for fatigue. Objective:  
 
Visit Vitals /79 (BP 1 Location: Left arm) Pulse 92 Temp 98.1 °F (36.7 °C) Resp 18 Wt 103 lb 4.6 oz (46.9 kg) SpO2 98% BMI 19.52 kg/m² Physical Exam: 
 
General:  Debilitated. No acute distress. Eyes:  Conjunctivae/corneas clear Nose: Nares normal. Septum midline. Desire Cade Neck: Supple, symmetrical, trachea midline Lungs:   Coarse bilaterally, unlabored Heart:  Regular rate and rhythm Abdomen:   Soft, non-tender, non-distended Extremities: Normal, atraumatic, no cyanosis or edema Skin: Skin color, texture, turgor normal  
Neurologic: Nonfocal  
Psych: Alert and oriented to person and place Signed By: Lalit Zhu NP February 14, 2020

## 2020-02-14 NOTE — PROGRESS NOTES
LMSW met briefly with pt and son, Erasto Ramos at bedside. Pt is unfortunately confused at this time. Pt was residing with family prior to this admission. CM will follow pt plan of care in anticipation that she will need some form of supportive care services/referrals to transition out of the hospital.   
Care Management Interventions PCP Verified by CM: Jesi Aguirre) Mode of Transport at Discharge: (family) Transition of Care Consult (CM Consult): Discharge Planning(Pt is now insured by medicaid with pharmacy benefits so no medication copays should be over $4.) Discharge Durable Medical Equipment: No 
Physical Therapy Consult: No 
Occupational Therapy Consult: No 
Speech Therapy Consult: No 
Current Support Network: Relative's Home(SonCassandra [phone] is at bedside with pt today. Pt lives with her brother (?) Nita Mora.  ) Confirm Follow Up Transport: Other (see comment)(family or medicaid transport) Name of the Patient Representative Who was Provided with a Choice of Provider and Agrees with the Discharge Plan: son [employer] Information Provided?: No 
Discharge Location Discharge Placement: Unable to determine at this time

## 2020-02-14 NOTE — PROGRESS NOTES
andrés 
 
[facility] Hematology & Oncology Inpatient Hematology / Oncology Progress Note Admission Date: 2/10/2020  3:39 PM 
Reason for Admission/Hospital Course: Lung cancer ([address] [C34.90] Hypoxia [R09.02] 
 
 
24 Hour Events: 
Family at bedside On hi flow NC at 40LPM  
Anxious this AM.  
 
ROS: 
+ cough 
+shortness of breath 
+ pain 10 point review of systems is otherwise negative with the exception of the elements mentioned above in the HPI. Allergies Allergen Reactions  Lithium Analogues Unknown (comments)  Morphine (Pf) Rash Pt is currently taking Morphine 30mg QID  Other Medication Swelling Whatever holds Vit d pill together OBJECTIVE: 
Patient Vitals for the past 8 hrs: 
 BP Temp Pulse Resp SpO2  
20 0635 131/79 98.1 °F (36.7 °C) 92 18 98 % Temp (24hrs), Av.8 °F (36.6 °C), Min:97.5 °F (36.4 °C), Max:98.1 °F (36.7 °C) No intake/output data recorded. Physical Exam: 
Constitutional: Ill appearing  female in no mild distress, sitting in the hospital bed. Alert and oriented x 2.slightly anxious. Family at bedside. HEENT: Normocephalic and atraumatic. Oropharynx is clear, mucous membranes are moist.  Pupils are equal, round, and reactive to light. Extraocular muscles are intact. Sclerae anicteric. Neck supple without JVD. No thyromegaly present. Lymph node 
 deferred. Skin Warm and dry. No bruising and no rash noted. No erythema. No pallor. Respiratory Lungs are coarse; normal air exchange without accessory muscle use. On high flow 40 LPM. CVS Normal rate, regular rhythm and normal S1 and S2. No murmurs, gallops, or rubs. Abdomen Soft, nontender and nondistended, normoactive bowel sounds. No palpable mass. No hepatosplenomegaly. Neuro Grossly nonfocal with no obvious sensory or motor deficits. MSK Normal range of motion in general.  No edema and no tenderness. Psych Slightly anxious Labs: 
   
Recent Labs 02/13/20 
0201 02/12/20 
8694 WBC 10.6 13.0*  
RBC 3.21* 3.13* HGB 9.2* 8.9* HCT 29.1* 28.0*  
MCV 90.7 89.5 MCH 28.7 28.4 MCHC 31.6 31.8  
RDW 12.6 13.1  400 GRANS 96* 80* LYMPH 3* 10* MONOS 1* 9  
EOS 0* 0*  
BASOS 0 0 IG 0 1  
DF AUTOMATED AUTOMATED ANEU 10.2* 10.4* ABL 0.3* 1.3 ABM 0.1 1.2 ROSA ISELA 0.0 0.0 ABB 0.0 0.0 AIG 0.0 0.1 Recent Labs  
  02/13/20 
0201 02/12/20 
0208 * 132* K 4.0 3.6  97* CO2 24 27 AGAP 9 8 * 111* BUN 8 9 CREA 0.46* 0.53* GFRAA >60 >60 GFRNA >60 >60  
CA 8.4 8.7 SGOT 22 20 AP 70 71 TP 6.7 6.6 ALB 2.0* 2.1*  
GLOB 4.7* 4.5* AGRAT 0.4* 0.5* Imaging: 
 
Medications: 
Current Facility-Administered Medications Medication Dose Route Frequency  OLANZapine (ZyPREXA zydis) disintegrating tablet 10 mg  10 mg Oral QHS  haloperidol lactate (HALDOL) injection 5 mg  5 mg IntraMUSCular Q6H PRN  
 albuterol (PROVENTIL VENTOLIN) nebulizer solution 2.5 mg  2.5 mg Nebulization Q4H PRN  prochlorperazine (COMPAZINE) with saline injection 10 mg  10 mg IntraVENous ONCE PRN  
 LORazepam (ATIVAN) injection 0.5 mg  0.5 mg IntraVENous ONCE PRN  
 morphine injection 5 mg  5 mg IntraVENous Q2H PRN  
 LORazepam (ATIVAN) injection 1 mg  1 mg IntraVENous Q2H PRN  
 LORazepam (ATIVAN) tablet 0.5 mg  0.5 mg Oral Q6H PRN  
 albuterol (PROVENTIL VENTOLIN) nebulizer solution 2.5 mg  2.5 mg Nebulization Q4H PRN  
 morphine CR (MS CONTIN) tablet 60 mg  60 mg Oral Q12H  
 morphine IR (MS IR) tablet 15 mg  15 mg Oral Q4H PRN  
 ondansetron (ZOFRAN ODT) tablet 8 mg  8 mg Oral Q8H PRN  prochlorperazine (COMPAZINE) tablet 10 mg  10 mg Oral Q6H PRN  
 sertraline (ZOLOFT) tablet 25 mg  25 mg Oral DAILY ASSESSMENT: 
 
Problem List  Date Reviewed: 2/13/2020 Codes Class Noted Lung cancer [facility]) ICD-10-CM: C34.90 ICD-9-CM: 162.9  2/10/2020  Hypoxia ICD-10-CM: R09.02 
 ICD-9-CM: 799.02  2/10/2020 Malignant neoplasm of overlapping sites of left lung [facility]) ICD-10-CM: C34.82 
ICD-9-CM: 162.8  1/22/2020 Hypokalemia ICD-10-CM: E87.6 ICD-9-CM: 276.8  12/7/2019 Chest pain ICD-10-CM: R07.9 ICD-9-CM: 786.50  12/6/2019 Chronic tension-type headache, intractable ICD-10-CM: X54.243 ICD-9-CM: 339.12  5/13/2019 Cigarette nicotine dependence without complication MZA-97-UA: O14.714 ICD-9-CM: 305.1  7/3/2018 COPD exacerbation ([address] ICD-10-CM: J44.1 ICD-9-CM: 491.21  Unknown Recurrent UTI ICD-10-CM: N39.0 ICD-9-CM: 599.0  4/24/2018 Gross hematuria ICD-10-CM: R31.0 ICD-9-CM: 599.71  4/24/2018 Urinary retention ICD-10-CM: R33.9 ICD-9-CM: 788.20  4/24/2018 Sick sinus syndrome (HCC) (Chronic) ICD-10-CM: I49.5 ICD-9-CM: 427.81  9/29/2017 IBS (irritable bowel syndrome) ICD-10-CM: K58.9 ICD-9-CM: 564.1  Unknown 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 
ICD-9-CM: 433.20  12/28/2016 Encounter for medication management ICD-10-CM: K53.712 ICD-9-CM: V58.69  12/28/2016 Neuropathy (Chronic) ICD-10-CM: G62.9 ICD-9-CM: 355.9  12/28/2016 Overview Signed 7/28/2017  2:20 PM by Pierre Jhaveri MD  
  Last Assessment & Plan:  
Patient is currently on gabapentin 600 mg. Medication may cause increased drowsiness with Rispirdol, however pt has no complaints today. Intractable migraine with aura without status migrainosus ICD-10-CM: G43.119 ICD-9-CM: 346.01  12/28/2016 Peripheral vascular disease (HCC) (Chronic) ICD-10-CM: I73.9 ICD-9-CM: 443.9  12/1/2016 Overview Signed 12/1/2016  4:23 PM by Herb Henderson MD  
  CTA (2/11/16): Moderate to severe stenosis in the proximal right subclavian artery. Borderline personality disorder ([facility] ICD-10-CM: F60.3 ICD-9-CM: 301.83  Unknown Overview Signed 7/28/2017  2:20 PM by Loren Middleton MD  
  Last Assessment & Plan: 1. Continue inpatient hospitalization 2. Lithium level 1.5. Will hold am dose and change to 300mg PO BID. Will recheck in 3 days. 3.  Continue Neurontin 4. Consider Vistaril PRN if indicated 5. Encourage active participation in groups and on the milieu 6. Consider discharge and aftercare needs 7. Encourage abstinence from drugs of abuse Cerebrovascular disease ICD-10-CM: I67.9 ICD-9-CM: 437.9  Unknown Overview Signed 12/1/2016  4:23 PM by Lora Pinedo MD  
  CTA of the neck (2/11/16) : No significant stenosis of the right carotid artery. 37% stenosis in the proximal left internal carotid artery. Severe stenosis at the origin of the left vertebral artery Chronic hepatitis C ([facility] ICD-10-CM: B18.2 ICD-9-CM: 070.54  Unknown Chronic pain ICD-10-CM: G89.29 ICD-9-CM: 338.29  Unknown Overview Signed 7/28/2017  2:20 PM by Loren Middleton MD  
  Last Assessment & Plan:  
Patient has history of chronic neck and back pain secondary to degenerative changes. She was recently prescribed a 15 day supply of Dilaudid 2 mg TID by \"pain management doctor\". This script was filled on 5.8. 17. Plan: - Given patient's history of substance use, she would largely benefit from detox of narcotics. Will try conservative methods while inpatient for back pain as well as neck and shoulder pain. Continue Ultram 100 mg Q6H while we await permission to speak to pain management doctor. [facility] office in regards to patient's pain management contract. Patient has been dismissed from his services for positive UDS. - Avoid Toradol given her elevated Creatinine and glucose at time of admission. A1c normal at 5.4. ASCUS with positive high risk HPV cervical ICD-10-CM: R87.610, R87.810 ICD-9-CM: 795.01, 795.05  Unknown Pacemaker ICD-10-CM: Z95.0 ICD-9-CM: V45.01  8/10/2016 Overview Signed 8/10/2016 10:47 AM by Yung Gamino MD  
  1. [employer]  :  Due to sick sinus syndrome. Chronic diastolic congestive heart failure (HCC) (Chronic) ICD-10-CM: I50.32 
ICD-9-CM: 428.32, 428.0  8/9/2016 Overview Addendum 8/27/2019  5:11 PM by Yung Gamino MD  
  Admitted [facility] 8/12/11 with CP and elevated BNP at 352. 
1.  Echo (8/3/11):  EF > 55%. Mild LVH. Pseudonormal LV filling pattern. Bi-atrial enlargement. .  Mild mitral regurgitation. 2.  Echo (8/22/19):  EF 65-70%. Indeterminant diastolic function. Mild LAE. Coronary atherosclerosis of native coronary vessel (Chronic) ICD-10-CM: I25.10 ICD-9-CM: 414.01  8/9/2016 Overview Addendum 12/9/2019  7:53 AM by Yung Gamino MD  
  1.  3 Vessel CABG (7/8/11):  MIR to LAD. LIMA in Y graft to superior and inferior diagonal. 
2.  Cath (8/31/12): Occluded grafts. FFR of LAD 0.89. Similar stenosis of diagonal but no FFR done. 3.  LHC (11/1/17): Mild to moderate nonhemodynamically significant CAD. Normal IFR of LAD (40%: 0.98) and Diagonal (50%: 0.98) 4. LHC (12/7/19): Stable CAD. Tobacco use disorder (Chronic) ICD-10-CM: E68.365 ICD-9-CM: 305.1  Unknown HTN (hypertension), benign (Chronic) ICD-10-CM: I10 
ICD-9-CM: 401.1  8/9/2016 Dyslipidemia (Chronic) ICD-10-CM: E73.1 ICD-9-CM: 272.4  8/9/2016 Abnormal MRI of head ICD-10-CM: R93.0 ICD-9-CM: 793.0  1/1/2015 Overview Signed 8/27/2016  4:28 PM by Pee Garcia MD  
  Frontal Lobe periventricular WM disease. Ruled out for MS by Neurology Cocaine abuse in remission [facility]) ICD-10-CM: F14.11 ICD-9-CM: 305.63  9/18/2012 S/P CABG x 3 (Chronic) ICD-10-CM: Z95.1 ICD-9-CM: V45.81  9/18/2012 Bipolar disorder (HCC) (Chronic) ICD-10-CM: F31.9 ICD-9-CM: 296.80  9/18/2012  Acute respiratory failure with hypoxia (HCC) ICD-10-CM: J96.01 
 ICD-9-CM: 518.81  7/12/2011  
   
  
 
   
48 y.o. F consulted for cancer in 1/2020. She worked up neck/upper chest pain with CT finding of upper mediastinal mass, EBUS 1/22/20 with FNA showed malignant cells with broad IHC negativity except for suggestion of liver origin. She had active drug abuse as documented reason being discharged from her prior pain management. She presented to [facility] on 1/30/20, has missed the scheduled PET, reported moved in with her brother and a friend drove her to visit.  We discussed her rather atypical cancer presentation, called radiology to urgently re-schedule PET to further define the origin of cancer and extent, discussed her narco management and she gave inconsistent answers regarding whether and when she used cocaine, also stated she stopped pain med herself rather then being discharged by Dr. Pema Gamboa, although she appeared in pain, involved SW and palliative care, signed narco contract, yet urine screen positive for cocaine, then she changed her story of last use 1 week ago but that should not lead to positive urine test either, discussed the breaking of contract and trust voids prescription of narco. PET 1/30/20 showed large right mediastinal/supraclavicle avid disease but no evidence of disease otherwise and no suspicious disease in the liver, I reviewed PET personally and discussed to have urgent excisional biopsy for more tissue of histology and molecular tests, meanwhile I would not rec treat this as liver cancer, but rather treat with protocol of limited stage SCLC or stage III NSCLC if brain MRI negative to aim at possible cure or durable control, she was agreeable and Adam will arrange for urgent biopsy/port/rad onc eval, start cisplatin/etoposide/XRT 2/10/20, hyponatremia and weight gain concern of SIADH and instructed not to drink excessively and monitor, SVC syndrome and monitor closely for rx response, however she was not able to tolerate pre-hydration for cisplatin and admit for SOB likely related to SVC syndrome, administer chemo inpt with slow rate, may dosing XRT upfront for SVC relief, IR eval if needed. PLAN: 
Lung cancer 
-supposed to start Cis/VP16/XRT yesterday. 2/11 plan for chemo tomorrow if echo ok. XRT plan is not completed yet. 2/12 start chemo today if echo ok. Over to XRT for rescanning today. 2/13 D2 Cis/VP16 not given 2/14 Will give D 2 cis/VP16 as patient wishes to proceed with chemotherapy. Shortness of breath/Cough 
- on 2 LPM NC. Mucinex BID. CXR with known malignancy and RLL infiltrate. 2/11 start cef/vanc for possible PNA. Check echo. 2/12 continues on cef/vanc. Echo pending. Increased oxygen needs overnight, up to 5 LPM. pulm consult pending. Check BNP for completeness. Echo still pending. 2/13 echo ok with EF of 55-60%. BNP elevated. Lasix 20 mg daily PRN if I>O by 500 cc. Lasix given this AM. Pulmonology also following as well. On high flow at 40 LPM. Continues on cef/vanc for possible PNA. Currently able to use bedpan for urination- may need jara placed. 2/14 check blood gas. Ox 95-98% on high alon at 40 lpm. Back on cef/vanc. ?SIDAH/hyponatremia - On IV fluids at this time with some improvement. Check urine sodium. Consult nephro 2/12 neph consult pending.  today. 2/13 appreciate nephros recommendations. NA up to 133 today. 2/14 Na improved up to 135. Confusion 
-ongoing confusion noted. UDS pending. Continue home medications Cari SOPs Lovenox for DVT prophylaxis Goals and plan of care reviewed with the patient. All questions answered to the best of our ability. Disposition/Update to plan of care 2/14: Last night, patient alert and oriented x 4.  After discussions with nursing staff, pulm, patient, and family members, patient signed HCPOA and made her brother, Agnes Phillips, her HCPOA. Patient and brother decided last night to pursue comfort measures. During rounding this AM, discussion with patient and family completed and she wishes for DNR, but to continue chemotherapy. Palliative care NP also confirmed this as well. Home medications/hospital medications added back to the [facility]. Plan to proceed with D2 chemo today. Jenae Musa, EDDY-ROBBIE [facility] Hematology and Oncology [address] [place] [address] Office : [phone] Fax : [phone]

## 2020-02-14 NOTE — PROGRESS NOTES
Date of Outreach Update: 
Alicia Teixeira was seen and assessed. Family confirmed decision for comfort care. Dr. Nella Torres notified. Orders placed. Signed By:   Janet Barney RN   February 13, 2020 7:05 PM

## 2020-02-14 NOTE — PROGRESS NOTES
[facility] Admission Date: 2/10/2020 Daily Progress Note: 2/14/2020 The patient's chart is reviewed and the patient is discussed with the staff. 54 y.o.  female seen and evaluated at the request of Dr. Kai Holland. Pt is well known to [facility] with a history of cocaine abuse(as recent as 1/10/2020 reportedly, + UDS on 1/30/2020), tobacco abuse 1/2 ppd x 38 years, COPD, chronic pain, DLD, chronic hepatitis C, chronic diastolic CHF, CAD, bipolar disorder with borderline personality disorder, CAD s/ CABG, and ASCUS with positive high risk HPV cervical. Pt presented to the ER on 1/13/2020 with cough, shortness of breath and R sided chest pain. CXR identified new widening of the superior mediastinum in comparison to recent prior chest x-rays. Chest CT showed an incompletely characterized anterior mediastinal mass extending to the right supraclavicular region, but appearing to be separate from the thyroid gland, measuring approximately 3.9 cm in AP diameter. Pulmonology and surgical evaluation were recommended. Patient was discharged with instructions to follow-up with pulmonary outpatient. Pt was seen and underwent bronch with EBUS and FNA with airway inspection. The 4R location tumor was biopsied and positive for malignancy on SATNAM. Cytology from FNA of 2R-4R location of lung mass, revealed the presence of malignant cells. Immunohistochemical features were suggestive of carcinoma of hepatic origin given staining for cytoplasmic TTF-1 as well as arginase.  Pathologist recommended clinical and imaging correlation, and noted that additional tissue may be necessary for further evaluation given relatively low cellularity of cell block preparation. 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 Pt had enlarged hypermetabolic mediastinal LN and soft tissue mass in the R supraclavicular soft tissues  Measuring 4.9x2.9cm separate from thyroid. Pt was referred to oncology and admitted on 2/10/2020 as pt could not tolerate IVF prior to chemo secondary to dyspnea and acute respiratory failure. Pt's Na was 123 with concern for SIADH. Nephrology was consulted. An echo was ordered and is pending. Pt has had increased O2 requirements and increased cough. We were consulted to assist.  
Pt is lying in bed and unable to answer any questions coherently. Pt will open eyes and then closes them back again. Pt will start to say something and her sentence trails off and she falls back to sleep. She then starts picking at her O2 tubing and tries to get out of bed. Pt reportedly alert and oriented yesterday but her son stayed overnight. Pt has UDS ordered and pending given change in status and history of drug abuse. Pt is not on O2 at home. We will check stat ABG.    
 
Subjective: On airvo 
 family at the bedsite 
 sitting up She is now DNR Current Facility-Administered Medications Medication Dose Route Frequency  methylPREDNISolone (PF) (SOLU-MEDROL) injection 40 mg  40 mg IntraVENous Q6H  
 furosemide (LASIX) injection 20 mg  20 mg IntraVENous DAILY PRN  
 amLODIPine (NORVASC) tablet 5 mg  5 mg Oral DAILY  atorvastatin (LIPITOR) tablet 80 mg  80 mg Oral DAILY  carvediloL (COREG) tablet 6.25 mg  6.25 mg Oral BID WITH MEALS  enoxaparin (LOVENOX) injection 30 mg  30 mg SubCUTAneous Q24H  
 0.9% sodium chloride infusion  50 mL/hr IntraVENous CONTINUOUS  
 guaiFENesin ER (MUCINEX) tablet 1,200 mg  1,200 mg Oral BID  etoposide (VEPESID) 163.2 mg in 0.9% sodium chloride 500 mL chemo infusion  120 mg/m2 (Treatment Plan Recorded) IntraVENous ONCE  prochlorperazine (COMPAZINE) with saline injection 10 mg  10 mg IntraVENous ONCE PRN  
 cefepime (MAXIPIME) 2 g in 0.9% sodium chloride (MBP/ADV) 100 mL  2 g IntraVENous Q12H  
 OLANZapine (ZyPREXA zydis) disintegrating tablet 10 mg  10 mg Oral QHS  haloperidol lactate (HALDOL) injection 5 mg  5 mg IntraMUSCular Q6H PRN  
 morphine injection 5 mg  5 mg IntraVENous Q2H PRN  
 LORazepam (ATIVAN) injection 1 mg  1 mg IntraVENous Q2H PRN  
 LORazepam (ATIVAN) tablet 0.5 mg  0.5 mg Oral Q6H PRN  
 albuterol (PROVENTIL VENTOLIN) nebulizer solution 2.5 mg  2.5 mg Nebulization Q4H PRN  
 morphine CR (MS CONTIN) tablet 60 mg  60 mg Oral Q12H  
 morphine IR (MS IR) tablet 15 mg  15 mg Oral Q4H PRN  
 ondansetron (ZOFRAN ODT) tablet 8 mg  8 mg Oral Q8H PRN  prochlorperazine (COMPAZINE) tablet 10 mg  10 mg Oral Q6H PRN  
 sertraline (ZOLOFT) tablet 25 mg  25 mg Oral DAILY Review of Systems Unobtainable due to patient status. Objective:  
 
Vitals:  
 02/13/20 1207 02/13/20 1401 02/14/20 0635 02/14/20 1030 BP: 93/59 121/66 131/79 127/75 Pulse: 81 83 92 82 Resp: 20 16 18 18 Temp: 97.5 °F (36.4 °C) 97.8 °F (36.6 °C) 98.1 °F (36.7 °C) 98 °F (36.7 °C) SpO2: 99% 100% 98% 94% Weight:      
 
 
 
Intake/Output Summary (Last 24 hours) at 2/14/2020 1303 Last data filed at 2/14/2020 1115 Gross per 24 hour Intake 669 ml Output 1200 ml Net -531 ml Physical Exam:  
Constitution:  the patient is well developed and in no acute distress EENMT:  Sclera clear, pupils equal, oral mucosa moist 
Respiratory: crackles Cardiovascular:  RRR without M,G,R 
Gastrointestinal: soft and non-tender; with positive bowel sounds. Musculoskeletal: warm without cyanosis. There is no lower extremity edema. Skin:  no jaundice or rashes, no wounds Neurologic: no gross neuro deficits Psychiatric:  Lethargic not interacting CXR:  
 
  
Recent Labs  
  02/14/20 
1114 02/13/20 
0201 02/12/20 
4687 WBC 16.0* 10.6 13.0* HGB 8.3* 9.2* 8.9* HCT 26.3* 29.1* 28.0*  
  433 400 Recent Labs  
  02/14/20 
1114 02/13/20 
0201 02/12/20 
7087 * 133* 132* K 3.4* 4.0 3.6  100 97* CO2 29 24 27 * 130* 111* BUN 21 8 9 CREA 0.59* 0.46* 0.53* CA 8.4 8.4 8.7 ALB 2.0* 2.0* 2.1* TBILI 0.2 0.4 0.4 ALT 14 12 10* SGOT 38* 22 20 Recent Labs  
  02/13/20 
1146 02/12/20 
1409 PHI 7.345* 7.347* PCO2I 47.2* 47.2*  
PO2I 88 59* HCO3I 25.8 25.9 Assessment:  (Medical Decision Making) Hospital Problems  Date Reviewed: 2/13/2020 Codes Class Noted POA Lung cancer [facility]) ICD-10-CM: C34.90 ICD-9-CM: 162.9  2/10/2020 Unknown Hypoxia ICD-10-CM: R09.02 
ICD-9-CM: 799.02  2/10/2020 Unknown COPD exacerbation ([facility] ICD-10-CM: J44.1 ICD-9-CM: 491.21  Unknown Yes Acute respiratory failure with hypoxia ([facility] ICD-10-CM: J96.01 
ICD-9-CM: 518.81  7/12/2011 Yes Plan:  (Medical Decision Making) --she is now DNR but still wants to continue chemo 
- continue solumedrol, airvo,nebs More than 50% of the time documented was spent in face-to-face contact with the patient and in the care of the patient on the floor/unit where the patient is located.  
 
Gaviota Brooks MD

## 2020-02-14 NOTE — PROGRESS NOTES
Pt alert and oriented x 4, pt educated on disease process and treatment options available, pt educated on laws of [place] concerning HCPOA, pt states that she wants to be comfortable and does not wish to be intubated, pt states wishes that brother Smiley Melendez will be HCPOA, HCPOA forms obtained and explained to pt and Nita Mora, forms filled out and witnessed by 2 individuals, MD notified

## 2020-02-14 NOTE — PROGRESS NOTES
END OF SHIFT NOTE: 
 
Intake/Output 02/14 0701 - 02/14 1900 In: 2083 [P.O.:278; I.V.:1093] Out: 600 [Urine:600] Voiding: YES Catheter: YES Drain:   
 
 
 
 
 
Stool:  0 occurrences. Emesis:  0 occurrences. VITAL SIGNS Patient Vitals for the past 12 hrs: 
 Temp Pulse Resp BP SpO2  
02/14/20 1438 97.7 °F (36.5 °C) 78 18 123/67 98 % 02/14/20 1030 98 °F (36.7 °C) 82 18 127/75 94 % Pain Assessment Pain 1 Pain Scale 1: FLACC (02/14/20 0104) Pain Intensity 1: 0 (02/14/20 0104) Patient Stated Pain Goal: 0 (02/13/20 1412) Pain Reassessment 1: Patient resting w/respiratory rate greater than 10 (02/14/20 0104) Pain Location 1: Generalized (02/13/20 1923) Pain Orientation 1: Right (02/12/20 0450) Pain Description 1: Aching (02/13/20 1923) Pain Intervention(s) 1: Medication (see MAR) (02/14/20 0031) Ambulating No 
 
Additional Information: patient in and out of confusion throughout the day. Patient tolerated chemo well. Patient was retaining 450 mL, jara place and draining well. Shift report given to oncoming nurse at the bedside.  
 
Peggy Mancera RN

## 2020-02-14 NOTE — PROGRESS NOTES
Attempt to contact Rodolfo x 2, unable to leave , attempt to contact Nelson Dorantes x 1, phone not in service, family attempted to contact Jorge Worthington x 1, text message sent by family

## 2020-02-14 NOTE — PROGRESS NOTES
Pharmacokinetic Consult to Pharmacist 
 
Maria Isabel Madden is a 48 y.o. female being treated for Pneumonia with Cefepime and Vancomycin. Weight: 46.9 kg (103 lb 4.6 oz) Lab Results Component Value Date/Time BUN 21 02/14/2020 11:14 AM  
 Creatinine 0.59 (L) 02/14/2020 11:14 AM  
 WBC 16.0 (H) 02/14/2020 11:14 AM  
 Lactic Acid (POC) 1.2 01/31/2018 05:13 PM  
  
Estimated Creatinine Clearance: 67.5 mL/min (A) (by C-G formula based on SCr of 0.59 mg/dL (L)). CULTURES: 
2/10: Right lower lobe infiltrate Lab Results Component Value Date/Time Vancomycin,trough 21.2 (HH) 02/13/2020 01:58 AM  
 
 
Day 1 of vancomycin. Goal trough is 15-20. Will restart Vancomycin 1250mg IV x one dose followed by Vancomycin 750mg IV q12h. Will continue to follow patient. Thank you, ANDREZ Rowan, PharmD

## 2020-02-15 NOTE — PROGRESS NOTES
END OF SHIFT NOTE: 
 
Intake/Output 02/15 0701 - 02/15 1900 In: 2120 [P.O.:945; I.V.:1175] Out: 1300 [Urine:1300] Voiding: YES Catheter: YES Drain:   
 
 
 
 
 
Stool:  0 occurrences. Emesis:  0 occurrences. VITAL SIGNS Patient Vitals for the past 12 hrs: 
 Temp Pulse Resp BP SpO2  
02/15/20 1757     91 % 02/15/20 1738     90 % 02/15/20 1450     91 % 02/15/20 1445 98.5 °F (36.9 °C) 74  116/56 98 % 02/15/20 1116 97.5 °F (36.4 °C) 99 18 128/80 94 % 02/15/20 0646 98.1 °F (36.7 °C) 64 18 136/83 96 % Pain Assessment Pain 1 Pain Scale 1: Visual (02/15/20 1840) Pain Intensity 1: 0 (02/15/20 1840) Patient Stated Pain Goal: 0 (02/15/20 1742) Pain Reassessment 1: Patient resting w/respiratory rate greater than 10 (02/15/20 1840) Pain Location 1: Generalized (02/15/20 1742) Pain Orientation 1: Right (02/12/20 0450) Pain Description 1: Aching (02/15/20 1742) Pain Intervention(s) 1: Medication (see MAR) (02/15/20 1742) Ambulating No 
 
Additional Information: patient ate better today than she has been, towards the end of the shift patient was more oriented than she has been as well. Shift report to be given to oncoming nurse at the bedside.  
 
Petar Hein RN

## 2020-02-15 NOTE — PROGRESS NOTES
The patient's son Erasto Ramos came across the patient's wallet. He expressed concern that if his brother Agustina Savage came to see him mom and found it he would steal it. Erasto Ramos took the patient's wallet home with him to keep safe.

## 2020-02-15 NOTE — PROGRESS NOTES
[facility] Admission Date: 2/10/2020 Daily Progress Note: 2/15/2020 The patient's chart is reviewed and the patient is discussed with the staff. 54 y.o.  female seen and evaluated at the request of Dr. Jorge Michaels. Pt is well known to [facility] with a history of cocaine abuse(as recent as 1/10/2020 reportedly, + UDS on 1/30/2020), tobacco abuse 1/2 ppd x 38 years, COPD, chronic pain, DLD, chronic hepatitis C, chronic diastolic CHF, CAD, bipolar disorder with borderline personality disorder, CAD s/ CABG, and ASCUS with positive high risk HPV cervical. Pt presented to the ER on 1/13/2020 with cough, shortness of breath and R sided chest pain. CXR identified new widening of the superior mediastinum in comparison to recent prior chest x-rays. Chest CT showed an incompletely characterized anterior mediastinal mass extending to the right supraclavicular region, but appearing to be separate from the thyroid gland, measuring approximately 3.9 cm in AP diameter. Pulmonology and surgical evaluation were recommended. Patient was discharged with instructions to follow-up with pulmonary outpatient. Pt was seen and underwent bronch with EBUS and FNA with airway inspection. The 4R location tumor was biopsied and positive for malignancy on SATNAM. Cytology from FNA of 2R-4R location of lung mass, revealed the presence of malignant cells. Immunohistochemical features were suggestive of carcinoma of hepatic origin given staining for cytoplasmic TTF-1 as well as arginase.  Pathologist recommended clinical and imaging correlation, and noted that additional tissue may be necessary for further evaluation given relatively low cellularity of cell block preparation. 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 Pt had enlarged hypermetabolic mediastinal LN and soft tissue mass in the R supraclavicular soft tissues  Measuring 4.9x2.9cm separate from thyroid. Pt was referred to oncology and admitted on 2/10/2020 as pt could not tolerate IVF prior to chemo secondary to dyspnea and acute respiratory failure. Pt's Na was 123 with concern for SIADH. Nephrology was consulted. An echo was ordered and is pending. Pt has had increased O2 requirements and increased cough. We were consulted to assist.  
Pt is lying in bed and unable to answer any questions coherently. Pt will open eyes and then closes them back again. Pt will start to say something and her sentence trails off and she falls back to sleep. She then starts picking at her O2 tubing and tries to get out of bed. Pt reportedly alert and oriented yesterday but her son stayed overnight. Pt has UDS ordered and pending given change in status and history of drug abuse. Pt is not on O2 at home. We will check stat ABG.    
 
Subjective: On NC at 5 LPM. Sleepy. responding but not talk. No pain or dyspnea. On chemo. Current Facility-Administered Medications Medication Dose Route Frequency  [START ON 2/16/2020] Vancomycin Trough Reminder   Other ONCE  
 dexamethasone (DECADRON) 4 mg/mL injection 8 mg  8 mg IntraVENous ONCE  
 etoposide (VEPESID) 163.2 mg in 0.9% sodium chloride 500 mL chemo infusion  120 mg/m2 (Treatment Plan Recorded) IntraVENous ONCE  
 methylPREDNISolone (PF) (SOLU-MEDROL) injection 40 mg  40 mg IntraVENous Q6H  
 furosemide (LASIX) injection 20 mg  20 mg IntraVENous DAILY PRN  
 amLODIPine (NORVASC) tablet 5 mg  5 mg Oral DAILY  atorvastatin (LIPITOR) tablet 80 mg  80 mg Oral DAILY  carvediloL (COREG) tablet 6.25 mg  6.25 mg Oral BID WITH MEALS  enoxaparin (LOVENOX) injection 30 mg  30 mg SubCUTAneous Q24H  0.9% sodium chloride infusion  50 mL/hr IntraVENous CONTINUOUS  
 guaiFENesin ER (MUCINEX) tablet 1,200 mg  1,200 mg Oral BID  cefepime (MAXIPIME) 2 g in 0.9% sodium chloride (MBP/ADV) 100 mL  2 g IntraVENous Q12H  potassium chloride (K-DUR, KLOR-CON) SR tablet 20 mEq  20 mEq Oral DAILY  vancomycin (VANCOCIN) 750 mg in  mL infusion  750 mg IntraVENous Q12H  
 OLANZapine (ZyPREXA zydis) disintegrating tablet 10 mg  10 mg Oral QHS  haloperidol lactate (HALDOL) injection 5 mg  5 mg IntraMUSCular Q6H PRN  
 morphine injection 5 mg  5 mg IntraVENous Q2H PRN  
 LORazepam (ATIVAN) injection 1 mg  1 mg IntraVENous Q2H PRN  
 LORazepam (ATIVAN) tablet 0.5 mg  0.5 mg Oral Q6H PRN  
 albuterol (PROVENTIL VENTOLIN) nebulizer solution 2.5 mg  2.5 mg Nebulization Q4H PRN  
 morphine CR (MS CONTIN) tablet 60 mg  60 mg Oral Q12H  
 morphine IR (MS IR) tablet 15 mg  15 mg Oral Q4H PRN  
 ondansetron (ZOFRAN ODT) tablet 8 mg  8 mg Oral Q8H PRN  prochlorperazine (COMPAZINE) tablet 10 mg  10 mg Oral Q6H PRN  
 sertraline (ZOLOFT) tablet 25 mg  25 mg Oral DAILY Review of Systems Unobtainable due to patient status. Objective:  
 
Vitals:  
 02/14/20 2317 02/15/20 0255 02/15/20 8141 02/15/20 1116 BP: 162/88 (!) 160/93 136/83 128/80 Pulse: 66 75 64 99 Resp: 18 18 18 18 Temp: 97.6 °F (36.4 °C) 96.9 °F (36.1 °C) 98.1 °F (36.7 °C) 97.5 °F (36.4 °C) SpO2: 96% 90% 96% 94% Weight: 110 lb 11.2 oz (50.2 kg) Intake/Output Summary (Last 24 hours) at 2/15/2020 1217 Last data filed at 2/15/2020 1134 Gross per 24 hour Intake 2138 ml Output 3000 ml Net -862 ml Physical Exam:  
Constitution:  the patient is well developed and in no acute distress on 5 LPM 
EENMT:  Sclera clear, pupils equal, oral mucosa moist 
Respiratory: rhonchi b/l 
Cardiovascular:  RRR without M,G,R 
Gastrointestinal: soft and non-tender; with positive bowel sounds. Musculoskeletal: warm without cyanosis. There is no lower extremity edema. Skin:  no jaundice or rashes, no wounds Neurologic: no gross neuro deficits Psychiatric:  Lethargic but able to arouse and respond to questions. CXR:  
 
  
Recent Labs  
  02/15/20 
0257 02/14/20 
1114 02/13/20 
0201 WBC 16.8* 16.0* 10.6 HGB 8.8* 8.3* 9.2* HCT 28.1* 26.3* 29.1*  
 392 433 Recent Labs  
  02/15/20 
0257 02/14/20 
1114 02/13/20 
0201  135* 133* K 3.4* 3.4* 4.0  
 102 100 CO2 28 29 24 * 150* 130* BUN 24* 21 8 CREA 0.59* 0.59* 0.46* CA 8.4 8.4 8.4 ALB 2.2* 2.0* 2.0*  
TBILI 0.3 0.2 0.4 ALT 16 14 12 SGOT 33 38* 22 Recent Labs  
  02/13/20 
1146 02/12/20 
1409 PHI 7.345* 7.347* PCO2I 47.2* 47.2*  
PO2I 88 59* HCO3I 25.8 25.9 Assessment:  (Medical Decision Making) Hospital Problems  Date Reviewed: 2/13/2020 Codes Class Noted POA Lung cancer [facility]) ICD-10-CM: C34.90 ICD-9-CM: 162.9  2/10/2020 Unknown Hypoxia ICD-10-CM: R09.02 
ICD-9-CM: 799.02  2/10/2020 Unknown COPD exacerbation ([address] ICD-10-CM: J44.1 ICD-9-CM: 491.21  Unknown Yes Acute respiratory failure with hypoxia ([address] ICD-10-CM: J96.01 
ICD-9-CM: 518.81  7/12/2011 Yes Plan:  (Medical Decision Making) --continue oxygen on 5 LPM currently. --add nebs -- do not see them as standing order. --on abx and likely post obstructive from lung mass -- ion since 2/10 and continue till 2/17 to complete 7 days. Do not see any cultures. --on chemo now per oncology 
- continue solumedrol on 40 q6  
--pain meds 
--DNR More than 50% of the time documented was spent in face-to-face contact with the patient and in the care of the patient on the floor/unit where the patient is located.  
 
Yoli Duckworth MD

## 2020-02-16 NOTE — PROGRESS NOTES
Problem: Falls - Risk of 
Goal: *Absence of Falls Description Document Sergiowilmer Avinash Fall Risk and appropriate interventions in the flowsheet. Outcome: Progressing Towards Goal 
Note: Fall Risk Interventions: 
Mobility Interventions: Communicate number of staff needed for ambulation/transfer, Patient to call before getting OOB Mentation Interventions: Adequate sleep, hydration, pain control Medication Interventions: Assess postural VS orthostatic hypotension, Teach patient to arise slowly, Evaluate medications/consider consulting pharmacy, Patient to call before getting OOB, Bed/chair exit alarm Elimination Interventions: Call light in reach, Patient to call for help with toileting needs History of Falls Interventions: Door open when patient unattended, Room close to nurse's station Problem: Patient Education: Go to Patient Education Activity Goal: Patient/Family Education Outcome: Progressing Towards Goal 
  
Problem: Pressure Injury - Risk of 
Goal: *Prevention of pressure injury Description Document Patricio Scale and appropriate interventions in the flowsheet. Outcome: Progressing Towards Goal 
Note: Pressure Injury Interventions: 
Sensory Interventions: Assess need for specialty bed, Pressure redistribution bed/mattress (bed type) Moisture Interventions: Apply protective barrier, creams and emollients Activity Interventions: Increase time out of bed, Pressure redistribution bed/mattress(bed type) Mobility Interventions: Assess need for specialty bed, Pressure redistribution bed/mattress (bed type) Nutrition Interventions: Document food/fluid/supplement intake, Offer support with meals,snacks and hydration Friction and Shear Interventions: Foam dressings/transparent film/skin sealants Problem: Patient Education: Go to Patient Education Activity Goal: Patient/Family Education Outcome: Progressing Towards Goal 
  
Problem: Breathing Pattern - Ineffective Goal: *Absence of hypoxia Outcome: Progressing Towards Goal 
  
Problem: Non-Violent Restraints Goal: *Removal from restraints as soon as assessed to be safe Outcome: Progressing Towards Goal 
Goal: *No harm/injury to patient while restraints in use Outcome: Progressing Towards Goal 
Goal: *Patient's dignity will be maintained Outcome: Progressing Towards Goal 
Goal: *Patient Specific Goal (EDIT GOAL, INSERT TEXT) Outcome: Progressing Towards Goal 
Goal: Non-violent Restaints:Standard Interventions Outcome: Progressing Towards Goal 
Goal: Non-violent Restraints:Patient Interventions Outcome: Progressing Towards Goal 
Goal: Patient/Family Education Outcome: Progressing Towards Goal 
  
Problem: Chemotherapy, Day 1 Goal: Off Pathway (Use only if patient is Off Pathway) Outcome: Progressing Towards Goal 
Goal: Activity/Safety Outcome: Progressing Towards Goal 
Goal: Consults, if ordered Outcome: Progressing Towards Goal 
Goal: Diagnostic Test/Procedures Outcome: Progressing Towards Goal 
Goal: Nutrition/Diet Outcome: Progressing Towards Goal 
Goal: Discharge Planning Outcome: Progressing Towards Goal 
Goal: Medications Outcome: Progressing Towards Goal 
Goal: Respiratory Outcome: Progressing Towards Goal 
Goal: Treatments/Interventions/Procedures Outcome: Progressing Towards Goal 
Goal: Psychosocial 
Outcome: Progressing Towards Goal 
Goal: *Optimal pain control at patient's stated goal 
Outcome: Progressing Towards Goal 
Goal: *Hemodynamically stable Outcome: Progressing Towards Goal 
Goal: *Adequate oxygenation Outcome: Progressing Towards Goal 
Goal: *Chemotherapy regimen is initiated Outcome: Progressing Towards Goal 
Goal: *Patient and family verbalize understanding of plan of care Outcome: Progressing Towards Goal 
  
Problem: Pain Goal: *Control of Pain Outcome: Progressing Towards Goal 
  
Problem: Patient Education: Go to Patient Education Activity Goal: Patient/Family Education Outcome: Progressing Towards Goal

## 2020-02-16 NOTE — PROGRESS NOTES
Pharmacokinetic Consult to Pharmacist 
 
Kiya Peabody is a 48 y.o. female being treated for pneumonia with vancomycin and cefepime. Weight: 49.4 kg (109 lb) Lab Results Component Value Date/Time BUN 24 (H) 02/16/2020 02:24 AM  
 Creatinine 0.61 02/16/2020 02:24 AM  
 WBC 19.3 (H) 02/16/2020 02:24 AM  
 Lactic Acid (POC) 1.2 01/31/2018 05:13 PM  
  
Estimated Creatinine Clearance: 67.5 mL/min (by C-G formula based on SCr of 0.61 mg/dL). CULTURES: 
-- 
 
Lab Results Component Value Date/Time Vancomycin,trough 29.8 (HH) 02/16/2020 02:24 AM  
 
 
Day 3 of vancomycin after restart. (Original start 2/11/20). Goal trough is 15 -20. Vanc trough = 29.8 on 750mg q 12h. Will decrease to q 18 hours and delay next dose 24 hours to allow trough to decrease. Will continue to follow patient. Thank you, 
Leonard Lora, PharmD Clinical Pharmacist 
[phone]

## 2020-02-16 NOTE — PROGRESS NOTES
[facility] Admission Date: 2/10/2020 Daily Progress Note: 2/16/2020 The patient's chart is reviewed and the patient is discussed with the staff. 54 y.o.  female seen and evaluated at the request of Dr. Lashell Mejia. Pt is well known to [facility] with a history of cocaine abuse(as recent as 1/10/2020 reportedly, + UDS on 1/30/2020), tobacco abuse 1/2 ppd x 38 years, COPD, chronic pain, DLD, chronic hepatitis C, chronic diastolic CHF, CAD, bipolar disorder with borderline personality disorder, CAD s/ CABG, and ASCUS with positive high risk HPV cervical. Pt presented to the ER on 1/13/2020 with cough, shortness of breath and R sided chest pain. CXR identified new widening of the superior mediastinum in comparison to recent prior chest x-rays. Chest CT showed an incompletely characterized anterior mediastinal mass extending to the right supraclavicular region, but appearing to be separate from the thyroid gland, measuring approximately 3.9 cm in AP diameter. Pulmonology and surgical evaluation were recommended. Patient was discharged with instructions to follow-up with pulmonary outpatient. Pt was seen and underwent bronch with EBUS and FNA with airway inspection. The 4R location tumor was biopsied and positive for malignancy on SATNAM. Cytology from FNA of 2R-4R location of lung mass, revealed the presence of malignant cells. Immunohistochemical features were suggestive of carcinoma of hepatic origin given staining for cytoplasmic TTF-1 as well as arginase.  Pathologist recommended clinical and imaging correlation, and noted that additional tissue may be necessary for further evaluation given relatively low cellularity of cell block preparation. 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 Pt had enlarged hypermetabolic mediastinal LN and soft tissue mass in the R supraclavicular soft tissues  Measuring 4.9x2.9cm separate from thyroid. Pt was referred to oncology and admitted on 2/10/2020 as pt could not tolerate IVF prior to chemo secondary to dyspnea and acute respiratory failure. Pt's Na was 123 with concern for SIADH. Nephrology was consulted. An echo was ordered and is pending. Pt has had increased O2 requirements and increased cough. We were consulted to assist.  
Pt is lying in bed and unable to answer any questions coherently. Pt will open eyes and then closes them back again. Pt will start to say something and her sentence trails off and she falls back to sleep. She then starts picking at her O2 tubing and tries to get out of bed. Pt reportedly alert and oriented yesterday but her son stayed overnight. Pt has UDS ordered and pending given change in status and history of drug abuse. Pt is not on O2 at home. We will check stat ABG.    
 
Subjective:  
 
Patient is more awake today and instantly responding. Did rest last night. No pain. On  NC at 4 LPM. Current Facility-Administered Medications Medication Dose Route Frequency  potassium chloride (K-DUR, KLOR-CON) SR tablet 40 mEq  40 mEq Oral DAILY  [START ON 2/17/2020] vancomycin (VANCOCIN) 750 mg in  mL infusion  750 mg IntraVENous Q18H  
 albuterol (PROVENTIL VENTOLIN) nebulizer solution 2.5 mg  2.5 mg Nebulization TID RT  
 methylPREDNISolone (PF) (SOLU-MEDROL) injection 40 mg  40 mg IntraVENous Q6H  
 furosemide (LASIX) injection 20 mg  20 mg IntraVENous DAILY PRN  
 amLODIPine (NORVASC) tablet 5 mg  5 mg Oral DAILY  atorvastatin (LIPITOR) tablet 80 mg  80 mg Oral DAILY  carvediloL (COREG) tablet 6.25 mg  6.25 mg Oral BID WITH MEALS  enoxaparin (LOVENOX) injection 30 mg  30 mg SubCUTAneous Q24H  0.9% sodium chloride infusion  50 mL/hr IntraVENous CONTINUOUS  
 guaiFENesin ER (MUCINEX) tablet 1,200 mg  1,200 mg Oral BID  cefepime (MAXIPIME) 2 g in 0.9% sodium chloride (MBP/ADV) 100 mL  2 g IntraVENous Q12H  
 OLANZapine (ZyPREXA zydis) disintegrating tablet 10 mg  10 mg Oral QHS  haloperidol lactate (HALDOL) injection 5 mg  5 mg IntraMUSCular Q6H PRN  
 morphine injection 5 mg  5 mg IntraVENous Q2H PRN  
 LORazepam (ATIVAN) injection 1 mg  1 mg IntraVENous Q2H PRN  
 LORazepam (ATIVAN) tablet 0.5 mg  0.5 mg Oral Q6H PRN  
 albuterol (PROVENTIL VENTOLIN) nebulizer solution 2.5 mg  2.5 mg Nebulization Q4H PRN  
 morphine CR (MS CONTIN) tablet 60 mg  60 mg Oral Q12H  
 morphine IR (MS IR) tablet 15 mg  15 mg Oral Q4H PRN  
 ondansetron (ZOFRAN ODT) tablet 8 mg  8 mg Oral Q8H PRN  prochlorperazine (COMPAZINE) tablet 10 mg  10 mg Oral Q6H PRN  
 sertraline (ZOLOFT) tablet 25 mg  25 mg Oral DAILY Review of Systems Unobtainable due to patient status. Objective:  
 
Vitals:  
 02/16/20 0011 02/16/20 6785 02/16/20 0720 02/16/20 7606 BP: 119/84 141/59 148/61 Pulse: 66 69 73 Resp: 12 14 16 Temp: 97.2 °F (36.2 °C) 97.4 °F (36.3 °C) 97.8 °F (36.6 °C) SpO2: 94% 91% 94% 95% Weight:      
 
 
 
Intake/Output Summary (Last 24 hours) at 2/16/2020 1029 Last data filed at 2/16/2020 7551 Gross per 24 hour Intake 2090 ml Output 2950 ml Net -860 ml Physical Exam:  
Constitution:  the patient is well developed and in no acute distress on 4 LPM 
EENMT:  Sclera clear, pupils equal, oral mucosa moist 
Respiratory: less rhonchi but decreased sounds in right lung base Cardiovascular:  RRR without M,G,R 
Gastrointestinal: soft and non-tender; with positive bowel sounds. Musculoskeletal: warm without cyanosis. There is no lower extremity edema. Skin:  no jaundice or rashes, no wounds Neurologic: no gross neuro deficits Psychiatric:  Awake and alert CXR:  
 
  
 Recent Labs  
  02/16/20 
0224 02/15/20 
0257 02/14/20 
1114 WBC 19.3* 16.8* 16.0* HGB 8.7* 8.8* 8.3* HCT 26.9* 28.1* 26.3*  
 411 392 Recent Labs  
  02/16/20 
0224 02/15/20 
0257 02/14/20 
1114  136 135*  
K 2.7* 3.4* 3.4*  
 102 102 CO2 31 28 29 * 139* 150* BUN 24* 24* 21  
CREA 0.61 0.59* 0.59* CA 8.0* 8.4 8.4 ALB 1.9* 2.2* 2.0*  
TBILI 0.2 0.3 0.2 ALT 15 16 14 SGOT 27 33 38* Recent Labs  
  02/13/20 
1146 PHI 7.345* PCO2I 47.2*  
PO2I 88 HCO3I 25.8 Assessment:  (Medical Decision Making) Hospital Problems  Date Reviewed: 2/13/2020 Codes Class Noted POA Lung cancer [facility]) ICD-10-CM: C34.90 ICD-9-CM: 162.9  2/10/2020 Unknown Hypoxia ICD-10-CM: R09.02 
ICD-9-CM: 799.02  2/10/2020 Unknown COPD exacerbation ([facility] ICD-10-CM: J44.1 ICD-9-CM: 491.21  Unknown Yes Acute respiratory failure with hypoxia ([facility] ICD-10-CM: J96.01 
ICD-9-CM: 518.81  7/12/2011 Yes Plan:  (Medical Decision Making) --continue oxygen on 4 LPM currently and taper as tolerated 
--nebs helping and would continue --on abx and likely post obstructive from lung mass -- ion since 2/10 and continue till 2/17 to complete 7 days. Do not see any cultures. --on chemo now per oncology 
- continue solumedrol on 40 q6 but taper to q12, since not wheezing 
--pain meds 
--DNR More than 50% of the time documented was spent in face-to-face contact with the patient and in the care of the patient on the floor/unit where the patient is located.  
 
Rigoberto Olmstead MD

## 2020-02-16 NOTE — PROGRESS NOTES
andrés 
 
[facility] Hematology & Oncology Inpatient Hematology / Oncology Progress Note Admission Date: 2/10/2020  3:39 PM 
Reason for Admission/Hospital Course: Lung cancer ([address] [C34.90] Hypoxia [R09.02] 
 
 
24 Hour Events: 
Afeb, VSS, 
O2 down to 3L Sleepy but arousable ROS: 
+ cough 
+shortness of breath 
+ pain 10 point review of systems is otherwise negative with the exception of the elements mentioned above in the HPI. Allergies Allergen Reactions  Lithium Analogues Unknown (comments)  Morphine (Pf) Rash Pt is currently taking Morphine 30mg QID  Other Medication Swelling Whatever holds Vit d pill together OBJECTIVE: 
Patient Vitals for the past 8 hrs: 
 BP Temp Pulse Resp SpO2  
20 0827     95 % 20 0720 148/61 97.8 °F (36.6 °C) 73 16 94 % 20 0441 141/59 97.4 °F (36.3 °C) 69 14 91 % Temp (24hrs), Av.7 °F (36.5 °C), Min:97.2 °F (36.2 °C), Max:98.5 °F (36.9 °C) 
 
 0701 -  1900 In: 240 [P.O.:240] Out: 200 [Urine:200] Physical Exam: 
Constitutional: Ill appearing  female in no mild distress, sitting in the hospital bed. Alert and oriented x 2. HEENT: Normocephalic and atraumatic. Oropharynx is clear, mucous membranes are moist.  Sclerae anicteric. Neck supple without JVD. No thyromegaly present. Lymph node 
 deferred. Skin Warm and dry. No bruising and no rash noted. No erythema. No pallor. Respiratory Lungs are coarse; normal air exchange without accessory muscle use. On O2 @3L. CVS Normal rate, regular rhythm and normal S1 and S2. No murmurs, gallops, or rubs. Abdomen Soft, nontender and nondistended, normoactive bowel sounds. No palpable mass. No hepatosplenomegaly. Neuro Lethargic MSK Normal range of motion in general.  No edema and no tenderness. Psych Lethargic Labs: 
   
Recent Labs  
  20 
0224 02/15/20 
0257 20 
1114 WBC 19.3* 16.8* 16.0*  
 RBC 3.04* 3.08* 2.90* HGB 8.7* 8.8* 8.3* HCT 26.9* 28.1* 26.3*  
MCV 88.5 91.2 90.7 MCH 28.6 28.6 28.6 MCHC 32.3 31.3* 31.6 RDW 13.1 13.1 13.0  411 392 GRANS 98* 97* 96* LYMPH 1* 1* 2*  
MONOS 1* 1* 2* EOS 0* 0* 0*  
BASOS 0 0 0 IG 1 1 1 DF AUTOMATED AUTOMATED AUTOMATED ANEU 18.8* 16.3* 15.4* ABL 0.2* 0.2* 0.3* ABM 0.1 0.1 0.3 ROSA ISELA 0.0 0.0 0.0 ABB 0.0 0.0 0.0 AIG 0.2 0.2 0.1 Recent Labs  
  02/16/20 
0224 02/15/20 
0257 02/14/20 
1114  136 135*  
K 2.7* 3.4* 3.4*  
 102 102 CO2 31 28 29 AGAP 6* 6* 4*  
* 139* 150* BUN 24* 24* 21  
CREA 0.61 0.59* 0.59* GFRAA >60 >60 >60 GFRNA >60 >60 >60  
CA 8.0* 8.4 8.4 SGOT 27 33 38* AP 49* 55 54  
TP 6.0* 6.5 6.2* ALB 1.9* 2.2* 2.0*  
GLOB 4.1* 4.3* 4.2* AGRAT 0.5* 0.5* 0.5* Imaging: 
 
Medications: 
Current Facility-Administered Medications Medication Dose Route Frequency  potassium chloride (K-DUR, KLOR-CON) SR tablet 40 mEq  40 mEq Oral DAILY  [START ON 2/17/2020] vancomycin (VANCOCIN) 750 mg in  mL infusion  750 mg IntraVENous Q18H  
 albuterol (PROVENTIL VENTOLIN) nebulizer solution 2.5 mg  2.5 mg Nebulization TID RT  
 methylPREDNISolone (PF) (SOLU-MEDROL) injection 40 mg  40 mg IntraVENous Q6H  
 furosemide (LASIX) injection 20 mg  20 mg IntraVENous DAILY PRN  
 amLODIPine (NORVASC) tablet 5 mg  5 mg Oral DAILY  atorvastatin (LIPITOR) tablet 80 mg  80 mg Oral DAILY  carvediloL (COREG) tablet 6.25 mg  6.25 mg Oral BID WITH MEALS  enoxaparin (LOVENOX) injection 30 mg  30 mg SubCUTAneous Q24H  
 0.9% sodium chloride infusion  50 mL/hr IntraVENous CONTINUOUS  
 guaiFENesin ER (MUCINEX) tablet 1,200 mg  1,200 mg Oral BID  cefepime (MAXIPIME) 2 g in 0.9% sodium chloride (MBP/ADV) 100 mL  2 g IntraVENous Q12H  
 OLANZapine (ZyPREXA zydis) disintegrating tablet 10 mg  10 mg Oral QHS  haloperidol lactate (HALDOL) injection 5 mg  5 mg IntraMUSCular Q6H PRN  
 morphine injection 5 mg  5 mg IntraVENous Q2H PRN  
 LORazepam (ATIVAN) injection 1 mg  1 mg IntraVENous Q2H PRN  
 LORazepam (ATIVAN) tablet 0.5 mg  0.5 mg Oral Q6H PRN  
 albuterol (PROVENTIL VENTOLIN) nebulizer solution 2.5 mg  2.5 mg Nebulization Q4H PRN  
 morphine CR (MS CONTIN) tablet 60 mg  60 mg Oral Q12H  
 morphine IR (MS IR) tablet 15 mg  15 mg Oral Q4H PRN  
 ondansetron (ZOFRAN ODT) tablet 8 mg  8 mg Oral Q8H PRN  prochlorperazine (COMPAZINE) tablet 10 mg  10 mg Oral Q6H PRN  
 sertraline (ZOLOFT) tablet 25 mg  25 mg Oral DAILY ASSESSMENT: 
 
Problem List  Date Reviewed: 2/13/2020 Codes Class Noted Lung cancer [facility]) ICD-10-CM: C34.90 ICD-9-CM: 162.9  2/10/2020 Hypoxia ICD-10-CM: R09.02 
ICD-9-CM: 799.02  2/10/2020 Malignant neoplasm of overlapping sites of left lung [facility]) ICD-10-CM: C34.82 
ICD-9-CM: 162.8  1/22/2020 Hypokalemia ICD-10-CM: E87.6 ICD-9-CM: 276.8  12/7/2019 Chest pain ICD-10-CM: R07.9 ICD-9-CM: 786.50  12/6/2019 Chronic tension-type headache, intractable ICD-10-CM: V16.394 ICD-9-CM: 339.12  5/13/2019 Cigarette nicotine dependence without complication PXA-45-GL: A65.470 ICD-9-CM: 305.1  7/3/2018 COPD exacerbation ([facility] ICD-10-CM: J44.1 ICD-9-CM: 491.21  Unknown Recurrent UTI ICD-10-CM: N39.0 ICD-9-CM: 599.0  4/24/2018 Gross hematuria ICD-10-CM: R31.0 ICD-9-CM: 599.71  4/24/2018 Urinary retention ICD-10-CM: R33.9 ICD-9-CM: 788.20  4/24/2018 Sick sinus syndrome (HCC) (Chronic) ICD-10-CM: I49.5 ICD-9-CM: 427.81  9/29/2017 IBS (irritable bowel syndrome) ICD-10-CM: K58.9 ICD-9-CM: 564.1  Unknown 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 
ICD-9-CM: 433.20  12/28/2016 Encounter for medication management ICD-10-CM: R32.790 ICD-9-CM: V58.69  12/28/2016 Neuropathy (Chronic) ICD-10-CM: G62.9 ICD-9-CM: 355.9  12/28/2016 Overview Signed 7/28/2017  2:20 PM by Philipp Maxwell MD  
  Last Assessment & Plan:  
Patient is currently on gabapentin 600 mg. Medication may cause increased drowsiness with Rispirdol, however pt has no complaints today. Intractable migraine with aura without status migrainosus ICD-10-CM: G43.119 ICD-9-CM: 346.01  12/28/2016 Peripheral vascular disease (HCC) (Chronic) ICD-10-CM: I73.9 ICD-9-CM: 443.9  12/1/2016 Overview Signed 12/1/2016  4:23 PM by Trinh Harvey MD  
  CTA (2/11/16): Moderate to severe stenosis in the proximal right subclavian artery. Borderline [facility] disorder [facility] ICD-10-CM: F60.3 ICD-9-CM: 301.83  Unknown Overview Signed 7/28/2017  2:20 PM by Philipp Maxwell MD  
  Last Assessment & Plan: 1. Continue inpatient hospitalization 2. Lithium level 1.5. Will hold am dose and change to 300mg PO BID. Will recheck in 3 days. 3.  Continue Neurontin 4. Consider Vistaril PRN if indicated 5. Encourage active participation in groups and on the milieu 6. Consider discharge and aftercare needs 7. Encourage abstinence from drugs of abuse Cerebrovascular disease ICD-10-CM: I67.9 ICD-9-CM: 437.9  Unknown Overview Signed 12/1/2016  4:23 PM by Trinh Harvey MD  
  CTA of the neck (2/11/16) : No significant stenosis of the right carotid artery. 37% stenosis in the proximal left internal carotid artery. Severe stenosis at the origin of the left vertebral artery Chronic hepatitis C ([facility] ICD-10-CM: B18.2 ICD-9-CM: 070.54  Unknown Chronic pain ICD-10-CM: G89.29 ICD-9-CM: 338.29  Unknown  Overview Signed 7/28/2017  2:20 PM by Philipp Maxwell MD  
  Last Assessment & Plan:  
Patient has history of chronic neck and back pain secondary to degenerative changes. She was recently prescribed a 15 day supply of Dilaudid 2 mg TID by \"pain management doctor\". This script was filled on 5.8. 17. Plan: - Given patient's history of substance use, she would largely benefit from detox of narcotics. Will try conservative methods while inpatient for back pain as well as neck and shoulder pain. Continue Ultram 100 mg Q6H while we await permission to speak to pain management doctor. Tl Ramos office in regards to patient's pain management contract. Patient has been dismissed from his services for positive UDS. - Avoid Toradol given her elevated Creatinine and glucose at time of admission. A1c normal at 5.4. ASCUS with positive high risk HPV cervical ICD-10-CM: R87.610, R87.810 ICD-9-CM: 795.01, 795.05  Unknown Pacemaker ICD-10-CM: Z95.0 ICD-9-CM: V45.01  8/10/2016 Overview Signed 8/10/2016 10:47 AM by Masoud Ragsdale MD  
  1. [employer]  :  Due to sick sinus syndrome. Chronic diastolic congestive heart failure (HCC) (Chronic) ICD-10-CM: I50.32 
ICD-9-CM: 428.32, 428.0  8/9/2016 Overview Addendum 8/27/2019  5:11 PM by Masoud Ragsdale MD  
  Admitted [facility] 8/12/11 with CP and elevated BNP at 352. 
1.  Echo (8/3/11):  EF > 55%. Mild LVH. Pseudonormal LV filling pattern. Bi-atrial enlargement. .  Mild mitral regurgitation. 2.  Echo (8/22/19):  EF 65-70%. Indeterminant diastolic function. Mild LAE. Coronary atherosclerosis of native coronary vessel (Chronic) ICD-10-CM: I25.10 ICD-9-CM: 414.01  8/9/2016 Overview Addendum 12/9/2019  7:53 AM by Masoud Ragsdale MD  
  1.  3 Vessel CABG (7/8/11):  MIR to LAD. LIMA in Y graft to superior and inferior diagonal. 
2.  Cath (8/31/12): Occluded grafts. FFR of LAD 0.89. Similar stenosis of diagonal but no FFR done. 3.  C (11/1/17): Mild to moderate nonhemodynamically significant CAD. Normal IFR of LAD (40%: 0.98) and Diagonal (50%: 0.98) 4. LHC (12/7/19): Stable CAD. Tobacco use disorder (Chronic) ICD-10-CM: Y75.037 ICD-9-CM: 305.1  Unknown HTN (hypertension), benign (Chronic) ICD-10-CM: I10 
ICD-9-CM: 401.1  8/9/2016 Dyslipidemia (Chronic) ICD-10-CM: E77.1 ICD-9-CM: 272.4  8/9/2016 Abnormal MRI of head ICD-10-CM: R93.0 ICD-9-CM: 793.0  1/1/2015 Overview Signed 8/27/2016  4:28 PM by Jenna Ye MD  
  Frontal Lobe periventricular WM disease. Ruled out for MS by Neurology Cocaine abuse in remission [facility]) ICD-10-CM: F14.11 ICD-9-CM: 305.63  9/18/2012 S/P CABG x 3 (Chronic) ICD-10-CM: Z95.1 ICD-9-CM: V45.81  9/18/2012 Bipolar disorder (HCC) (Chronic) ICD-10-CM: F31.9 ICD-9-CM: 296.80  9/18/2012 Acute respiratory failure with hypoxia [facility]) ICD-10-CM: J96.01 
ICD-9-CM: 518.81  7/12/2011  
   
  
 
   
48 y.o. F consulted for cancer in 1/2020. She worked up neck/upper chest pain with CT finding of upper mediastinal mass, EBUS 1/22/20 with FNA showed malignant cells with broad IHC negativity except for suggestion of liver origin. She had active drug abuse as documented reason being discharged from her prior pain management. She presented to [facility] on 1/30/20, has missed the scheduled PET, reported moved in with her brother and a friend drove her to visit.  We discussed her rather atypical cancer presentation, called radiology to urgently re-schedule PET to further define the origin of cancer and extent, discussed her narco management and she gave inconsistent answers regarding whether and when she used cocaine, also stated she stopped pain med herself rather then being discharged by Dr. Velia Al, although she appeared in pain, involved SW and palliative care, signed narco contract, yet urine screen positive for cocaine, then she changed her story of last use 1 week ago but that should not lead to positive urine test either, discussed the breaking of contract and trust voids prescription of narco. PET 1/30/20 showed large right mediastinal/supraclavicle avid disease but no evidence of disease otherwise and no suspicious disease in the liver, I reviewed PET personally and discussed to have urgent excisional biopsy for more tissue of histology and molecular tests, meanwhile I would not rec treat this as liver cancer, but rather treat with protocol of limited stage SCLC or stage III NSCLC if brain MRI negative to aim at possible cure or durable control, she was agreeable and Adam will arrange for urgent biopsy/port/rad onc eval, start cisplatin/etoposide/XRT 2/10/20, hyponatremia and weight gain concern of SIADH and instructed not to drink excessively and monitor, SVC syndrome and monitor closely for rx response, however she was not able to tolerate pre-hydration for cisplatin and admit for SOB likely related to SVC syndrome, administer chemo inpt with slow rate, may dosing XRT upfront for SVC relief, IR eval if needed. PLAN: 
Lung cancer 
-supposed to start Cis/VP16/XRT yesterday. 2/11 plan for chemo tomorrow if echo ok. XRT plan is not completed yet. 2/12 start chemo today if echo ok. Over to XRT for rescanning today. 2/13 D2 Cis/VP16 not given 2/14 Will give D 2 cis/VP16 as patient wishes to proceed with chemotherapy. 2/15 Proceed with C1D3 Cis/Etop today Shortness of breath/Cough 
- on 2 LPM NC. Mucinex BID. CXR with known malignancy and RLL infiltrate. 2/11 start cef/vanc for possible PNA. Check echo. 2/12 continues on cef/vanc. Echo pending. Increased oxygen needs overnight, up to 5 LPM. pulm consult pending. Check BNP for completeness. Echo still pending. 2/13 echo ok with EF of 55-60%. BNP elevated. Lasix 20 mg daily PRN if I>O by 500 cc. Lasix given this AM. Pulmonology also following as well. On high flow at 40 LPM. Continues on cef/vanc for possible PNA.  Currently able to use bedpan for urination- may need jara placed. 2/14 check blood gas. Ox 95-98% on high alon at 40 lpm. Back on cef/vanc. 2/15 O2 down to 5L, remains on Cef/Vanc 
2/16 O2 down to 3L today, continue Cef/Vanc/steroids/nebs ? SIDAH/hyponatremia - On IV fluids at this time with some improvement. Check urine sodium. Consult nephro 2/12 neph consult pending.  today. 2/13 appreciate nephros recommendations. NA up to 133 today. 2/14 Na improved up to 135. Confusion 
-ongoing confusion noted. UDS pending. Continue home medications Cari SOPs Lovenox for DVT prophylaxis Goals and plan of care reviewed with the patient. All questions answered to the best of our ability. Disposition/Update to plan of care 2/16: O2 needs now down to 3L, remains sleepy but arousable. She answered questions appropriately. confusion is ongoing/intermittent. Tolerated C1D3 well yesterday. Pain well controlled. No new concerns. Mandie Martinez NP [facility] Hematology and Oncology [id number] [address] Office : [phone] Fax : [phone]

## 2020-02-17 NOTE — PROGRESS NOTES
Pts breathing improved. Lasix given for fluid retention. Pain controlled well with meds. X-ray of chest done at bed. Fluids d/c.

## 2020-02-17 NOTE — PROGRESS NOTES
Palliative Care Progress Note Patient: Leon Lam MRN: [id number]  SSN: [id number] YOB: 1965  Age: 48 y.o. Sex: female Assessment/Plan: Chief Complaint/Interval History: alert, OOB to chair. Principal Diagnosis: · Altered Mental Status R41.82 Additional Diagnoses: · Advance Care Planning Counseling Z71.89 
· Debility, Unspecified  R53.81 
· Delirium  F05 · Fatigue, Lethargy  R53.83 
· Frailty  R54 · Respiratory Failure, Acute on Chronic  J96.20 · Counseling, Encounter for Medical Advice  Z71.9 
· Encounter for Palliative Care  Z51.5 Palliative Performance Scale (PPS) PPS: 40 Medical Decision Making:  
Reviewed and summarized notes over last 24 hours Reviewed laboratory and x-ray data over last 24 hours No family present. Met with pt at bedside. Feels breathing is improving. Denies any current pain but notes general weakness. Pt has DNR in place and would not wish for aggressive treatments. Has completed chemotherapy and pulm is treating pna. Will follow loosely as goals are currently clear and symptoms managed. Will discuss findings with members of the interdisciplinary team.   
 
  
More than 50% of this 35 minute visit was spent counseling and coordination of care as outlined above. Subjective:  
 
Review of Systems: A comprehensive review of systems was negative except for:  
Constitutional: Positive for fatigue. Objective:  
 
Visit Vitals /77 (BP 1 Location: Right arm) Pulse 75 Temp 98.9 °F (37.2 °C) Resp 19 Wt 108 lb 10 oz (49.3 kg) SpO2 94% BMI 20.52 kg/m² Physical Exam: 
 
General:  Debilitated. No acute distress. Eyes:  Conjunctivae/corneas clear Nose: Nares normal. Septum midline. Amanda Dart Neck: Supple, symmetrical, trachea midline Lungs:   Coarse bilaterally, unlabored Heart:  Regular rate and rhythm Abdomen:   Soft, non-tender, non-distended Extremities: Normal, atraumatic, no cyanosis or edema Skin: Skin color, texture, turgor normal  
Neurologic: Nonfocal  
Psych: Alert and oriented Signed By: Christiano Hopkins MD   
 February 17, 2020

## 2020-02-17 NOTE — CDMP QUERY
Pt admitted with lung cancer and pneumonia. Pt noted to have confusion. If possible, please document in the progress notes and d/c summary if you are evaluating and / or treating any of the following: ? Anoxic Encephalopathy ? Metabolic Encephalopathy ? Septic Encephalopathy ? Toxic Encephalopathy 
? Encephalopathy due to medications or drugs (please specify) ? Toxic Metabolic Encephalopathy ? Wernickes Encephalopathy 
? Other Encephalopathy 
? Other, please specify ? Clinically unable to determine The medical record reflects the following: 
   Risk Factors: advanced lung ca, pneumonia, acute respiratory failure Clinical Indicators:  
--2/12 Nephrology note, \"Patient seen and examined on rounds, ECHO being performed at the bedside, pt is drowsy but arousable complaints of shortness of breath pt oriented to self, follows commands, complaints of thirst and does not report if she is or not drinking excessive amounts of water. \" 
--2/12 nephrology note stating, \"Pt is lying in bed and unable to answer any questions coherently. Pt will open eyes and then closes them back again. Pt will start to say something and her sentence trails off and she falls back to sleep. She then starts picking at her O2 tubing and tries to get out of bed. Pt reportedly alert and oriented yesterday but her son stayed overnight. Pt has UDS ordered and pending given change in status and history of drug abuse. Pt is not on O2 at home. We will check stat ABG\" 
--2/12 ABG ph 7.347/co247.2/po2 59/hco3 25.9 
--2/13 hemonc pn stating, \"Confusion-ongoing confusion noted. UDS pending. \" 
--2/13 negative UDS 
--2/14 hemonc progress note stating, \" Today, she is in soft restraints and remains somewhat confused but is more alert. \" 
--2/15 hemonc progress note stating, \"She is still a bit confused but seems less breathless. She has had no fever. She is satting better and requiring less O2.   Despite her confusion, she is answering questions appropriately. \" 
--2/16 pulmonary pn stating, \"Patient is more awake today and instantly responding\" 
--2/16 hemonc note stating, \". Patient is stable to perhaps somewhat better. She is less confused and her sats are adequate on 3-5 liters\" and \"She answered questions appropriately. confusion is ongoing/intermittent. \" Treatment: increased supplemental oxygen and monitoring, ABGs, UDS, DNR status, soft restraints Thank you, Veronica Ness, [address] [facility] RN 
[phone]

## 2020-02-17 NOTE — PROGRESS NOTES
d 
 
[facility] Hematology & Oncology Inpatient Hematology / Oncology Progress Note Admission Date: 2/10/2020  3:39 PM 
Reason for Admission/Hospital Course: Lung cancer ([facility] [address] [C34.90] Hypoxia [R09.02] 
 
 
24 Hour Events: 
Afeb, VSS 
O2 up to 5L overnight, back down to 3L Sitting up in bedside chair Looks overall better today ROS: 
+ cough 
+shortness of breath 
+ pain 10 point review of systems is otherwise negative with the exception of the elements mentioned above in the HPI. Allergies Allergen Reactions  Lithium Analogues Unknown (comments)  Morphine (Pf) Rash Pt is currently taking Morphine 30mg QID  Other Medication Swelling Whatever holds Vit d pill together OBJECTIVE: 
Patient Vitals for the past 8 hrs: 
 BP Temp Pulse Resp SpO2  
20 1030 110/51 97.5 °F (36.4 °C) 65 18 90 % 20 0755     94 % 20 0720 129/77 98.9 °F (37.2 °C) 75 19 91 % Temp (24hrs), Av °F (36.7 °C), Min:97.1 °F (36.2 °C), Max:98.9 °F (37.2 °C) 
 
 0701 -  1900 In: -  
Out: 4715 [[id number]] Physical Exam: 
Constitutional: Ill appearing  female in no mild distress, sitting bedside chair. HEENT: Normocephalic and atraumatic. Oropharynx is clear, mucous membranes are moist.  Sclerae anicteric. Neck supple without JVD. No thyromegaly present. Lymph node 
 deferred. Skin Warm and dry. No bruising and no rash noted. No erythema. No pallor. Respiratory Lungs are coarse-improved; normal air exchange without accessory muscle use. On O2 @3L. CVS Normal rate, regular rhythm and normal S1 and S2. No murmurs, gallops, or rubs. Abdomen Soft, nontender and nondistended, normoactive bowel sounds. No palpable mass. No hepatosplenomegaly. Neuro No obvious deficits MSK Normal range of motion in general.  No edema and no tenderness. Psych Appropriate mood Labs: 
   
Recent Labs  
  20 
0312 20 
0224 02/15/20 
0257 WBC 14.6* 19.3* 16.8*  
RBC 2.78* 3.04* 3.08* HGB 7.9* 8.7* 8.8* HCT 24.7* 26.9* 28.1* MCV 88.8 88.5 91.2 MCH 28.4 28.6 28.6 MCHC 32.0 32.3 31.3*  
RDW 13.2 13.1 13.1  340 411 GRANS 98* 98* 97* LYMPH 1* 1* 1*  
MONOS 0* 1* 1*  
EOS 0* 0* 0*  
BASOS 0 0 0 IG 1 1 1 DF AUTOMATED AUTOMATED AUTOMATED ANEU 14.2* 18.8* 16.3* ABL 0.1* 0.2* 0.2* ABM 0.0* 0.1 0.1 ROSA ISELA 0.0 0.0 0.0 ABB 0.0 0.0 0.0 AIG 0.2 0.2 0.2 Recent Labs  
  02/17/20 
0312 02/16/20 
1002 02/16/20 
0224 02/15/20 
0257   --  137 136  
K 3.1* 5.2* 2.7* 3.4*  
  --  100 102 CO2 31  --  31 28 AGAP 7  --  6* 6*  
*  --  145* 139* BUN 25*  --  24* 24* CREA 0.64  --  0.61 0.59* GFRAA >60  --  >60 >60 GFRNA >60  --  >60 >60  
CA 7.6*  --  8.0* 8.4 SGOT 27  --  27 33 AP 47*  --  49* 55  
TP 5.9*  --  6.0* 6.5 ALB 1.9*  --  1.9* 2.2*  
GLOB 4.0*  --  4.1* 4.3* AGRAT 0.5*  --  0.5* 0.5* Imaging: 
 
Medications: 
Current Facility-Administered Medications Medication Dose Route Frequency  potassium chloride (K-DUR, KLOR-CON) SR tablet 40 mEq  40 mEq Oral BID  polyethylene glycol (MIRALAX) packet 17 g  17 g Oral DAILY  senna-docusate (PERICOLACE) 8.6-50 mg per tablet 1 Tab  1 Tab Oral DAILY  vancomycin (VANCOCIN) 750 mg in  mL infusion  750 mg IntraVENous Q18H  
 methylPREDNISolone (PF) (SOLU-MEDROL) injection 40 mg  40 mg IntraVENous Q12H  
 albuterol (PROVENTIL VENTOLIN) nebulizer solution 2.5 mg  2.5 mg Nebulization TID RT  
 furosemide (LASIX) injection 20 mg  20 mg IntraVENous DAILY PRN  
 amLODIPine (NORVASC) tablet 5 mg  5 mg Oral DAILY  atorvastatin (LIPITOR) tablet 80 mg  80 mg Oral DAILY  carvediloL (COREG) tablet 6.25 mg  6.25 mg Oral BID WITH MEALS  enoxaparin (LOVENOX) injection 30 mg  30 mg SubCUTAneous Q24H  
 0.9% sodium chloride infusion  50 mL/hr IntraVENous CONTINUOUS  
  guaiFENesin ER (MUCINEX) tablet 1,200 mg  1,200 mg Oral BID  cefepime (MAXIPIME) 2 g in 0.9% sodium chloride (MBP/ADV) 100 mL  2 g IntraVENous Q12H  
 OLANZapine (ZyPREXA zydis) disintegrating tablet 10 mg  10 mg Oral QHS  haloperidol lactate (HALDOL) injection 5 mg  5 mg IntraMUSCular Q6H PRN  
 morphine injection 5 mg  5 mg IntraVENous Q2H PRN  
 LORazepam (ATIVAN) injection 1 mg  1 mg IntraVENous Q2H PRN  
 LORazepam (ATIVAN) tablet 0.5 mg  0.5 mg Oral Q6H PRN  
 albuterol (PROVENTIL VENTOLIN) nebulizer solution 2.5 mg  2.5 mg Nebulization Q4H PRN  
 morphine CR (MS CONTIN) tablet 60 mg  60 mg Oral Q12H  
 morphine IR (MS IR) tablet 15 mg  15 mg Oral Q4H PRN  
 ondansetron (ZOFRAN ODT) tablet 8 mg  8 mg Oral Q8H PRN  prochlorperazine (COMPAZINE) tablet 10 mg  10 mg Oral Q6H PRN  
 sertraline (ZOLOFT) tablet 25 mg  25 mg Oral DAILY ASSESSMENT: 
 
Problem List  Date Reviewed: 2/13/2020 Codes Class Noted Lung cancer [facility]) ICD-10-CM: C34.90 ICD-9-CM: 162.9  2/10/2020 Hypoxia ICD-10-CM: R09.02 
ICD-9-CM: 799.02  2/10/2020 Malignant neoplasm of overlapping sites of left lung [facility]) ICD-10-CM: C34.82 
ICD-9-CM: 162.8  1/22/2020 Hypokalemia ICD-10-CM: E87.6 ICD-9-CM: 276.8  12/7/2019 Chest pain ICD-10-CM: R07.9 ICD-9-CM: 786.50  12/6/2019 Chronic tension-type headache, intractable ICD-10-CM: E09.718 ICD-9-CM: 339.12  5/13/2019 Cigarette nicotine dependence without complication LRQ-16-AV: J50.399 ICD-9-CM: 305.1  7/3/2018 COPD exacerbation ([facility] [address] ICD-10-CM: J44.1 ICD-9-CM: 491.21  Unknown Recurrent UTI ICD-10-CM: N39.0 ICD-9-CM: 599.0  4/24/2018 Gross hematuria ICD-10-CM: R31.0 ICD-9-CM: 599.71  4/24/2018 Urinary retention ICD-10-CM: R33.9 ICD-9-CM: 788.20  4/24/2018 Sick sinus syndrome (HCC) (Chronic) ICD-10-CM: I49.5 ICD-9-CM: 427.81  9/29/2017 IBS (irritable bowel syndrome) ICD-10-CM: K58.9 ICD-9-CM: 564.1  Unknown 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 
ICD-9-CM: 433.20  12/28/2016 Encounter for medication management ICD-10-CM: I73.662 ICD-9-CM: V58.69  12/28/2016 Neuropathy (Chronic) ICD-10-CM: G62.9 ICD-9-CM: 355.9  12/28/2016 Overview Signed 7/28/2017  2:20 PM by Jailene Ornelas MD  
  Last Assessment & Plan:  
Patient is currently on gabapentin 600 mg. Medication may cause increased drowsiness with Rispirdol, however pt has no complaints today. Intractable migraine with aura without status migrainosus ICD-10-CM: G43.119 ICD-9-CM: 346.01  12/28/2016 Peripheral vascular disease (HCC) (Chronic) ICD-10-CM: I73.9 ICD-9-CM: 443.9  12/1/2016 Overview Signed 12/1/2016  4:23 PM by Ariane Lezama MD  
  CTA (2/11/16): Moderate to severe stenosis in the proximal right subclavian artery. [facility] ICD-10-CM: F60.3 ICD-9-CM: 301.83  Unknown Overview Signed 7/28/2017  2:20 PM by Jailene Ornelas MD  
  Last Assessment & Plan: 1. Continue inpatient hospitalization 2. Lithium level 1.5. Will hold am dose and change to 300mg PO BID. Will recheck in 3 days. 3.  Continue Neurontin 4. Consider Vistaril PRN if indicated 5. Encourage active participation in groups and on the milieu 6. Consider discharge and aftercare needs 7. Encourage abstinence from drugs of abuse Cerebrovascular disease ICD-10-CM: I67.9 ICD-9-CM: 437.9  Unknown Overview Signed 12/1/2016  4:23 PM by Ariane Lezama MD  
  CTA of the neck (2/11/16) : No significant stenosis of the right carotid artery. 37% stenosis in the proximal left internal carotid artery. Severe stenosis at the origin of the left vertebral artery Chronic hepatitis C ([facility] [address] ICD-10-CM: B18.2 ICD-9-CM: 070.54  Unknown Chronic pain ICD-10-CM: G89.29 ICD-9-CM: 338.29  Unknown Overview Signed 7/28/2017  2:20 PM by Hammad Lowe MD  
  Last Assessment & Plan:  
Patient has history of chronic neck and back pain secondary to degenerative changes. She was recently prescribed a 15 day supply of Dilaudid 2 mg TID by \"pain management doctor\". This script was filled on 5.8. 17. Plan: - Given patient's history of substance use, she would largely benefit from detox of narcotics. Will try conservative methods while inpatient for back pain as well as neck and shoulder pain. Continue Ultram 100 mg Q6H while we await permission to speak to pain management doctor. Noemi Ng office in regards to patient's pain management contract. Patient has been dismissed from his services for positive UDS. - Avoid Toradol given her elevated Creatinine and glucose at time of admission. A1c normal at 5.4. ASCUS with positive high risk HPV cervical ICD-10-CM: R87.610, R87.810 ICD-9-CM: 795.01, 795.05  Unknown Pacemaker ICD-10-CM: Z95.0 ICD-9-CM: V45.01  8/10/2016 Overview Signed 8/10/2016 10:47 AM by Carlyle Valverde MD  
  1. [employer]  :  Due to sick sinus syndrome. Chronic diastolic congestive heart failure (HCC) (Chronic) ICD-10-CM: I50.32 
ICD-9-CM: 428.32, 428.0  8/9/2016 Overview Addendum 8/27/2019  5:11 PM by Carlyle Valverde MD  
  Admitted [facility] 8/12/11 with CP and elevated BNP at 352. 
1.  Echo (8/3/11):  EF > 55%. Mild LVH. Pseudonormal LV filling pattern. Bi-atrial enlargement. .  Mild mitral regurgitation. 2.  Echo (8/22/19):  EF 65-70%. Indeterminant diastolic function. Mild LAE. Coronary atherosclerosis of native coronary vessel (Chronic) ICD-10-CM: I25.10 ICD-9-CM: 414.01  8/9/2016 Overview Addendum 12/9/2019  7:53 AM by Carlyle Valverde MD  
  1.  3 Vessel CABG (7/8/11):  MIR to LAD.   LIMA in Y graft to superior and inferior diagonal. 
 2.  Cath (8/31/12): Occluded grafts. FFR of LAD 0.89. Similar stenosis of diagonal but no FFR done. 3.  LHC (11/1/17): Mild to moderate nonhemodynamically significant CAD. Normal IFR of LAD (40%: 0.98) and Diagonal (50%: 0.98) 4. LHC (12/7/19): Stable CAD. Tobacco use disorder (Chronic) ICD-10-CM: A40.443 ICD-9-CM: 305.1  Unknown HTN (hypertension), benign (Chronic) ICD-10-CM: I10 
ICD-9-CM: 401.1  8/9/2016 Dyslipidemia (Chronic) ICD-10-CM: T26.6 ICD-9-CM: 272.4  8/9/2016 Abnormal MRI of head ICD-10-CM: R93.0 ICD-9-CM: 793.0  1/1/2015 Overview Signed 8/27/2016  4:28 PM by Rosario Vargas MD  
  Frontal Lobe periventricular WM disease. Ruled out for MS by Neurology Cocaine abuse in remission [facility]) ICD-10-CM: F14.11 ICD-9-CM: 305.63  9/18/2012 S/P CABG x 3 (Chronic) ICD-10-CM: Z95.1 ICD-9-CM: V45.81  9/18/2012 Bipolar disorder (HCC) (Chronic) ICD-10-CM: F31.9 ICD-9-CM: 296.80  9/18/2012 Acute respiratory failure with hypoxia [facility]) ICD-10-CM: J96.01 
ICD-9-CM: 518.81  7/12/2011  
   
  
 
   
48 y.o. F consulted for cancer in 1/2020. She worked up neck/upper chest pain with CT finding of upper mediastinal mass, EBUS 1/22/20 with FNA showed malignant cells with broad IHC negativity except for suggestion of liver origin. She had active drug abuse as documented reason being discharged from her prior pain management. She presented to [facility] on 1/30/20, has missed the scheduled PET, reported moved in with her brother and a friend drove her to visit.  We discussed her rather atypical cancer presentation, called radiology to urgently re-schedule PET to further define the origin of cancer and extent, discussed her narco management and she gave inconsistent answers regarding whether and when she used cocaine, also stated she stopped pain med herself rather then being discharged by Dr. Suresh Davis, although she appeared in pain, involved SW and palliative care, signed narco contract, yet urine screen positive for cocaine, then she changed her story of last use 1 week ago but that should not lead to positive urine test either, discussed the breaking of contract and trust voids prescription of narco. PET 1/30/20 showed large right mediastinal/supraclavicle avid disease but no evidence of disease otherwise and no suspicious disease in the liver, I reviewed PET personally and discussed to have urgent excisional biopsy for more tissue of histology and molecular tests, meanwhile I would not rec treat this as liver cancer, but rather treat with protocol of limited stage SCLC or stage III NSCLC if brain MRI negative to aim at possible cure or durable control, she was agreeable and Adam will arrange for urgent biopsy/port/rad onc eval, start cisplatin/etoposide/XRT 2/10/20, hyponatremia and weight gain concern of SIADH and instructed not to drink excessively and monitor, SVC syndrome and monitor closely for rx response, however she was not able to tolerate pre-hydration for cisplatin and admit for SOB likely related to SVC syndrome, administer chemo inpt with slow rate, may dosing XRT upfront for SVC relief, IR eval if needed. PLAN: 
Lung cancer 
-supposed to start Cis/VP16/XRT yesterday. 2/11 plan for chemo tomorrow if echo ok. XRT plan is not completed yet. 2/12 start chemo today if echo ok. Over to XRT for rescanning today. 2/13 D2 Cis/VP16 not given 2/14 Will give D 2 cis/VP16 as patient wishes to proceed with chemotherapy. 2/15 Proceed with C1D3 Cis/Etop today Shortness of breath/Cough 
- on 2 LPM NC. Mucinex BID. CXR with known malignancy and RLL infiltrate. 2/11 start cef/vanc for possible PNA. Check echo. 2/12 continues on cef/vanc. Echo pending. Increased oxygen needs overnight, up to 5 LPM. pulm consult pending. Check BNP for completeness. Echo still pending. 2/13 echo ok with EF of 55-60%. BNP elevated. Lasix 20 mg daily PRN if I>O by 500 cc. Lasix given this AM. Pulmonology also following as well. On high flow at 40 LPM. Continues on cef/vanc for possible PNA. Currently able to use bedpan for urination- may need jara placed. 2/14 check blood gas. Ox 95-98% on high alon at 40 lpm. Back on cef/vanc. 2/15 O2 down to 5L, remains on Cef/Vanc 
2/16 O2 down to 3L today, continue Cef/Vanc/steroids/nebs 2/17 O2 at 3L, D4/7 Cef/Vanc ? SIDAH/hyponatremia - On IV fluids at this time with some improvement. Check urine sodium. Consult nephro 2/12 neph consult pending.  today. 2/13 appreciate nephros recommendations. NA up to 133 today. 2/14 Na improved up to 135. Confusion 
-ongoing confusion noted. UDS pending. 2/17 more alert and clearer today Continue home medications Cari SOPs Lovenox for DVT prophylaxis Goals and plan of care reviewed with the patient. All questions answered to the best of our ability. Disposition/Update to plan of care 2/17: She looks improved today, sitting up in bedside chair and eating. O2 needs increase overnight, but back down to 3L. Lungs sounds are much improved. On Day 4/7 of Cef/Vanc, continue steroids and nebs per pulmonary. Deyanira Mauro NP [facility] Hematology and Oncology [address] [place] [address] Office : [phone] Fax : [phone] Attending Addendum: 
I have personally performed a face to face diagnostic evaluation on this patient. I have reviewed and agree with the care plan as documented above by  Wilfrido Glez N.P.  My findings are as follows: Patient appears lethargic, heart rate regular without murmurs, abdomen is non-tender, bowel sounds are positive. 2 female history of thoracic malignancy status post recent concurrent cisplatin/etoposideXRT, cycle 1 was completed 2/15/2020. Reports right neck swelling much improved.   Shortness of breath improving with decreasing oxygen needs. Remains on broad-spectrum IV antibiotics. Continue ongoing care. Total time 25 min 50% in direct consultation about the patient's diagnosis and management Dennis Murphy MD 
[facility] Hematology/Oncology [address] [place] [address] Office : [phone] Fax : [phone]

## 2020-02-17 NOTE — PROGRESS NOTES
Problem: Mobility Impaired (Adult and Pediatric) Goal: *Acute Goals and Plan of Care (Insert Text) Description 1. Ms. Chipper Dakins will perform supine to sit and sit to supine independently in 7 days. 2.  Ms. Chipper Dakins will perform sit to stand and bed to chair with least restrictive device independently in 7 days. 3.  Ms. Chipper Dakins will perform gait with rolling walker or least restrictive device 150 ft independently in 7 days. 4.  Ms. Chipper Dakins will perform therex to bilateral lower extremities x 20 reps independently in 7 days. Outcome: Progressing Towards Goal 
 
PHYSICAL THERAPY: Initial Assessment and Daily Note 2/17/2020 INPATIENT: PT Visit Days : 1 Payor: [place] / Plan: [place] / Product Type: [employer] Medicaid /   
  
NAME/AGE/GENDER: Marisel Starks is a 48 y.o. female PRIMARY DIAGNOSIS: [facility] ([facility] [C34.90] Hypoxia [R09.02] <principal problem not specified> <principal problem not specified> 
  
  
ICD-10: Treatment Diagnosis:  
 Generalized Muscle Weakness (M62.81) Difficulty in walking, Not elsewhere classified (R26.2) Precaution/Allergies: 
Lithium analogues; Morphine (pf); and Other medication ASSESSMENT:  
 
Ms. Chipper Dakins presents sitting up in the chair. A bit SOB just sitting but sats 92%. She presents with decreased mobility and decreased gait with generalized weakness associated with compromised respiratory status. Today she performed therex with rest in between along with standing marching with min assist.  Needs to use a rolling walker. Did not at baseline. Transfers with . She has another round of chemo in  late march. She lives with her brothers. Unclear if she will need a rehab stay prior to returning home vs a short rehab stay. Depends on progress. She has significantly improved in pulmonary function and mentation since last week. Ms. Chipper Dakins is functioning below baseline.   She is therefore appropriate for skilled PT to maximize her rehab potential.  
 
This section established at most recent assessment PROBLEM LIST (Impairments causing functional limitations): 
Decreased Strength Decreased Transfer Abilities Decreased Ambulation Ability/Technique Decreased Activity Tolerance INTERVENTIONS PLANNED: (Benefits and precautions of physical therapy have been discussed with the patient.) Bed Mobility Gait Training Therapeutic Activites Therapeutic Exercise/Strengthening Transfer Training TREATMENT PLAN: Frequency/Duration: 3 times a week for duration of hospital stay Rehabilitation Potential For Stated Goals: Good REHAB RECOMMENDATIONS (at time of discharge pending progress):   
Placement: It is my opinion, based on this patient's performance to date, that Ms. Couch may benefit from HHPT vs STR. Depends on progress. Equipment:  
May need a rolling walker. HISTORY:  
History of Present Injury/Illness (Reason for Referral): 
Patient is a 48 y.o.  female seen and evaluated at the request of Dr. Theodore Kovacs. Pt is well known to [facility] with a history of cocaine abuse(as recent as 1/10/2020 reportedly, + UDS on 1/30/2020), tobacco abuse 1/2 ppd x 38 years, COPD, chronic pain, DLD, chronic hepatitis C, chronic diastolic CHF, CAD, bipolar disorder with borderline personality disorder, CAD s/ CABG, and ASCUS with positive high risk HPV cervical. Pt presented to the ER on 1/13/2020 with cough, shortness of breath and R sided chest pain. CXR identified new widening of the superior mediastinum in comparison to recent prior chest x-rays. Chest CT showed an incompletely characterized anterior mediastinal mass extending to the right supraclavicular region, but appearing to be separate from the thyroid gland, measuring approximately 3.9 cm in AP diameter. Pulmonology and surgical evaluation were recommended.  Patient was discharged with instructions to follow-up with pulmonary outpatient. Pt was seen and underwent bronch with EBUS and FNA with airway inspection. The 4R location tumor was biopsied and positive for malignancy on SATNAM. Cytology from FNA of 2R-4R location of lung mass, revealed the presence of malignant cells. Immunohistochemical features were suggestive of carcinoma of hepatic origin given staining for cytoplasmic TTF-1 as well as arginase. Pathologist recommended clinical and imaging correlation, and noted that additional tissue may be necessary for further evaluation given relatively low cellularity of cell block preparation. 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 Pt had enlarged hypermetabolic mediastinal LN and soft tissue mass in the R supraclavicular soft tissues  Measuring 4.9x2.9cm separate from thyroid. Pt was referred to oncology and admitted on 2/10/2020 as pt could not tolerate IVF prior to chemo secondary to dyspnea and acute respiratory failure. Pt's Na was 123 with concern for SIADH. Nephrology was consulted. An echo was ordered and is pending. Pt has had increased O2 requirements and increased cough. We were consulted to assist.  
Pt is lying in bed and unable to answer any questions coherently. Pt will open eyes and then closes them back again. Pt will start to say something and her sentence trails off and she falls back to sleep. She then starts picking at her O2 tubing and tries to get out of bed. Pt reportedly alert and oriented yesterday but her son stayed overnight. Pt has UDS ordered and pending given change in status and history of drug abuse. Pt is not on O2 at home. We will check stat ABG. Past Medical History/Comorbidities: Ms. Jose R Chiu  has a past medical history of Abnormal MRI of head (), ASCUS with positive high risk HPV cervical, Bipolar disorder ([facility] Borderline personality disorder ([facility] CAD, Cerebrovascular disease, Chronic diastolic congestive heart failure ([facility] Chronic hepatitis C ([facility] Chronic pain, Cocaine abuse ([facility] (2019), COPD (chronic obstructive pulmonary disease) ([facility] Dyslipidemia, Heart attack ([facility] (), History of left heart catheterization (2019), HTN, IBS (irritable bowel syndrome), Lung cancer ([facility] Methamphetamine abuse ([facility] (2019), Pacemaker, Psychiatric disorder, S/P CABG x 3, SSS (sick sinus syndrome) ([facility] Subclavian artery stenosis, right ([facility] (), and Vertebral artery stenosis (). Ms. Jose R Chiu  has a past surgical history that includes pr appendectomy; pr colsc flx w/rmvl of tumor polyp lesion snare tq (2018); colonoscopy (N/A, 2018); hx heart catheterization; hx coronary artery bypass graft (2011); hx tubal ligation; hx  section (, ); hx orthopaedic; hx hernia repair; hx pacemaker (~); and ir insert tunl cvc w port over 5 years (2020). Social History/Living Environment:  
Home Environment: Apartment # Steps to Enter: 8 One/Two Story Residence: One story Living Alone: Yes Support Systems: Family member(s) Patient Expects to be Discharged to[de-identified] [id number] Current DME Used/Available at Home: None Tub or Shower Type: Shower Prior Level of Function/Work/Activity: 
independent CA, bipolar, drug abuse. Number of Personal Factors/Comorbidities that affect the Plan of Care: 3+: HIGH COMPLEXITY EXAMINATION:  
Most Recent Physical Functioning:  
Gross Assessment: 
  
         
  
Posture: 
  
Balance: 
  Bed Mobility: 
  
Wheelchair Mobility: 
  
Transfers: 
Sit to Stand: Contact guard assistance Stand to Sit: Contact guard assistance Gait: 
  
Base of Support: Widened Speed/[place]: Shuffled Step Length: Right shortened;Left shortened Distance (ft): (marching in place. she was worried that she would be too SOB walking. O2 sats 90-91. Marching for 15 sec each time. ) Ambulation - Level of Assistance: Minimal assistance Body Structures Involved: Muscles Body Functions Affected: Movement Related Activities and Participation Affected: Mobility Number of elements that affect the Plan of Care: 3: MODERATE COMPLEXITY CLINICAL PRESENTATION:  
Presentation: Evolving clinical presentation with changing clinical characteristics: MODERATE COMPLEXITY CLINICAL DECISION [place]KIN[address] [place] [address] AM-PAC 6 Clicks Basic Mobility Inpatient Short Form How much difficulty does the patient currently have. .. Unable A Lot A Little None 1. Turning over in bed (including adjusting bedclothes, sheets and blankets)? [] 1   [] 2   [x] 3   [] 4  
2. Sitting down on and standing up from a chair with arms ( e.g., wheelchair, bedside commode, etc.)   [] 1   [] 2   [x] 3   [] 4  
3. Moving from lying on back to sitting on the side of the bed? [] 1   [] 2   [x] 3   [] 4 How much help from another person does the patient currently need. .. Total A Lot A Little None 4. Moving to and from a bed to a chair (including a wheelchair)? [] 1   [] 2   [x] 3   [] 4  
5. Need to walk in hospital room? [] 1   [x] 2   [] 3   [] 4  
6. Climbing 3-5 steps with a railing? [] 1   [x] 2   [] 3   [] 4  
© , Trustees of [address] [place] [address], under license to [employer]. All rights reserved Score:  Initial: 16 Most Recent: X (Date: -- ) Interpretation of Tool:  Represents activities that are increasingly more difficult (i.e. Bed mobility, Transfers, Gait). Medical Necessity:    
Patient is expected to demonstrate progress in  
functional technique 
 to  
decrease assistance required with mobility and gait. . 
Reason for Services/Other Comments: 
Patient continues to require present interventions due to patient's inability to function at baseline. .  
Use of outcome tool(s) and clinical judgement create a POC that gives a: Questionable prediction of patient's progress: MODERATE COMPLEXITY  
  
 
 
 
TREATMENT:  
(In addition to Assessment/Re-Assessment sessions the following treatments were rendered) Pre-treatment Symptoms/Complaints:  none Pain: Initial:  
   Post Session:  none Therapeutic Exercise: ( 8):  Exercises per grid below to improve strength. Required minimal visual and verbal cues to promote proper body mechanics. Progressed repetitions as indicated. Date: 
2/17 Date: 
 Date: Activity/Exercise Parameters Parameters Parameters AP 10 LAQ 10    
Marching 10 Standing marching. 2x 15 sec Braces/Orthotics/Lines/Etc:  
O2 Device: Nasal cannula Treatment/Session Assessment:   
Response to Treatment:  good Interdisciplinary Collaboration:  
Registered Nurse After treatment position/precautions:  
Up in chair Call light within reach RN notified Compliance with Program/Exercises: Will assess as treatment progresses Recommendations/Intent for next treatment session: \"Next visit will focus on advancements to more challenging activities and reduction in assistance provided\". Total Treatment Duration: PT Patient Time In/Time Out Time In: 1320 Time Out: 5487 Shaniqua Mehta, PT

## 2020-02-17 NOTE — PROGRESS NOTES
Chart screened by  for discharge planning. Pt is working closely with PT at this time. It is undetermined if pt will require STRH or HH PT at discharge per PT notes. Will follow PT therapy notes. Please consult  if any new issues arise Case management will continue to follow.

## 2020-02-17 NOTE — PROGRESS NOTES
Problem: Falls - Risk of 
Goal: *Absence of Falls Description Document Bryan Garcia Fall Risk and appropriate interventions in the flowsheet. Outcome: Progressing Towards Goal 
Note: Fall Risk Interventions: 
Mobility Interventions: Communicate number of staff needed for ambulation/transfer, Patient to call before getting OOB Mentation Interventions: Adequate sleep, hydration, pain control Medication Interventions: Assess postural VS orthostatic hypotension, Patient to call before getting OOB, Teach patient to arise slowly, Evaluate medications/consider consulting pharmacy Elimination Interventions: Call light in reach, Patient to call for help with toileting needs History of Falls Interventions: Door open when patient unattended Problem: Patient Education: Go to Patient Education Activity Goal: Patient/Family Education Outcome: Progressing Towards Goal 
  
Problem: Pressure Injury - Risk of 
Goal: *Prevention of pressure injury Description Document Patricio Scale and appropriate interventions in the flowsheet. Outcome: Progressing Towards Goal 
Note: Pressure Injury Interventions: 
Sensory Interventions: Assess need for specialty bed Moisture Interventions: Maintain skin hydration (lotion/cream) Activity Interventions: Pressure redistribution bed/mattress(bed type), Chair cushion, Assess need for specialty bed Mobility Interventions: Pressure redistribution bed/mattress (bed type), HOB 30 degrees or less, Assess need for specialty bed Nutrition Interventions: Document food/fluid/supplement intake, Offer support with meals,snacks and hydration Friction and Shear Interventions: HOB 30 degrees or less Problem: Patient Education: Go to Patient Education Activity Goal: Patient/Family Education Outcome: Progressing Towards Goal 
  
Problem: Breathing Pattern - Ineffective Goal: *Absence of hypoxia Outcome: Progressing Towards Goal 
  
 Problem: Non-Violent Restraints Goal: *Removal from restraints as soon as assessed to be safe Outcome: Progressing Towards Goal 
Goal: *No harm/injury to patient while restraints in use Outcome: Progressing Towards Goal 
Goal: *Patient's dignity will be maintained Outcome: Progressing Towards Goal 
Goal: *Patient Specific Goal (EDIT GOAL, INSERT TEXT) Outcome: Progressing Towards Goal 
Goal: Non-violent Restaints:Standard Interventions Outcome: Progressing Towards Goal 
Goal: Non-violent Restraints:Patient Interventions Outcome: Progressing Towards Goal 
Goal: Patient/Family Education Outcome: Progressing Towards Goal 
  
Problem: Chemotherapy, Day 1 Goal: Off Pathway (Use only if patient is Off Pathway) Outcome: Progressing Towards Goal 
Goal: Activity/Safety Outcome: Progressing Towards Goal 
Goal: Consults, if ordered Outcome: Progressing Towards Goal 
Goal: Diagnostic Test/Procedures Outcome: Progressing Towards Goal 
Goal: Nutrition/Diet Outcome: Progressing Towards Goal 
Goal: Discharge Planning Outcome: Progressing Towards Goal 
Goal: Medications Outcome: Progressing Towards Goal 
Goal: Respiratory Outcome: Progressing Towards Goal 
Goal: Treatments/Interventions/Procedures Outcome: Progressing Towards Goal 
Goal: Psychosocial 
Outcome: Progressing Towards Goal 
Goal: *Optimal pain control at patient's stated goal 
Outcome: Progressing Towards Goal 
Goal: *Hemodynamically stable Outcome: Progressing Towards Goal 
Goal: *Adequate oxygenation Outcome: Progressing Towards Goal 
Goal: *Chemotherapy regimen is initiated Outcome: Progressing Towards Goal 
Goal: *Patient and family verbalize understanding of plan of care Outcome: Progressing Towards Goal 
  
Problem: Pain Goal: *Control of Pain Outcome: Progressing Towards Goal 
  
Problem: Patient Education: Go to Patient Education Activity Goal: Patient/Family Education Outcome: Progressing Towards Goal 
  
 Problem: Patient Education: Go to Patient Education Activity Goal: Patient/Family Education Outcome: Progressing Towards Goal

## 2020-02-17 NOTE — PROGRESS NOTES
[facility] Admission Date: 2/10/2020 Daily Progress Note: 2/17/2020 The patient's chart is reviewed and the patient is discussed with the staff. 54 y.o.  female seen and evaluated at the request of Dr. Pradeep Rico. .  Pt is well known to [facility] with a history of cocaine abuse(as recent as 1/10/2020 reportedly, + UDS on 1/30/2020), tobacco abuse 1/2 ppd x 38 years, COPD, chronic pain, DLD, chronic hepatitis C, chronic diastolic CHF, CAD, bipolar disorder with borderline personality disorder, CAD s/ CABG, and ASCUS with positive high risk HPV cervical. Pt presented to the ER on 1/13/2020 with cough, shortness of breath and R sided chest pain. CXR identified new widening of the superior mediastinum in comparison to recent prior chest x-rays. Chest CT showed an incompletely characterized anterior mediastinal mass extending to the right supraclavicular region, but appearing to be separate from the thyroid gland, measuring approximately 3.9 cm in AP diameter. Pt was seen and underwent bronch with EBUS and FNA with airway inspection. The 4R location tumor was biopsied and positive for malignancy on SATNAM. Cytology from FNA of 2R-4R location of lung mass, revealed the presence of malignant cells. Immunohistochemical features were suggestive of carcinoma of hepatic origin given staining for cytoplasmic TTF-1 as well as arginase. 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 Pt had enlarged hypermetabolic mediastinal LN and soft tissue mass in the R supraclavicular soft tissues  Measuring 4.9x2.9cm separate from thyroid. Pt was referred to oncology and admitted on 2/10/2020.   She has now completed her first round of chemo using cisplatin/etoposide (2/15). She was treated with a 7 day course of abx for suspected post obstructive pneumonia. Oxygen needs are decreasing. Subjective:  
   Arms are still swollen. Wants to keep jara in as nurse gave her lasix today. Appetite improving. Cough unproductive. Thinks breathing treatments help and interested in using them at home. Sister at bedside. Current Facility-Administered Medications Medication Dose Route Frequency  potassium chloride (K-DUR, KLOR-CON) SR tablet 40 mEq  40 mEq Oral BID  polyethylene glycol (MIRALAX) packet 17 g  17 g Oral DAILY  senna-docusate (PERICOLACE) 8.6-50 mg per tablet 1 Tab  1 Tab Oral DAILY  vancomycin (VANCOCIN) 750 mg in  mL infusion  750 mg IntraVENous Q18H  
 methylPREDNISolone (PF) (SOLU-MEDROL) injection 40 mg  40 mg IntraVENous Q12H  
 albuterol (PROVENTIL VENTOLIN) nebulizer solution 2.5 mg  2.5 mg Nebulization TID RT  
 furosemide (LASIX) injection 20 mg  20 mg IntraVENous DAILY PRN  
 amLODIPine (NORVASC) tablet 5 mg  5 mg Oral DAILY  atorvastatin (LIPITOR) tablet 80 mg  80 mg Oral DAILY  carvediloL (COREG) tablet 6.25 mg  6.25 mg Oral BID WITH MEALS  enoxaparin (LOVENOX) injection 30 mg  30 mg SubCUTAneous Q24H  
 0.9% sodium chloride infusion  50 mL/hr IntraVENous CONTINUOUS  
 guaiFENesin ER (MUCINEX) tablet 1,200 mg  1,200 mg Oral BID  cefepime (MAXIPIME) 2 g in 0.9% sodium chloride (MBP/ADV) 100 mL  2 g IntraVENous Q12H  
 OLANZapine (ZyPREXA zydis) disintegrating tablet 10 mg  10 mg Oral QHS  haloperidol lactate (HALDOL) injection 5 mg  5 mg IntraMUSCular Q6H PRN  
 morphine injection 5 mg  5 mg IntraVENous Q2H PRN  
 LORazepam (ATIVAN) injection 1 mg  1 mg IntraVENous Q2H PRN  
 LORazepam (ATIVAN) tablet 0.5 mg  0.5 mg Oral Q6H PRN  
 albuterol (PROVENTIL VENTOLIN) nebulizer solution 2.5 mg  2.5 mg Nebulization Q4H PRN  
 morphine CR (MS CONTIN) tablet 60 mg  60 mg Oral Q12H  morphine IR (MS IR) tablet 15 mg  15 mg Oral Q4H PRN  
 ondansetron (ZOFRAN ODT) tablet 8 mg  8 mg Oral Q8H PRN  prochlorperazine (COMPAZINE) tablet 10 mg  10 mg Oral Q6H PRN  
 sertraline (ZOLOFT) tablet 25 mg  25 mg Oral DAILY Objective:  
 
Vitals:  
 02/17/20 1030 02/17/20 1402 02/17/20 1441 02/17/20 1443 BP: 110/51  125/51 Pulse: 65  68 Resp: 18  18 Temp: 97.5 °F (36.4 °C)  97.8 °F (36.6 °C) SpO2: 90% 91% 95% 91% Weight:      
 
Intake and Output:  
02/15 1901 - 02/17 0700 In: 1873.2 [P.O.:960; I.V.:913.2] Out: 8764 [ZUMKF:1825] 02/17 0701 - 02/17 1900 In: 360 [P.O.:360] Out: 1700 [Urine:1700] Physical Exam:  
Constitutional:  the patient is well developed and in no acute distress HEENT:  Sclera clear, pupils equal, oral mucosa moist 
Lungs: harsh breath sounds and decreased on the right. Oxygen at 3 liters Cardiovascular:  RRR without M,G,R 
Abd/GI: soft and non-tender; with positive bowel sounds. Ext: warm without cyanosis. There is no lower leg edema but both arms are edematous. Musculoskeletal: moves all four extremities with equal strength Skin:  no jaundice or rashes, no wounds Neuro: no gross neuro deficits Musculoskeletal: can ambulate. No deformity Psychiatric: Calm. Review of Systems - Review of Systems Constitutional: Positive for malaise/fatigue. Respiratory: Positive for cough and shortness of breath. Cardiovascular: Negative. Gastrointestinal:  
     Decreased appetite Lines: port, jara CHEST XRAY:   
 
 
LAB Recent Labs  
  02/17/20 
0312 02/16/20 
0224 02/15/20 
0257 WBC 14.6* 19.3* 16.8* HGB 7.9* 8.7* 8.8* HCT 24.7* 26.9* 28.1*  
 340 411 Recent Labs  
  02/17/20 
0312 02/16/20 
1002 02/16/20 
0224 02/15/20 
0257   --  137 136  
K 3.1* 5.2* 2.7* 3.4*  
  --  100 102 CO2 31  --  31 28 *  --  145* 139* BUN 25*  --  24* 24* CREA 0.64  --  0.61 0.59* ALB 1.9*  --  1.9* 2.2*  
 SGOT 27  --  27 33 Assessment:  (Medical Decision Making) Hospital Problems  Date Reviewed: 2/13/2020 Codes Class Noted POA Lung cancer [facility]) ICD-10-CM: C34.90 ICD-9-CM: 162.9  2/10/2020 Unknown S/p first round of chemo Hypoxia ICD-10-CM: R09.02 
ICD-9-CM: 799.02  2/10/2020 Unknown Have decreased flow - Rx for lung cancer/post obstructive pna  
 COPD exacerbation (H[facility]) ICD-10-CM: J44.1 ICD-9-CM: 491.21  Unknown Yes No wheezing but harsh breath sounds. Acute respiratory failure with hypoxia ([facility] ICD-10-CM: J96.01 
ICD-9-CM: 518.81  7/12/2011 Yes As above Plan:  (Medical Decision Making) 1. Lasix today for arm edema (compressed SVC per CT) . Weight up 3 kgs since admission. Stop IV fluids and repeat lasix tomorrow. Monitor Na 2. Day 4 maxipeme/vanc. No cultures for review. Rx for suspected post obstructive pneumonia. Last CXR with small effusions. Will repeat CXR today to reassess. WBC down 3. Has completed first round of chemo - 2/15 - cisplatin/etoposide 4. Lives with her brother - feels that she will have enough care at home 5. Plan to order bronchodilators at discharge and probable oxygen therapy - reassess needs at [facility] 6. Leave jara in for now with lasix 7. Will change zyprexa to prn - started last week by palliative care when she was restless 8. Weaning steroids - tapered yesterday. Will change to po Leopold Nails, NP I have spoken with and examined the patient. I agree with the above assessment and plan as documented. Shabbir Li is a 49yo F with presumed lung cancer w R hilar mass, SVC compression, possibly new R pleural effusion Gen: pleasant, no distress Lungs:  Decreased R base Heart:  RRR with no Murmur/Rubs/Gallops Abd: NTND Ext: + UE edema F/u CXR. May require ultrasound/thoracentesis tomorrow. Emphasized importance of abstinence from smoking Renee Hill MD 
 
 
 More than 50% of time documented was spent face-to-face contact with the patient and in the care of the patient on the floor/unit where the patient is located.

## 2020-02-17 NOTE — PROGRESS NOTES
END OF SHIFT NOTE: 
 
Patient medicated for pain x2 with good results. Lung sounds remain very coarse/congested. Patient and son asking about deep suctioning vs bronch with washout. Diuresed well after Lasix yesterday, but would probably benefit from another dose today. Intake/Output 02/16 1901 - 02/17 0700 In: 1160.2 [P.O.:600; I.V.:560.2] Out: 1625 [[id number]] Voiding: YES Catheter: YES Drain:   
 
Stool:  0 occurrences. Emesis:  0 occurrences. VITAL SIGNS Patient Vitals for the past 12 hrs: 
 Temp Pulse Resp BP SpO2  
02/17/20 0326 98.4 °F (36.9 °C) 65 18 117/56 95 % 02/16/20 2243 98.2 °F (36.8 °C) 65 18 113/60 95 % 02/16/20 1947     92 % 02/16/20 1858 98.1 °F (36.7 °C) 66 19 115/65 98 % Pain Assessment Pain 1 Pain Scale 1: Visual (02/17/20 0311) Pain Intensity 1: 0 (02/17/20 0311) Patient Stated Pain Goal: 0 (02/17/20 0311) Pain Reassessment 1: Patient resting w/respiratory rate greater than 10 (02/17/20 0311) Pain Location 1: Shoulder;Neck (02/16/20 2047) Pain Orientation 1: Right (02/16/20 2047) Pain Description 1: Constant; Aching (02/16/20 2047) Pain Intervention(s) 1: Medication (see MAR) (02/16/20 2047) Ambulating No 
 
Shift report given to oncoming nurse at the bedside. [employer]

## 2020-02-18 NOTE — ROUTINE PROCESS
US to bilateral chest wall to evaluate for pleural effusion. Not enough fluid to drain per Dr. Paddy Nunez. Pictures placed on chart. (see MD NOTE).

## 2020-02-18 NOTE — PROGRESS NOTES
andrés 
 
[facility] Hematology & Oncology Inpatient Hematology / Oncology Progress Note Admission Date: 2/10/2020  3:39 PM 
Reason for Admission/Hospital Course: Lung cancer ([address] [C34.90] Hypoxia [R09.02] 
 
 
24 Hour Events: 
Afebrile, VSS, on O2 @ 3L Completed C1 cis/etop on 2/15 On Cef/Vanc for PNA 
 
ROS: 
Constitutional: +fatigue. Negative for fever, chills. CV: Negative for chest pain, palpitations, edema. Respiratory: +dyspnea +wheezing GI: Negative for nausea, abdominal pain, diarrhea. 10 point review of systems is otherwise negative with the exception of the elements mentioned above in the HPI. Allergies Allergen Reactions  Lithium Analogues Unknown (comments)  Morphine (Pf) Rash Pt is currently taking Morphine 30mg QID  Other Medication Swelling Whatever holds Vit d pill together OBJECTIVE: 
Patient Vitals for the past 8 hrs: 
 BP Temp Pulse Resp SpO2  
20 1143 105/54 99.5 °F (37.5 °C) 70 18 92 % 20 0915     92 % 20 0722 132/80 99.7 °F (37.6 °C) 75 19 93 % Temp (24hrs), Av.9 °F (37.2 °C), Min:97.8 °F (36.6 °C), Max:99.7 °F (37.6 °C) 
 
 0701 -  1900 In: -  
Out: 1473 [[id number]] Physical Exam: 
Constitutional: Ill appearing  female in no mild distress, sitting bedside chair. HEENT: Normocephalic and atraumatic. Oropharynx is clear, mucous membranes are moist.  Sclerae anicteric. Neck supple without JVD. No thyromegaly present. Skin Warm and dry. No bruising and no rash noted. No erythema. No pallor. Respiratory Lungs with rhonchi and scattered wheezes bilaterally; normal air exchange without accessory muscle use. On O2 via NC.  
CVS Normal rate, regular rhythm and normal S1 and S2. No murmurs, gallops, or rubs. Abdomen Soft, nontender and nondistended, normoactive bowel sounds. No palpable mass. No hepatosplenomegaly. Neuro No obvious deficits MSK Normal range of motion in general.  No edema and no tenderness. Psych Appropriate mood Labs: 
   
Recent Labs  
  02/18/20 
0217 02/17/20 
5606 02/16/20 
1509 WBC 11.9* 14.6* 19.3*  
RBC 2.93* 2.78* 3.04* HGB 8.4* 7.9* 8.7* HCT 25.8* 24.7* 26.9*  
MCV 88.1 88.8 88.5 MCH 28.7 28.4 28.6 MCHC 32.6 32.0 32.3 RDW 13.2 13.2 13.1  226 340 GRANS 88* 98* 98* LYMPH 6* 1* 1*  
MONOS 0* 0* 1*  
EOS 0* 0* 0*  
BASOS 0 0 0 IG 6* 1 1 DF AUTOMATED AUTOMATED AUTOMATED ANEU 10.5* 14.2* 18.8* ABL 0.7 0.1* 0.2* ABM 0.0* 0.0* 0.1 ROSA ISELA 0.0 0.0 0.0 ABB 0.0 0.0 0.0 AIG 0.7* 0.2 0.2 Recent Labs  
  02/18/20 
0217 02/17/20 
0312 02/16/20 
1002 02/16/20 
0224  138  --  137  
K 3.3* 3.1* 5.2* 2.7*  
CL 95* 100  --  100 CO2 34* 31  --  31 AGAP 7 7  --  6*  
* 151*  --  145* BUN 21 25*  --  24* CREA 0.51* 0.64  --  0.61 GFRAA >60 >60  --  >60  
GFRNA >60 >60  --  >60  
CA 7.8* 7.6*  --  8.0*  
SGOT 31 27  --  27  
AP 49* 47*  --  49* TP 6.1* 5.9*  --  6.0* ALB 2.0* 1.9*  --  1.9*  
GLOB 4.1* 4.0*  --  4.1* AGRAT 0.5* 0.5*  --  0.5* Imaging: XR CHEST SNGL V [[id number]] Collected: 02/17/20 2023 Order Status: Completed Updated: 02/17/20 2026 Narrative:    
Portable chest x-ray Clinical indications: Hypoxia. FINDINGS: Single AP view the chest compared to a similar exam dated 2/12/2020 
shows airspace opacity at the right lung base with a right pleural effusion. Atelectasis or pneumonia should be considered. The left lung is stable and 
clear. A pacer device is in place. Impression:    
IMPRESSION: No significant interval change with right lower lung airspace 
opacity and a right pleural effusion. Atelectasis or pneumonia should be 
considered. XR CHEST SNGL V [[id number]] Collected: 02/12/20 1439 Order Status: Completed Updated: 02/12/20 1442 Narrative:    
CHEST X-RAY, single portable view  2/12/2020 History: Hypoxemia and cough. Technique: Single frontal view of the chest. 
 
Comparison: Chest x-ray 2/10/2020 Findings: A stable left-sided intracardiac device is seen. A stable left-sided venous port 
is seen. Stable mild cardiomegaly is seen. Lungs are expanded without 
appreciable pneumothorax. Stable masslike density along the right upper 
paramediastinal border. Increasing now small to moderate right basilar pleural 
effusion. Impression:    
IMPRESSION: 1.  Increasing now small to moderate basilar pleural effusion. The etiology of 
effusion is uncertain although this could represent sequela of heart 
failure/fluid overload given the associated cardiomegaly and the rapid onset of 
this finding. XR CHEST PA LAT [[id number]] Collected: 02/10/20 1746 Order Status: Completed Updated: 02/10/20 1752 Narrative:    
Chest 2 view CLINICAL INDICATION: Acute worsening dyspnea, moderate to severe; SVC syndrome, 
mediastinal mass COMPARISON: 1/23/2020 radiograph, 1/30/2020 CT PET, and 2/5/2020 CT with 
contrast 
 
TECHNIQUE: Sitting upright AP and lateral views of the chest  
 
FINDINGS: Patient is rotated to the left. Lung volumes are slightly shallow. There is artifact due to external tubing. There is interval mild to moderate right hemidiaphragm elevation. There is 
slightly increased mild groundglass and reticular opacity in the right lower 
lobe. No evidence of a pneumothorax, significant pleural effusion, or overt CHF. The mediastinal and hilar contours again demonstrate widening consistent with 
the known masses that were better evaluated on the recent CT, previous CABG, 
left side portacatheter, and dual-lead pacemaker. The bone density is again low throughout. No acute fracture or dislocation is 
evident. Impression:    
IMPRESSION:  
1. Right lower lobe infiltrate. 2. Mediastinal masses compatible with malignancy, further evaluated on recent CT 
and PET. Medications: Current Facility-Administered Medications Medication Dose Route Frequency  [START ON 2/19/2020] Vancomycin Trough Reminder   Other ONCE  potassium chloride 20 mEq in 50 ml IVPB  20 mEq IntraVENous ONCE  potassium chloride (K-DUR, KLOR-CON) SR tablet 40 mEq  40 mEq Oral BID  polyethylene glycol (MIRALAX) packet 17 g  17 g Oral DAILY  senna-docusate (PERICOLACE) 8.6-50 mg per tablet 1 Tab  1 Tab Oral DAILY  vancomycin (VANCOCIN) 750 mg in  mL infusion  750 mg IntraVENous Q18H  predniSONE (DELTASONE) tablet 40 mg  40 mg Oral DAILY WITH BREAKFAST  OLANZapine (ZyPREXA zydis) disintegrating tablet 10 mg  10 mg Oral QHS PRN  
 albuterol (PROVENTIL VENTOLIN) nebulizer solution 2.5 mg  2.5 mg Nebulization TID RT  
 furosemide (LASIX) injection 20 mg  20 mg IntraVENous DAILY PRN  
 amLODIPine (NORVASC) tablet 5 mg  5 mg Oral DAILY  atorvastatin (LIPITOR) tablet 80 mg  80 mg Oral DAILY  carvediloL (COREG) tablet 6.25 mg  6.25 mg Oral BID WITH MEALS  enoxaparin (LOVENOX) injection 30 mg  30 mg SubCUTAneous Q24H  cefepime (MAXIPIME) 2 g in 0.9% sodium chloride (MBP/ADV) 100 mL  2 g IntraVENous Q12H  
 morphine injection 5 mg  5 mg IntraVENous Q2H PRN  
 LORazepam (ATIVAN) tablet 0.5 mg  0.5 mg Oral Q6H PRN  
 albuterol (PROVENTIL VENTOLIN) nebulizer solution 2.5 mg  2.5 mg Nebulization Q4H PRN  
 morphine CR (MS CONTIN) tablet 60 mg  60 mg Oral Q12H  
 morphine IR (MS IR) tablet 15 mg  15 mg Oral Q4H PRN  
 ondansetron (ZOFRAN ODT) tablet 8 mg  8 mg Oral Q8H PRN  prochlorperazine (COMPAZINE) tablet 10 mg  10 mg Oral Q6H PRN  
 sertraline (ZOLOFT) tablet 25 mg  25 mg Oral DAILY ASSESSMENT: 
 
Problem List  Date Reviewed: 2/13/2020 Codes Class Noted Lung cancer [facility]) ICD-10-CM: C34.90 ICD-9-CM: 162.9  2/10/2020 Hypoxia ICD-10-CM: R09.02 
ICD-9-CM: 799.02  2/10/2020  Malignant neoplasm of overlapping sites of left lung [facility]) ICD-10-CM: C34.82 
ICD-9-CM: 162.8  1/22/2020 Hypokalemia ICD-10-CM: E87.6 ICD-9-CM: 276.8  12/7/2019 Chest pain ICD-10-CM: R07.9 ICD-9-CM: 786.50  12/6/2019 Chronic tension-type headache, intractable ICD-10-CM: M45.752 ICD-9-CM: 339.12  5/13/2019 Cigarette nicotine dependence without complication ZW-72-NT: D89.236 ICD-9-CM: 305.1  7/3/2018 COPD exacerbation ([address] ICD-10-CM: J44.1 ICD-9-CM: 491.21  Unknown Recurrent UTI ICD-10-CM: N39.0 ICD-9-CM: 599.0  4/24/2018 Gross hematuria ICD-10-CM: R31.0 ICD-9-CM: 599.71  4/24/2018 Urinary retention ICD-10-CM: R33.9 ICD-9-CM: 788.20  4/24/2018 Sick sinus syndrome (HCC) (Chronic) ICD-10-CM: I49.5 ICD-9-CM: 427.81  9/29/2017 IBS (irritable bowel syndrome) ICD-10-CM: K58.9 ICD-9-CM: 564.1  Unknown 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 
ICD-9-CM: 433.20  12/28/2016 Encounter for medication management ICD-10-CM: G98.582 ICD-9-CM: V58.69  12/28/2016 Neuropathy (Chronic) ICD-10-CM: G62.9 ICD-9-CM: 355.9  12/28/2016 Overview Signed 7/28/2017  2:20 PM by Jada Mathew MD  
  Last Assessment & Plan:  
Patient is currently on gabapentin 600 mg. Medication may cause increased drowsiness with Rispirdol, however pt has no complaints today. Intractable migraine with aura without status migrainosus ICD-10-CM: G43.119 ICD-9-CM: 346.01  12/28/2016 Peripheral vascular disease (HCC) (Chronic) ICD-10-CM: I73.9 ICD-9-CM: 443.9  12/1/2016 Overview Signed 12/1/2016  4:23 PM by Kelley Vaughn MD  
  CTA (2/11/16): Moderate to severe stenosis in the proximal right subclavian artery. Borderline personality disorder ([facility] ICD-10-CM: F60.3 ICD-9-CM: 301.83  Unknown Overview Signed 7/28/2017  2:20 PM by Jada Mathew MD  
  Last Assessment & Plan: 1. Continue inpatient hospitalization 2.  Lithium level 1.5. Will hold am dose and change to 300mg PO BID. Will recheck in 3 days. 3.  Continue Neurontin 4. Consider Vistaril PRN if indicated 5. Encourage active participation in groups and on the milieu 6. Consider discharge and aftercare needs 7. Encourage abstinence from drugs of abuse Cerebrovascular disease ICD-10-CM: I67.9 ICD-9-CM: 437.9  Unknown Overview Signed 12/1/2016  4:23 PM by Masoud Ragsdale MD  
  CTA of the neck (2/11/16) : No significant stenosis of the right carotid artery. 37% stenosis in the proximal left internal carotid artery. Severe stenosis at the origin of the left vertebral artery Chronic hepatitis C ([facility] ICD-10-CM: B18.2 ICD-9-CM: 070.54  Unknown Chronic pain ICD-10-CM: G89.29 ICD-9-CM: 338.29  Unknown Overview Signed 7/28/2017  2:20 PM by Edith Patel MD  
  Last Assessment & Plan:  
Patient has history of chronic neck and back pain secondary to degenerative changes. She was recently prescribed a 15 day supply of Dilaudid 2 mg TID by \"pain management doctor\". This script was filled on 5.8. 17. Plan: - Given patient's history of substance use, she would largely benefit from detox of narcotics. Will try conservative methods while inpatient for back pain as well as neck and shoulder pain. Continue Ultram 100 mg Q6H while we await permission to speak to pain management doctor. Tl Ramos office in regards to patient's pain management contract. Patient has been dismissed from his services for positive UDS. - Avoid Toradol given her elevated Creatinine and glucose at time of admission. A1c normal at 5.4. ASCUS with positive high risk HPV cervical ICD-10-CM: R87.610, R87.810 ICD-9-CM: 795.01, 795.05  Unknown Pacemaker ICD-10-CM: Z95.0 ICD-9-CM: V45.01  8/10/2016 Overview Signed 8/10/2016 10:47 AM by Masoud Ragsdale MD  
  1. [employer]  :  Due to sick sinus syndrome. Chronic diastolic congestive heart failure (HCC) (Chronic) ICD-10-CM: I50.32 
ICD-9-CM: 428.32, 428.0  8/9/2016 Overview Addendum 8/27/2019  5:11 PM by Arcola Nissen, MD  
  Admitted [facility] 8/12/11 with CP and elevated BNP at 352. 
1.  Echo (8/3/11):  EF > 55%. Mild LVH. Pseudonormal LV filling pattern. Bi-atrial enlargement. .  Mild mitral regurgitation. 2.  Echo (8/22/19):  EF 65-70%. Indeterminant diastolic function. Mild LAE. Coronary atherosclerosis of native coronary vessel (Chronic) ICD-10-CM: I25.10 ICD-9-CM: 414.01  8/9/2016 Overview Addendum 12/9/2019  7:53 AM by Arcola Nissen, MD  
  1.  3 Vessel CABG (7/8/11):  MIR to LAD. LIMA in Y graft to superior and inferior diagonal. 
2.  Cath (8/31/12): Occluded grafts. FFR of LAD 0.89. Similar stenosis of diagonal but no FFR done. 3.  LHC (11/1/17): Mild to moderate nonhemodynamically significant CAD. Normal IFR of LAD (40%: 0.98) and Diagonal (50%: 0.98) 4. LHC (12/7/19): Stable CAD. Tobacco use disorder (Chronic) ICD-10-CM: X07.964 ICD-9-CM: 305.1  Unknown HTN (hypertension), benign (Chronic) ICD-10-CM: I10 
ICD-9-CM: 401.1  8/9/2016 Dyslipidemia (Chronic) ICD-10-CM: S15.9 ICD-9-CM: 272.4  8/9/2016 Abnormal MRI of head ICD-10-CM: R93.0 ICD-9-CM: 793.0  1/1/2015 Overview Signed 8/27/2016  4:28 PM by Wilber Browning MD  
  Frontal Lobe periventricular WM disease. Ruled out for MS by Neurology Cocaine abuse in remission [facility]) ICD-10-CM: F14.11 ICD-9-CM: 305.63  9/18/2012 S/P CABG x 3 (Chronic) ICD-10-CM: Z95.1 ICD-9-CM: V45.81  9/18/2012 Bipolar disorder (HCC) (Chronic) ICD-10-CM: F31.9 ICD-9-CM: 296.80  9/18/2012 Acute respiratory failure with hypoxia [facility]) ICD-10-CM: J96.01 
ICD-9-CM: 518.81  7/12/2011  
   
  
 
   
48 y.o. F consulted for cancer in 1/2020.  She worked up neck/upper chest pain with CT finding of upper mediastinal mass, EBUS 1/22/20 with FNA showed malignant cells with broad IHC negativity except for suggestion of liver origin. She had active drug abuse as documented reason being discharged from her prior pain management. She presented to [facility] on 1/30[facility]20, has missed the scheduled PET, reported moved in with her brother and a friend drove her to visit.  We discussed her rather atypical cancer presentation, called radiology to urgently re-schedule PET to further define the origin of cancer and extent, discussed her narco management and she gave inconsistent answers regarding whether and when she used cocaine, also stated she stopped pain med herself rather then being discharged by Dr. Bhavin Young, although she appeared in pain, involved SW and palliative care, signed narco contract, yet urine screen positive for cocaine, then she changed her story of last use 1 week ago but that should not lead to positive urine test either, discussed the breaking of contract and trust voids prescription of narco. PET 1/30/20 showed large right mediastinal/supraclavicle avid disease but no evidence of disease otherwise and no suspicious disease in the liver, I reviewed PET personally and discussed to have urgent excisional biopsy for more tissue of histology and molecular tests, meanwhile I would not rec treat this as liver cancer, but rather treat with protocol of limited stage SCLC or stage III NSCLC if brain MRI negative to aim at possible cure or durable control, she was agreeable and Adam will arrange for urgent biopsy/port/rad onc eval, start cisplatin/etoposide/XRT 2/10/20, hyponatremia and weight gain concern of SIADH and instructed not to drink excessively and monitor, SVC syndrome and monitor closely for rx response, however she was not able to tolerate pre-hydration for cisplatin and admit for SOB likely related to SVC syndrome, administer chemo inpt with slow rate, may dosing XRT upfront for SVC relief, IR eval if needed. PLAN: 
Lung cancer 
-supposed to start Cis/VP16/XRT yesterday. 2/11 plan for chemo tomorrow if echo ok. XRT plan is not completed yet. 2/12 start chemo today if echo ok. Over to XRT for rescanning today. 2/13 D2 Cis/VP16 not given 2/14 Will give D 2 cis/VP16 as patient wishes to proceed with chemotherapy. 2/15 Proceed with C1D3 Cis/Etop today 2/18 Completed C1 cis/etop on 2/15. C2 due 3/4. Shortness of breath/Cough 
- on 2 LPM NC. Mucinex BID. CXR with known malignancy and RLL infiltrate. 2/11 start cef/vanc for possible PNA. Check echo. 2/12 continues on cef/vanc. Echo pending. Increased oxygen needs overnight, up to 5 LPM. pulm consult pending. Check BNP for completeness. Echo still pending. 2/13 echo ok with EF of 55-60%. BNP elevated. Lasix 20 mg daily PRN if I>O by 500 cc. Lasix given this AM. Pulmonology also following as well. On high flow at 40 LPM. Continues on cef/vanc for possible PNA. Currently able to use bedpan for urination- may need jara placed. 2/14 check blood gas. Ox 95-98% on high alon at 40 lpm. Back on cef/vanc. 2/15 O2 down to 5L, remains on Cef/Vanc 
2/16 O2 down to 3L today, continue Cef/Vanc/steroids/nebs 2/18 O2 at 3L, D5/7 Cef/Vanc ? SIDAH/hyponatremia - On IV fluids at this time with some improvement. Check urine sodium. Consult nephro 2/12 neph consult pending.  today. 2/13 appreciate nephros recommendations. NA up to 133 today. 2/14 Na improved up to 135. resolved Confusion 
-ongoing confusion noted. UDS pending. 2/17 more alert and clearer today 2/18 alert, oriented Continue home medications Cari SOPs Lovenox for DVT prophylaxis Goals and plan of care reviewed with the patient. All questions answered to the best of our ability. Disposition/Update to plan of care 2/18: Appears to be improving.   O2 back down to 3L. On Day 5/7 of Cef/Vanc, continue steroids and nebs per pulmonary. Per PT, will need STR at discharge. Will go ahead and notify CM. Darnell Oh NP [facility] Hematology and Oncology [id number] [facility], [address] [place] [address] Office : [phone] Fax : [phone] Attending Addendum: 
I have personally performed a face to face diagnostic evaluation on this patient. I have reviewed and agree with the care plan as documented above by  Darnell Oh N.P.  My findings are as follows: Patient appears lethargic, heart rate regular without murmurs, abdomen is non-tender, bowel sounds are positive. 48 female history of thoracic malignancy status post recent concurrent cisplatin/etoposideXRT, cycle 1 was completed 2/15/2020. Reports right neck swelling much improved. Shortness of breath improving with decreasing oxygen needs. Remains on broad-spectrum IV antibiotics. Continue ongoing care. Will benefit from rehab on discharge.  
  
     
Total time 25 min 50% in direct consultation about the patient's diagnosis and management 
  
 
  
Meghan Recio MD 
[facility] Hematology/Oncology [id number] [address] Office : [phone] Fax : [phone]

## 2020-02-18 NOTE — PROGRESS NOTES
END OF SHIFT NOTE: 
 
Intake/Output 02/18 0701 - 02/18 1900 In: -  
Out: 7943 [[facility]:3647] Voiding: YES Catheter: YES Drain:   
 
 
 
 
 
Stool:  1 occurrences. Emesis:  0 occurrences. VITAL SIGNS Patient Vitals for the past 12 hrs: 
 Temp Pulse Resp BP SpO2  
02/18/20 1718     92 % 02/18/20 1705  67  116/54   
02/18/20 1525 98.6 °F (37 °C) 70 20 92/54 93 % 02/18/20 1143 99.5 °F (37.5 °C) 70 18 105/54 92 % 02/18/20 0915     92 % 02/18/20 0722 99.7 °F (37.6 °C) 75 19 132/80 93 % Pain Assessment Pain 1 Pain Scale 1: Numeric (0 - 10) (02/18/20 1813) Pain Intensity 1: 8 (02/18/20 1813) Patient Stated Pain Goal: 4 (02/18/20 0841) Pain Reassessment 1: Patient resting w/respiratory rate greater than 10 (02/18/20 1406) Pain Location 1: Arm;Neck; Shoulder (02/17/20 2037) Pain Orientation 1: Right (02/17/20 1852) Pain Description 1: Constant; Aching (02/17/20 1852) Pain Intervention(s) 1: Medication (see MAR) (02/18/20 1813) Ambulating Yes Additional Information:  
 
Shift report given to oncoming nurse at the bedside. Elias Perla

## 2020-02-18 NOTE — PROGRESS NOTES
[facility] Admission Date: 2/10/2020 Daily Progress Note: 2/18/2020 The patient's chart is reviewed and the patient is discussed with the staff. 54 y.o.  female seen and evaluated at the request of Dr. Pradeep Rico. .  Pt is well known to [facility] with a history of cocaine abuse(as recent as 1/10/2020 reportedly, + UDS on 1/30/2020), tobacco abuse 1/2 ppd x 38 years, COPD, chronic pain, DLD, chronic hepatitis C, chronic diastolic CHF, CAD, bipolar disorder with borderline personality disorder, CAD s/ CABG, and ASCUS with positive high risk HPV cervical. Pt presented to the ER on 1/13/2020 with cough, shortness of breath and R sided chest pain. CXR identified new widening of the superior mediastinum in comparison to recent prior chest x-rays. Chest CT showed an incompletely characterized anterior mediastinal mass extending to the right supraclavicular region, but appearing to be separate from the thyroid gland, measuring approximately 3.9 cm in AP diameter. Pt was seen and underwent bronch with EBUS and FNA with airway inspection. The 4R location tumor was biopsied and positive for malignancy on SATNAM. Cytology from FNA of 2R-4R location of lung mass, revealed the presence of malignant cells. Immunohistochemical features were suggestive of carcinoma of hepatic origin given staining for cytoplasmic TTF-1 as well as arginase. 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 Pt had enlarged hypermetabolic mediastinal LN and soft tissue mass in the R supraclavicular soft tissues  Measuring 4.9x2.9cm separate from thyroid. Pt was referred to oncology and admitted on 2/10/2020.   She has now completed her first round of chemo using cisplatin/etoposide (2/15). She was treated with a 7 day course of abx for suspected post obstructive pneumonia. Oxygen needs are decreasing. Subjective:  
   Arms are still swollen. Still having a hard time taking a deep breath, feels short of breath. Slept well last night. Appetite less this AM - feels like she may have \"overdone\" it yesterday. Current Facility-Administered Medications Medication Dose Route Frequency  [START ON 2/19/2020] Vancomycin Trough Reminder   Other ONCE  potassium chloride 20 mEq in 50 ml IVPB  20 mEq IntraVENous ONCE  potassium chloride (K-DUR, KLOR-CON) SR tablet 40 mEq  40 mEq Oral BID  polyethylene glycol (MIRALAX) packet 17 g  17 g Oral DAILY  senna-docusate (PERICOLACE) 8.6-50 mg per tablet 1 Tab  1 Tab Oral DAILY  vancomycin (VANCOCIN) 750 mg in  mL infusion  750 mg IntraVENous Q18H  predniSONE (DELTASONE) tablet 40 mg  40 mg Oral DAILY WITH BREAKFAST  OLANZapine (ZyPREXA zydis) disintegrating tablet 10 mg  10 mg Oral QHS PRN  
 albuterol (PROVENTIL VENTOLIN) nebulizer solution 2.5 mg  2.5 mg Nebulization TID RT  
 furosemide (LASIX) injection 20 mg  20 mg IntraVENous DAILY PRN  
 amLODIPine (NORVASC) tablet 5 mg  5 mg Oral DAILY  atorvastatin (LIPITOR) tablet 80 mg  80 mg Oral DAILY  carvediloL (COREG) tablet 6.25 mg  6.25 mg Oral BID WITH MEALS  enoxaparin (LOVENOX) injection 30 mg  30 mg SubCUTAneous Q24H  cefepime (MAXIPIME) 2 g in 0.9% sodium chloride (MBP/ADV) 100 mL  2 g IntraVENous Q12H  
 morphine injection 5 mg  5 mg IntraVENous Q2H PRN  
 LORazepam (ATIVAN) tablet 0.5 mg  0.5 mg Oral Q6H PRN  
 albuterol (PROVENTIL VENTOLIN) nebulizer solution 2.5 mg  2.5 mg Nebulization Q4H PRN  
 morphine CR (MS CONTIN) tablet 60 mg  60 mg Oral Q12H  
 morphine IR (MS IR) tablet 15 mg  15 mg Oral Q4H PRN  
 ondansetron (ZOFRAN ODT) tablet 8 mg  8 mg Oral Q8H PRN  prochlorperazine (COMPAZINE) tablet 10 mg  10 mg Oral Q6H PRN  
  sertraline (ZOLOFT) tablet 25 mg  25 mg Oral DAILY Objective:  
 
Vitals:  
 20 9520 20 2740 20 0915 20 1143 BP: 128/66 132/80  105/54 Pulse: 82 75  70 Resp:  Temp: 99 °F (37.2 °C) 99.7 °F (37.6 °C)  99.5 °F (37.5 °C) SpO2:  93% 92% 92% Weight:      
 
Intake and Output:  
1901 - 700 In: 3249.2 [P.O.:1410; I.V.:1839.2] Out: 6650 [LGID] 701 - 1900 In: -  
Out: 2525 [FVNTZ:5261] Physical Exam:  
Constitutional:  the patient is well developed and in no acute distress HEENT:  Sclera clear, pupils equal, oral mucosa moist 
Lungs: clear but decreased from the posterior on the right. Oxygen at 3 liters Cardiovascular:  RRR without M,G,R 
Abd/GI: soft and non-tender; with positive bowel sounds. Ext: warm without cyanosis. There is no lower leg edema but both arms are edematous. Musculoskeletal: moves all four extremities with equal strength Skin:  no jaundice or rashes, no wounds Neuro: no gross neuro deficits Musculoskeletal: can ambulate. No deformity Psychiatric: Calm. Review of Systems -  Overall fatigue Appetite better overall but still decreased Short of breath even with rest, cough better Lines: port, jara CHEST XRAY:   
 
 
LAB Recent Labs  
  20 
0217 20 
7050 20 
0224 WBC 11.9* 14.6* 19.3* HGB 8.4* 7.9* 8.7* HCT 25.8* 24.7* 26.9*  
 226 340 Recent Labs  
  20 
0217 20 
0312 20 
1002 20 
0224  138  --  137  
K 3.3* 3.1* 5.2* 2.7*  
CL 95* 100  --  100 CO2 34* 31  --  31  
* 151*  --  145* BUN 21 25*  --  24* CREA 0.51* 0.64  --  0.61 ALB 2.0* 1.9*  --  1.9*  
SGOT 31 27  --  27 Assessment:  (Medical Decision Making) Hospital Problems  Date Reviewed: 2020 Codes Class Noted POA Lung cancer [facility]) ICD-10-CM: C34.90 ICD-9-CM: 162.9  2/10/2020 Unknown S/p first round of chemo Hypoxia ICD-10-CM: R09.02 
ICD-9-CM: 799.02  2/10/2020 Unknown Have decreased flow - now on 3 liters - Rx for lung cancer/post obstructive pna  
 COPD exacerbation (HCC) ICD-10-CM: J44.1 ICD-9-CM: 491.21  Unknown Yes No wheezing on exam today Acute respiratory failure with hypoxia ([address] ICD-10-CM: J96.01 
ICD-9-CM: 518.81  7/12/2011 Yes As above Plan:  (Medical Decision Making) 1. Lasix again today for arm edema (compressed SVC per CT) . Weight up 2 lbs since admission. Fluid balance neg 2.9 liters yesterday/neg 5.8 liters overall. CXR with right pleural effusion - will plan to US today and tap if needed. Fluid for study - ? Secondary to cancer versus fluid balance. Continue lasix and monitor weight, fluid balance, labs, blood pressure 2. Day 5 maxipeme/vanc. No cultures for review. Rx for suspected post obstructive pneumonia. WBC down. Will stop vanc - ? Need to continue abx, if so -change to po 
3. Has completed first round of chemo - 2/15 - cisplatin/etoposide 4. Lives with her brother - feels that she will have enough care at home 5. Plan to order bronchodilators at discharge and probable oxygen therapy - reassess [facility] at [facility] 6. Leave jara in for now with lasix 7. zyprexa changed to prn - slept well last night without using 8. Po steroids with taper off [facility], JOANA I have spoken with and examined the patient. I agree with the above assessment and plan as documented. Ultrasound was performed and fluid volume in R pleural space was very small, likely 100ml or less, suggesting CXR findings due to atelectasis/obstruction. Leukocytosis improving on antibiotics as above. Reports she doesn't need anything for withdrawal from cigarettes. Gen: pleasant, on 3lpm 
Lungs:  Improved air movt in R base this afternoon Heart:  RRR with no Murmur/Rubs/Gallops Abd: NTND Ext: trace edema, improving in RUE 
 
--Complete 7 days antibiotics. No cultures to guide therapy, but could likely narrow or change to oral. 
--add incentive spirometry --continue diuresis, but could likely change to oral tomorrow. Will get CXR for review. --continue PT 
 
Monica Clark MD 
 
 
 
More than 50% of time documented was spent face-to-face contact with the patient and in the care of the patient on the floor/unit where the patient is located.

## 2020-02-18 NOTE — ROUTINE PROCESS
RN at bedside to assist  with ultrasound guided thoracentesis. Consent obtained. Patient sat up on the side of the bed. Procedure explained to patient by MD and patient verbalized understanding. Timeout completed with patient identification. Ultrasound completed bilaterally. ***cc of fluid drained from *** lung. Post-thoracentesis ultrasound performed and draingage ***. Pictures obtained. Specimens sent to lab as ordered by MD. Vital Signs remain stable through out procedure. See flow sheet. Report given to primary nurse, ***.

## 2020-02-18 NOTE — PROGRESS NOTES
Nutrition Assessment for:  
Length of Stay Assessment: Patient with recent diagnosis of lung cancer. Was not able to tolerate pre-hydration for cisplatin/etopside due to shortness of breath likely related to SVC syndrome. She was admitted for chemo administration and XRT for SVC relief. She is s/p C1 chemo on 2/15 with C2 due 3/4. She also has PMH significant for bipolar disorder, personality disorder, CAD s/p CABG, CHF, hept C, drug abuse, COPD, HTN, IBS. Her course has been complicated by hypoxic encephalopathy. RN reports that she is eating better since she is breathing better and mentation has improved. Met with patient this afternoon and she states that she feels like she has been eating much better the last few days. She states that she was able to tolerate ~45% of all items on lunch tray. She states she did not have an appetite for breakfast. She states that she ate better yesterday and reports 50-75% of all three meals yesterday with breakfast being her best meal. She states that she was eating poorly prior to admission due to shortness of breath and decline in appetite. She states that she was drinking Ensure at home 3-4 per day. She reports that her weight has been variable. DIET REGULAR Anthropometrics: 
Height: 60\" (152.4 cm), Weight: 48.8 kg (107 lb 9.4 oz), Weight Source: Bed, Body mass index is 20.33 kg/m². BMI class of Normal weight. Weight and BMI 2/17/2020 2/16/2020 2/15/2020 2/14/2020 2/12/2020 Weight 48.801 kg 49.272 kg 49.442 kg 50.213 kg 46.851 kg BMI (calculated) Weight and BMI 2/4/2020 2/3/2020 1/31/2020 1/30/2020 1/22/2020 Weight 45.36 kg 45.36 kg 43.8 kg 42.298 kg 41.731 kg BMI (calculated) 18.9 kg/m2    17.4 kg/m2 Weight and BMI 1/15/2020 1/13/2020 Weight 41.731 kg 42. 094 kg BMI (calculated)  16.4 kg/m2 Unable to verify weight variances due to unknown weight sources and stated vs measure, but it appears there has been weight gain. Questionable fluid status with h/o CHF. Macronutrient needs: (using CBW (Current body weight) bed scale 48.8 kg) EER: 1496-0204 kcal/day (25-30 kcal/kg) EPR: 59-63 g/day (1.2-1.3 g/kg) Intake/Comparative Standards: Per documentation, patient consuming average ~30% of 15 recorded meals in 7 days. This meets 50-75% of kcal needs and 25-50% of protein needs. Nutrition Diagnosis: 
Inadequate oral intake related to increased work of breathing and depressed appetite as evidenced by patient reported barriers to PO intake, diet recall prior to admission with oral nutrition supplementation, currently meeting needs as above. Nutrition Intervention: 
Meals and snacks: Continue current diet Medical food supplement therapy: Will add Ensure Enlive BID- vanilla flavor preference noted Coordination of nutrition care by a Nutrition Professional: Jose Covert Discharge Nutrition Plan: Too soon to determine. 150 [facility] [address] [place] [address], [phone], 0[address] [facility]

## 2020-02-19 NOTE — PROGRESS NOTES
END OF SHIFT NOTE: 
 
Intake/Output 02/19 0701 - 02/19 1900 In: -  
Out: 0758 [ZENYS:0592] Voiding: YES Catheter: YES Drain:   
 
 
 
 
 
Stool:  1 occurrences. Stool Assessment Stool Color: Desma Pickle (02/18/20 1957) Stool Appearance: Formed (02/19/20 3183) Stool Amount: Large (02/18/20 1957) Stool Source/Status: Rectum (02/18/20 1957) Emesis:  0 occurrences. VITAL SIGNS Patient Vitals for the past 12 hrs: 
 Temp Pulse Resp BP SpO2  
02/19/20 1735    145/76   
02/19/20 1706   18 97/61 99 % 02/19/20 1448     95 % 02/19/20 1445 98.3 °F (36.8 °C) 63 20 92/44 98 % 02/19/20 1220    97/68   
02/19/20 1210    (!) 89/45   
02/19/20 1137 98.9 °F (37.2 °C) 62 20 (!) 84/38 96 % 02/19/20 0814     95 % 02/19/20 0746 98.9 °F (37.2 °C) 73 22 145/78 96 % Pain Assessment Pain 1 Pain Scale 1: Numeric (0 - 10) (02/19/20 1422) Pain Intensity 1: 5 (02/19/20 1422) Patient Stated Pain Goal: 4 (02/18/20 0841) Pain Reassessment 1: Patient resting w/respiratory rate greater than 10 (02/19/20 1422) Pain Location 1: Generalized (02/18/20 1852) Pain Orientation 1: Right (02/17/20 1852) Pain Description 1: Constant;Cramping;Aching (02/18/20 1852) Pain Intervention(s) 1: Medication (see MAR) (02/19/20 6549) Ambulating YES Additional Information:  
 
Patient was given Ativan and Morphine and blood pressure dropped to 84/38. Called placed to Shirley Burgess NP and new orders received for a 1L bolus. Corrected BP 92/44. Shift report given to oncoming nurse at the bedside. Nathaniel Million

## 2020-02-19 NOTE — PROGRESS NOTES
Palliative Care Progress Note Patient: Nicolette Polanco MRN: [id number]  SSN: [id number] YOB: 1965  Age: 48 y.o. Sex: female Assessment/Plan: Chief Complaint/Interval History: Alert, on Optiflow this morning Principal Diagnosis: · Dyspnea  R06.00 Additional Diagnoses: · Advance Care Planning Counseling Z71.89 
· Debility, Unspecified  R53.81 · Fatigue, Lethargy  R53.83 
· Frailty  R54 · Respiratory Failure, Acute on Chronic  J96.20 · Counseling, Encounter for Medical Advice  Z71.9 
· Encounter for Palliative Care  Z51.5 Palliative Performance Scale (PPS) PPS: 40 Medical Decision Making:  
Reviewed and summarized notes over last 48 hours Reviewed laboratory and x-ray data over last 48 hours Patient resting in bed, alert and oriented. She has been placed on Optiflow- per notes, on 3L yesterday. She complains of dyspnea and \"ready to breathe better\", but says this is better on Optiflow. She does not complain of pain my visit. Reviewed HCPOA that patient completed this hospitalization. She confirms that she wants her brother Liza Valera to be her designated decision maker. She would like to name her brother and sister as alternates on this document; counseled her that this must be updated. Advise that we wait to assure her mental status is fully improved- patient verbalized understanding. Will follow-up on this in next day or two. Will discuss findings with members of the interdisciplinary team.   
 
  
More than 50% of this 25 minute visit was spent counseling and coordination of care as outlined above. Subjective:  
 
Review of Systems: A comprehensive review of systems was negative except for:  
Constitutional: Positive for fatigue. Respiratory: Positive for dyspnea, improved on Optiflow. Objective:  
 
Visit Vitals /78 (BP 1 Location: Left arm, BP Patient Position: At rest) Pulse 73 Temp 98.9 °F (37.2 °C) Resp 22 Wt 101 lb (45.8 kg) SpO2 95% BMI 19.08 kg/m² Physical Exam: 
 
General:  Debilitated. No acute distress. Eyes:  Conjunctivae/corneas clear Nose: Nares normal. Septum midline. Optiflow. Neck: Supple, symmetrical, trachea midline Lungs:   Coarse bilaterally, unlabored. Heart:  Regular rate and rhythm Abdomen:   Soft, non-tender, non-distended. Extremities: Normal, atraumatic, no cyanosis or edema. Skin: Skin color, texture, turgor normal.  
Neurologic: Nonfocal.  
Psych: Alert and oriented. Signed By: Leroy Lagos NP February 19, 2020

## 2020-02-19 NOTE — PROGRESS NOTES
END OF SHIFT NOTE: 
 
Intake/Output 02/18 1901 - 02/19 0700 In: 150 [P.O.:150] Out: 3478 [[facility]:2767] Voiding: YES Catheter: YES Drain:   
 
 
 
 
 
Stool:  0 occurrences. Stool Assessment Stool Color: Marianne Ng (02/18/20 1957) Stool Appearance: Formed (02/18/20 1957) Stool Amount: Large (02/18/20 1957) Stool Source/Status: Rectum (02/18/20 1957) Emesis:  0 occurrences. VITAL SIGNS Patient Vitals for the past 12 hrs: 
 Temp Pulse Resp BP SpO2  
02/18/20 2241 98.4 °F (36.9 °C) 71 22 124/58 98 % 02/18/20 2113  68   98 % 02/18/20 1957 98.8 °F (37.1 °C) 68 22 109/53 96 % Pain Assessment Pain 1 Pain Scale 1: Visual (02/19/20 0030) Pain Intensity 1: 0 (02/19/20 0030) Patient Stated Pain Goal: 4 (02/18/20 0841) Pain Reassessment 1: Patient resting w/respiratory rate greater than 10(watching tv, in and out of sleep) (02/19/20 0030) Pain Location 1: Generalized (02/18/20 1852) Pain Orientation 1: Right (02/17/20 1852) Pain Description 1: Constant;Cramping;Aching (02/18/20 1852) Pain Intervention(s) 1: Medication (see MAR) (02/18/20 2241) Ambulating Yes Additional Information:  
Patient held O2 saturation above 95% throughout night. Patient had an axillary temp of 100.6, however, temperature went down within the next hour on its own. Shift report given to oncoming nurse at the bedside. Fito Anguiano

## 2020-02-19 NOTE — PROGRESS NOTES
[facility] Admission Date: 2/10/2020 Daily Progress Note: 2/19/2020 The patient's chart is reviewed and the patient is discussed with the staff. Pt is 48 y.o.  female seen and evaluated at the request of Dr. Mark Waterman. .  Pt is well known to [facility] with a history of cocaine abuse(as recent as 1/10/2020 reportedly, + UDS on 1/30/2020), tobacco abuse 1/2 ppd x 38 years, COPD, chronic pain, DLD, chronic hepatitis C, chronic diastolic CHF, CAD, bipolar disorder with borderline personality disorder, CAD s/ CABG, and ASCUS with positive high risk HPV cervical. Pt presented to the ER on 1/13/2020 with cough, shortness of breath and R sided chest pain. CXR identified new widening of the superior mediastinum in comparison to recent prior chest x-rays. Chest CT showed an incompletely characterized anterior mediastinal mass extending to the right supraclavicular region, but appearing to be separate from the thyroid gland, measuring approximately 3.9 cm in AP diameter. Pt was seen and underwent bronch with EBUS and FNA with airway inspection. The 4R location tumor was biopsied and positive for malignancy on SATNAM. Cytology from FNA of 2R-4R location of lung mass, revealed the presence of malignant cells. Immunohistochemical features were suggestive of carcinoma of hepatic origin given staining for cytoplasmic TTF-1 as well as arginase. 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 Pt had enlarged hypermetabolic mediastinal LN and soft tissue mass in the R supraclavicular soft tissues  Measuring 4.9x2.9cm separate from thyroid. Pt was referred to oncology and admitted on 2/10/2020.   She has now completed her first round of chemo using cisplatin/etoposide (2/15). She was treated with a 7 day course of abx for suspected post obstructive pneumonia. Oxygen needs are decreasing. Subjective:  
 
Pt lying in bed on optiflow 40L. Pt reports cough with yellow sputum. Pt hypotensive and back on optiflow. Pt had temp 100.6* this AM. Current Facility-Administered Medications Medication Dose Route Frequency  cefepime (MAXIPIME) 2 g in 0.9% sodium chloride (MBP/ADV) 100 mL  2 g IntraVENous Q12H  
 morphine injection 3 mg  3 mg IntraVENous Q2H PRN  
 furosemide (LASIX) injection 20 mg  20 mg IntraVENous DAILY  potassium chloride (K-DUR, KLOR-CON) SR tablet 40 mEq  40 mEq Oral BID  polyethylene glycol (MIRALAX) packet 17 g  17 g Oral DAILY  senna-docusate (PERICOLACE) 8.6-50 mg per tablet 1 Tab  1 Tab Oral DAILY  predniSONE (DELTASONE) tablet 40 mg  40 mg Oral DAILY WITH BREAKFAST  OLANZapine (ZyPREXA zydis) disintegrating tablet 10 mg  10 mg Oral QHS PRN  
 albuterol (PROVENTIL VENTOLIN) nebulizer solution 2.5 mg  2.5 mg Nebulization TID RT  
 [Held by provider] amLODIPine (NORVASC) tablet 5 mg  5 mg Oral DAILY  atorvastatin (LIPITOR) tablet 80 mg  80 mg Oral DAILY  [Held by provider] carvediloL (COREG) tablet 6.25 mg  6.25 mg Oral BID WITH MEALS  enoxaparin (LOVENOX) injection 30 mg  30 mg SubCUTAneous Q24H  
 LORazepam (ATIVAN) tablet 0.5 mg  0.5 mg Oral Q6H PRN  
 albuterol (PROVENTIL VENTOLIN) nebulizer solution 2.5 mg  2.5 mg Nebulization Q4H PRN  
 morphine CR (MS CONTIN) tablet 60 mg  60 mg Oral Q12H  
 morphine IR (MS IR) tablet 15 mg  15 mg Oral Q4H PRN  
 ondansetron (ZOFRAN ODT) tablet 8 mg  8 mg Oral Q8H PRN  prochlorperazine (COMPAZINE) tablet 10 mg  10 mg Oral Q6H PRN  
 sertraline (ZOLOFT) tablet 25 mg  25 mg Oral DAILY Review of Systems 
+cough-yellow sputum 
+dyspnea Constitutional: negative for fever, chills, sweats Cardiovascular: negative for chest pain, palpitations, syncope, edema Gastrointestinal:  negative for dysphagia, reflux, vomiting, diarrhea, abdominal pain, or melena Neurologic:  negative for focal weakness, numbness, headache Objective:  
 
Vitals:  
 02/19/20 0814 02/19/20 1137 02/19/20 1210 02/19/20 1220 BP:  (!) 84/38 (!) 89/45 97/68 Pulse:  62 Resp:  20 Temp:  98.9 °F (37.2 °C) SpO2: 95% 96% Weight:      
 
 
 
Intake/Output Summary (Last 24 hours) at 2/19/2020 1350 Last data filed at 2/19/2020 1131 Gross per 24 hour Intake 150 ml Output 2550 ml Net -2400 ml Physical Exam:  
Constitution:  the patient is well developed and in no acute distress, on optiflow 40L  
EENMT:  Sclera clear, pupils equal, oral mucosa moist 
Respiratory: coarse insp/exp rhonchi/crackles Cardiovascular:  RRR without M,G,R 
Gastrointestinal: soft and non-tender; with positive bowel sounds. Musculoskeletal: warm without cyanosis. There is no lower extremity edema. Skin:  no jaundice or rashes Neurologic: no gross neuro deficits Psychiatric:  alert and oriented x 3 CXR:  
 
 
LAB No results for input(s): GLUCPOC in the last 72 hours. No lab exists for component: [place] Recent Labs  
  02/19/20 
0335 02/18/20 
0217 02/17/20 
6520 WBC 7.9 11.9* 14.6* HGB 7.9* 8.4* 7.9*  
HCT 25.0* 25.8* 24.7*  
* 182 226 Recent Labs  
  02/19/20 
0335 02/18/20 
0217 02/17/20 
2274 * 136 138  
K 3.7 3.3* 3.1*  
CL 96* 95* 100 CO2 35* 34* 31  
* 117* 151* BUN 25* 21 25* CREA 0.47* 0.51* 0.64 CA 8.3 7.8* 7.6* ALB 2.1* 2.0* 1.9* TBILI 0.4 0.4 0.3 ALT 35 27 19 SGOT 36 31 27 No results for input(s): PH, PCO2, PO2, HCO3, PHI, PCO2I, PO2I, HCO3I in the last 72 hours. No results for input(s): LCAD, LAC in the last 72 hours. Assessment:  (Medical Decision Making) Hospital Problems  Date Reviewed: 2/19/2020 Codes Class Noted POA * (Principal) [facility] ICD-10-CM: C34.90 ICD-9-CM: 162.9  2/10/2020 Yes Per primary Hypoxia ICD-10-CM: R09.02 
ICD-9-CM: 799.02  2/10/2020 Yes Wean FiO2 as tolerated COPD exacerbation ([facility] ICD-10-CM: J44.1 ICD-9-CM: 491.21  Unknown Yes Continue prednisone and albuterol, add spiriva Acute respiratory failure with hypoxia ([facility] ICD-10-CM: J96.01 
ICD-9-CM: 518.81  7/12/2011 Yes Wean O2 as tolerated Plan:  (Medical Decision Making)  
 
--wean FiO2 as tolerated 
--continue maxipime through 2/21 
--on prednisone 40mg daily 
--continue IV lasix --add mucinex BID 
--continue nebs 
--add spiriva daily More than 50% of the time documented was spent in face-to-face contact with the patient and in the care of the patient on the floor/unit where the patient is located. SPARKLE Arroyo I have spoken with and examined the patient. I agree with the above assessment and plan as documented. Now on optiflow and says patient she feels better on it, though no documentation of what happened leading to increase in oxygen support. Gen: pleasant Lungs: scattered rhonchi/wheezes on left Heart:  RRR with no Murmur/Rubs/Gallops Abd: NABS Ext: no c/c/e 
 
--continue antibiotics 
--wean oxygen please. 
--added spiriva, continue albuterol Josette Gamez MD

## 2020-02-19 NOTE — PROGRESS NOTES
Chart screened by  for discharge planning. Pt received [facility] [address] list approved with her insurance. Please consult  if any new issues arise Case management will follow up on [address] choices. 1) [employer]. 2)[facility] [place] 3)[place] To [facility] for referrals. Case Management will continue to follow.

## 2020-02-19 NOTE — PROGRESS NOTES
d 
 
[facility] Hematology & Oncology Inpatient Hematology / Oncology Progress Note Admission Date: 2/10/2020  3:39 PM 
Reason for Admission/Hospital Course: Lung cancer ([facility] [C34.90] Hypoxia [R09.02] 
 
 
24 Hour Events: 
Tmax 100.6, on Optiflow Completed C1 cis/etop on 2/15 On Cef for PNA 
 
ROS: 
Constitutional: +fatigue. Negative for fever, chills. CV: Negative for chest pain, palpitations, edema. Respiratory: +dyspnea +wheezing GI: Negative for nausea, abdominal pain, diarrhea. 10 point review of systems is otherwise negative with the exception of the elements mentioned above in the HPI. Allergies Allergen Reactions  Lithium Analogues Unknown (comments)  Morphine (Pf) Rash Pt is currently taking Morphine 30mg QID  Other Medication Swelling Whatever holds Vit d pill together OBJECTIVE: 
Patient Vitals for the past 8 hrs: 
 BP Temp Pulse Resp SpO2  
20 1137 (!) 84/38 98.9 °F (37.2 °C) 62 20 96 % 20 0814     95 % 20 0746 145/78 98.9 °F (37.2 °C) 73 22 96 % 20 0500  99.2 °F (37.3 °C)    Temp (24hrs), Av.1 °F (37.3 °C), Min:98.4 °F (36.9 °C), Max:100.6 °F (38.1 °C) 
 
 0701 -  1900 In: -  
Out: 707 [[id number]] Physical Exam: 
Constitutional: Ill appearing  female in no mild distress, sitting bedside chair. HEENT: Normocephalic and atraumatic. Oropharynx is clear, mucous membranes are moist.  Sclerae anicteric. Neck supple without JVD. No thyromegaly present. Skin Warm and dry. No bruising and no rash noted. No erythema. No pallor. Respiratory Lungs coarse bilaterally, R>L; normal air exchange without accessory muscle use. On Optiflow. CVS Normal rate, regular rhythm and normal S1 and S2. No murmurs, gallops, or rubs. Abdomen Soft, nontender and nondistended, normoactive bowel sounds. No palpable mass. No hepatosplenomegaly. Neuro No obvious deficits MSK Normal range of motion in general.  No edema and no tenderness. Psych Appropriate mood Labs: 
   
Recent Labs  
  02/19/20 
0335 02/18/20 
0217 02/17/20 
8756 WBC 7.9 11.9* 14.6*  
RBC 2.82* 2.93* 2.78* HGB 7.9* 8.4* 7.9*  
HCT 25.0* 25.8* 24.7*  
MCV 88.7 88.1 88.8 MCH 28.0 28.7 28.4 MCHC 31.6 32.6 32.0  
RDW 13.2 13.2 13.2 * 182 226 GRANS 87* 88* 98* LYMPH 7* 6* 1*  
MONOS 0* 0* 0* EOS 0* 0* 0*  
BASOS 0 0 0 IG 6* 6* 1 DF AUTOMATED AUTOMATED AUTOMATED ANEU 6.8 10.5* 14.2* ABL 0.6 0.7 0.1* ABM 0.0* 0.0* 0.0* ROSA ISELA 0.0 0.0 0.0 ABB 0.0 0.0 0.0 AIG 0.5 0.7* 0.2 Recent Labs  
  02/19/20 
0335 02/18/20 
0217 02/17/20 
1801 * 136 138  
K 3.7 3.3* 3.1*  
CL 96* 95* 100 CO2 35* 34* 31 AGAP 3* 7 7 * 117* 151* BUN 25* 21 25* CREA 0.47* 0.51* 0.64 GFRAA >60 >60 >60 GFRNA >60 >60 >60  
CA 8.3 7.8* 7.6* SGOT 36 31 27 AP 49* 49* 47* TP 6.2* 6.1* 5.9* ALB 2.1* 2.0* 1.9*  
GLOB 4.1* 4.1* 4.0* AGRAT 0.5* 0.5* 0.5* Imaging: XR CHEST SNGL V [[id number]] Collected: 02/19/20 0449 Order Status: Completed Updated: 02/19/20 5441 Narrative:    
EXAM: Chest x-ray. INDICATION: Dyspnea. COMPARISON: February 17, 2020. TECHNIQUE: Frontal view chest x-ray. FINDINGS: Mild bibasilar lung atelectasis or infiltrates and a small right 
pleural effusion are unchanged. No pneumothorax or left pleural effusion is 
identified. The cardiac size is within normal limits. Again noted is a prior 
sternotomy, a left chest wall pacemaker, a left chest wall infusion port 
catheter in the patient's known upper mediastinal mass. Impression:    
IMPRESSION: Unchanged mild bibasilar lung atelectasis or infiltrates and small 
right pleural effusion. XR CHEST SNGL V [[id number]] Collected: 02/17/20 2023 Order Status: Completed Updated: 02/17/20 2026 Narrative:    
Portable chest x-ray Clinical indications: Hypoxia. FINDINGS: Single AP view the chest compared to a similar exam dated 2/12/2020 
shows airspace opacity at the right lung base with a right pleural effusion. Atelectasis or pneumonia should be considered. The left lung is stable and 
clear. A pacer device is in place. Impression:    
IMPRESSION: No significant interval change with right lower lung airspace 
opacity and a right pleural effusion. Atelectasis or pneumonia should be 
considered. XR CHEST SNGL V [[id number]] Collected: 02/12/20 1439 Order Status: Completed Updated: 02/12/20 1442 Narrative:    
CHEST X-RAY, single portable view  2/12/2020 History: Hypoxemia and cough. Technique: Single frontal view of the chest. 
 
Comparison: Chest x-ray 2/10/2020 Findings: A stable left-sided intracardiac device is seen. A stable left-sided venous port 
is seen. Stable mild cardiomegaly is seen. Lungs are expanded without 
appreciable pneumothorax. Stable masslike density along the right upper 
paramediastinal border. Increasing now small to moderate right basilar pleural 
effusion. Impression:    
IMPRESSION: 1.  Increasing now small to moderate basilar pleural effusion. The etiology of 
effusion is uncertain although this could represent sequela of heart 
failure/fluid overload given the associated cardiomegaly and the rapid onset of 
this finding. XR CHEST PA LAT [[id number]] Collected: 02/10/20 1746 Order Status: Completed Updated: 02/10/20 1752 Narrative:    
Chest 2 view CLINICAL INDICATION: Acute worsening dyspnea, moderate to severe; SVC syndrome, 
mediastinal mass COMPARISON: 1/23/2020 radiograph, 1/30/2020 CT PET, and 2/5/2020 CT with 
contrast 
 
TECHNIQUE: Sitting upright AP and lateral views of the chest  
 
FINDINGS: Patient is rotated to the left. Lung volumes are slightly shallow. There is artifact due to external tubing. There is interval mild to moderate right hemidiaphragm elevation. There is slightly increased mild groundglass and reticular opacity in the right lower 
lobe. No evidence of a pneumothorax, significant pleural effusion, or overt CHF. The mediastinal and hilar contours again demonstrate widening consistent with 
the known masses that were better evaluated on the recent CT, previous CABG, 
left side portacatheter, and dual-lead pacemaker. The bone density is again low throughout. No acute fracture or dislocation is 
evident. Impression:    
IMPRESSION:  
1. Right lower lobe infiltrate. 2. Mediastinal masses compatible with malignancy, further evaluated on recent CT 
and PET. Medications: 
Current Facility-Administered Medications Medication Dose Route Frequency  cefepime (MAXIPIME) 2 g in 0.9% sodium chloride (MBP/ADV) 100 mL  2 g IntraVENous Q12H  
 sodium chloride 0.9 % bolus infusion 1,000 mL  1,000 mL IntraVENous ONCE  
 furosemide (LASIX) injection 20 mg  20 mg IntraVENous DAILY  tuberculin injection 5 Units  5 Units IntraDERMal ONCE  potassium chloride (K-DUR, KLOR-CON) SR tablet 40 mEq  40 mEq Oral BID  polyethylene glycol (MIRALAX) packet 17 g  17 g Oral DAILY  senna-docusate (PERICOLACE) 8.6-50 mg per tablet 1 Tab  1 Tab Oral DAILY  predniSONE (DELTASONE) tablet 40 mg  40 mg Oral DAILY WITH BREAKFAST  OLANZapine (ZyPREXA zydis) disintegrating tablet 10 mg  10 mg Oral QHS PRN  
 albuterol (PROVENTIL VENTOLIN) nebulizer solution 2.5 mg  2.5 mg Nebulization TID RT  
 amLODIPine (NORVASC) tablet 5 mg  5 mg Oral DAILY  atorvastatin (LIPITOR) tablet 80 mg  80 mg Oral DAILY  carvediloL (COREG) tablet 6.25 mg  6.25 mg Oral BID WITH MEALS  enoxaparin (LOVENOX) injection 30 mg  30 mg SubCUTAneous Q24H  
 morphine injection 5 mg  5 mg IntraVENous Q2H PRN  
 LORazepam (ATIVAN) tablet 0.5 mg  0.5 mg Oral Q6H PRN  
 albuterol (PROVENTIL VENTOLIN) nebulizer solution 2.5 mg  2.5 mg Nebulization Q4H PRN  
  morphine CR (MS CONTIN) tablet 60 mg  60 mg Oral Q12H  
 morphine IR (MS IR) tablet 15 mg  15 mg Oral Q4H PRN  
 ondansetron (ZOFRAN ODT) tablet 8 mg  8 mg Oral Q8H PRN  prochlorperazine (COMPAZINE) tablet 10 mg  10 mg Oral Q6H PRN  
 sertraline (ZOLOFT) tablet 25 mg  25 mg Oral DAILY ASSESSMENT: 
 
Problem List  Date Reviewed: 2/13/2020 Codes Class Noted * (Principal) Lung cancer ([facility] ICD-10-CM: C34.90 ICD-9-CM: 162.9  2/10/2020 Hypoxia ICD-10-CM: R09.02 
ICD-9-CM: 799.02  2/10/2020 Malignant neoplasm of overlapping sites of left lung [facility]) ICD-10-CM: C34.82 
ICD-9-CM: 162.8  1/22/2020 Hypokalemia ICD-10-CM: E87.6 ICD-9-CM: 276.8  12/7/2019 Chest pain ICD-10-CM: R07.9 ICD-9-CM: 786.50  12/6/2019 Chronic tension-type headache, intractable ICD-10-CM: J67.324 ICD-9-CM: 339.12  5/13/2019 Cigarette nicotine dependence without complication F-99-UF: Y66.353 ICD-9-CM: 305.1  7/3/2018 COPD exacerbation ([facility] ICD-10-CM: J44.1 ICD-9-CM: 491.21  Unknown Recurrent UTI ICD-10-CM: N39.0 ICD-9-CM: 599.0  4/24/2018 Gross hematuria ICD-10-CM: R31.0 ICD-9-CM: 599.71  4/24/2018 Urinary retention ICD-10-CM: R33.9 ICD-9-CM: 788.20  4/24/2018 Sick sinus syndrome (HCC) (Chronic) ICD-10-CM: I49.5 ICD-9-CM: 427.81  9/29/2017 IBS (irritable bowel syndrome) ICD-10-CM: K58.9 ICD-9-CM: 564.1  Unknown 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 
ICD-9-CM: 433.20  12/28/2016 Encounter for medication management ICD-10-CM: N88.009 ICD-9-CM: V58.69  12/28/2016 Neuropathy (Chronic) ICD-10-CM: G62.9 ICD-9-CM: 355.9  12/28/2016 Overview Signed 7/28/2017  2:20 PM by Paola Billings MD  
  Last Assessment & Plan:  
Patient is currently on gabapentin 600 mg. Medication may cause increased drowsiness with Rispirdol, however pt has no complaints today. Intractable migraine with aura without status migrainosus ICD-10-CM: G43.119 ICD-9-CM: 346.01  12/28/2016 Peripheral vascular disease (HCC) (Chronic) ICD-10-CM: I73.9 ICD-9-CM: 443.9  12/1/2016 Overview Signed 12/1/2016  4:23 PM by Jewell Butler MD  
  CTA (2/11/16): Moderate to severe stenosis in the proximal right subclavian artery. Borderline personality disorder ([facility] ICD-10-CM: F60.3 ICD-9-CM: 301.83  Unknown Overview Signed 7/28/2017  2:20 PM by Haydee Carrasquillo MD  
  Last Assessment & Plan: 1. Continue inpatient hospitalization 2. Lithium level 1.5. Will hold am dose and change to 300mg PO BID. Will recheck in 3 days. 3.  Continue Neurontin 4. Consider Vistaril PRN if indicated 5. Encourage active participation in groups and on the milieu 6. Consider discharge and aftercare needs 7. Encourage abstinence from drugs of abuse Cerebrovascular disease ICD-10-CM: I67.9 ICD-9-CM: 437.9  Unknown Overview Signed 12/1/2016  4:23 PM by Jewell Butler MD  
  CTA of the neck (2/11/16) : No significant stenosis of the right carotid artery. 37% stenosis in the proximal left internal carotid artery. Severe stenosis at the origin of the left vertebral artery Chronic hepatitis C ([facility] ICD-10-CM: B18.2 ICD-9-CM: 070.54  Unknown Chronic pain ICD-10-CM: G89.29 ICD-9-CM: 338.29  Unknown Overview Signed 7/28/2017  2:20 PM by Haydee Carrasquillo MD  
  Last Assessment & Plan:  
Patient has history of chronic neck and back pain secondary to degenerative changes. She was recently prescribed a 15 day supply of Dilaudid 2 mg TID by \"pain management doctor\". This script was filled on 5.8. 17. Plan: - Given patient's history of substance use, she would largely benefit from detox of narcotics. Will try conservative methods while inpatient for back pain as well as neck and shoulder pain.  Continue Ultram 100 mg Q6H while we await permission to speak to pain management doctor. Mauricio Del[facility] office in regards to patient's pain management contract. Patient has been dismissed from his services for positive UDS. - Avoid Toradol given her elevated Creatinine and glucose at time of admission. A1c normal at 5.4. ASCUS with positive high risk HPV cervical ICD-10-CM: R87.610, R87.810 ICD-9-CM: 795.01, 795.05  Unknown Pacemaker ICD-10-CM: Z95.0 ICD-9-CM: V45.01  8/10/2016 Overview Signed 8/10/2016 10:47 AM by Merry Esrtada MD  
  1. [employer]  :  Due to sick sinus syndrome. Chronic diastolic congestive heart failure (HCC) (Chronic) ICD-10-CM: I50.32 
ICD-9-CM: 428.32, 428.0  8/9/2016 Overview Addendum 8/27/2019  5:11 PM by Merry Estrada MD  
  Admitted [facility] 8/12/11 with CP and elevated BNP at 352. 
1.  Echo (8/3/11):  EF > 55%. Mild LVH. Pseudonormal LV filling pattern. Bi-atrial enlargement. .  Mild mitral regurgitation. 2.  Echo (8/22/19):  EF 65-70%. Indeterminant diastolic function. Mild LAE. Coronary atherosclerosis of native coronary vessel (Chronic) ICD-10-CM: I25.10 ICD-9-CM: 414.01  8/9/2016 Overview Addendum 12/9/2019  7:53 AM by Merry Estrada MD  
  1.  3 Vessel CABG (7/8/11):  MIR to LAD. LIMA in Y graft to superior and inferior diagonal. 
2.  Cath (8/31/12): Occluded grafts. FFR of LAD 0.89. Similar stenosis of diagonal but no FFR done. 3.  LHC (11/1/17): Mild to moderate nonhemodynamically significant CAD. Normal IFR of LAD (40%: 0.98) and Diagonal (50%: 0.98) 4. LHC (12/7/19): Stable CAD. Tobacco use disorder (Chronic) ICD-10-CM: X38.648 ICD-9-CM: 305.1  Unknown HTN (hypertension), benign (Chronic) ICD-10-CM: I10 
ICD-9-CM: 401.1  8/9/2016 Dyslipidemia (Chronic) ICD-10-CM: S61.2 ICD-9-CM: 272.4  8/9/2016 Abnormal MRI of head ICD-10-CM: R93.0 ICD-9-CM: 793.0  1/1/2015 Overview Signed 8/27/2016  4:28 PM by Araon Bishop MD  
  Frontal Lobe periventricular WM disease. Ruled out for MS by Neurology Cocaine abuse in remission [facility]) ICD-10-CM: F14.11 ICD-9-CM: 305.63  9/18/2012 S/P CABG x 3 (Chronic) ICD-10-CM: Z95.1 ICD-9-CM: V45.81  9/18/2012 Bipolar disorder (HCC) (Chronic) ICD-10-CM: F31.9 ICD-9-CM: 296.80  9/18/2012 Acute respiratory failure with hypoxia [facility]) ICD-10-CM: J96.01 
ICD-9-CM: 518.81  7/12/2011  
   
  
 
   
48 y.o. F consulted for cancer in 1/2020. She worked up neck/upper chest pain with CT finding of upper mediastinal mass, EBUS 1/22/20 with FNA showed malignant cells with broad IHC negativity except for suggestion of liver origin. She had active drug abuse as documented reason being discharged from her prior pain management. She presented to [facility] on 1[facility]30[facility], has missed the scheduled PET, reported moved in with her brother and a friend drove her to visit.  We discussed her rather atypical cancer presentation, called radiology to urgently re-schedule PET to further define the origin of cancer and extent, discussed her narco management and she gave inconsistent answers regarding whether and when she used cocaine, also stated she stopped pain med herself rather then being discharged by Dr. Parul Mayberry, although she appeared in pain, involved SW and palliative care, signed narco contract, yet urine screen positive for cocaine, then she changed her story of last use 1 week ago but that should not lead to positive urine test either, discussed the breaking of contract and trust voids prescription of narco. PET 1/30/20 showed large right mediastinal/supraclavicle avid disease but no evidence of disease otherwise and no suspicious disease in the liver, I reviewed PET personally and discussed to have urgent excisional biopsy for more tissue of histology and molecular tests, meanwhile I would not rec treat this as liver cancer, but rather treat with protocol of limited stage SCLC or stage III NSCLC if brain MRI negative to aim at possible cure or durable control, she was agreeable and Adam will arrange for urgent biopsy/port/rad onc eval, start cisplatin/etoposide/XRT 2/10/20, hyponatremia and weight gain concern of SIADH and instructed not to drink excessively and monitor, SVC syndrome and monitor closely for rx response, however she was not able to tolerate pre-hydration for cisplatin and admit for SOB likely related to SVC syndrome, administer chemo inpt with slow rate, may dosing XRT upfront for SVC relief, IR eval if needed. PLAN: 
Lung cancer 
-supposed to start Cis/VP16/XRT yesterday. 2/11 plan for chemo tomorrow if echo ok. XRT plan is not completed yet. 2/12 start chemo today if echo ok. Over to XRT for rescanning today. 2/13 D2 Cis/VP16 not given 2/14 Will give D 2 cis/VP16 as patient wishes to proceed with chemotherapy. 2/15 Proceed with C1D3 Cis/Etop today 2/18 Completed C1 cis/etop on 2/15. C2 due 3/4. Shortness of breath/Cough 
- on 2 LPM NC. Mucinex BID. CXR with known malignancy and RLL infiltrate. 2/11 start cef/vanc for possible PNA. Check echo. 2/12 continues on cef/vanc. Echo pending. Increased oxygen needs overnight, up to 5 LPM. pulm consult pending. Check BNP for completeness. Echo still pending. 2/13 echo ok with EF of 55-60%. BNP elevated. Lasix 20 mg daily PRN if I>O by 500 cc. Lasix given this AM. Pulmonology also following as well. On high flow at 40 LPM. Continues on cef/vanc for possible PNA. Currently able to use bedpan for urination- may need jara placed. 2/14 check blood gas. Ox 95-98% on high alon at 40 lpm. Back on cef/vanc. 2/15 O2 down to 5L, remains on Cef/Vanc 
2/16 O2 down to 3L today, continue Cef/Vanc/steroids/nebs 2/18 O2 at 3L, D5/7 Cef/Vanc 
2/19 On optiflow. Pulm DC'd Vanc yesterday. Con't Cef. Tmax 100.6. Repeat CXR with no changes. Check UA, BCx. Check BNP. ? SIDAH/hyponatremia - On IV fluids at this time with some improvement. Check urine sodium. Consult nephro 2/12 neph consult pending.  today. 2/13 appreciate nephros recommendations. NA up to 133 today. 2/14 Na improved up to 135.  
2/19 Na+ 134 Confusion 
-ongoing confusion noted. UDS pending. 2/17 more alert and clearer today 2/18 alert, oriented Continue home medications Cari SOPs Lovenox for DVT prophylaxis Goals and plan of care reviewed with the patient. All questions answered to the best of our ability. Mellisa Vaca NP [facility] Hematology and Oncology [address] [address] Office : [phone] Fax : [phone] Attending Addendum: 
I have personally performed a face to face diagnostic evaluation on this patient. I have reviewed and agree with the care plan as documented above Jay Jay Mosquera N.P.  My findings are as follows: Patient appears lethargic, heart rate regular without murmurs, abdomen is non-tender, bowel sounds are positive.  54 female history of thoracic malignancy status post recent concurrent cisplatin/etoposideXRT, cycle 1 was completed 2/15/2020.  Reports right neck swelling much improved.  Shortness of breath , on optiflow, pulmonology following.  Remains on broad-spectrum IV antibiotics as well a sprednisone. Continue ongoing care. Will benefit from rehab on discharge.  
  
  
Total time 25 min 50% in direct consultation about the patient's diagnosis and management 
  
 
  
Brittany Quintanilla MD 
[facility] Hematology/Oncology [address] [address] Office : [phone] Fax : [phone]

## 2020-02-20 NOTE — PROGRESS NOTES
andrés 
 
[facility] Inpatient Hematology / Oncology Progress Note Admission Date: 2/10/2020  3:39 PM 
Reason for Admission/Hospital Course: Lung cancer ([address] [C34.90] Hypoxia [R09.02] 
 
 
24 Hour Events: 
Tmax 100.5, hypotensive at times, on Optiflow @ 30 L Completed C1 cis/etop on 2/15 On Cef for PNA, completes tomorrow BNP 5 ROS: 
Constitutional: +fatigue. Negative for fever, chills. CV: Negative for chest pain, palpitations, edema. Respiratory: +dyspnea +wheezing GI: Negative for nausea, abdominal pain, diarrhea. 10 point review of systems is otherwise negative with the exception of the elements mentioned above in the HPI. Allergies Allergen Reactions  Lithium Analogues Unknown (comments)  Morphine (Pf) Rash Pt is currently taking Morphine 30mg QID  Other Medication Swelling Whatever holds Vit d pill together OBJECTIVE: 
Patient Vitals for the past 8 hrs: 
 BP Temp Pulse Resp SpO2  
20 0904     95 % 20 0719 106/60 98.5 °F (36.9 °C) 73 20 96 % 20 0607  98.3 °F (36.8 °C)     
20 0504  100.3 °F (37.9 °C)     
20 0354 118/63 (!) 100.5 °F (38.1 °C) 100 20 98 % Temp (24hrs), Av.1 °F (37.3 °C), Min:98.3 °F (36.8 °C), Max:100.5 °F (38.1 °C) 
 
 0701 -  1900 In: -  
Out: 1000 [Urine:1000] Physical Exam: 
Constitutional: Ill appearing  female in no mild distress, sitting bedside chair. HEENT: Normocephalic and atraumatic. Oropharynx with white patches; mucous membranes are moist.  Sclerae anicteric. Neck supple without JVD. No thyromegaly present. Skin Warm and dry. No bruising and no rash noted. No erythema. No pallor. Respiratory Lungs coarse with scattered wheezes bilaterally; normal air exchange without accessory muscle use. On Optiflow. CVS Normal rate, regular rhythm and normal S1 and S2. No murmurs, gallops, or rubs. Abdomen Soft, nontender and nondistended, normoactive bowel sounds. No palpable mass. No hepatosplenomegaly. Neuro No obvious deficits MSK Normal range of motion in general.  No edema and no tenderness. Psych Appropriate mood Labs: 
   
Recent Labs  
  02/20/20 0219 02/19/20 0335 02/18/20 0217 WBC 4.2* 7.9 11.9*  
RBC 3.01* 2.82* 2.93* HGB 8.5* 7.9* 8.4* HCT 27.0* 25.0* 25.8* MCV 89.7 88.7 88.1 MCH 28.2 28.0 28.7 MCHC 31.5 31.6 32.6 RDW 13.2 13.2 13.2 * 121* 182 GRANS 76 87* 88* LYMPH 15 7* 6*  
MONOS 0* 0* 0* EOS 0* 0* 0*  
BASOS 1 0 0 IG 8* 6* 6*  
DF AUTOMATED AUTOMATED AUTOMATED ANEU 3.3 6.8 10.5* ABL 0.6 0.6 0.7 ABM 0.0* 0.0* 0.0* ROSA ISELA 0.0 0.0 0.0 ABB 0.0 0.0 0.0 AIG 0.3 0.5 0.7* Recent Labs  
  02/20/20 0219 02/19/20 0335 02/18/20 0217 * 134* 136  
K 4.3 3.7 3.3*  
CL 97* 96* 95* CO2 32 35* 34* AGAP 5* 3* 7 * 130* 117* BUN 26* 25* 21  
CREA 0.58* 0.47* 0.51* GFRAA >60 >60 >60 GFRNA >60 >60 >60  
CA 8.4 8.3 7.8* SGOT 55* 36 31 AP 60 49* 49* TP 6.9 6.2* 6.1* ALB 2.0* 2.1* 2.0*  
GLOB 4.9* 4.1* 4.1* AGRAT 0.4* 0.5* 0.5* Imaging: XR CHEST SNGL V [[id number]] Collected: 02/19/20 0449 Order Status: Completed Updated: 02/19/20 0501 Narrative:    
EXAM: Chest x-ray. INDICATION: Dyspnea. COMPARISON: February 17, 2020. TECHNIQUE: Frontal view chest x-ray. FINDINGS: Mild bibasilar lung atelectasis or infiltrates and a small right 
pleural effusion are unchanged. No pneumothorax or left pleural effusion is 
identified. The cardiac size is within normal limits. Again noted is a prior 
sternotomy, a left chest wall pacemaker, a left chest wall infusion port 
catheter in the patient's known upper mediastinal mass. Impression:    
IMPRESSION: Unchanged mild bibasilar lung atelectasis or infiltrates and small 
right pleural effusion. XR CHEST SNGL V [[id number]] Collected: 02/17/20 2023 Order Status: Completed Updated: 02/17/20 2026 Narrative:    
Portable chest x-ray Clinical indications: Hypoxia. FINDINGS: Single AP view the chest compared to a similar exam dated 2/12/2020 
shows airspace opacity at the right lung base with a right pleural effusion. Atelectasis or pneumonia should be considered. The left lung is stable and 
clear. A pacer device is in place. Impression:    
IMPRESSION: No significant interval change with right lower lung airspace 
opacity and a right pleural effusion. Atelectasis or pneumonia should be 
considered. XR CHEST SNGL V [[id number]] Collected: 02/12/20 1439 Order Status: Completed Updated: 02/12/20 1442 Narrative:    
CHEST X-RAY, single portable view  2/12/2020 History: Hypoxemia and cough. Technique: Single frontal view of the chest. 
 
Comparison: Chest x-ray 2/10/2020 Findings: A stable left-sided intracardiac device is seen. A stable left-sided venous port 
is seen. Stable mild cardiomegaly is seen. Lungs are expanded without 
appreciable pneumothorax. Stable masslike density along the right upper 
paramediastinal border. Increasing now small to moderate right basilar pleural 
effusion. Impression:    
IMPRESSION: 1.  Increasing now small to moderate basilar pleural effusion. The etiology of 
effusion is uncertain although this could represent sequela of heart 
failure/fluid overload given the associated cardiomegaly and the rapid onset of 
this finding. XR CHEST PA LAT [[id number]] Collected: 02/10/20 1746 Order Status: Completed Updated: 02/10/20 1752 Narrative:    
Chest 2 view CLINICAL INDICATION: Acute worsening dyspnea, moderate to severe; SVC syndrome, 
mediastinal mass COMPARISON: 1/23/2020 radiograph, 1/30/2020 CT PET, and 2/5/2020 CT with 
contrast 
 
TECHNIQUE: Sitting upright AP and lateral views of the chest  
 
FINDINGS: Patient is rotated to the left. Lung volumes are slightly shallow. There is artifact due to external tubing. There is interval mild to moderate right hemidiaphragm elevation. There is 
slightly increased mild groundglass and reticular opacity in the right lower 
lobe. No evidence of a pneumothorax, significant pleural effusion, or overt CHF. The mediastinal and hilar contours again demonstrate widening consistent with 
the known masses that were better evaluated on the recent CT, previous CABG, 
left side portacatheter, and dual-lead pacemaker. The bone density is again low throughout. No acute fracture or dislocation is 
evident. Impression:    
IMPRESSION:  
1. Right lower lobe infiltrate. 2. Mediastinal masses compatible with malignancy, further evaluated on recent CT 
and PET. Medications: 
Current Facility-Administered Medications Medication Dose Route Frequency  acetaminophen (TYLENOL) tablet 650 mg  650 mg Oral Q6H PRN  
 nystatin (MYCOSTATIN) 100,000 unit/mL oral suspension 500,000 Units  500,000 Units Oral QID  cefepime (MAXIPIME) 2 g in 0.9% sodium chloride (MBP/ADV) 100 mL  2 g IntraVENous Q12H  
 morphine injection 3 mg  3 mg IntraVENous Q2H PRN  
 guaiFENesin ER (MUCINEX) tablet 1,200 mg  1,200 mg Oral Q12H  
 tiotropium bromide (SPIRIVA RESPIMAT) 2.5 mcg /actuation  2 Puff Inhalation DAILY  central line flush (saline) syringe 10 mL  10 mL InterCATHeter PRN  
 heparin (porcine) pf 300 Units  300 Units InterCATHeter PRN  
 furosemide (LASIX) injection 20 mg  20 mg IntraVENous DAILY  potassium chloride (K-DUR, KLOR-CON) SR tablet 40 mEq  40 mEq Oral BID  polyethylene glycol (MIRALAX) packet 17 g  17 g Oral DAILY  senna-docusate (PERICOLACE) 8.6-50 mg per tablet 1 Tab  1 Tab Oral DAILY  predniSONE (DELTASONE) tablet 40 mg  40 mg Oral DAILY WITH BREAKFAST  OLANZapine (ZyPREXA zydis) disintegrating tablet 10 mg  10 mg Oral QHS PRN  
 albuterol (PROVENTIL VENTOLIN) nebulizer solution 2.5 mg  2.5 mg Nebulization TID RT  
  [Held by provider] amLODIPine (NORVASC) tablet 5 mg  5 mg Oral DAILY  atorvastatin (LIPITOR) tablet 80 mg  80 mg Oral DAILY  [Held by provider] carvediloL (COREG) tablet 6.25 mg  6.25 mg Oral BID WITH MEALS  enoxaparin (LOVENOX) injection 30 mg  30 mg SubCUTAneous Q24H  
 LORazepam (ATIVAN) tablet 0.5 mg  0.5 mg Oral Q6H PRN  
 albuterol (PROVENTIL VENTOLIN) nebulizer solution 2.5 mg  2.5 mg Nebulization Q4H PRN  
 morphine CR (MS CONTIN) tablet 60 mg  60 mg Oral Q12H  
 morphine IR (MS IR) tablet 15 mg  15 mg Oral Q4H PRN  
 ondansetron (ZOFRAN ODT) tablet 8 mg  8 mg Oral Q8H PRN  prochlorperazine (COMPAZINE) tablet 10 mg  10 mg Oral Q6H PRN  
 sertraline (ZOLOFT) tablet 25 mg  25 mg Oral DAILY ASSESSMENT: 
 
Problem List  Date Reviewed: 2/19/2020 Codes Class Noted * (Principal) Lung cancer ([facility] ICD-10-CM: C34.90 ICD-9-CM: 162.9  2/10/2020 Hypoxia ICD-10-CM: R09.02 
ICD-9-CM: 799.02  2/10/2020 Malignant neoplasm of overlapping sites of left lung [facility]) ICD-10-CM: C34.82 
ICD-9-CM: 162.8  1/22/2020 Hypokalemia ICD-10-CM: E87.6 ICD-9-CM: 276.8  12/7/2019 Chest pain ICD-10-CM: R07.9 ICD-9-CM: 786.50  12/6/2019 Chronic tension-type headache, intractable ICD-10-CM: D16.170 ICD-9-CM: 339.12  5/13/2019 Cigarette nicotine dependence without complication WBW-69-LA: J58.684 ICD-9-CM: 305.1  7/3/2018 COPD exacerbation ([facility] ICD-10-CM: J44.1 ICD-9-CM: 491.21  Unknown Recurrent UTI ICD-10-CM: N39.0 ICD-9-CM: 599.0  4/24/2018 Gross hematuria ICD-10-CM: R31.0 ICD-9-CM: 599.71  4/24/2018 Urinary retention ICD-10-CM: R33.9 ICD-9-CM: 788.20  4/24/2018 Sick sinus syndrome (HCC) (Chronic) ICD-10-CM: I49.5 ICD-9-CM: 427.81  9/29/2017 IBS (irritable bowel syndrome) ICD-10-CM: K58.9 ICD-9-CM: 564.1  Unknown  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 
 ICD-9-CM: 433.20  12/28/2016 Encounter for medication management ICD-10-CM: E59.694 ICD-9-CM: V58.69  12/28/2016 Neuropathy (Chronic) ICD-10-CM: G62.9 ICD-9-CM: 355.9  12/28/2016 Overview Signed 7/28/2017  2:20 PM by Mague Kinney MD  
  Last Assessment & Plan:  
Patient is currently on gabapentin 600 mg. Medication may cause increased drowsiness with Rispirdol, however pt has no complaints today. Intractable migraine with aura without status migrainosus ICD-10-CM: G43.119 ICD-9-CM: 346.01  12/28/2016 Peripheral vascular disease (HCC) (Chronic) ICD-10-CM: I73.9 ICD-9-CM: 443.9  12/1/2016 Overview Signed 12/1/2016  4:23 PM by Raghu Day MD  
  CTA (2/11/16): Moderate to severe stenosis in the proximal right subclavian artery. Borderline personality disorder ([facility] ICD-10-CM: F60.3 ICD-9-CM: 301.83  Unknown Overview Signed 7/28/2017  2:20 PM by Mague Kinney MD  
  Last Assessment & Plan: 1. Continue inpatient hospitalization 2. Lithium level 1.5. Will hold am dose and change to 300mg PO BID. Will recheck in 3 days. 3.  Continue Neurontin 4. Consider Vistaril PRN if indicated 5. Encourage active participation in groups and on the milieu 6. Consider discharge and aftercare needs 7. Encourage abstinence from drugs of abuse Cerebrovascular disease ICD-10-CM: I67.9 ICD-9-CM: 437.9  Unknown Overview Signed 12/1/2016  4:23 PM by Raghu Day MD  
  CTA of the neck (2/11/16) : No significant stenosis of the right carotid artery. 37% stenosis in the proximal left internal carotid artery. Severe stenosis at the origin of the left vertebral artery Chronic hepatitis C ([facility] ICD-10-CM: B18.2 ICD-9-CM: 070.54  Unknown Chronic pain ICD-10-CM: G89.29 ICD-9-CM: 338.29  Unknown Overview Signed 7/28/2017  2:20 PM by Mague Kinney MD  
  Last Assessment & Plan: Patient has history of chronic neck and back pain secondary to degenerative changes. She was recently prescribed a 15 day supply of Dilaudid 2 mg TID by \"pain management doctor\". This script was filled on 5.8. 17. Plan: - Given patient's history of substance use, she would largely benefit from detox of narcotics. Will try conservative methods while inpatient for back pain as well as neck and shoulder pain. Continue Ultram 100 mg Q6H while we await permission to speak to pain management doctor. Tl Ramos office in regards to patient's pain management contract. Patient has been dismissed from his services for positive UDS. - Avoid Toradol given her elevated Creatinine and glucose at time of admission. A1c normal at 5.4. ASCUS with positive high risk HPV cervical ICD-10-CM: R87.610, R87.810 ICD-9-CM: 795.01, 795.05  Unknown Pacemaker ICD-10-CM: Z95.0 ICD-9-CM: V45.01  8/10/2016 Overview Signed 8/10/2016 10:47 AM by Masoud Ragsdale MD  
  1. [employer]  :  Due to sick sinus syndrome. Chronic diastolic congestive heart failure (HCC) (Chronic) ICD-10-CM: I50.32 
ICD-9-CM: 428.32, 428.0  8/9/2016 Overview Addendum 8/27/2019  5:11 PM by Masoud Ragsdale MD  
  [place] [facility] [place] with CP and elevated BNP at 352. 
1.  Echo (8/3/11):  EF > 55%. Mild LVH. Pseudonormal LV filling pattern. Bi-atrial enlargement. .  Mild mitral regurgitation. 2.  Echo (8/22/19):  EF 65-70%. Indeterminant diastolic function. Mild LAE. Coronary atherosclerosis of native coronary vessel (Chronic) ICD-10-CM: I25.10 ICD-9-CM: 414.01  8/9/2016 Overview Addendum 12/9/2019  7:53 AM by Masoud Ragsdale MD  
  1.  3 Vessel CABG (7/8/11):  MIR to LAD. LIMA in Y graft to superior and inferior diagonal. 
2.  Cath (8/31/12): Occluded grafts. FFR of LAD 0.89. Similar stenosis of diagonal but no FFR done. 3.  LHC (11/1/17): Mild to moderate nonhemodynamically significant CAD. Normal IFR of LAD (40%: 0.98) and Diagonal (50%: 0.98) 4. LHC (12/7/19): Stable CAD. Tobacco use disorder (Chronic) ICD-10-CM: P40.234 ICD-9-CM: 305.1  Unknown HTN (hypertension), benign (Chronic) ICD-10-CM: I10 
ICD-9-CM: 401.1  8/9/2016 Dyslipidemia (Chronic) ICD-10-CM: F77.8 ICD-9-CM: 272.4  8/9/2016 Abnormal MRI of head ICD-10-CM: R93.0 ICD-9-CM: 793.0  1/1/2015 Overview Signed 8/27/2016  4:28 PM by Margarita Carrera MD  
  Frontal Lobe periventricular WM disease. Ruled out for MS by Neurology Cocaine abuse in remission [facility]) ICD-10-CM: F14.11 ICD-9-CM: 305.63  9/18/2012 S/P CABG x 3 (Chronic) ICD-10-CM: Z95.1 ICD-9-CM: V45.81  9/18/2012 Bipolar disorder (HCC) (Chronic) ICD-10-CM: F31.9 ICD-9-CM: 296.80  9/18/2012 Acute respiratory failure with hypoxia [facility]) ICD-10-CM: J96.01 
ICD-9-CM: 518.81  7/12/2011  
   
  
 
   
48 y.o. F consulted for cancer in 1/2020. She worked up neck/upper chest pain with CT finding of upper mediastinal mass, EBUS 1/22/20 with FNA showed malignant cells with broad IHC negativity except for suggestion of liver origin. She had active drug abuse as documented reason being discharged from her prior pain management. She presented to [facility] on 1/30/20, has missed the scheduled PET, reported moved in with her brother and a friend drove her to visit.  We discussed her rather atypical cancer presentation, called radiology to urgently re-schedule PET to further define the origin of cancer and extent, discussed her narco management and she gave inconsistent answers regarding whether and when she used cocaine, also stated she stopped pain med herself rather then being discharged by Dr. Nathaniel Reeves, although she appeared in pain, involved SW and palliative care, signed narco contract, yet urine screen positive for cocaine, then she changed her story of last use 1 week ago but that should not lead to positive urine test either, discussed the breaking of contract and trust voids prescription of narco. PET 1/30/20 showed large right mediastinal/supraclavicle avid disease but no evidence of disease otherwise and no suspicious disease in the liver, I reviewed PET personally and discussed to have urgent excisional biopsy for more tissue of histology and molecular tests, meanwhile I would not rec treat this as liver cancer, but rather treat with protocol of limited stage SCLC or stage III NSCLC if brain MRI negative to aim at possible cure or durable control, she was agreeable and Adam will arrange for urgent biopsy/port/rad onc eval, start cisplatin/etoposide/XRT 2/10/20, hyponatremia and weight gain concern of SIADH and instructed not to drink excessively and monitor, SVC syndrome and monitor closely for rx response, however she was not able to tolerate pre-hydration for cisplatin and admit for SOB likely related to SVC syndrome, administer chemo inpt with slow rate, may dosing XRT upfront for SVC relief, IR eval if needed. PLAN: 
Lung cancer 
-supposed to start Cis/VP16/XRT yesterday. 2/11 plan for chemo tomorrow if echo ok. XRT plan is not completed yet. 2/12 start chemo today if echo ok. Over to XRT for rescanning today. 2/13 D2 Cis/VP16 not given 2/14 Will give D 2 cis/VP16 as patient wishes to proceed with chemotherapy. 2/15 Proceed with C1D3 Cis/Etop today 2/18 Completed C1 cis/etop on 2/15. C2 due 3/4. Shortness of breath/Cough 
- on 2 LPM NC. Mucinex BID. CXR with known malignancy and RLL infiltrate. 2/11 start cef/vanc for possible PNA. Check echo. 2/12 continues on cef/vanc. Echo pending. Increased oxygen needs overnight, up to 5 LPM. pulm consult pending. Check BNP for completeness. Echo still pending. 2/13 echo ok with EF of 55-60%. BNP elevated.  Lasix 20 mg daily PRN if I>O by 500 cc. Lasix given this AM. Pulmonology also following as well. On high flow at 40 LPM. Continues on cef/vanc for possible PNA. Currently able to use bedpan for urination- may need jara placed. 2/14 check blood gas. Ox 95-98% on high alon at 40 lpm. Back on cef/vanc. 2/15 O2 down to 5L, remains on Cef/Vanc 
2/16 O2 down to 3L today, continue Cef/Vanc/steroids/nebs 2/18 O2 at 3L, D5/7 Cef/Vanc 
2/19 On optiflow. Pulm DC'd Vanc yesterday. Con't Cef. Tmax 100.6. Repeat CXR with no changes. Check UA, BCx. Check BNP. 
2/20 Optiflow @ 30L. Tmax 100.5. UA neg. BCx pending. On Cef, completes tomorrow. BNP 2115. On Lasix 20mg IV daily. Pulm following. ?SIDAH/hyponatremia - On IV fluids at this time with some improvement. Check urine sodium. Consult nephro 2/12 neph consult pending.  today. 2/13 appreciate nephros recommendations. NA up to 133 today. 2/14 Na improved up to 135.  
2/20 Na+ 134 Confusion 
-ongoing confusion noted. UDS pending. 2/17 more alert and clearer today 2/18 alert, oriented Oral candidiasis 2/20 Nystatin ordered Continue home medications Cari SOPs Lovenox for DVT prophylaxis Will need STR at [place] -  aware Goals and plan of care reviewed with the patient. All questions answered to the best of our ability. Damon Gould NP 3 [facility] [address] [place] [address] Office : [phone] Fax : [phone] Attending Addendum: 
I have personally performed a face to face diagnostic evaluation on this patient.  I have reviewed and agree with the care plan as documented above Alycia Escobar N.P.  My findings are as follows: Patient appears lethargic, heart rate regular without murmurs, abdomen is non-tender, bowel sounds are positive.  54 female history of thoracic malignancy status post recent concurrent cisplatin/etoposideXRT, cycle 1 was completed 2/15/2020.  Reports right neck swelling much improved.  Shortness of breath , on optiflow, pulmonology following.  Remains on broad-spectrum IV antibiotics as well as prednisone. High BNP, now being diuresed. Continue ongoing care. Will benefit from rehab on discharge.  
  
  
Total time 25 min 50% in direct consultation about the patient's diagnosis and management 
  
 
  
Ammy Escamilla MD 
[facility] [address] [place] [address] Office : [phone] Fax : [phone]

## 2020-02-20 NOTE — PROGRESS NOTES
Pt pain meds and ativan dose decreased/changed. Pt placed back on hi alon NC @3L. No needs stated at this time.

## 2020-02-20 NOTE — PROGRESS NOTES
Problem: Falls - Risk of 
Goal: *Absence of Falls Description Document Bryan Garcia Fall Risk and appropriate interventions in the flowsheet. Outcome: Progressing Towards Goal 
Note: Fall Risk Interventions: 
Mobility Interventions: Communicate number of staff needed for ambulation/transfer, Patient to call before getting OOB Mentation Interventions: Adequate sleep, hydration, pain control, Room close to nurse's station Medication Interventions: Assess postural VS orthostatic hypotension, Patient to call before getting OOB, Teach patient to arise slowly Elimination Interventions: Call light in reach, Patient to call for help with toileting needs History of Falls Interventions: Room close to nurse's station Problem: Patient Education: Go to Patient Education Activity Goal: Patient/Family Education Outcome: Progressing Towards Goal 
  
Problem: Pressure Injury - Risk of 
Goal: *Prevention of pressure injury Description Document Patricio Scale and appropriate interventions in the flowsheet. Outcome: Progressing Towards Goal 
Note: Pressure Injury Interventions: 
Sensory Interventions: Assess need for specialty bed, Pressure redistribution bed/mattress (bed type) Moisture Interventions: Apply protective barrier, creams and emollients, Maintain skin hydration (lotion/cream) Activity Interventions: Assess need for specialty bed, Increase time out of bed, Pressure redistribution bed/mattress(bed type) Mobility Interventions: Assess need for specialty bed, HOB 30 degrees or less, Pressure redistribution bed/mattress (bed type) Nutrition Interventions: Document food/fluid/supplement intake, Offer support with meals,snacks and hydration Friction and Shear Interventions: HOB 30 degrees or less Problem: Patient Education: Go to Patient Education Activity Goal: Patient/Family Education Outcome: Progressing Towards Goal 
  
Problem: Breathing Pattern - Ineffective Goal: *Absence of hypoxia Outcome: Progressing Towards Goal 
  
Problem: Chemotherapy, Day 1 Goal: Off Pathway (Use only if patient is Off Pathway) Outcome: Progressing Towards Goal 
Goal: Activity/Safety Outcome: Progressing Towards Goal 
Goal: Consults, if ordered Outcome: Progressing Towards Goal 
Goal: Diagnostic Test/Procedures Outcome: Progressing Towards Goal 
Goal: Nutrition/Diet Outcome: Progressing Towards Goal 
Goal: Discharge Planning Outcome: Progressing Towards Goal 
Goal: Medications Outcome: Progressing Towards Goal 
Goal: Respiratory Outcome: Progressing Towards Goal 
Goal: Treatments/Interventions/Procedures Outcome: Progressing Towards Goal 
Goal: Psychosocial 
Outcome: Progressing Towards Goal 
Goal: *Optimal pain control at patient's stated goal 
Outcome: Progressing Towards Goal 
Goal: *Hemodynamically stable Outcome: Progressing Towards Goal 
Goal: *Adequate oxygenation Outcome: Progressing Towards Goal 
Goal: *Chemotherapy regimen is initiated Outcome: Progressing Towards Goal 
Goal: *Patient and family verbalize understanding of plan of care Outcome: Progressing Towards Goal 
  
Problem: Pain Goal: *Control of Pain Outcome: Progressing Towards Goal 
  
Problem: Patient Education: Go to Patient Education Activity Goal: Patient/Family Education Outcome: Progressing Towards Goal 
  
Problem: Patient Education: Go to Patient Education Activity Goal: Patient/Family Education Outcome: Progressing Towards Goal 
  
Problem: Gas Exchange - Impaired Goal: *Absence of hypoxia Outcome: Progressing Towards Goal 
  
Problem: Nutrition Deficit Goal: *Optimize nutritional status Outcome: Progressing Towards Goal

## 2020-02-20 NOTE — PROGRESS NOTES
END OF SHIFT NOTE: 
 
Intake/Output 02/19 1901 - 02/20 0700 In: 36 [P.O.:600; I.V.:107] Out: 1100 [Urine:1100] Voiding: YES Catheter: YES Drain:   
 
 
 
 
 
Stool:  0 occurrences. Stool Assessment Stool Color: Desma Pickle (02/18/20 1957) Stool Appearance: Formed (02/19/20 5746) Stool Amount: Large (02/18/20 1957) Stool Source/Status: Rectum (02/18/20 1957) Emesis:  0 occurrences. VITAL SIGNS Patient Vitals for the past 12 hrs: 
 Temp Pulse Resp BP SpO2  
02/20/20 0354 (!) 100.5 °F (38.1 °C) 100 20 118/63 98 % 02/20/20 0230     98 % 02/19/20 2250     97 % 02/19/20 2215 98.9 °F (37.2 °C) 73 20 (!) 153/94 98 % 02/19/20 1926 98.7 °F (37.1 °C) 75 16 105/54 97 % 02/19/20 1735    145/76   
02/19/20 1706   18 97/61 99 % Pain Assessment Pain 1 Pain Scale 1: Visual (02/20/20 0215) Pain Intensity 1: 0 (02/20/20 0215) Patient Stated Pain Goal: 0 (02/20/20 0215) Pain Reassessment 1: Patient resting w/respiratory rate greater than 10 (02/20/20 0130) Pain Onset 1: pta (02/20/20 0059) Pain Location 1: Neck; Shoulder (02/20/20 0059) Pain Orientation 1: Right (02/20/20 0059) Pain Description 1: Aching (02/20/20 0059) Pain Intervention(s) 1: Medication (see MAR); Rest (02/20/20 0059) Ambulating No 
 
Additional Information:  
Remains on airvo;still calling for c/o SOB;medicated w/ Morphine prn & resp tech notified to give treatments w/ some relief noted. TMax 100. 5;pending blood cx results. Shift report given to oncoming nurse Deepali RN  at the bedside.  
 
Hira Greene RN

## 2020-02-20 NOTE — PROGRESS NOTES
[facility] Admission Date: 2/10/2020 Daily Progress Note: 2/20/2020 The patient's chart is reviewed and the patient is discussed with the staff. 54 y.o.  female seen and evaluated at the request of Dr. Sylwia Miller. .  Pt is well known to [facility] with a history of cocaine abuse(as recent as 1/10/2020 reportedly, + UDS on 1/30/2020), tobacco abuse 1/2 ppd x 38 years, COPD, chronic pain, DLD, chronic hepatitis C, chronic diastolic CHF, CAD, bipolar disorder with borderline personality disorder, CAD s/ CABG, and ASCUS with positive high risk HPV cervical. Pt presented to the ER on 1/13/2020 with cough, shortness of breath and R sided chest pain. CXR identified new widening of the superior mediastinum in comparison to recent prior chest x-rays. Chest CT showed an incompletely characterized anterior mediastinal mass extending to the right supraclavicular region, but appearing to be separate from the thyroid gland, measuring approximately 3.9 cm in AP diameter.  Pt was seen and underwent bronch with EBUS and FNA with airway inspection. The 4R location tumor was biopsied and positive for malignancy on SATNAM. Cytology from FNA of 2R-4R location of lung mass, revealed the presence of malignant cells. Immunohistochemical features were suggestive of carcinoma of hepatic origin given staining for cytoplasmic TTF-1 as well as arginase. 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 Pt had enlarged hypermetabolic mediastinal LN and soft tissue mass in the R supraclavicular soft tissues  Measuring 4.9x2.9cm separate from thyroid.  Pt was referred to oncology and admitted on 2/10/2020.  
  She has now completed her first round of chemo using cisplatin/etoposide (2/15). She was treated with levaquin for suspected post obstructive pneumonia. Increased oxygen needs on 2/19 with fever - back on antibiotics. Subjective:  
  Complained of right sided chest/shoulder pain earlier - got IV morphine. She complains of dyspnea with exertion. Coughing up brown colored mucus. Arm swelling much better. Current Facility-Administered Medications Medication Dose Route Frequency  acetaminophen (TYLENOL) tablet 650 mg  650 mg Oral Q6H PRN  
 nystatin (MYCOSTATIN) 100,000 unit/mL oral suspension 500,000 Units  500,000 Units Oral QID  cefepime (MAXIPIME) 2 g in 0.9% sodium chloride (MBP/ADV) 100 mL  2 g IntraVENous Q12H  
 morphine injection 3 mg  3 mg IntraVENous Q2H PRN  
 guaiFENesin ER (MUCINEX) tablet 1,200 mg  1,200 mg Oral Q12H  
 tiotropium bromide (SPIRIVA RESPIMAT) 2.5 mcg /actuation  2 Puff Inhalation DAILY  central line flush (saline) syringe 10 mL  10 mL InterCATHeter PRN  
 heparin (porcine) pf 300 Units  300 Units InterCATHeter PRN  
 furosemide (LASIX) injection 20 mg  20 mg IntraVENous DAILY  potassium chloride (K-DUR, KLOR-CON) SR tablet 40 mEq  40 mEq Oral BID  polyethylene glycol (MIRALAX) packet 17 g  17 g Oral DAILY  senna-docusate (PERICOLACE) 8.6-50 mg per tablet 1 Tab  1 Tab Oral DAILY  predniSONE (DELTASONE) tablet 40 mg  40 mg Oral DAILY WITH BREAKFAST  OLANZapine (ZyPREXA zydis) disintegrating tablet 10 mg  10 mg Oral QHS PRN  
 albuterol (PROVENTIL VENTOLIN) nebulizer solution 2.5 mg  2.5 mg Nebulization TID RT  
 [Held by provider] amLODIPine (NORVASC) tablet 5 mg  5 mg Oral DAILY  atorvastatin (LIPITOR) tablet 80 mg  80 mg Oral DAILY  [Held by provider] carvediloL (COREG) tablet 6.25 mg  6.25 mg Oral BID WITH MEALS  enoxaparin (LOVENOX) injection 30 mg  30 mg SubCUTAneous Q24H  
 LORazepam (ATIVAN) tablet 0.5 mg  0.5 mg Oral Q6H PRN  
 albuterol (PROVENTIL VENTOLIN) nebulizer solution 2.5 mg  2.5 mg Nebulization Q4H PRN  
 morphine CR (MS CONTIN) tablet 60 mg  60 mg Oral Q12H  
 morphine IR (MS IR) tablet 15 mg  15 mg Oral Q4H PRN  
 ondansetron (ZOFRAN ODT) tablet 8 mg  8 mg Oral Q8H PRN  prochlorperazine (COMPAZINE) tablet 10 mg  10 mg Oral Q6H PRN  
 sertraline (ZOLOFT) tablet 25 mg  25 mg Oral DAILY Objective:  
 
Vitals:  
 02/20/20 0932 02/20/20 0719 02/20/20 0904 02/20/20 1109 BP:  106/60  118/73 Pulse:  73  73 Resp:  20  20 Temp: 98.3 °F (36.8 °C) 98.5 °F (36.9 °C)  98.7 °F (37.1 °C) SpO2:  96% 95% 94% Weight:      
 
Intake and Output:  
02/18 1901 - 02/20 0700 In: 688 [P.O.:750; I.V.:107] Out: 4600 [[id number]] 02/20 0701 - 02/20 1900 In: -  
Out: [address] [[id number]] Physical Exam:  
Constitutional:  the patient is well developed and in no acute distress HEENT:  Sclera clear, pupils equal, oral mucosa moist 
Lungs: crackles from posterior. Decreased breath sounds on the right from posterior. Wearing optiflow - 35% Cardiovascular:  RRR without M,G,R 
Abd/GI: soft and non-tender; with positive bowel sounds. Ext: warm without cyanosis. There is less edema in her arms Musculoskeletal: moves all four extremities with equal strength Skin:  no jaundice or rashes, no wounds Neuro: no gross neuro deficits but some drowsiness today during exam 
Musculoskeletal: can ambulate. No deformity Psychiatric: Calm. Review of Systems - Review of Systems Constitutional: Positive for malaise/fatigue. Respiratory: Positive for cough, sputum production and shortness of breath. Cardiovascular: Negative. Gastrointestinal: Negative. Lines: port CHEST XRAY:   
 
 
LAB Recent Labs  
  02/20/20 
0219 02/19/20 
0335 02/18/20 
0217 WBC 4.2* 7.9 11.9* HGB 8.5* 7.9* 8.4* HCT 27.0* 25.0* 25.8*  
* 121* 182 Recent Labs  
  02/20/20 
0219 02/19/20 
0335 02/18/20 0217 * 134* 136  
K 4.3 3.7 3.3*  
CL 97* 96* 95* CO2 32 35* 34* * 130* 117* BUN 26* 25* 21  
CREA 0.58* 0.47* 0.51* ALB 2.0* 2.1* 2.0*  
SGOT 55* 36 31 Assessment:  (Medical Decision Making) Hospital Problems  Date Reviewed: 2/19/2020 Codes Class Noted POA * (Principal) Lung cancer ([facility].) ICD-10-CM: C34.90 ICD-9-CM: 162.9  2/10/2020 Yes S/p first round of  chemo Hypoxia ICD-10-CM: R09.02 
ICD-9-CM: 799.02  2/10/2020 Yes Now on optiflow - had been weaned to nasal cannula earlier this week COPD exacerbation ([facility].) ICD-10-CM: J44.1 ICD-9-CM: 491.21  Unknown Yes No wheezing on exam  
 Acute respiratory failure with hypoxia [facility]) ICD-10-CM: J96.01 
ICD-9-CM: 518.81  7/12/2011 Yes As above Plan:  (Medical Decision Making) 1. Temp yesterday and today - back on abx (previously on levaquin, now on maxipeme). ? Post obstructive pneumonia. Secretions brown in color per patieint - will submit sputum for culture. She also has jara but does not want to have it removed due to dyspnea with exertion 2. Daily lasix - will change to oral. Edema in arms much better. Fluid balance neg 1.7 liters yesterday - 11.3 liters overall. Blood pressure has been low so norvasc and coreg on hold. Monitoring labs as well. Leave jara for now per her request - remove if oxygen can be weaned 3. Try to wean back to nasal cannula - anticipate needing oxygen at discharge 4. Nebulized albuterol/spiriva/mucinex 5. Decrease prn medications for pain and anxiety (she is falling asleep today during exam). She is on scheduled MS contin as well 6. More chemo planned for next month 7. CXR with small right pleural effusion - US earlier this week - too small to tap Jacky Iron, NP More than 50% of time documented was spent face-to-face contact with the patient and in the care of the patient on the floor/unit where the patient is located. The patient has been seen and examined by me personally, the chart,labs, and radiographic studies have been reviewed. Chest: CTA no stridor. Suprisingly better and tolerating chemo with less signs of SVC syndrome Extremities: 1+ edema I agree with the above assessment and plan. Continue current supportive care. Devona Blizzard MD. 
 
 
 
The patient has been seen and examined by me personally, the chart,labs, and radiographic studies have been reviewed. Chest: 
Extremities: 
 
I agree with the above assessment and plan.  
 
Devona Blizzard MD.

## 2020-02-21 NOTE — PROGRESS NOTES
andrés 
 
[facility] [place] Hematology & Oncology Inpatient Hematology / Oncology Progress Note Admission Date: 2/10/2020  3:39 PM 
Reason for Admission/Hospital Course: Lung cancer ([address] [C34.90] Hypoxia [R09.02] 
 
 
24 Hour Events: 
Tmax 101.1, VSS, on Hiflow @ 4L  Completed C1 cis/etop on 2/15 ROS: 
Constitutional: +fatigue +fever CV: Negative for chest pain, palpitations, edema. Respiratory: +dyspnea +wheezing GI: Negative for nausea, abdominal pain, diarrhea. 10 point review of systems is otherwise negative with the exception of the elements mentioned above in the HPI. Allergies Allergen Reactions  Lithium Analogues Unknown (comments)  Morphine (Pf) Rash Pt is currently taking Morphine 30mg QID  Other Medication Swelling Whatever holds Vit d pill together OBJECTIVE: 
Patient Vitals for the past 8 hrs: 
 BP Temp Pulse Resp SpO2  
20 0925     96 % 20 0803     98 % 20 0734 107/67 (!) 100.5 °F (38.1 °C) 87 22 92 % 20 0309 123/67 99 °F (37.2 °C) 87 18 96 % Temp (24hrs), Av.5 °F (37.5 °C), Min:98.4 °F (36.9 °C), Max:101.1 °F (38.4 °C) No intake/output data recorded. Physical Exam: 
Constitutional: Ill appearing  female in no mild distress, sitting bedside chair. HEENT: Normocephalic and atraumatic. Oropharynx with white patches; mucous membranes are moist.  Sclerae anicteric. Neck supple without JVD. No thyromegaly present. Skin Warm and dry. No bruising and no rash noted. No erythema. No pallor. Respiratory Lungs coarse with scattered wheezes bilaterally; normal air exchange without accessory muscle use. On Optiflow. CVS Normal rate, regular rhythm and normal S1 and S2. No murmurs, gallops, or rubs. Abdomen Soft, nontender and nondistended, normoactive bowel sounds. No palpable mass. No hepatosplenomegaly. Neuro No obvious deficits MSK Normal range of motion in general.  No edema and no tenderness. Psych Appropriate mood Labs: 
   
Recent Labs  
  02/21/20 
0246 02/20/20 
6860 02/19/20 
0335 WBC 0.6* 4.2* 7.9  
RBC 2.64* 3.01* 2.82* HGB 7.5* 8.5* 7.9*  
HCT 23.4* 27.0* 25.0* MCV 88.6 89.7 88.7 MCH 28.4 28.2 28.0 MCHC 32.1 31.5 31.6 RDW 12.6 13.2 13.2 PLT 63* 116* 121* GRANS 27* 76 87* LYMPH 61* 15 7* MONOS 2* 0* 0* EOS 2 0* 0*  
BASOS 0 1 0 IG 8* 8* 6*  
DF AUTOMATED AUTOMATED AUTOMATED ANEU 0.2* 3.3 6.8 ABL 0.4* 0.6 0.6 ABM 0.0* 0.0* 0.0* ROSA ISELA 0.0 0.0 0.0 ABB 0.0 0.0 0.0 AIG 0.0 0.3 0.5 Recent Labs  
  02/21/20 
0246 02/20/20 
0219 02/19/20 
0335 * 134* 134* K 3.9 4.3 3.7 CL 94* 97* 96* CO2 32 32 35* AGAP 7 5* 3*  
* 124* 130* BUN 25* 26* 25* CREA 0.67 0.58* 0.47* GFRAA >60 >60 >60 GFRNA >60 >60 >60  
CA 8.4 8.4 8.3 SGOT 57* 55* 36 AP 55 60 49* TP 6.3 6.9 6.2* ALB 2.0* 2.0* 2.1*  
GLOB 4.3* 4.9* 4.1* AGRAT 0.5* 0.4* 0.5* MG 1.3*  --   --   
 
Imaging: XR CHEST PA LAT [[id number]] Collected: 02/21/20 0931 Order Status: Completed Updated: 02/21/20 7100 Narrative:    
EXAMINATION: CHEST RADIOGRAPH 2/21/2020 9:00 AM 
 
ACCESSION NUMBER: [id number] INDICATION: neutropenic fever COMPARISON: Chest x-ray 2/19/2020, chest CT 2/5/2020 TECHNIQUE: PA and lateral views of the chest were obtained. FINDINGS:  
 
Cardiac Silhouette: Unchanged widening of the superior mediastinum. Unchanged positioning of a left-sided central venous access port and of a 
cardiac pacemaker. Lungs: Unchanged moderate right pleural effusion and right basilar opacity. Pleura: No definite left pleural effusion. No pneumothorax. Osseous Structures: Prior median sternotomy and coronary artery bypass grafting. Thoracic spine spondylosis. Upper Abdomen: Abdominal aortic atherosclerosis.   
Impression:    
IMPRESSION: 
 
 1. Unchanged moderate right pleural effusion and right basilar opacity, which 
may be compressive atelectasis or infection. 2. Unchanged widening of the superior mediastinum, corresponding to the superior 
mediastinal mass on the CT of 2/5/2020. 
 
3. Follow-up to resolution is recommended. VOICE DICTATED BY: Dr. Katherine Bae XR CHEST SNGL V [[id number]] Collected: 02/19/20 0449 Order Status: Completed Updated: 02/19/20 0501 Narrative:    
EXAM: Chest x-ray. INDICATION: Dyspnea. COMPARISON: February 17, 2020. TECHNIQUE: Frontal view chest x-ray. FINDINGS: Mild bibasilar lung atelectasis or infiltrates and a small right 
pleural effusion are unchanged. No pneumothorax or left pleural effusion is 
identified. The cardiac size is within normal limits. Again noted is a prior 
sternotomy, a left chest wall pacemaker, a left chest wall infusion port 
catheter in the patient's known upper mediastinal mass. Impression:    
IMPRESSION: Unchanged mild bibasilar lung atelectasis or infiltrates and small 
right pleural effusion. XR CHEST SNGL V [[id number]] Collected: 02/17/20 2023 Order Status: Completed Updated: 02/17/20 2026 Narrative:    
Portable chest x-ray Clinical indications: Hypoxia. FINDINGS: Single AP view the chest compared to a similar exam dated 2/12/2020 
shows airspace opacity at the right lung base with a right pleural effusion. Atelectasis or pneumonia should be considered. The left lung is stable and 
clear. A pacer device is in place. Impression:    
IMPRESSION: No significant interval change with right lower lung airspace 
opacity and a right pleural effusion. Atelectasis or pneumonia should be 
considered. XR CHEST SNGL V [[id number]] Collected: 02/12/20 1439 Order Status: Completed Updated: 02/12/20 1442 Narrative:    
CHEST X-RAY, single portable view  2/12/2020 History: Hypoxemia and cough.  
 
Technique: Single frontal view of the chest. 
 
 Comparison: Chest x-ray 2/10/2020 Findings: A stable left-sided intracardiac device is seen. A stable left-sided venous port 
is seen. Stable mild cardiomegaly is seen. Lungs are expanded without 
appreciable pneumothorax. Stable masslike density along the right upper 
paramediastinal border. Increasing now small to moderate right basilar pleural 
effusion. Impression:    
IMPRESSION: 1.  Increasing now small to moderate basilar pleural effusion. The etiology of 
effusion is uncertain although this could represent sequela of heart 
failure/fluid overload given the associated cardiomegaly and the rapid onset of 
this finding. XR CHEST PA LAT [[id number]] Collected: 02/10/20 1746 Order Status: Completed Updated: 02/10/20 1752 Narrative:    
Chest 2 view CLINICAL INDICATION: Acute worsening dyspnea, moderate to severe; SVC syndrome, 
mediastinal mass COMPARISON: 1/23/2020 radiograph, 1/30/2020 CT PET, and 2/5/2020 CT with 
contrast 
 
TECHNIQUE: Sitting upright AP and lateral views of the chest  
 
FINDINGS: Patient is rotated to the left. Lung volumes are slightly shallow. There is artifact due to external tubing. There is interval mild to moderate right hemidiaphragm elevation. There is 
slightly increased mild groundglass and reticular opacity in the right lower 
lobe. No evidence of a pneumothorax, significant pleural effusion, or overt CHF. The mediastinal and hilar contours again demonstrate widening consistent with 
the known masses that were better evaluated on the recent CT, previous CABG, 
left side portacatheter, and dual-lead pacemaker. The bone density is again low throughout. No acute fracture or dislocation is 
evident. Impression:    
IMPRESSION:  
1. Right lower lobe infiltrate. 2. Mediastinal masses compatible with malignancy, further evaluated on recent CT 
and PET. Medications: 
Current Facility-Administered Medications Medication Dose Route Frequency  magnesium oxide (MAG-OX) tablet 400 mg  400 mg Oral TID  cefepime (MAXIPIME) 2 g in 0.9% sodium chloride (MBP/ADV) 100 mL  2 g IntraVENous Q8H  
 vancomycin (VANCOCIN) 1,000 mg in 0.9% sodium chloride (MBP/ADV) 250 mL  1,000 mg IntraVENous ONCE  
 magnesium sulfate 2 g/50 ml IVPB (premix or compounded)  2 g IntraVENous ONCE  
 acetaminophen (TYLENOL) tablet 650 mg  650 mg Oral Q6H PRN  
 nystatin (MYCOSTATIN) 100,000 unit/mL oral suspension 500,000 Units  500,000 Units Oral QID  LORazepam (ATIVAN) tablet 0.25 mg  0.25 mg Oral Q6H PRN  
 morphine (ROXANOL) concentrated oral syringe 10 mg  10 mg Oral Q6H PRN  
 furosemide (LASIX) tablet 20 mg  20 mg Oral DAILY  guaiFENesin ER (MUCINEX) tablet 1,200 mg  1,200 mg Oral Q12H  
 tiotropium bromide (SPIRIVA RESPIMAT) 2.5 mcg /actuation  2 Puff Inhalation DAILY  central line flush (saline) syringe 10 mL  10 mL InterCATHeter PRN  
 heparin (porcine) pf 300 Units  300 Units InterCATHeter PRN  polyethylene glycol (MIRALAX) packet 17 g  17 g Oral DAILY  senna-docusate (PERICOLACE) 8.6-50 mg per tablet 1 Tab  1 Tab Oral DAILY  predniSONE (DELTASONE) tablet 40 mg  40 mg Oral DAILY WITH BREAKFAST  albuterol (PROVENTIL VENTOLIN) nebulizer solution 2.5 mg  2.5 mg Nebulization TID RT  
 [Held by provider] amLODIPine (NORVASC) tablet 5 mg  5 mg Oral DAILY  atorvastatin (LIPITOR) tablet 80 mg  80 mg Oral DAILY  [Held by provider] carvediloL (COREG) tablet 6.25 mg  6.25 mg Oral BID WITH MEALS  enoxaparin (LOVENOX) injection 30 mg  30 mg SubCUTAneous Q24H  
 albuterol (PROVENTIL VENTOLIN) nebulizer solution 2.5 mg  2.5 mg Nebulization Q4H PRN  
 morphine CR (MS CONTIN) tablet 60 mg  60 mg Oral Q12H  
 morphine IR (MS IR) tablet 15 mg  15 mg Oral Q4H PRN  
 ondansetron (ZOFRAN ODT) tablet 8 mg  8 mg Oral Q8H PRN  prochlorperazine (COMPAZINE) tablet 10 mg  10 mg Oral Q6H PRN  
 sertraline (ZOLOFT) tablet 25 mg  25 mg Oral DAILY ASSESSMENT: 
 
Problem List  Date Reviewed: 2/19/2020 Codes Class Noted * (Principal) Lung cancer ([facility] ICD-10-CM: C34.90 ICD-9-CM: 162.9  2/10/2020 Hypoxia ICD-10-CM: R09.02 
ICD-9-CM: 799.02  2/10/2020 Malignant neoplasm of overlapping sites of left lung [facility]) ICD-10-CM: C34.82 
ICD-9-CM: 162.8  1/22/2020 Hypokalemia ICD-10-CM: E87.6 ICD-9-CM: 276.8  12/7/2019 Chest pain ICD-10-CM: R07.9 ICD-9-CM: 786.50  12/6/2019 Chronic tension-type headache, intractable ICD-10-CM: G37.467 ICD-9-CM: 339.12  5/13/2019 Cigarette nicotine dependence without complication AWB-10-SE: H88.618 ICD-9-CM: 305.1  7/3/2018 COPD exacerbation ([facility] ICD-10-CM: J44.1 ICD-9-CM: 491.21  Unknown Recurrent UTI ICD-10-CM: N39.0 ICD-9-CM: 599.0  4/24/2018 Gross hematuria ICD-10-CM: R31.0 ICD-9-CM: 599.71  4/24/2018 Urinary retention ICD-10-CM: R33.9 ICD-9-CM: 788.20  4/24/2018 Sick sinus syndrome (HCC) (Chronic) ICD-10-CM: I49.5 ICD-9-CM: 427.81  9/29/2017 IBS (irritable bowel syndrome) ICD-10-CM: K58.9 ICD-9-CM: 564.1  Unknown 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 
ICD-9-CM: 433.20  12/28/2016 Encounter for medication management ICD-10-CM: M88.886 ICD-9-CM: V58.69  12/28/2016 Neuropathy (Chronic) ICD-10-CM: G62.9 ICD-9-CM: 355.9  12/28/2016 Overview Signed 7/28/2017  2:20 PM by Nicho Peña MD  
  Last Assessment & Plan:  
Patient is currently on gabapentin 600 mg. Medication may cause increased drowsiness with Rispirdol, however pt has no complaints today. Intractable migraine with aura without status migrainosus ICD-10-CM: G43.119 ICD-9-CM: 346.01  12/28/2016 Peripheral vascular disease (HCC) (Chronic) ICD-10-CM: I73.9 ICD-9-CM: 443.9  12/1/2016  Overview Signed 12/1/2016  4:23 PM by Mariposa Solorio MD  
 CTA (2/11/16): Moderate to severe stenosis in the proximal right subclavian artery. Borderline personality disorder ([facility] ICD-10-CM: F60.3 ICD-9-CM: 301.83  Unknown Overview Signed 7/28/2017  2:20 PM by Jonathan Hawkins MD  
  Last Assessment & Plan: 1. Continue inpatient hospitalization 2. Lithium level 1.5. Will hold am dose and change to 300mg PO BID. Will recheck in 3 days. 3.  Continue Neurontin 4. Consider Vistaril PRN if indicated 5. Encourage active participation in groups and on the milieu 6. Consider discharge and aftercare needs 7. Encourage abstinence from drugs of abuse Cerebrovascular disease ICD-10-CM: I67.9 ICD-9-CM: 437.9  Unknown Overview Signed 12/1/2016  4:23 PM by Colby Clark MD  
  CTA of the neck (2/11/16) : No significant stenosis of the right carotid artery. 37% stenosis in the proximal left internal carotid artery. Severe stenosis at the origin of the left vertebral artery Chronic hepatitis C ([facility] ICD-10-CM: B18.2 ICD-9-CM: 070.54  Unknown Chronic pain ICD-10-CM: G89.29 ICD-9-CM: 338.29  Unknown Overview Signed 7/28/2017  2:20 PM by Jonathan Hawkins MD  
  Last Assessment & Plan:  
Patient has history of chronic neck and back pain secondary to degenerative changes. She was recently prescribed a 15 day supply of Dilaudid 2 mg TID by \"pain management doctor\". This script was filled on 5.8. 17. Plan: - Given patient's history of substance use, she would largely benefit from detox of narcotics. Will try conservative methods while inpatient for back pain as well as neck and shoulder pain. Continue Ultram 100 mg Q6H while we await permission to speak to pain management doctor. Katie Fontenot office in regards to patient's pain management contract. Patient has been dismissed from his services for positive UDS. - Avoid Toradol given her elevated Creatinine and glucose at time of admission. A1c normal at 5.4. ASCUS with positive high risk HPV cervical ICD-10-CM: R87.610, R87.810 ICD-9-CM: 795.01, 795.05  Unknown Pacemaker ICD-10-CM: Z95.0 ICD-9-CM: V45.01  8/10/2016 Overview Signed 8/10/2016 10:47 AM by Neftali Johansen MD  
  1. [employer]  :  Due to sick sinus syndrome. Chronic diastolic congestive heart failure (HCC) (Chronic) ICD-10-CM: I50.32 
ICD-9-CM: 428.32, 428.0  8/9/2016 Overview Addendum 8/27/2019  5:11 PM by Neftali Johansen MD  
  Admitted [facility] 8/12/11 with CP and elevated BNP at 352. 
1.  Echo (8/3/11):  EF > 55%. Mild LVH. Pseudonormal LV filling pattern. Bi-atrial enlargement. .  Mild mitral regurgitation. 2.  Echo (8/22/19):  EF 65-70%. Indeterminant diastolic function. Mild LAE. Coronary atherosclerosis of native coronary vessel (Chronic) ICD-10-CM: I25.10 ICD-9-CM: 414.01  8/9/2016 Overview Addendum 12/9/2019  7:53 AM by Neftali Johansen MD  
  1.  3 Vessel CABG (7/8/11):  MIR to LAD. LIMA in Y graft to superior and inferior diagonal. 
2.  Cath (8/31/12): Occluded grafts. FFR of LAD 0.89. Similar stenosis of diagonal but no FFR done. 3.  LHC (11/1/17): Mild to moderate nonhemodynamically significant CAD. Normal IFR of LAD (40%: 0.98) and Diagonal (50%: 0.98) 4. LHC (12/7/19): Stable CAD. Tobacco use disorder (Chronic) ICD-10-CM: E54.642 ICD-9-CM: 305.1  Unknown HTN (hypertension), benign (Chronic) ICD-10-CM: I10 
ICD-9-CM: 401.1  8/9/2016 Dyslipidemia (Chronic) ICD-10-CM: J93.7 ICD-9-CM: 272.4  8/9/2016 Abnormal MRI of head ICD-10-CM: R93.0 ICD-9-CM: 793.0  1/1/2015 Overview Signed 8/27/2016  4:28 PM by Stan Solano MD  
  Frontal Lobe periventricular WM disease. Ruled out for MS by Neurology Cocaine abuse in remission [facility]) ICD-10-CM: F14.11 ICD-9-CM: 305.63  9/18/2012 S/P CABG x 3 (Chronic) ICD-10-CM: Z95.1 ICD-9-CM: V45.81  9/18/2012 Bipolar disorder (HCC) (Chronic) ICD-10-CM: F31.9 ICD-9-CM: 296.80  9/18/2012 Acute respiratory failure with hypoxia [facility]) ICD-10-CM: J96.01 
ICD-9-CM: 518.81  7/12/2011  
   
  
 
   
48 y.o. F consulted for cancer in 1/2020. She worked up neck/upper chest pain with CT finding of upper mediastinal mass, EBUS 1/22/20 with FNA showed malignant cells with broad IHC negativity except for suggestion of liver origin. She had active drug abuse as documented reason being discharged from her prior pain management. She presented to [facility] on 1/30/20, has missed the scheduled PET, reported moved in with her brother and a friend drove her to visit.  We discussed her rather atypical cancer presentation, called radiology to urgently re-schedule PET to further define the origin of cancer and extent, discussed her narco management and she gave inconsistent answers regarding whether and when she used cocaine, also stated she stopped pain med herself rather then being discharged by Dr. Alexey Bonds, although she appeared in pain, involved SW and palliative care, signed narco contract, yet urine screen positive for cocaine, then she changed her story of last use 1 week ago but that should not lead to positive urine test either, discussed the breaking of contract and trust voids prescription of narco. PET 1/30/20 showed large right mediastinal/supraclavicle avid disease but no evidence of disease otherwise and no suspicious disease in the liver, I reviewed PET personally and discussed to have urgent excisional biopsy for more tissue of histology and molecular tests, meanwhile I would not rec treat this as liver cancer, but rather treat with protocol of limited stage SCLC or stage III NSCLC if brain MRI negative to aim at possible cure or durable control, she was agreeable and Adam will arrange for urgent biopsy/port/rad onc eval, start cisplatin/etoposide/XRT 2/10/20, hyponatremia and weight gain concern of SIADH and instructed not to drink excessively and monitor, SVC syndrome and monitor closely for rx response, however she was not able to tolerate pre-hydration for cisplatin and admit for SOB likely related to SVC syndrome, administer chemo inpt with slow rate, may dosing XRT upfront for SVC relief, IR eval if needed. PLAN: 
Lung cancer 
-supposed to start Cis/VP16/XRT yesterday. 2/11 plan for chemo tomorrow if echo ok. XRT plan is not completed yet. 2/12 start chemo today if echo ok. Over to XRT for rescanning today. 2/13 D2 Cis/VP16 not given 2/14 Will give D 2 cis/VP16 as patient wishes to proceed with chemotherapy. 2/15 Proceed with C1D3 Cis/Etop today 2/21 Completed C1 cis/etop on 2/15. C2 due 3/4. Has not started XRT yet. Shortness of breath/Cough 
- on 2 LPM NC. Mucinex BID. CXR with known malignancy and RLL infiltrate. 2/11 start cef/vanc for possible PNA. Check echo. 2/12 continues on cef/vanc. Echo pending. Increased oxygen needs overnight, up to 5 LPM. pulm consult pending. Check BNP for completeness. Echo still pending. 2/13 echo ok with EF of 55-60%. BNP elevated. Lasix 20 mg daily PRN if I>O by 500 cc. Lasix given this AM. Pulmonology also following as well. On high flow at 40 LPM. Continues on cef/vanc for possible PNA. Currently able to use bedpan for urination- may need jara placed. 2/14 check blood gas. Ox 95-98% on high alon at 40 lpm. Back on cef/vanc. 2/15 O2 down to 5L, remains on Cef/Vanc 
2/16 O2 down to 3L today, continue Cef/Vanc/steroids/nebs 2/18 O2 at 3L, D5/7 Cef/Vanc 
2/19 On optiflow. Pulm DC'd Vanc yesterday. Con't Cef. Tmax 100.6. Repeat CXR with no changes. Check UA, BCx. Check BNP. 
2/20 Optiflow @ 30L. Tmax 100.5. UA neg. BCx pending. On Cef, completes tomorrow. BNP 2115. On Lasix 20mg IV daily. Pulm following. 
2/21 O2 down to 4L. Pulm changed lasix to oral.  Pulm following. Neutropenic fever 2/21 Tmax 101.1. BCx-NGTD.  UCx/sputum Cx pending. Repeat CXR. Con't Cef. Vanc ordered. ?SIDAH/hyponatremia - On IV fluids at this time with some improvement. Check urine sodium. Consult nephro 2/12 neph consult pending.  today. 2/13 appreciate nephros recommendations. NA up to 133 today. 2/14 Na improved up to 135.  
2/20 Na+ 134 Confusion 
-ongoing confusion noted. UDS pending. 2/17 more alert and clearer today 2/18 alert, oriented Oral candidiasis 2/20 Nystatin ordered Pancytopenia secondary to chemotherapy - Transfuse prn per Cari SOPs 
2/21 Start Granix Continue home medications Cari SOPs Lovenox for DVT prophylaxis (hold for plt <50k) Will need STR at [facility] aware Goals and plan of care reviewed with the patient. All questions answered to the best of our ability. Stacia Dunham NP [facility] Hematology and Oncology [address] [address] Office : [phone] Fax : [phone] Attending Addendum: 
I have personally performed a face to face diagnostic evaluation on this patient. I have reviewed and agree with the care plan as documented above Jermaine Roberts NDenyP.  My findings are as follows: Patient appears lethargic, heart rate regular without murmurs, abdomen is non-tender, bowel sounds are positive.  54 female history of thoracic malignancy status post recent concurrent cisplatin/etoposideXRT, cycle 1 was completed 2/15/2020.  Reports right neck swelling much improved.  Shortness of breath: pulmonology following.  Remains on broad-spectrum IV antibiotics as well as prednisone. High BNP, now being diuresed and improving. Continue ongoing care. Will benefit from rehab on discharge.  
  
  
Total time 25 min 50% in direct consultation about the patient's diagnosis and management 
  
 
  
Jus Chairez MD 
[facility] Hematology/Oncology [address] [address] Office : [phone] Fax : [phone]

## 2020-02-21 NOTE — PROGRESS NOTES
Chart screened by  for discharge planning. Pending referrals from 1) [facility]. 2)[facility] 3)[facility] Pt is now back on high flow O2 3L Please consult  if any new issues arise

## 2020-02-21 NOTE — PROGRESS NOTES
Aramis Centeno Admission Date: 2/10/2020 Daily Progress Note: 2/21/2020 The patient's chart is reviewed and the patient is discussed with the staff. 54 y.o.  female seen and evaluated at the request of Dr. Toribio Christian. .  Pt is well known to [facility] with a history of cocaine abuse(as recent as 1/10/2020 reportedly, + UDS on 1/30/2020), tobacco abuse 1/2 ppd x 38 years, COPD, chronic pain, DLD, chronic hepatitis C, chronic diastolic CHF, CAD, bipolar disorder with borderline personality disorder, CAD s/ CABG, and ASCUS with positive high risk HPV cervical. Pt presented to the ER on 1/13/2020 with cough, shortness of breath and R sided chest pain. CXR identified new widening of the superior mediastinum in comparison to recent prior chest x-rays. Chest CT showed an incompletely characterized anterior mediastinal mass extending to the right supraclavicular region, but appearing to be separate from the thyroid gland, measuring approximately 3.9 cm in AP diameter.  Pt was seen and underwent bronch with EBUS and FNA with airway inspection. The 4R location tumor was biopsied and positive for malignancy on SATNAM. Cytology from FNA of 2R-4R location of lung mass, revealed the presence of malignant cells. Immunohistochemical features were suggestive of carcinoma of hepatic origin given staining for cytoplasmic TTF-1 as well as arginase. 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 Pt had enlarged hypermetabolic mediastinal LN and soft tissue mass in the R supraclavicular soft tissues  Measuring 4.9x2.9cm separate from thyroid.  Pt was referred to oncology and admitted on 2/10/2020.  
  She has now completed her first round of chemo using cisplatin/etoposide (2/15). She was treated with levaquin for suspected post obstructive pneumonia. Increased oxygen needs on 2/19 with fever - back on antibiotics. Subjective:  
  Continues to do well, on O2 with HFNC at 4L Current Facility-Administered Medications Medication Dose Route Frequency  magnesium oxide (MAG-OX) tablet 400 mg  400 mg Oral TID  cefepime (MAXIPIME) 2 g in 0.9% sodium chloride (MBP/ADV) 100 mL  2 g IntraVENous Q8H  
 vancomycin (VANCOCIN) 750 mg in  mL infusion  750 mg IntraVENous Q12H  
 acetaminophen (TYLENOL) tablet 650 mg  650 mg Oral Q6H PRN  
 nystatin (MYCOSTATIN) 100,000 unit/mL oral suspension 500,000 Units  500,000 Units Oral QID  LORazepam (ATIVAN) tablet 0.25 mg  0.25 mg Oral Q6H PRN  
 morphine (ROXANOL) concentrated oral syringe 10 mg  10 mg Oral Q6H PRN  
 furosemide (LASIX) tablet 20 mg  20 mg Oral DAILY  guaiFENesin ER (MUCINEX) tablet 1,200 mg  1,200 mg Oral Q12H  
 tiotropium bromide (SPIRIVA RESPIMAT) 2.5 mcg /actuation  2 Puff Inhalation DAILY  central line flush (saline) syringe 10 mL  10 mL InterCATHeter PRN  
 heparin (porcine) pf 300 Units  300 Units InterCATHeter PRN  polyethylene glycol (MIRALAX) packet 17 g  17 g Oral DAILY  senna-docusate (PERICOLACE) 8.6-50 mg per tablet 1 Tab  1 Tab Oral DAILY  predniSONE (DELTASONE) tablet 40 mg  40 mg Oral DAILY WITH BREAKFAST  albuterol (PROVENTIL VENTOLIN) nebulizer solution 2.5 mg  2.5 mg Nebulization TID RT  
 [Held by provider] amLODIPine (NORVASC) tablet 5 mg  5 mg Oral DAILY  atorvastatin (LIPITOR) tablet 80 mg  80 mg Oral DAILY  [Held by provider] carvediloL (COREG) tablet 6.25 mg  6.25 mg Oral BID WITH MEALS  enoxaparin (LOVENOX) injection 30 mg  30 mg SubCUTAneous Q24H  
 albuterol (PROVENTIL VENTOLIN) nebulizer solution 2.5 mg  2.5 mg Nebulization Q4H PRN  
 morphine CR (MS CONTIN) tablet 60 mg  60 mg Oral Q12H  
 morphine IR (MS IR) tablet 15 mg  15 mg Oral Q4H PRN  
  ondansetron (ZOFRAN ODT) tablet 8 mg  8 mg Oral Q8H PRN  prochlorperazine (COMPAZINE) tablet 10 mg  10 mg Oral Q6H PRN  
 sertraline (ZOLOFT) tablet 25 mg  25 mg Oral DAILY Objective:  
 
Vitals:  
 02/21/20 0967 02/21/20 0803 02/21/20 0925 02/21/20 1119 BP: 107/67   121/40 Pulse: 87   85 Resp: 22   20 Temp: (!) 100.5 °F (38.1 °C)   98.7 °F (37.1 °C) SpO2: 92% 98% 96% 98% Weight:      
 
Intake and Output:  
02/19 1901 - 02/21 0700 In: 2069 [P.O.:1825; I.V.:244] [id number] 02/21 0701 - 02/21 1900 In: 236 [P.O.:236] Out: - Physical Exam:  
Constitutional:  the patient is well developed and in no acute distress HEENT:  Sclera clear, pupils equal, oral mucosa moist 
Lungs: crackles from posterior. Decreased breath sounds on the right from posterior. Wearing optiflow - 35% Cardiovascular:  RRR without M,G,R 
Abd/GI: soft and non-tender; with positive bowel sounds. Ext: warm without cyanosis. There is less edema in her arms Musculoskeletal: moves all four extremities with equal strength Skin:  no jaundice or rashes, no wounds Neuro: no gross neuro deficits but some drowsiness today during exam 
Musculoskeletal: can ambulate. No deformity Psychiatric: Calm. Review of Systems - Review of Systems Constitutional: Positive for malaise/fatigue. Respiratory: Positive for cough, sputum production and shortness of breath. Cardiovascular: Negative. Gastrointestinal: Negative. Lines: port CHEST XRAY:   
 
 
LAB Recent Labs  
  02/21/20 
0246 02/20/20 
0219 02/19/20 
0335 WBC 0.6* 4.2* 7.9 HGB 7.5* 8.5* 7.9*  
HCT 23.4* 27.0* 25.0*  
PLT 63* 116* 121* Recent Labs  
  02/21/20 
0246 02/20/20 
0219 02/19/20 
0335 * 134* 134* K 3.9 4.3 3.7 CL 94* 97* 96* CO2 32 32 35* * 124* 130* BUN 25* 26* 25* CREA 0.67 0.58* 0.47* MG 1.3*  --   --   
ALB 2.0* 2.0* 2.1*  
SGOT 57* 55* 36 Assessment:  (Medical Decision Making) Hospital Problems  Date Reviewed: 2/19/2020 Codes Class Noted POA * (Principal) Lung cancer ([facility] ICD-10-CM: C34.90 ICD-9-CM: 162.9  2/10/2020 Yes S/p first round of  chemo Hypoxia ICD-10-CM: R09.02 
ICD-9-CM: 799.02  2/10/2020 Yes Now on optiflow - had been weaned to nasal cannula earlier this week COPD exacerbation ([facility] ICD-10-CM: J44.1 ICD-9-CM: 491.21  Unknown Yes No wheezing on exam  
 Acute respiratory failure with hypoxia [facility]) ICD-10-CM: J96.01 
ICD-9-CM: 518.81  7/12/2011 Yes As above Plan:  (Medical Decision Making) 1. Temp yesterday and today - back on abx (previously on levaquin, now on maxipeme). ? Post obstructive pneumonia. Secretions brown in color per patieint - will submit sputum for culture. She also has jara but does not want to have it removed due to dyspnea with exertion 2. Daily lasix . Edema in arms much better. Fluid balance neg 3. Try to wean back to nasal cannula - anticipate needing oxygen at discharge 4. Nebulized albuterol/spiriva/mucinex 5. Decrease prn medications for pain and anxiety (she is falling asleep today during exam). She is on scheduled MS contin as well 6. More chemo planned for next month March 4th No XRT for now 7. CXR with small right pleural effusion - US earlier this week - too small to tap Racheal Blackwell MD 
 
Will see next on Monday

## 2020-02-21 NOTE — PROGRESS NOTES
Problem: Mobility Impaired (Adult and Pediatric) Goal: *Acute Goals and Plan of Care (Insert Text) Description 1. Ms. Patience Allan will perform supine to sit and sit to supine independently in 7 days. 2.  Ms. Patience Allan will perform sit to stand and bed to chair with least restrictive device independently in 7 days. 3.  Ms. Patience Allan will perform gait with rolling walker or least restrictive device 150 ft independently in 7 days. 4.  Ms. Patience Allan will perform therex to bilateral lower extremities x 20 reps independently in 7 days. Outcome: Progressing Towards Goal 
 
PHYSICAL THERAPY: Daily Note and AM 2/21/2020 INPATIENT: PT Visit Days : 2 Payor: Tianna Oconnor / Plan: [place] MED[place] / Product Type: [employer] Care Medicaid /   
  
NAME/AGE/GENDER: Deisi Aguilar is a 48 y.o. female PRIMARY DIAGNOSIS: Lung cancer ([facility] [C34.90] Hypoxia [R09.02] Lung cancer ([facility] Lung cancer ([facility] Procedure(s) (LRB): ULTRASOUND (Bilateral) 3 Days Post-Op ICD-10: Treatment Diagnosis:  
 · Generalized Muscle Weakness (M62.81) · Difficulty in walking, Not elsewhere classified (R26.2) Precaution/Allergies: 
Lithium analogues; Morphine (pf); and Other medication ASSESSMENT:  
 
Ms. Patience Allan is now on 3 liters O2. She is up in the chair and anxious for PT. MD came in and said next chemo is March 4 and no radiation right now. O2 at rest on 3 liters 96. Gait with rolling walker for energy conservation 50 ft with cg on 3 liters x 3 with seated rest breaks and O2 sats 96,97%. She was tired no doubt. But tolerated the increase in activity. Performed therex in the chair including incentive spirometer. Ms. Patience Allan has made good progress but still has a low activity tolerance including still needing O2 when she was not on any at home. She is eager to improve and would therefore benefit from a short post acute rehab stay to get her buffed up before she has her next chemo treatment. This section established at most recent assessment PROBLEM LIST (Impairments causing functional limitations): 1. Decreased Strength 2. Decreased Transfer Abilities 3. Decreased Ambulation Ability/Technique 4. Decreased Activity Tolerance INTERVENTIONS PLANNED: (Benefits and precautions of physical therapy have been discussed with the patient.) 1. Bed Mobility 2. Gait Training 3. Therapeutic Activites 4. Therapeutic Exercise/Strengthening 5. Transfer Training TREATMENT PLAN: Frequency/Duration: 3 times a week for duration of hospital stay Rehabilitation Potential For Stated Goals: Good REHAB RECOMMENDATIONS (at time of discharge pending progress):   
Placement: It is my opinion, based on this patient's performance to date, that Ms. Couch may benefit from HHPT vs STR. Depends on progress. Equipment: ? May need a rolling walker. HISTORY:  
History of Present Injury/Illness (Reason for Referral): 
Patient is a 48 y.o.  female seen and evaluated at the request of Dr. Elin Pierce. Pt is well known to [facility] with a history of cocaine abuse(as recent as 1/10/2020 reportedly, + UDS on 1/30/2020), tobacco abuse 1/2 ppd x 38 years, COPD, chronic pain, DLD, chronic hepatitis C, chronic diastolic CHF, CAD, bipolar disorder with borderline personality disorder, CAD s/ CABG, and ASCUS with positive high risk HPV cervical. Pt presented to the ER on 1/13/2020 with cough, shortness of breath and R sided chest pain. CXR identified new widening of the superior mediastinum in comparison to recent prior chest x-rays. Chest CT showed an incompletely characterized anterior mediastinal mass extending to the right supraclavicular region, but appearing to be separate from the thyroid gland, measuring approximately 3.9 cm in AP diameter. Pulmonology and surgical evaluation were recommended. Patient was discharged with instructions to follow-up with pulmonary outpatient.  Pt was seen and underwent bronch with EBUS and FNA with airway inspection. The 4R location tumor was biopsied and positive for malignancy on SATNAM. Cytology from FNA of 2R-4R location of lung mass, revealed the presence of malignant cells. Immunohistochemical features were suggestive of carcinoma of hepatic origin given staining for cytoplasmic TTF-1 as well as arginase. Pathologist recommended clinical and imaging correlation, and noted that additional tissue may be necessary for further evaluation given relatively low cellularity of cell block preparation. 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 Pt had enlarged hypermetabolic mediastinal LN and soft tissue mass in the R supraclavicular soft tissues  Measuring 4.9x2.9cm separate from thyroid. Pt was referred to oncology and admitted on 2/10/2020 as pt could not tolerate IVF prior to chemo secondary to dyspnea and acute respiratory failure. Pt's Na was 123 with concern for SIADH. Nephrology was consulted. An echo was ordered and is pending. Pt has had increased O2 requirements and increased cough. We were consulted to assist.  
Pt is lying in bed and unable to answer any questions coherently. Pt will open eyes and then closes them back again. Pt will start to say something and her sentence trails off and she falls back to sleep. She then starts picking at her O2 tubing and tries to get out of bed. Pt reportedly alert and oriented yesterday but her son stayed overnight. Pt has UDS ordered and pending given change in status and history of drug abuse. Pt is not on O2 at home. We will check stat ABG. Past Medical History/Comorbidities: Ms. Malena Morrell  has a past medical history of Abnormal MRI of head (2015), ASCUS with positive high risk HPV cervical, Bipolar disorder ([facility] Borderline personality disorder ([facility] CAD, Cerebrovascular disease, Chronic diastolic congestive heart failure ([facility] Chronic hepatitis C ([facility] Chronic pain, [facility] ([facility]), COPD (chronic obstructive pulmonary disease) ([facility] Dyslipidemia, Heart attack ([facility] (), History of left heart catheterization (2019), HTN, IBS (irritable bowel syndrome), Lung cancer ([facility] ([facility]), Pacemaker, Psychiatric disorder, S/P CABG x 3, SSS (sick sinus syndrome) ([facility] Subclavian artery stenosis, right ([facility] (), and Vertebral artery stenosis (). Ms. Huy Lai  has a past surgical history that includes pr appendectomy; pr colsc flx w/rmvl of tumor polyp lesion snare tq (2018); colonoscopy (N/A, 2018); hx heart catheterization; hx coronary artery bypass graft (2011); hx tubal ligation; hx  section (, ); hx orthopaedic; hx hernia repair; hx pacemaker (~); and ir insert tunl cvc w port over 5 years (2020). Social History/Living Environment:  
Home Environment: Apartment # Steps to Enter: 8 One/Two Story Residence: One story Living Alone: Yes Support Systems: Family member(s) Patient Expects to be Discharged to[de-identified] [id number] Current DME Used/Available at Home: None Tub or Shower Type: Shower Prior Level of Function/Work/Activity: 
independent CA, bipolar, drug abuse. Number of Personal Factors/Comorbidities that affect the Plan of Care: 3+: HIGH COMPLEXITY EXAMINATION:  
Most Recent Physical Functioning:  
Gross Assessment: 
  
         
  
Posture: 
  
Balance: 
Sitting: Intact Standing: Impaired; With support Standing - Static: Fair Standing - Dynamic : Fair Bed Mobility: 
  
Wheelchair Mobility: 
  
Transfers: 
Sit to Stand: Contact guard assistance Stand to Sit: Contact guard assistance Gait: 
  
Base of Support: Widened Speed/[place]: Slow Step Length: Right shortened;Left shortened Distance (ft): 50 Feet (ft)(x 3 with seated rest breaks checking O2 sat in between each one. O2 sats [id number]) Ambulation - Level of Assistance: Contact guard assistance Body Structures Involved: 1. Muscles Body Functions Affected: 1. Movement Related Activities and Participation Affected: 1. Mobility Number of elements that affect the Plan of Care: 3: MODERATE COMPLEXITY CLINICAL PRESENTATION:  
Presentation: Evolving clinical presentation with changing clinical characteristics: MODERATE COMPLEXITY CLINICAL DECISION MAKING:  
[facility] AM-PAC 6 Clicks Basic Mobility Inpatient Short Form How much difficulty does the patient currently have. .. Unable A Lot A Little None 1. Turning over in bed (including adjusting bedclothes, sheets and blankets)? [] 1   [] 2   [x] 3   [] 4  
2. Sitting down on and standing up from a chair with arms ( e.g., wheelchair, bedside commode, etc.)   [] 1   [] 2   [x] 3   [] 4  
3. Moving from lying on back to sitting on the side of the bed? [] 1   [] 2   [x] 3   [] 4 How much help from another person does the patient currently need. .. Total A Lot A Little None 4. Moving to and from a bed to a chair (including a wheelchair)? [] 1   [] 2   [x] 3   [] 4  
5. Need to walk in hospital room? [] 1   [x] 2   [] 3   [] 4  
6. Climbing 3-5 steps with a railing? [] 1   [x] 2   [] 3   [] 4  
© 2007, Trustees of [facility], under license to [employer]. All rights reserved Score:  Initial: 16 Most Recent: X (Date: -- ) Interpretation of Tool:  Represents activities that are increasingly more difficult (i.e. Bed mobility, Transfers, Gait). Medical Necessity:    
· Patient is expected to demonstrate progress in  
· functional technique ·  to  
· decrease assistance required with mobility and gait. · . Reason for Services/Other Comments: 
· Patient · continues to require present interventions due to patient's inability to function at baseline. · . Use of outcome tool(s) and clinical judgement create a POC that gives a: Questionable prediction of patient's progress: MODERATE COMPLEXITY  
  
 
 
 
TREATMENT:  
(In addition to Assessment/Re-Assessment sessions the following treatments were rendered) Pre-treatment Symptoms/Complaints: none Pain: Initial:  
Pain Intensity 1: 0  Post Session:  tired Therapeutic Exercise: ( 10):  Exercises per grid below to improve strength. Required minimal visual and verbal cues to promote proper body mechanics. Progressed repetitions as indicated. Therapeutic Activity: (    17): Therapeutic activities including Chair transfers and Ambulation on level ground to improve mobility. Required minimal   to promote motor control of bilateral, upper extremity(s), lower extremity(s). Date: 
2/17 Date: 
2/21/ Date: Activity/Exercise Parameters Parameters Parameters AP 10 10 LAQ 10 10 Marching 10 Standing marching. 2x 15 sec Incentive spirometer  10 Braces/Orthotics/Lines/Etc:  
· O2 Device: Nasal cannula Treatment/Session Assessment:   
· Response to Treatment:  good · Interdisciplinary Collaboration:  
o Registered Nurse · After treatment position/precautions:  
o Up in chair 
o Call light within reach 
o RN notified · Compliance with Program/Exercises: Will assess as treatment progresses · Recommendations/Intent for next treatment session: \"Next visit will focus on advancements to more challenging activities and reduction in assistance provided\". Total Treatment Duration: PT Patient Time In/Time Out Time In: 1130 Time Out: 9007 Arjun Mehta, PT

## 2020-02-21 NOTE — PROGRESS NOTES
Problem: Falls - Risk of 
Goal: *Absence of Falls Description Document Jorge Luis Marsh Fall Risk and appropriate interventions in the flowsheet. Outcome: Progressing Towards Goal 
Note: Fall Risk Interventions: 
Mobility Interventions: Communicate number of staff needed for ambulation/transfer, Patient to call before getting OOB Mentation Interventions: Adequate sleep, hydration, pain control, Room close to nurse's station Medication Interventions: Assess postural VS orthostatic hypotension, Patient to call before getting OOB, Teach patient to arise slowly Elimination Interventions: Call light in reach, Patient to call for help with toileting needs History of Falls Interventions: Room close to nurse's station Problem: Patient Education: Go to Patient Education Activity Goal: Patient/Family Education Outcome: Progressing Towards Goal 
  
Problem: Pressure Injury - Risk of 
Goal: *Prevention of pressure injury Description Document Patricio Scale and appropriate interventions in the flowsheet. Outcome: Progressing Towards Goal 
Note: Pressure Injury Interventions: 
Sensory Interventions: Assess need for specialty bed, Pressure redistribution bed/mattress (bed type) Moisture Interventions: Maintain skin hydration (lotion/cream) Activity Interventions: Assess need for specialty bed, Increase time out of bed, Pressure redistribution bed/mattress(bed type) Mobility Interventions: Assess need for specialty bed, HOB 30 degrees or less, Pressure redistribution bed/mattress (bed type) Nutrition Interventions: Document food/fluid/supplement intake, Offer support with meals,snacks and hydration Friction and Shear Interventions: HOB 30 degrees or less Problem: Patient Education: Go to Patient Education Activity Goal: Patient/Family Education Outcome: Progressing Towards Goal 
  
Problem: Breathing Pattern - Ineffective Goal: *Absence of hypoxia Outcome: Progressing Towards Goal 
  
Problem: Chemotherapy, Day 1 Goal: Off Pathway (Use only if patient is Off Pathway) Outcome: Progressing Towards Goal 
Goal: Activity/Safety Outcome: Progressing Towards Goal 
Goal: Consults, if ordered Outcome: Progressing Towards Goal 
Goal: Diagnostic Test/Procedures Outcome: Progressing Towards Goal 
Goal: Nutrition/Diet Outcome: Progressing Towards Goal 
Goal: Discharge Planning Outcome: Progressing Towards Goal 
Goal: Medications Outcome: Progressing Towards Goal 
Goal: Respiratory Outcome: Progressing Towards Goal 
Goal: Treatments/Interventions/Procedures Outcome: Progressing Towards Goal 
Goal: Psychosocial 
Outcome: Progressing Towards Goal 
Goal: *Optimal pain control at patient's stated goal 
Outcome: Progressing Towards Goal 
Goal: *Hemodynamically stable Outcome: Progressing Towards Goal 
Goal: *Adequate oxygenation Outcome: Progressing Towards Goal 
Goal: *Chemotherapy regimen is initiated Outcome: Progressing Towards Goal 
Goal: *Patient and family verbalize understanding of plan of care Outcome: Progressing Towards Goal 
  
Problem: Pain Goal: *Control of Pain Outcome: Progressing Towards Goal 
  
Problem: Patient Education: Go to Patient Education Activity Goal: Patient/Family Education Outcome: Progressing Towards Goal 
  
Problem: Patient Education: Go to Patient Education Activity Goal: Patient/Family Education Outcome: Progressing Towards Goal 
  
Problem: Gas Exchange - Impaired Goal: *Absence of hypoxia Outcome: Progressing Towards Goal 
  
Problem: Nutrition Deficit Goal: *Optimize nutritional status Outcome: Progressing Towards Goal

## 2020-02-21 NOTE — PROGRESS NOTES
0600-END OF SHIFT NOTE: 
 
-pt restless during night with complaints of pain and difficulty breathing 
-pt febrile, see note 
-pain controlled (see MAR), 1x ativan 
-vss, no needs voiced at this time Intake/Output 
02/20 1901 - 02/21 0700 In: 56 [P.O.:745; I.V.:137] Out: 650 [Urine:650] Voiding: YES Catheter: NO 
Drain:   
 
 
Stool:  0 occurrences. Stool Assessment Stool Color: Aurea Gal (02/18/20 1957) Stool Appearance: Formed (02/19/20 4100) Stool Amount: Large (02/18/20 1957) Stool Source/Status: Rectum (02/18/20 1957) Emesis:  0 occurrences. VITAL SIGNS Patient Vitals for the past 12 hrs: 
 Temp Pulse Resp BP SpO2  
02/21/20 0309 99 °F (37.2 °C) 87 18 123/67 96 % 02/20/20 2348 (!) 101.1 °F (38.4 °C)      
02/20/20 2302 100 °F (37.8 °C) 87 18 148/83 99 % 02/20/20 2043     94 % 02/20/20 1926 99.1 °F (37.3 °C) 86 18 100/73 94 % Pain Assessment Pain 1 Pain Scale 1: Visual (02/21/20 0115) Pain Intensity 1: 0 (02/21/20 0115) Patient Stated Pain Goal: 0 (02/21/20 0115) Pain Reassessment 1: Patient resting w/respiratory rate greater than 10 (02/21/20 0115) Pain Onset 1: pta (02/21/20 0028) Pain Location 1: Neck; Shoulder (02/21/20 0028) Pain Orientation 1: Right (02/21/20 0028) Pain Description 1: Aching;Constant (02/21/20 0028) Pain Intervention(s) 1: Medication (see MAR) (02/21/20 0028) Ambulating No 
 
Additional Information:  
 
Shift report given to oncoming nurse at the bedside.  
 
Ninfa Fontenot RN

## 2020-02-22 NOTE — PROGRESS NOTES
END OF SHIFT NOTE: 
 
Intake/Output 
02/21 1901 - 02/22 0700 In: 1079.3 [I.V.:726] Out: 800 [Urine:800] Voiding: YES Catheter: YES Drain:   
 
 
 
 
 
Stool:  0 occurrences. Stool Assessment Stool Color: Geovanna Scriver (02/18/20 1957) Stool Appearance: Formed (02/21/20 1950) Stool Amount: Large (02/18/20 1957) Stool Source/Status: Rectum (02/18/20 1957) Emesis:  0 occurrences. VITAL SIGNS Patient Vitals for the past 12 hrs: 
 Temp Pulse Resp BP SpO2  
02/22/20 0354    151/71   
02/22/20 0336 98 °F (36.7 °C) 71 20 180/80 92 % 02/21/20 2314 98.3 °F (36.8 °C) 67 20 144/86 93 % 02/21/20 2108    133/90   
02/21/20 2010 98.3 °F (36.8 °C) 76 18 128/62 97 % 02/21/20 1958 97.3 °F (36.3 °C) 70 16 94/61 93 % 02/21/20 1930     98 % 02/21/20 1827 98.2 °F (36.8 °C) 71  131/72 93 % Pain Assessment Pain 1 Pain Scale 1: Numeric (0 - 10) (02/22/20 0415) Pain Intensity 1: 0 (02/22/20 0415) Patient Stated Pain Goal: 0 (02/22/20 0415) Pain Reassessment 1: Yes (02/22/20 0415) Pain Onset 1: pta (02/22/20 0321) Pain Location 1: Neck (02/22/20 0321) Pain Orientation 1: Right (02/22/20 0321) Pain Description 1: Aching (02/22/20 0321) Pain Intervention(s) 1: Medication (see MAR) (02/22/20 0321) Ambulating Yes Additional Information: VSS. Afebrile. Prn morphine and ativan given. No needs voiced at this time Shift report given to oncoming nurse at the bedside.  
 
Twan Bond RN

## 2020-02-22 NOTE — PROGRESS NOTES
d 
 
[facility] Hematology & Oncology Inpatient Hematology / Oncology Progress Note Admission Date: 2/10/2020  3:39 PM 
Reason for Admission/Hospital Course: Lung cancer ([facility] [C34.90] Hypoxia [R09.02] 
 
 
24 Hour Events: 
Afeb, VSS, on Hiflow @ 2L Sitting up in the chair Completed C1 cis/etop on 2/15 ROS: 
Constitutional: +fatigue +fever CV: Negative for chest pain, palpitations, edema. Respiratory: +dyspnea +wheezing GI: Negative for nausea, abdominal pain, diarrhea. 10 point review of systems is otherwise negative with the exception of the elements mentioned above in the HPI. Allergies Allergen Reactions  Lithium Analogues Unknown (comments)  Morphine (Pf) Rash Pt is currently taking Morphine 30mg QID  Other Medication Swelling Whatever holds Vit d pill together OBJECTIVE: 
Patient Vitals for the past 8 hrs: 
 BP Temp Pulse Resp SpO2  
20 0800 128/68 100.1 °F (37.8 °C) 78 18 93 % 20 0759     95 % 20 0735 (!) 124/104 100.3 °F (37.9 °C) (!) 104 18 90 % 20 0354 151/71      
20 0336 180/80 98 °F (36.7 °C) 71 20 92 % Temp (24hrs), Av.5 °F (36.9 °C), Min:97.3 °F (36.3 °C), Max:100.3 °F (37.9 °C) No intake/output data recorded. Physical Exam: 
Constitutional: Ill appearing  female in no mild distress, sitting bedside chair. HEENT: Normocephalic and atraumatic. Oropharynx with white patches; mucous membranes are moist.  Sclerae anicteric. Neck supple without JVD. No thyromegaly present. Skin Warm and dry. No bruising and no rash noted. No erythema. No pallor. Respiratory Lungs coarse with scattered wheezes bilaterally; normal air exchange without accessory muscle use. On Optiflow. CVS Normal rate, regular rhythm and normal S1 and S2. No murmurs, gallops, or rubs. Abdomen Soft, nontender and nondistended, normoactive bowel sounds. No palpable mass. No hepatosplenomegaly. Neuro No obvious deficits MSK Normal range of motion in general.  No edema and no tenderness. Psych Appropriate mood Labs: 
   
Recent Labs  
  02/22/20 
0306 02/21/20 
0246 02/20/20 
6431 WBC 0.3* 0.6* 4.2*  
RBC 3.27* 2.64* 3.01* HGB 9.4* 7.5* 8.5* HCT 28.9* 23.4* 27.0* MCV 88.4 88.6 89.7 MCH 28.7 28.4 28.2 MCHC 32.5 32.1 31.5  
RDW 12.4 12.6 13.2 PLT 45* 63* 116* GRANS  --  27* 76 LYMPH  --  61* 15 MONOS  --  2* 0* EOS  --  2 0* BASOS  --  0 1 IG  --  8* 8*  
DF MANUAL AUTOMATED AUTOMATED ANEU  --  0.2* 3.3 ABL  --  0.4* 0.6 ABM  --  0.0* 0.0* ROSA ISELA  --  0.0 0.0 ABB  --  0.0 0.0 AIG  --  0.0 0.3 Recent Labs  
  02/22/20 0306 02/21/20 
0246 02/20/20 
6937 * 133* 134* K 3.8 3.9 4.3 CL 97* 94* 97* CO2 32 32 32 AGAP 6* 7 5* * 126* 124* BUN 26* 25* 26* CREA 0.62 0.67 0.58* GFRAA >60 >60 >60 GFRNA >60 >60 >60  
CA 8.4 8.4 8.4 SGOT 63* 57* 55* AP 62 55 60  
TP 6.5 6.3 6.9 ALB 2.2* 2.0* 2.0*  
GLOB 4.3* 4.3* 4.9* AGRAT 0.5* 0.5* 0.4* MG 1.9 1.3*  --   
 
Imaging: XR CHEST PA LAT [[id number]] Collected: 02/21/20 0931 Order Status: Completed Updated: 02/21/20 1660 Narrative:    
EXAMINATION: CHEST RADIOGRAPH 2/21/2020 9:00 AM 
 
ACCESSION NUMBER: [id number] INDICATION: neutropenic fever COMPARISON: Chest x-ray 2/19/2020, chest CT 2/5/2020 TECHNIQUE: PA and lateral views of the chest were obtained. FINDINGS:  
 
Cardiac Silhouette: Unchanged widening of the superior mediastinum. Unchanged positioning of a left-sided central venous access port and of a 
cardiac pacemaker. Lungs: Unchanged moderate right pleural effusion and right basilar opacity. Pleura: No definite left pleural effusion. No pneumothorax. Osseous Structures: Prior median sternotomy and coronary artery bypass grafting. Thoracic spine spondylosis. Upper Abdomen: Abdominal aortic atherosclerosis.   
Impression:    
IMPRESSION: 
 
 1. Unchanged moderate right pleural effusion and right basilar opacity, which 
may be compressive atelectasis or infection. 2. Unchanged widening of the superior mediastinum, corresponding to the superior 
mediastinal mass on the CT of 2/5/2020. 
 
3. Follow-up to resolution is recommended. VOICE DICTATED BY: Dr. Wally Scherer XR CHEST SNGL V [[id number]] Collected: 02/19/20 0449 Order Status: Completed Updated: 02/19/20 0501 Narrative:    
EXAM: Chest x-ray. INDICATION: Dyspnea. COMPARISON: February 17, 2020. TECHNIQUE: Frontal view chest x-ray. FINDINGS: Mild bibasilar lung atelectasis or infiltrates and a small right 
pleural effusion are unchanged. No pneumothorax or left pleural effusion is 
identified. The cardiac size is within normal limits. Again noted is a prior 
sternotomy, a left chest wall pacemaker, a left chest wall infusion port 
catheter in the patient's known upper mediastinal mass. Impression:    
IMPRESSION: Unchanged mild bibasilar lung atelectasis or infiltrates and small 
right pleural effusion. XR CHEST SNGL V [[id number]] Collected: 02/17/20 2023 Order Status: Completed Updated: 02/17/20 2026 Narrative:    
Portable chest x-ray Clinical indications: Hypoxia. FINDINGS: Single AP view the chest compared to a similar exam dated 2/12/2020 
shows airspace opacity at the right lung base with a right pleural effusion. Atelectasis or pneumonia should be considered. The left lung is stable and 
clear. A pacer device is in place. Impression:    
IMPRESSION: No significant interval change with right lower lung airspace 
opacity and a right pleural effusion. Atelectasis or pneumonia should be 
considered. XR CHEST SNGL V [[id number]] Collected: 02/12/20 1439 Order Status: Completed Updated: 02/12/20 1442 Narrative:    
CHEST X-RAY, single portable view  2/12/2020 History: Hypoxemia and cough.  
 
Technique: Single frontal view of the chest. 
 
 Comparison: Chest x-ray 2/10/2020 Findings: A stable left-sided intracardiac device is seen. A stable left-sided venous port 
is seen. Stable mild cardiomegaly is seen. Lungs are expanded without 
appreciable pneumothorax. Stable masslike density along the right upper 
paramediastinal border. Increasing now small to moderate right basilar pleural 
effusion. Impression:    
IMPRESSION: 1.  Increasing now small to moderate basilar pleural effusion. The etiology of 
effusion is uncertain although this could represent sequela of heart 
failure/fluid overload given the associated cardiomegaly and the rapid onset of 
this finding. XR CHEST PA LAT [[id number]] Collected: 02/10/20 1746 Order Status: Completed Updated: 02/10/20 1752 Narrative:    
Chest 2 view CLINICAL INDICATION: Acute worsening dyspnea, moderate to severe; SVC syndrome, 
mediastinal mass COMPARISON: 1/23/2020 radiograph, 1/30/2020 CT PET, and 2/5/2020 CT with 
contrast 
 
TECHNIQUE: Sitting upright AP and lateral views of the chest  
 
FINDINGS: Patient is rotated to the left. Lung volumes are slightly shallow. There is artifact due to external tubing. There is interval mild to moderate right hemidiaphragm elevation. There is 
slightly increased mild groundglass and reticular opacity in the right lower 
lobe. No evidence of a pneumothorax, significant pleural effusion, or overt CHF. The mediastinal and hilar contours again demonstrate widening consistent with 
the known masses that were better evaluated on the recent CT, previous CABG, 
left side portacatheter, and dual-lead pacemaker. The bone density is again low throughout. No acute fracture or dislocation is 
evident. Impression:    
IMPRESSION:  
1. Right lower lobe infiltrate. 2. Mediastinal masses compatible with malignancy, further evaluated on recent CT 
and PET. Medications: 
Current Facility-Administered Medications Medication Dose Route Frequency  magnesium oxide (MAG-OX) tablet 400 mg  400 mg Oral TID  cefepime (MAXIPIME) 2 g in 0.9% sodium chloride (MBP/ADV) 100 mL  2 g IntraVENous Q8H  
 vancomycin (VANCOCIN) 750 mg in  mL infusion  750 mg IntraVENous Q12H  tbo-filgrastim (GRANIX) injection 300 mcg  300 mcg SubCUTAneous QPM  
 0.9% sodium chloride infusion 250 mL  250 mL IntraVENous PRN  
 diphenhydrAMINE (BENADRYL) capsule 25 mg  25 mg Oral Q6H PRN  
 acetaminophen (TYLENOL) tablet 650 mg  650 mg Oral Q6H PRN  
 nystatin (MYCOSTATIN) 100,000 unit/mL oral suspension 500,000 Units  500,000 Units Oral QID  LORazepam (ATIVAN) tablet 0.25 mg  0.25 mg Oral Q6H PRN  
 morphine (ROXANOL) concentrated oral syringe 10 mg  10 mg Oral Q6H PRN  
 furosemide (LASIX) tablet 20 mg  20 mg Oral DAILY  guaiFENesin ER (MUCINEX) tablet 1,200 mg  1,200 mg Oral Q12H  
 tiotropium bromide (SPIRIVA RESPIMAT) 2.5 mcg /actuation  2 Puff Inhalation DAILY  central line flush (saline) syringe 10 mL  10 mL InterCATHeter PRN  
 heparin (porcine) pf 300 Units  300 Units InterCATHeter PRN  polyethylene glycol (MIRALAX) packet 17 g  17 g Oral DAILY  senna-docusate (PERICOLACE) 8.6-50 mg per tablet 1 Tab  1 Tab Oral DAILY  predniSONE (DELTASONE) tablet 40 mg  40 mg Oral DAILY WITH BREAKFAST  albuterol (PROVENTIL VENTOLIN) nebulizer solution 2.5 mg  2.5 mg Nebulization TID RT  
 [Held by provider] amLODIPine (NORVASC) tablet 5 mg  5 mg Oral DAILY  atorvastatin (LIPITOR) tablet 80 mg  80 mg Oral DAILY  [Held by provider] carvediloL (COREG) tablet 6.25 mg  6.25 mg Oral BID WITH MEALS  enoxaparin (LOVENOX) injection 30 mg  30 mg SubCUTAneous Q24H  
 albuterol (PROVENTIL VENTOLIN) nebulizer solution 2.5 mg  2.5 mg Nebulization Q4H PRN  
 morphine CR (MS CONTIN) tablet 60 mg  60 mg Oral Q12H  
 morphine IR (MS IR) tablet 15 mg  15 mg Oral Q4H PRN  
 ondansetron (ZOFRAN ODT) tablet 8 mg  8 mg Oral Q8H PRN  
  prochlorperazine (COMPAZINE) tablet 10 mg  10 mg Oral Q6H PRN  
 sertraline (ZOLOFT) tablet 25 mg  25 mg Oral DAILY ASSESSMENT: 
 
Problem List  Date Reviewed: 2/19/2020 Codes Class Noted * (Principal) Lung cancer ([facility] ICD-10-CM: C34.90 ICD-9-CM: 162.9  2/10/2020 Hypoxia ICD-10-CM: R09.02 
ICD-9-CM: 799.02  2/10/2020 Malignant neoplasm of overlapping sites of left lung [facility]) ICD-10-CM: C34.82 
ICD-9-CM: 162.8  1/22/2020 Hypokalemia ICD-10-CM: E87.6 ICD-9-CM: 276.8  12/7/2019 Chest pain ICD-10-CM: R07.9 ICD-9-CM: 786.50  12/6/2019 Chronic tension-type headache, intractable ICD-10-CM: L54.594 ICD-9-CM: 339.12  5/13/2019 Cigarette nicotine dependence without complication VGL-71-OR: G18.887 ICD-9-CM: 305.1  7/3/2018 COPD exacerbation ([facility] ICD-10-CM: J44.1 ICD-9-CM: 491.21  Unknown Recurrent UTI ICD-10-CM: N39.0 ICD-9-CM: 599.0  4/24/2018 Gross hematuria ICD-10-CM: R31.0 ICD-9-CM: 599.71  4/24/2018 Urinary retention ICD-10-CM: R33.9 ICD-9-CM: 788.20  4/24/2018 Sick sinus syndrome (HCC) (Chronic) ICD-10-CM: I49.5 ICD-9-CM: 427.81  9/29/2017 IBS (irritable bowel syndrome) ICD-10-CM: K58.9 ICD-9-CM: 564.1  Unknown 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 
ICD-9-CM: 433.20  12/28/2016 Encounter for medication management ICD-10-CM: O21.733 ICD-9-CM: V58.69  12/28/2016 Neuropathy (Chronic) ICD-10-CM: G62.9 ICD-9-CM: 355.9  12/28/2016 Overview Signed 7/28/2017  2:20 PM by Loren Middleton MD  
  Last Assessment & Plan:  
Patient is currently on gabapentin 600 mg. Medication may cause increased drowsiness with Rispirdol, however pt has no complaints today. Intractable migraine with aura without status migrainosus ICD-10-CM: G43.119 ICD-9-CM: 346.01  12/28/2016 Peripheral vascular disease (HCC) (Chronic) ICD-10-CM: I73.9 ICD-9-CM: 443.9  12/1/2016 Overview Signed 12/1/2016  4:23 PM by Steve Allen MD  
  CTA (2/11/16): Moderate to severe stenosis in the proximal right subclavian artery. Borderline personality disorder ([facility] ICD-10-CM: F60.3 ICD-9-CM: 301.83  Unknown Overview Signed 7/28/2017  2:20 PM by Vanessa Tai MD  
  Last Assessment & Plan: 1. Continue inpatient hospitalization 2. Lithium level 1.5. Will hold am dose and change to 300mg PO BID. Will recheck in 3 days. 3.  Continue Neurontin 4. Consider Vistaril PRN if indicated 5. Encourage active participation in groups and on the milieu 6. Consider discharge and aftercare needs 7. Encourage abstinence from drugs of abuse Cerebrovascular disease ICD-10-CM: I67.9 ICD-9-CM: 437.9  Unknown Overview Signed 12/1/2016  4:23 PM by Steve Allen MD  
  CTA of the neck (2/11/16) : No significant stenosis of the right carotid artery. 37% stenosis in the proximal left internal carotid artery. Severe stenosis at the origin of the left vertebral artery Chronic hepatitis C ([facility] ICD-10-CM: B18.2 ICD-9-CM: 070.54  Unknown Chronic pain ICD-10-CM: G89.29 ICD-9-CM: 338.29  Unknown Overview Signed 7/28/2017  2:20 PM by Vanessa Tai MD  
  Last Assessment & Plan:  
Patient has history of chronic neck and back pain secondary to degenerative changes. She was recently prescribed a 15 day supply of Dilaudid 2 mg TID by \"pain management doctor\". This script was filled on 5.8. 17. Plan: - Given patient's history of substance use, she would largely benefit from detox of narcotics. Will try conservative methods while inpatient for back pain as well as neck and shoulder pain. Continue Ultram 100 mg Q6H while we await permission to speak to pain management doctor.  
Lucia Quiñonez office in regards to patient's pain management contract. Patient has been dismissed from his services for positive UDS. - Avoid Toradol given her elevated Creatinine and glucose at time of admission. A1c normal at 5.4. ASCUS with positive high risk HPV cervical ICD-10-CM: R87.610, R87.810 ICD-9-CM: 795.01, 795.05  Unknown Pacemaker ICD-10-CM: Z95.0 ICD-9-CM: V45.01  8/10/2016 Overview Signed 8/10/2016 10:47 AM by Leticia Arzate MD  
  1. [employer]  :  Due to sick sinus syndrome. Chronic diastolic congestive heart failure (HCC) (Chronic) ICD-10-CM: I50.32 
ICD-9-CM: 428.32, 428.0  8/9/2016 Overview Addendum 8/27/2019  5:11 PM by Leticia Arzate MD  
  Admitted [facility] 8/12/11 with CP and elevated BNP at 352. 
1.  Echo (8/3/11):  EF > 55%. Mild LVH. Pseudonormal LV filling pattern. Bi-atrial enlargement. .  Mild mitral regurgitation. 2.  Echo (8/22/19):  EF 65-70%. Indeterminant diastolic function. Mild LAE. Coronary atherosclerosis of native coronary vessel (Chronic) ICD-10-CM: I25.10 ICD-9-CM: 414.01  8/9/2016 Overview Addendum 12/9/2019  7:53 AM by Leticia Arzate MD  
  1.  3 Vessel CABG (7/8/11):  MIR to LAD. LIMA in Y graft to superior and inferior diagonal. 
2.  Cath (8/31/12): Occluded grafts. FFR of LAD 0.89. Similar stenosis of diagonal but no FFR done. 3.  LHC (11/1/17): Mild to moderate nonhemodynamically significant CAD. Normal IFR of LAD (40%: 0.98) and Diagonal (50%: 0.98) 4. LHC (12/7/19): Stable CAD. Tobacco use disorder (Chronic) ICD-10-CM: P46.206 ICD-9-CM: 305.1  Unknown HTN (hypertension), benign (Chronic) ICD-10-CM: I10 
ICD-9-CM: 401.1  8/9/2016 Dyslipidemia (Chronic) ICD-10-CM: O64.0 ICD-9-CM: 272.4  8/9/2016 Abnormal MRI of head ICD-10-CM: R93.0 ICD-9-CM: 793.0  1/1/2015 Overview Signed 8/27/2016  4:28 PM by Jenna Ye MD  
  Frontal Lobe periventricular WM disease. Ruled out for MS by Neurology Cocaine abuse in remission [facility]) ICD-10-CM: F14.11 ICD-9-CM: 305.63  9/18/2012 S/P CABG x 3 (Chronic) ICD-10-CM: Z95.1 ICD-9-CM: V45.81  9/18/2012 Bipolar disorder (HCC) (Chronic) ICD-10-CM: F31.9 ICD-9-CM: 296.80  9/18/2012 Acute respiratory failure with hypoxia [facility]) ICD-10-CM: J96.01 
ICD-9-CM: 518.81  7/12/2011  
   
  
 
   
48 y.o. F consulted for cancer in 1/2020. She worked up neck/upper chest pain with CT finding of upper mediastinal mass, EBUS 1/22/20 with FNA showed malignant cells with broad IHC negativity except for suggestion of liver origin. She had active drug abuse as documented reason being discharged from her prior pain management. She presented to [facility] on 1/30[facility], has missed the scheduled PET, reported moved in with her brother and a friend drove her to visit.  We discussed her rather atypical cancer presentation, called radiology to urgently re-schedule PET to further define the origin of cancer and extent, discussed her narco management and she gave inconsistent answers regarding whether and when she used cocaine, also stated she stopped pain med herself rather then being discharged by Dr. Milady Kowalski, although she appeared in pain, involved SW and palliative care, signed narco contract, yet urine screen positive for cocaine, then she changed her story of last use 1 week ago but that should not lead to positive urine test either, discussed the breaking of contract and trust voids prescription of narco. PET 1/30/20 showed large right mediastinal/supraclavicle avid disease but no evidence of disease otherwise and no suspicious disease in the liver, I reviewed PET personally and discussed to have urgent excisional biopsy for more tissue of histology and molecular tests, meanwhile I would not rec treat this as liver cancer, but rather treat with protocol of limited stage SCLC or stage III NSCLC if brain MRI negative to aim at possible cure or durable control, she was agreeable and Adam will arrange for urgent biopsy/port/rad onc eval, start cisplatin/etoposide/XRT 2/10/20, hyponatremia and weight gain concern of SIADH and instructed not to drink excessively and monitor, SVC syndrome and monitor closely for rx response, however she was not able to tolerate pre-hydration for cisplatin and admit for SOB likely related to SVC syndrome, administer chemo inpt with slow rate, may dosing XRT upfront for SVC relief, IR eval if needed. PLAN: 
Lung cancer 
-supposed to start Cis/VP16/XRT yesterday. 2/11 plan for chemo tomorrow if echo ok. XRT plan is not completed yet. 2/12 start chemo today if echo ok. Over to XRT for rescanning today. 2/13 D2 Cis/VP16 not given 2/14 Will give D 2 cis/VP16 as patient wishes to proceed with chemotherapy. 2/15 Proceed with C1D3 Cis/Etop today 2/21 Completed C1 cis/etop on 2/15. C2 due 3/4. Has not started XRT yet. Shortness of breath/Cough 
- on 2 LPM NC. Mucinex BID. CXR with known malignancy and RLL infiltrate. 2/11 start cef/vanc for possible PNA. Check echo. 2/12 continues on cef/vanc. Echo pending. Increased oxygen needs overnight, up to 5 LPM. pulm consult pending. Check BNP for completeness. Echo still pending. 2/13 echo ok with EF of 55-60%. BNP elevated. Lasix 20 mg daily PRN if I>O by 500 cc. Lasix given this AM. Pulmonology also following as well. On high flow at 40 LPM. Continues on cef/vanc for possible PNA. Currently able to use bedpan for urination- may need jara placed. 2/14 check blood gas. Ox 95-98% on high alon at 40 lpm. Back on cef/vanc. 2/15 O2 down to 5L, remains on Cef/Vanc 
2/16 O2 down to 3L today, continue Cef/Vanc/steroids/nebs 2/18 O2 at 3L, D5/7 Cef/Vanc 
2/19 On optiflow. Pulm DC'd Vanc yesterday. Con't Cef. Tmax 100.6. Repeat CXR with no changes. Check UA, BCx. Check BNP. 
2/20 Optiflow @ 30L. Tmax 100.5. UA neg. BCx pending.   On Cef, completes tomorrow. BNP 2115. On Lasix 20mg IV daily. Pulm following. 
2/21 O2 down to 4L. Pulm changed lasix to oral.  Pulm following. 
2/22 O2 down to 2 LPM.. Neutropenic fever 2/21 Tmax 101.1. BCx-NGTD.  UCx/sputum Cx pending. Repeat CXR. Con't Cef. Vanc ordered. 2/22 afebrile. On cef/vanc. Repeat CXR with unchanged widening of the mediastinum and unchanged moderate right pleural effusion and right basilar opacity which may be compressive atelectasis or infection. Blood cultures and urine culture NGTD. ? SIDAH/hyponatremia - On IV fluids at this time with some improvement. Check urine sodium. Consult nephro 2/12 neph consult pending.  today. 2/13 appreciate nephros recommendations. NA up to 133 today. 2/14 Na improved up to 135.  
2/20 Na+ 134 
2/22  Confusion 
-ongoing confusion noted. UDS pending. 2/17 more alert and clearer today 2/18 alert, oriented Oral candidiasis 2/20 Nystatin ordered Pancytopenia secondary to chemotherapy - Transfuse prn per Cari SOPs 
2/21 Start Granix Continue home medications Cari SOPs Lovenox for DVT prophylaxis (hold for plt <50k) Patient may not be able to receive STR on discharge per nursing staff- will need to discuss with CM on Monday. Goals and plan of care reviewed with the patient. All questions answered to the best of our ability. [facility] Hematology and Oncology [address] [place] [address] Office : [phone] Fax : [phone] Attending Addendum: 
I have personally performed a face to face diagnostic evaluation on this patient.  I have reviewed and agree with the care plan as documented above by  Jenae Musa, N.ANGELO.  My findings are as follows: Patient appears lethargic, heart rate regular without murmurs, abdomen is non-tender, bowel sounds are positive.  54 female history of thoracic malignancy status post recent concurrent cisplatin/etoposideXRT, cycle 1 was completed 2/15/2020.  Reports right neck swelling much improved.  Shortness of breath: pulmonology following.  Remains on broad-spectrum IV antibiotics as well as prednisone. High BNP, now being diuresed. Continue ongoing care, improving. [address] dropping, will start granixWill benefit from rehab on discharge.  
  
  
Total time 25 min 50% in direct consultation about the patient's diagnosis and management 
  
 
  
MD Leif Chopra Hematology/Oncology [address] [place] [address] Office : [phone] Fax : [phone]

## 2020-02-22 NOTE — PROGRESS NOTES
END OF SHIFT NOTE: 
 
Intake/Output 
02/22 0701 - 02/22 1900 In: 0 [P. O.:902] Out: 700 [Urine:700] Voiding: YES Catheter: NO 
Drain:   
 
 
 
 
 
Stool:  0 occurrences. Stool Assessment Stool Color: Rodolfo Hdez (02/18/20 1957) Stool Appearance: Formed (02/21/20 1950) Stool Amount: Large (02/18/20 1957) Stool Source/Status: Rectum (02/18/20 1957) Emesis:  0 occurrences. VITAL SIGNS Patient Vitals for the past 12 hrs: 
 Temp Pulse Resp BP SpO2  
02/22/20 1623 98.3 °F (36.8 °C) 76 20 125/69 93 % 02/22/20 1421     98 % 02/22/20 1134 98.2 °F (36.8 °C) 79 20 95/55 90 % 02/22/20 0800 100.1 °F (37.8 °C) 78 18 128/68 93 % 02/22/20 0759     95 % 02/22/20 0735 100.3 °F (37.9 °C) (!) 104 18 (!) 124/104 90 % Pain Assessment Pain 1 Pain Scale 1: Numeric (0 - 10) (02/22/20 1830) Pain Intensity 1: 3 (02/22/20 1830) Patient Stated Pain Goal: 0 (02/22/20 1830) Pain Reassessment 1: Yes (02/22/20 1830) Pain Onset 1: pta (02/22/20 1800) Pain Location 1: Back;Neck (02/22/20 1800) Pain Orientation 1: Right (02/22/20 1800) Pain Description 1: Aching (02/22/20 1800) Pain Intervention(s) 1: Medication (see MAR) (02/22/20 1800) Ambulating Yes Additional Information: Pulled jara today. Given pain and ativan today. Waiting on rehab. VSS. No needs voiced. Shift report given to oncoming nurse at the bedside. Porfirio Kocher

## 2020-02-22 NOTE — PROGRESS NOTES
Reviewed notes for spiritual concerns prior to visit Visit with patient to build rapport with . Calm Encouraged with presence and words of hope PER OUR CONVERSATION: 
Her biggest challenge \" to change my ways\" She acknowledged she has lived a rough life - bad choices Per patient\" it was not till I stared death in the face\" did God get my attention She says she has \"surrendered\" to Him and it has given her a new found peace. She is very fragile in her walk of sagar - adversity can trip her up Limited [place] of support to help her in the new journey Patient has multiple health issues challenging her She really likes to talk and share her feelings She said she wanted to call for  earlier- doesn't have lots of visitors Gave her my card And showed her the  number on her wall Will make her a priority for follow up Raimundo Cobian,  Staff  C: [phone]  /  [email or web address]

## 2020-02-22 NOTE — PROGRESS NOTES
Problem: Falls - Risk of 
Goal: *Absence of Falls Description Document Bryan Garcia Fall Risk and appropriate interventions in the flowsheet. Outcome: Progressing Towards Goal 
Note: Fall Risk Interventions: 
Mobility Interventions: Communicate number of staff needed for ambulation/transfer Mentation Interventions: Adequate sleep, hydration, pain control Medication Interventions: Assess postural VS orthostatic hypotension Elimination Interventions: Call light in reach History of Falls Interventions: Room close to nurse's station

## 2020-02-22 NOTE — PROGRESS NOTES
Problem: Falls - Risk of 
Goal: *Absence of Falls Description Document Gretchen Latin Fall Risk and appropriate interventions in the flowsheet. Outcome: Progressing Towards Goal 
Note: Fall Risk Interventions: 
Mobility Interventions: Communicate number of staff needed for ambulation/transfer Mentation Interventions: Adequate sleep, hydration, pain control Medication Interventions: Assess postural VS orthostatic hypotension Elimination Interventions: Call light in reach History of Falls Interventions: Room close to nurse's station

## 2020-02-23 NOTE — PROGRESS NOTES
END OF SHIFT NOTE: 
 
Intake/Output 
02/22 1901 - 02/23 0700 In: 1084 [P.O.:200; I.V.:884] Out: 175 [Urine:175] Voiding: YES Catheter: NO 
Drain:   
 
 
 
 
 
Stool:  2 occurrences. Stool Assessment Stool Color: Tessie Left (02/23/20 0438) Stool Appearance: Soft (02/23/20 0438) Stool Amount: Medium (02/23/20 0438) Stool Source/Status: Rectum (02/23/20 0438) Emesis:  0 occurrences. VITAL SIGNS Patient Vitals for the past 12 hrs: 
 Temp Pulse Resp BP SpO2  
02/23/20 0342 97.6 °F (36.4 °C) 69 18 135/77 94 % 02/22/20 2311 98.3 °F (36.8 °C) 69 18 (!) 152/93 92 % 02/22/20 1958 97.9 °F (36.6 °C) 73 18 127/55 90 % 02/22/20 1932     94 % Pain Assessment Pain 1 Pain Scale 1: Visual (02/23/20 0225) Pain Intensity 1: 0 (02/23/20 0225) Patient Stated Pain Goal: 0 (02/22/20 1830) Pain Reassessment 1: Patient resting w/respiratory rate greater than 10 (02/23/20 0225) Pain Onset 1: pta (02/22/20 1800) Pain Location 1: Back;Neck (02/22/20 1800) Pain Orientation 1: Right (02/22/20 1800) Pain Description 1: Aching (02/22/20 1800) Pain Intervention(s) 1: Medication (see MAR) (02/23/20 0130) Ambulating Yes Additional Information: Patient had 2 bowel movements throughout the night. After d/c jara, patient urinated 3 times, however, patient only made it into the hat 1 out of 3 times. Patient requires increased O2 rate when she gets up and walks to restroom. Shift report given to oncoming nurse at the bedside. [facility]

## 2020-02-23 NOTE — PROGRESS NOTES
Pharmacokinetic Consult to Pharmacist 
 
Lonnie Vasile is a 48 y.o. female being treated for febrile neutropenia with vancomycin and cefepime. Weight: 47.7 kg (105 lb 1.6 oz) Lab Results Component Value Date/Time BUN 28 (H) 02/23/2020 02:05 AM  
 Creatinine 0.64 02/23/2020 02:05 AM  
 WBC 0.1 (LL) 02/23/2020 02:05 AM  
 Lactic Acid (POC) 1.2 01/31/2018 05:13 PM  
  
Estimated Creatinine Clearance: 67.5 mL/min (by C-G formula based on SCr of 0.64 mg/dL). Lab Results Component Value Date/Time Vancomycin,trough 19.9 02/22/2020 08:56 PM  
 
 
Day 3 of vancomycin. Goal trough is 15-20. Trough at high end of therapeutic range, decrease to 500 mg q12h currently. Will continue to follow patient. Thank you, Seun Nunes, Pharm. D. Clinical Pharmacist 
[phone]

## 2020-02-23 NOTE — PROGRESS NOTES
andrés 
 
[facility] Hematology & Oncology Inpatient Hematology / Oncology Progress Note Admission Date: 2/10/2020  3:39 PM 
Reason for Admission/Hospital Course: Lung cancer ([address] [C34.90] Hypoxia [R09.02] 
 
 
24 Hour Events: 
Afeb on 6LPM now Sitting up in the bed; lethargic Completed C1 cis/etop on 2/15 ROS: 
Constitutional: +fatigue +fever CV: Negative for chest pain, palpitations, edema. Respiratory: +dyspnea +wheezing GI: Negative for nausea, abdominal pain, diarrhea. 10 point review of systems is otherwise negative with the exception of the elements mentioned above in the HPI. Allergies Allergen Reactions  Lithium Analogues Unknown (comments)  Morphine (Pf) Rash Pt is currently taking Morphine 30mg QID  Other Medication Swelling Whatever holds Vit d pill together OBJECTIVE: 
Patient Vitals for the past 8 hrs: 
 BP Temp Pulse Resp SpO2  
20 0804 98/56 97.7 °F (36.5 °C) 90 16 92 % 20 0342 135/77 97.6 °F (36.4 °C) 69 18 94 % Temp (24hrs), Av °F (36.7 °C), Min:97.6 °F (36.4 °C), Max:98.3 °F (36.8 °C) No intake/output data recorded. Physical Exam: 
Constitutional: Ill appearing  female in no mild distress, sitting up in the bed HEENT: Normocephalic and atraumatic. Oropharynx with white patches; mucous membranes are moist.  Sclerae anicteric. Neck supple without JVD. No thyromegaly present. Skin Warm and dry. No bruising and no rash noted. No erythema. No pallor. Respiratory Lungs coarse with scattered wheezes bilaterally; normal air exchange without accessory muscle use. On Optiflow. CVS Normal rate, regular rhythm and normal S1 and S2. No murmurs, gallops, or rubs. Abdomen Soft, nontender and nondistended, normoactive bowel sounds. No palpable mass. No hepatosplenomegaly. Neuro No obvious deficits MSK Normal range of motion in general.  No edema and no tenderness. Psych Appropriate mood Labs: Recent Labs  
  02/23/20 0205 02/22/20 0306 02/21/20 
0246 WBC 0.1* 0.3* 0.6*  
RBC 2.64* 3.27* 2.64* HGB 7.6* 9.4* 7.5* HCT 23.3* 28.9* 23.4* MCV 88.3 88.4 88.6 MCH 28.8 28.7 28.4 MCHC 32.6 32.5 32.1 RDW 12.6 12.4 12.6 PLT 31* 45* 63* GRANS  --   --  27* LYMPH  --   --  61* MONOS  --   --  2* EOS  --   --  2  
BASOS  --   --  0 IG  --   --  8*  
DF MANUAL MANUAL AUTOMATED ANEU  --   --  0.2* ABL  --   --  0.4* ABM  --   --  0.0* ROSA ISELA  --   --  0.0 ABB  --   --  0.0 AIG  --   --  0.0 Recent Labs  
  02/23/20 0205 02/22/20 0306 02/21/20 
0246 * 135* 133* K 3.5 3.8 3.9 CL 97* 97* 94* CO2 32 32 32 AGAP 5* 6* 7 * 130* 126* BUN 28* 26* 25* CREA 0.64 0.62 0.67 GFRAA >60 >60 >60 GFRNA >60 >60 >60  
CA 8.2* 8.4 8.4 SGOT 39* 63* 57* AP 56 62 55  
TP 5.9* 6.5 6.3 ALB 1.8* 2.2* 2.0*  
GLOB 4.1* 4.3* 4.3* AGRAT 0.4* 0.5* 0.5* MG 1.7* 1.9 1.3* Imaging: XR CHEST PA LAT [[id number]] Collected: 02/21/20 0931 Order Status: Completed Updated: 02/21/20 5251 Narrative:    
EXAMINATION: CHEST RADIOGRAPH 2/21/2020 9:00 AM 
 
ACCESSION NUMBER: [id number] INDICATION: neutropenic fever COMPARISON: Chest x-ray 2/19/2020, chest CT 2/5/2020 TECHNIQUE: PA and lateral views of the chest were obtained. FINDINGS:  
 
Cardiac Silhouette: Unchanged widening of the superior mediastinum. Unchanged positioning of a left-sided central venous access port and of a 
cardiac pacemaker. Lungs: Unchanged moderate right pleural effusion and right basilar opacity. Pleura: No definite left pleural effusion. No pneumothorax. Osseous Structures: Prior median sternotomy and coronary artery bypass grafting. Thoracic spine spondylosis. Upper Abdomen: Abdominal aortic atherosclerosis. Impression:    
IMPRESSION: 
 
1. Unchanged moderate right pleural effusion and right basilar opacity, which 
may be compressive atelectasis or infection. 2. Unchanged widening of the superior mediastinum, corresponding to the superior 
mediastinal mass on the CT of 2/5/2020. 
 
3. Follow-up to resolution is recommended. VOICE DICTATED BY: Dr. Stuart Chávez XR CHEST SNGL V [[id number]] Collected: 02/19/20 0449 Order Status: Completed Updated: 02/19/20 0501 Narrative:    
EXAM: Chest x-ray. INDICATION: Dyspnea. COMPARISON: February 17, 2020. TECHNIQUE: Frontal view chest x-ray. FINDINGS: Mild bibasilar lung atelectasis or infiltrates and a small right 
pleural effusion are unchanged. No pneumothorax or left pleural effusion is 
identified. The cardiac size is within normal limits. Again noted is a prior 
sternotomy, a left chest wall pacemaker, a left chest wall infusion port 
catheter in the patient's known upper mediastinal mass. Impression:    
IMPRESSION: Unchanged mild bibasilar lung atelectasis or infiltrates and small 
right pleural effusion. XR CHEST SNGL V [[id number]] Collected: 02/17/20 2023 Order Status: Completed Updated: 02/17/20 2026 Narrative:    
Portable chest x-ray Clinical indications: Hypoxia. FINDINGS: Single AP view the chest compared to a similar exam dated 2/12/2020 
shows airspace opacity at the right lung base with a right pleural effusion. Atelectasis or pneumonia should be considered. The left lung is stable and 
clear. A pacer device is in place. Impression:    
IMPRESSION: No significant interval change with right lower lung airspace 
opacity and a right pleural effusion. Atelectasis or pneumonia should be 
considered. XR CHEST SNGL V [[id number]] Collected: 02/12/20 1439 Order Status: Completed Updated: 02/12/20 1442 Narrative:    
CHEST X-RAY, single portable view  2/12/2020 History: Hypoxemia and cough. Technique: Single frontal view of the chest. 
 
Comparison: Chest x-ray 2/10/2020 Findings: A stable left-sided intracardiac device is seen. A stable left-sided venous port is seen. Stable mild cardiomegaly is seen. Lungs are expanded without 
appreciable pneumothorax. Stable masslike density along the right upper 
paramediastinal border. Increasing now small to moderate right basilar pleural 
effusion. Impression:    
IMPRESSION: 1.  Increasing now small to moderate basilar pleural effusion. The etiology of 
effusion is uncertain although this could represent sequela of heart 
failure/fluid overload given the associated cardiomegaly and the rapid onset of 
this finding. XR CHEST PA LAT [[id number]] Collected: 02/10/20 1746 Order Status: Completed Updated: 02/10/20 1752 Narrative:    
Chest 2 view CLINICAL INDICATION: Acute worsening dyspnea, moderate to severe; SVC syndrome, 
mediastinal mass COMPARISON: 1/23/2020 radiograph, 1/30/2020 CT PET, and 2/5/2020 CT with 
contrast 
 
TECHNIQUE: Sitting upright AP and lateral views of the chest  
 
FINDINGS: Patient is rotated to the left. Lung volumes are slightly shallow. There is artifact due to external tubing. There is interval mild to moderate right hemidiaphragm elevation. There is 
slightly increased mild groundglass and reticular opacity in the right lower 
lobe. No evidence of a pneumothorax, significant pleural effusion, or overt CHF. The mediastinal and hilar contours again demonstrate widening consistent with 
the known masses that were better evaluated on the recent CT, previous CABG, 
left side portacatheter, and dual-lead pacemaker. The bone density is again low throughout. No acute fracture or dislocation is 
evident. Impression:    
IMPRESSION:  
1. Right lower lobe infiltrate. 2. Mediastinal masses compatible with malignancy, further evaluated on recent CT 
and PET. Medications: 
Current Facility-Administered Medications Medication Dose Route Frequency  magnesium sulfate 2 g/50 ml IVPB (premix or compounded)  2 g IntraVENous ONCE  
  magnesium oxide (MAG-OX) tablet 400 mg  400 mg Oral TID  cefepime (MAXIPIME) 2 g in 0.9% sodium chloride (MBP/ADV) 100 mL  2 g IntraVENous Q8H  
 vancomycin (VANCOCIN) 750 mg in  mL infusion  750 mg IntraVENous Q12H  tbo-filgrastim (GRANIX) injection 300 mcg  300 mcg SubCUTAneous QPM  
 0.9% sodium chloride infusion 250 mL  250 mL IntraVENous PRN  
 diphenhydrAMINE (BENADRYL) capsule 25 mg  25 mg Oral Q6H PRN  
 acetaminophen (TYLENOL) tablet 650 mg  650 mg Oral Q6H PRN  
 nystatin (MYCOSTATIN) 100,000 unit/mL oral suspension 500,000 Units  500,000 Units Oral QID  LORazepam (ATIVAN) tablet 0.25 mg  0.25 mg Oral Q6H PRN  
 morphine (ROXANOL) concentrated oral syringe 10 mg  10 mg Oral Q6H PRN  
 furosemide (LASIX) tablet 20 mg  20 mg Oral DAILY  guaiFENesin ER (MUCINEX) tablet 1,200 mg  1,200 mg Oral Q12H  
 tiotropium bromide (SPIRIVA RESPIMAT) 2.5 mcg /actuation  2 Puff Inhalation DAILY  central line flush (saline) syringe 10 mL  10 mL InterCATHeter PRN  
 heparin (porcine) pf 300 Units  300 Units InterCATHeter PRN  polyethylene glycol (MIRALAX) packet 17 g  17 g Oral DAILY  senna-docusate (PERICOLACE) 8.6-50 mg per tablet 1 Tab  1 Tab Oral DAILY  predniSONE (DELTASONE) tablet 40 mg  40 mg Oral DAILY WITH BREAKFAST  albuterol (PROVENTIL VENTOLIN) nebulizer solution 2.5 mg  2.5 mg Nebulization TID RT  
 [Held by provider] amLODIPine (NORVASC) tablet 5 mg  5 mg Oral DAILY  atorvastatin (LIPITOR) tablet 80 mg  80 mg Oral DAILY  [Held by provider] carvediloL (COREG) tablet 6.25 mg  6.25 mg Oral BID WITH MEALS  
 [Held by provider] enoxaparin (LOVENOX) injection 30 mg  30 mg SubCUTAneous Q24H  
 albuterol (PROVENTIL VENTOLIN) nebulizer solution 2.5 mg  2.5 mg Nebulization Q4H PRN  
 morphine CR (MS CONTIN) tablet 60 mg  60 mg Oral Q12H  
 morphine IR (MS IR) tablet 15 mg  15 mg Oral Q4H PRN  
 ondansetron (ZOFRAN ODT) tablet 8 mg  8 mg Oral Q8H PRN  
  prochlorperazine (COMPAZINE) tablet 10 mg  10 mg Oral Q6H PRN  
 sertraline (ZOLOFT) tablet 25 mg  25 mg Oral DAILY ASSESSMENT: 
 
Problem List  Date Reviewed: 2/19/2020 Codes Class Noted * (Principal) Lung cancer ([facility] ICD-10-CM: C34.90 ICD-9-CM: 162.9  2/10/2020 Hypoxia ICD-10-CM: R09.02 
ICD-9-CM: 799.02  2/10/2020 Malignant neoplasm of overlapping sites of left lung [facility]) ICD-10-CM: C34.82 
ICD-9-CM: 162.8  1/22/2020 Hypokalemia ICD-10-CM: E87.6 ICD-9-CM: 276.8  12/7/2019 Chest pain ICD-10-CM: R07.9 ICD-9-CM: 786.50  12/6/2019 Chronic tension-type headache, intractable ICD-10-CM: M64.801 ICD-9-CM: 339.12  5/13/2019 Cigarette nicotine dependence without complication [id number]: D79.555 ICD-9-CM: 305.1  7/3/2018 COPD exacerbation ([facility] ICD-10-CM: J44.1 ICD-9-CM: 491.21  Unknown Recurrent UTI ICD-10-CM: N39.0 ICD-9-CM: 599.0  4/24/2018 Gross hematuria ICD-10-CM: R31.0 ICD-9-CM: 599.71  4/24/2018 Urinary retention ICD-10-CM: R33.9 ICD-9-CM: 788.20  4/24/2018 Sick sinus syndrome (HCC) (Chronic) ICD-10-CM: I49.5 ICD-9-CM: 427.81  9/29/2017 IBS (irritable bowel syndrome) ICD-10-CM: K58.9 ICD-9-CM: 564.1  Unknown 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 
ICD-9-CM: 433.20  12/28/2016 Encounter for medication management ICD-10-CM: I78.544 ICD-9-CM: V58.69  12/28/2016 Neuropathy (Chronic) ICD-10-CM: G62.9 ICD-9-CM: 355.9  12/28/2016 Overview Signed 7/28/2017  2:20 PM by Gloria Sigala MD  
  Last Assessment & Plan:  
Patient is currently on gabapentin 600 mg. Medication may cause increased drowsiness with Rispirdol, however pt has no complaints today. Intractable migraine with aura without status migrainosus ICD-10-CM: G43.119 ICD-9-CM: 346.01  12/28/2016 Peripheral vascular disease (HCC) (Chronic) ICD-10-CM: I73.9 ICD-9-CM: 443.9  12/1/2016 Overview Signed 12/1/2016  4:23 PM by Garret Quezada MD  
  CTA (2/11/16): Moderate to severe stenosis in the proximal right subclavian artery. Borderline personality disorder ([facility] ICD-10-CM: F60.3 ICD-9-CM: 301.83  Unknown Overview Signed 7/28/2017  2:20 PM by Epi Tinoco MD  
  Last Assessment & Plan: 1. Continue inpatient hospitalization 2. Lithium level 1.5. Will hold am dose and change to 300mg PO BID. Will recheck in 3 days. 3.  Continue Neurontin 4. Consider Vistaril PRN if indicated 5. Encourage active participation in groups and on the milieu 6. Consider discharge and aftercare needs 7. Encourage abstinence from drugs of abuse Cerebrovascular disease ICD-10-CM: I67.9 ICD-9-CM: 437.9  Unknown Overview Signed 12/1/2016  4:23 PM by Garret Quezada MD  
  CTA of the neck (2/11/16) : No significant stenosis of the right carotid artery. 37% stenosis in the proximal left internal carotid artery. Severe stenosis at the origin of the left vertebral artery Chronic hepatitis C ([facility] ICD-10-CM: B18.2 ICD-9-CM: 070.54  Unknown Chronic pain ICD-10-CM: G89.29 ICD-9-CM: 338.29  Unknown Overview Signed 7/28/2017  2:20 PM by Epi Tinoco MD  
  Last Assessment & Plan:  
Patient has history of chronic neck and back pain secondary to degenerative changes. She was recently prescribed a 15 day supply of Dilaudid 2 mg TID by \"pain management doctor\". This script was filled on 5.8. 17. Plan: - Given patient's history of substance use, she would largely benefit from detox of narcotics. Will try conservative methods while inpatient for back pain as well as neck and shoulder pain. Continue Ultram 100 mg Q6H while we await permission to speak to pain management doctor.  
[facility] office in regards to patient's pain management contract. Patient has been dismissed from his services for positive UDS. - Avoid Toradol given her elevated Creatinine and glucose at time of admission. A1c normal at 5.4. ASCUS with positive high risk HPV cervical ICD-10-CM: R87.610, R87.810 ICD-9-CM: 795.01, 795.05  Unknown Pacemaker ICD-10-CM: Z95.0 ICD-9-CM: V45.01  8/10/2016 Overview Signed 8/10/2016 10:47 AM by Merry Estrada MD  
  1. [employer]  :  Due to sick sinus syndrome. Chronic diastolic congestive heart failure (HCC) (Chronic) ICD-10-CM: I50.32 
ICD-9-CM: 428.32, 428.0  8/9/2016 Overview Addendum 8/27/2019  5:11 PM by Merry Estrada MD  
  Admitted [facility] 8/12/11 with CP and elevated BNP at 352. 
1.  Echo (8/3/11):  EF > 55%. Mild LVH. Pseudonormal LV filling pattern. Bi-atrial enlargement. .  Mild mitral regurgitation. 2.  Echo (8/22/19):  EF 65-70%. Indeterminant diastolic function. Mild LAE. Coronary atherosclerosis of native coronary vessel (Chronic) ICD-10-CM: I25.10 ICD-9-CM: 414.01  8/9/2016 Overview Addendum 12/9/2019  7:53 AM by Merry Estrada MD  
  1.  3 Vessel CABG (7/8/11):  MIR to LAD. LIMA in Y graft to superior and inferior diagonal. 
2.  Cath (8/31/12): Occluded grafts. FFR of LAD 0.89. Similar stenosis of diagonal but no FFR done. 3.  LHC (11/1/17): Mild to moderate nonhemodynamically significant CAD. Normal IFR of LAD (40%: 0.98) and Diagonal (50%: 0.98) 4. LHC (12/7/19): Stable CAD. Tobacco use disorder (Chronic) ICD-10-CM: K09.405 ICD-9-CM: 305.1  Unknown HTN (hypertension), benign (Chronic) ICD-10-CM: I10 
ICD-9-CM: 401.1  8/9/2016 Dyslipidemia (Chronic) ICD-10-CM: Q65.9 ICD-9-CM: 272.4  8/9/2016 Abnormal MRI of head ICD-10-CM: R93.0 ICD-9-CM: 793.0  1/1/2015 Overview Signed 8/27/2016  4:28 PM by Gabriela Mendes MD  
  Frontal Lobe periventricular WM disease. Ruled out for MS by Neurology Cocaine abuse in remission [facility]) ICD-10-CM: F14.11 ICD-9-CM: 305.63  9/18/2012 S/P CABG x 3 (Chronic) ICD-10-CM: Z95.1 ICD-9-CM: V45.81  9/18/2012 Bipolar disorder (HCC) (Chronic) ICD-10-CM: F31.9 ICD-9-CM: 296.80  9/18/2012 Acute respiratory failure with hypoxia [facility]) ICD-10-CM: J96.01 
ICD-9-CM: 518.81  7/12/2011  
   
  
 
   
48 y.o. F consulted for cancer in 1/2020. She worked up neck/upper chest pain with CT finding of upper mediastinal mass, EBUS 1/22/20 with FNA showed malignant cells with broad IHC negativity except for suggestion of liver origin. She had active drug abuse as documented reason being discharged from her prior pain management. She presented to [facility] on 1/30/20, has missed the scheduled PET, reported moved in with her brother and a friend drove her to visit.  We discussed her rather atypical cancer presentation, called radiology to urgently re-schedule PET to further define the origin of cancer and extent, discussed her narco management and she gave inconsistent answers regarding whether and when she used cocaine, also stated she stopped pain med herself rather then being discharged by Dr. Madeleine Arizmendi, although she appeared in pain, involved SW and palliative care, signed narco contract, yet urine screen positive for cocaine, then she changed her story of last use 1 week ago but that should not lead to positive urine test either, discussed the breaking of contract and trust voids prescription of narco. PET 1/30/20 showed large right mediastinal/supraclavicle avid disease but no evidence of disease otherwise and no suspicious disease in the liver, I reviewed PET personally and discussed to have urgent excisional biopsy for more tissue of histology and molecular tests, meanwhile I would not rec treat this as liver cancer, but rather treat with protocol of limited stage SCLC or stage III NSCLC if brain MRI negative to aim at possible cure or durable control, she was agreeable and Adam will arrange for urgent biopsy/port/rad onc eval, start cisplatin/etoposide/XRT 2/10/20, hyponatremia and weight gain concern of SIADH and instructed not to drink excessively and monitor, SVC syndrome and monitor closely for rx response, however she was not able to tolerate pre-hydration for cisplatin and admit for SOB likely related to SVC syndrome, administer chemo inpt with slow rate, may dosing XRT upfront for SVC relief, IR eval if needed. PLAN: 
Lung cancer 
-supposed to start Cis/VP16/XRT yesterday. 2/11 plan for chemo tomorrow if echo ok. XRT plan is not completed yet. 2/12 start chemo today if echo ok. Over to XRT for rescanning today. 2/13 D2 Cis/VP16 not given 2/14 Will give D 2 cis/VP16 as patient wishes to proceed with chemotherapy. 2/15 Proceed with C1D3 Cis/Etop today 2/21 Completed C1 cis/etop on 2/15. C2 due 3/4. Has not started XRT yet. Shortness of breath/Cough 
- on 2 LPM NC. Mucinex BID. CXR with known malignancy and RLL infiltrate. 2/11 start cef/vanc for possible PNA. Check echo. 2/12 continues on cef/vanc. Echo pending. Increased oxygen needs overnight, up to 5 LPM. pulm consult pending. Check BNP for completeness. Echo still pending. 2/13 echo ok with EF of 55-60%. BNP elevated. Lasix 20 mg daily PRN if I>O by 500 cc. Lasix given this AM. Pulmonology also following as well. On high flow at 40 LPM. Continues on cef/vanc for possible PNA. Currently able to use bedpan for urination- may need jara placed. 2/14 check blood gas. Ox 95-98% on high alon at 40 lpm. Back on cef/vanc. 2/15 O2 down to 5L, remains on Cef/Vanc 
2/16 O2 down to 3L today, continue Cef/Vanc/steroids/nebs 2/18 O2 at 3L, D5/7 Cef/Vanc 
2/19 On optiflow. Pulm DC'd Vanc yesterday. Con't Cef. Tmax 100.6. Repeat CXR with no changes. Check UA, BCx. Check BNP. 
2/20 Optiflow @ 30L. Tmax 100.5. UA neg. BCx pending.   On Cef, completes tomorrow. BNP 2115. On Lasix 20mg IV daily. Pulm following. 
2/21 O2 down to 4L. Pulm changed lasix to oral.  Pulm following. 
2/22 O2 down to 2 LPM.. Neutropenic fever 2/21 Tmax 101.1. BCx-NGTD.  UCx/sputum Cx pending. Repeat CXR. Con't Cef. Vanc ordered. 2/22 afebrile. On cef/vanc. Repeat CXR with unchanged widening of the mediastinum and unchanged moderate right pleural effusion and right basilar opacity which may be compressive atelectasis or infection. Blood cultures and urine culture NGTD.  
2/23 BC and urine culture NGTD. Continues on cef/vanc. ?SIDAH/hyponatremia - On IV fluids at this time with some improvement. Check urine sodium. Consult nephro 2/12 neph consult pending.  today. 2/13 appreciate nephros recommendations. NA up to 133 today. 2/14 Na improved up to 135.  
2/20 Na+ 134 
2/22  Confusion 
-ongoing confusion noted. UDS pending. 2/17 more alert and clearer today 2/18 alert, oriented 2/23 very lethargic this AM. Looks uncomfortable. Check CT head, EKG, cardiac enzymes, and ABG. Repeat CXR ordered. Oral candidiasis 2/20 Nystatin ordered Pancytopenia secondary to chemotherapy - Transfuse prn per Cari SOPs 
2/21 Start Granix Continue home medications Cari SOPs Lovenox for DVT prophylaxis (hold for plt <50k) Patient may not be able to receive STR on discharge per nursing staff- will need to discuss with CM on Monday. Goals and plan of care reviewed with the patient. All questions answered to the best of our ability. JOHN Iglesias [facility] Hematology and Oncology [id number] [place] [address] Office : [phone] Fax : [phone] Attending Addendum: 
I have personally performed a face to face diagnostic evaluation on this patient.  I have reviewed and agree with the care plan as documented above by  Jenae Musa, N.P.  My findings are as follows: Patient appears lethargic, heart rate regular without murmurs, abdomen is non-tender, bowel sounds are positive.  54 female history of thoracic malignancy status post recent concurrent cisplatin/etoposideXRT, cycle 1 was completed 2/15/2020.  Reports right neck swelling much improved.  Shortness of breath: pulmonology following.  Remains on broad-spectrum IV antibiotics as well as prednisone. High BNP, now being diuresed. Continue ongoing care, improving. [address] dropping, will start granixWill benefit from rehab on discharge. Somnolent today: will w/u as above including CT hesd, EKG/cardiac enzymes, CXR and ABG.   
  
Total time 35 min 50% in direct consultation about the patient's diagnosis and management 
  
 
  
Alberto Molina MD 
[facility] Hematology/Oncology [address] [place] [address] Office : [phone] Fax : [phone]

## 2020-02-23 NOTE — PROGRESS NOTES
Patient is too drowsy to talk about directives Kassie Sebastian, staff [address], [id number] [facility] Taj  /   [email or web address]

## 2020-02-23 NOTE — PROGRESS NOTES
[facility] OUTREACH NURSE PROGRESS REPORT SUBJECTIVE: Called to assess patient secondary to NP concern. MEWS Score: 1 (02/23/20 1605) Vitals:  
 02/23/20 0839 02/23/20 1140 02/23/20 1417 02/23/20 1605 BP:  (!) 128/98  114/67 Pulse:  (!) 108  87 Resp:  20  17 Temp:  100.2 °F (37.9 °C)  98 °F (36.7 °C) SpO2: 98% 90% 99% 90% Weight:      
  
 
LAB DATA: 
 
Recent Labs  
  02/23/20 
0205 02/22/20 
0306 02/21/20 
0246 * 135* 133* K 3.5 3.8 3.9 CL 97* 97* 94* CO2 32 32 32 AGAP 5* 6* 7 * 130* 126* BUN 28* 26* 25* CREA 0.64 0.62 0.67 GFRAA >60 >60 >60 GFRNA >60 >60 >60  
CA 8.2* 8.4 8.4 MG 1.7* 1.9 1.3* ALB 1.8* 2.2* 2.0*  
TP 5.9* 6.5 6.3 GLOB 4.1* 4.3* 4.3* AGRAT 0.4* 0.5* 0.5* ALT 59 85* 66* Recent Labs  
  02/23/20 
0205 02/22/20 
0306 02/21/20 
0246 WBC 0.1* 0.3* 0.6* HGB 7.6* 9.4* 7.5* HCT 23.3* 28.9* 23.4*  
PLT 31* 45* 63* OBJECTIVE: On arrival to room, I found patient to be sitting up in bed. Pain Assessment Pain Intensity 1: 4 (02/23/20 1804) Pain Location 1: Back, Neck Pain Intervention(s) 1: Medication (see MAR) Patient Stated Pain Goal: 0 
 
  
  
  
  
 
  
  
  
   
 
ASSESSMENT:  Primary RN at bedside changing port needle, discussed situation with patient and RN. Pt's lungs sound wet upon ausculation. 6L NC present and dyspneic. Pt states \"please don't let me die. \" BNP just sent per primary RN. Pt discussed with Dr. Landry Gunn. Orders received to start CPAP at night with abg results from today (CO2 in 60s). PLAN:  Continue to monitor per outreach protocol.

## 2020-02-23 NOTE — PROGRESS NOTES
Attempted to follow up on request for directives She is resting Spoke with staff Will try later Read Nitish, staff [address], [id number] [facility] Taj  /   [email or web address]

## 2020-02-24 NOTE — PROGRESS NOTES
[facility] Admission Date: 2/10/2020 Daily Progress Note: 2/24/2020 The patient's chart is reviewed and the patient is discussed with the staff. 54 y.o.  female seen and evaluated at the request of Dr. Pradeep Rico. .  Pt is well known to [facility] with a history of cocaine abuse(as recent as 1/10/2020 reportedly, + UDS on 1/30/2020), tobacco abuse 1/2 ppd x 38 years, COPD, chronic pain, DLD, chronic hepatitis C, chronic diastolic CHF, CAD, bipolar disorder with borderline personality disorder, CAD s/ CABG, and ASCUS with positive high risk HPV cervical. Pt presented to the ER on 1/13/2020 with cough, shortness of breath and R sided chest pain. CXR identified new widening of the superior mediastinum in comparison to recent prior chest x-rays. Chest CT showed an incompletely characterized anterior mediastinal mass extending to the right supraclavicular region, but appearing to be separate from the thyroid gland, measuring approximately 3.9 cm in AP diameter.  Pt was seen and underwent bronch with EBUS and FNA with airway inspection. The 4R location tumor was biopsied and positive for malignancy on SATNAM. Cytology from FNA of 2R-4R location of lung mass, revealed the presence of malignant cells. Immunohistochemical features were suggestive of carcinoma of hepatic origin given staining for cytoplasmic TTF-1 as well as arginase. 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 Pt had enlarged hypermetabolic mediastinal LN and soft tissue mass in the R supraclavicular soft tissues  Measuring 4.9x2.9cm separate from thyroid.  Pt was referred to oncology and admitted on 2/10/2020.  
  She has now completed her first round of chemo using cisplatin/etoposide (2/15). She was treated with levaquin for suspected post obstructive pneumonia. Increased oxygen needs on 2/19 with fever - back on antibiotics. Subjective: On 4L with sat of 96%. Was on 6L earlier. Lethargic and UDS sent after ex  came to visit. UDS is still pending. Current Facility-Administered Medications Medication Dose Route Frequency  Vancomycin Trough Reminder   Other ONCE  
 morphine CR (MS CONTIN) tablet 30 mg  30 mg Oral Q12H  potassium chloride (K-DUR, KLOR-CON) SR tablet 40 mEq  40 mEq Oral BID  vancomycin (VANCOCIN) 500 mg in 0.9% sodium chloride 100 mL IVPB  500 mg IntraVENous Q12H  
 magnesium oxide (MAG-OX) tablet 400 mg  400 mg Oral TID  cefepime (MAXIPIME) 2 g in 0.9% sodium chloride (MBP/ADV) 100 mL  2 g IntraVENous Q8H  
 tbo-filgrastim (GRANIX) injection 300 mcg  300 mcg SubCUTAneous QPM  
 0.9% sodium chloride infusion 250 mL  250 mL IntraVENous PRN  
 diphenhydrAMINE (BENADRYL) capsule 25 mg  25 mg Oral Q6H PRN  
 acetaminophen (TYLENOL) tablet 650 mg  650 mg Oral Q6H PRN  
 nystatin (MYCOSTATIN) 100,000 unit/mL oral suspension 500,000 Units  500,000 Units Oral QID  LORazepam (ATIVAN) tablet 0.25 mg  0.25 mg Oral Q6H PRN  
 morphine (ROXANOL) concentrated oral syringe 10 mg  10 mg Oral Q6H PRN  
 furosemide (LASIX) tablet 20 mg  20 mg Oral DAILY  guaiFENesin ER (MUCINEX) tablet 1,200 mg  1,200 mg Oral Q12H  
 tiotropium bromide (SPIRIVA RESPIMAT) 2.5 mcg /actuation  2 Puff Inhalation DAILY  central line flush (saline) syringe 10 mL  10 mL InterCATHeter PRN  
 heparin (porcine) pf 300 Units  300 Units InterCATHeter PRN  polyethylene glycol (MIRALAX) packet 17 g  17 g Oral DAILY  senna-docusate (PERICOLACE) 8.6-50 mg per tablet 1 Tab  1 Tab Oral DAILY  predniSONE (DELTASONE) tablet 40 mg  40 mg Oral DAILY WITH BREAKFAST  albuterol (PROVENTIL VENTOLIN) nebulizer solution 2.5 mg  2.5 mg Nebulization TID RT  
  [Held by provider] amLODIPine (NORVASC) tablet 5 mg  5 mg Oral DAILY  atorvastatin (LIPITOR) tablet 80 mg  80 mg Oral DAILY  [Held by provider] carvediloL (COREG) tablet 6.25 mg  6.25 mg Oral BID WITH MEALS  
 [Held by provider] enoxaparin (LOVENOX) injection 30 mg  30 mg SubCUTAneous Q24H  
 albuterol (PROVENTIL VENTOLIN) nebulizer solution 2.5 mg  2.5 mg Nebulization Q4H PRN  
 morphine IR (MS IR) tablet 15 mg  15 mg Oral Q4H PRN  
 ondansetron (ZOFRAN ODT) tablet 8 mg  8 mg Oral Q8H PRN  prochlorperazine (COMPAZINE) tablet 10 mg  10 mg Oral Q6H PRN  
 sertraline (ZOLOFT) tablet 25 mg  25 mg Oral DAILY Review of Systems Constitutional: Positive for malaise/fatigue. Respiratory: Positive for cough, sputum production and shortness of breath. Cardiovascular: Negative. Gastrointestinal: Negative. Objective:  
 
Vitals:  
 02/24/20 0724 02/24/20 0819 02/24/20 1137 02/24/20 1219 BP: 159/74  104/70 Pulse: 72  91 Resp: 22  20 Temp: 98.1 °F (36.7 °C)  98.1 °F (36.7 °C) SpO2: 94% 97% 93% 96% Weight:      
 
Intake and Output:  
02/22 1901 - 02/24 0700 In: 2805 [P.O.:497; I.V.:1167] Out: 525 [Urine:525] 02/24 0701 - 02/24 1900 In: -  
Out: 400 [Urine:400] Physical Exam:  
Constitutional:  the patient is well developed and in no acute distress HEENT:  Sclera clear, pupils equal, oral mucosa moist 
Lungs: crackles from posterior. Decreased breath sounds on the right from posterior. Wearing 4L Cardiovascular:  RRR without M,G,R 
Abd/GI: soft and non-tender; with positive bowel sounds. Ext: warm without cyanosis. There is less edema in her arms Musculoskeletal: moves all four extremities with equal strength Skin:  no jaundice or rashes, no wounds Neuro: Very lethargic Musculoskeletal: can ambulate. No deformity Psychiatric: Calm. Lines: port CHEST XRAY:   
 
 
LAB Recent Labs  
  02/24/20 
0341 02/23/20 
0205 02/22/20 
5459 WBC 0.2* 0.1* 0.3*  
 HGB 8.2* 7.6* 9.4* HCT 25.1* 23.3* 28.9*  
PLT 51* 31* 45* Recent Labs  
  02/24/20 
0341 02/23/20 
0205 02/22/20 
9775  134* 135* K 3.0* 3.5 3.8 CL 95* 97* 97* CO2 36* 32 32 * 169* 130* BUN 24* 28* 26* CREA 0.59* 0.64 0.62  
MG 2.1 1.7* 1.9 ALB 1.9* 1.8* 2.2*  
SGOT 48* 39* 63* Assessment:  (Medical Decision Making) Hospital Problems  Date Reviewed: 2/19/2020 Codes Class Noted POA * (Principal) Lung cancer ([facility] ICD-10-CM: C34.90 ICD-9-CM: 162.9  2/10/2020 Yes S/p first round of chemo Hypoxia ICD-10-CM: R09.02 
ICD-9-CM: 799.02  2/10/2020 Yes Now on optiflow - had been weaned to nasal cannula earlier this week COPD exacerbation ([facility].) ICD-10-CM: J44.1 ICD-9-CM: 491.21  Unknown Yes No wheezing on exam.  
 Acute respiratory failure with hypoxia [facility]) ICD-10-CM: J96.01 
ICD-9-CM: 518.81  7/12/2011 Yes As above Plan:  (Medical Decision Making) Hold further narcotics and sedating meds unless MS improves. Check UDS. Replete K.  
ABG prn further decreased LOC. Continue ABx.  
 
Kalyani Proctor MD

## 2020-02-24 NOTE — PROGRESS NOTES
0700-Bedside report received from [place], [address] [place] [address]. Resting in bed. No needs voiced. No s/s of acute distress. 1537-Took O2 off. Sat=85% Placed on 15L. Sat up to 95%. Confirmed with Alvera Setter, RT that he dropped to 4lpm. Placed on 4L. Sat=91% 1731-Pt will open eyes but does not answer questions. Pupils 4 mm. Has difficulty sitting up. Leans to the left side. When nurse turns back is cognizant enough to pull up blankets with what appears to be deliberate movements. VSS with a BP of 145/65 and an O2 sat of 94%. Pulmonary notes reviewed. Recommends ABGs prn if MS does not improve from when they saw her at 1400ish. Called Mariano Avelar NP and notified her of above. Orders for stat ABG received. 1740-Cindy, outreach RN assessing patient. 1812-Narcan given. Pt now moaning and more awake.

## 2020-02-24 NOTE — PROGRESS NOTES
Critical Care Outreach Nurse Progress Report: 
 
Subjective: In to assess pt secondary to f/u MD concern. MEWS Score: 1 (02/24/20 0245) Vitals:  
 02/24/20 3735 02/24/20 7469 02/24/20 1137 02/24/20 1219 BP: 159/74  104/70 Pulse: 72  91 Resp: 22  20 Temp: 98.1 °F (36.7 °C)  98.1 °F (36.7 °C) SpO2: 94% 97% 93% 96% Weight:      
  
 
Objective: Pt sitting up in bed, swaying back in forth, lethargic and unable to hold conversation. Pain Intensity 1: 0 (02/24/20 0245) Pain Location 1: Neck Pain Intervention(s) 1: Medication (see MAR) Patient Stated Pain Goal: 0 Assessment: Pt lethargic, opens eyes briefly to voice before dozing back off. Oriented to [employer]" and says the doctors are talking to her about \"lung cancer. \" Unable to hold intelligible conversation. Lung sounds coarse w/scattered crackles and wheezes. O2 Sat 94% on 4L NC, HR 74. BP stable. Pt does not appear to be in any pain at this time. Plan: Will follow per outreach protocol.

## 2020-02-24 NOTE — PROGRESS NOTES
Problem: Falls - Risk of 
Goal: *Absence of Falls Description Document Shira Miller Fall Risk and appropriate interventions in the flowsheet. Outcome: Progressing Towards Goal 
Note: Fall Risk Interventions: 
Mobility Interventions: Patient to call before getting OOB Mentation Interventions: Adequate sleep, hydration, pain control, Bed/chair exit alarm Medication Interventions: Bed/chair exit alarm Elimination Interventions: Call light in reach, Bed/chair exit alarm History of Falls Interventions: Bed/chair exit alarm

## 2020-02-24 NOTE — PROGRESS NOTES
Date of Outreach Update: 
Aramis Centeno was seen and assessed. MEWS Score: 1 (02/23/20 1917) Vitals:  
 02/23/20 1900 02/23/20 1917 02/23/20 1923 02/23/20 2014 BP: 126/75 101/50 Pulse:  74 Resp:  18 Temp:  98.2 °F (36.8 °C) SpO2:  96%  91% Weight:   44.5 kg (98 lb 1.6 oz) Pain Assessment Pain Intensity 1: 0 (02/23/20 1945) Pain Location 1: Back, Neck Pain Intervention(s) 1: Medication (see MAR) Patient Stated Pain Goal: 0 Previous Outreach assessment has been reviewed. Pt in bed, very drowsy but responds to voice. Lung sounds better since receiving lasix, still on 6L. Sat 94% at this time. Going to attempt cpap at night if patient will allow/tolerate. Will follow. Will continue to follow up per outreach protocol. Signed By:   Petar Melvin RN   February 23, 2020 10:07 PM

## 2020-02-24 NOTE — PROGRESS NOTES
Palliative Care Progress Note Patient: Marisel Starks MRN: [id number]  SSN: [id number] YOB: 1965  Age: 48 y.o. Sex: female Assessment/Plan: Chief Complaint/Interval History: Lethargic, on O2 via NC Principal Diagnosis: · Altered Mental Status R41.82 Additional Diagnoses: · Agitation  R45.1 · Cough  R05 · Debility, Unspecified  R53.81 · Fatigue, Lethargy  R53.83 
· Frailty  R54 
· Hypersecretions, Respiratory  J39.9 · Pain, general  R52 · Encounter for Palliative Care  Z51.5 Palliative Performance Scale (PPS) PPS: 40 Medical Decision Making:  
Reviewed and summarized notes over last 48 hours Reviewed laboratory and x-ray data over last 48 hours Discussed with Dr Shanelle Green and [facility], RN Pt in bed, sitting up but lethargic. No visitors at bedside. Pt noted to be coughing up thick yellow sputum and spitting it in the bed. Helped pt clean up and encouraged her to spit into a tissue, which was provided. Pt endorses generalized pain at present. Otherwise, it is difficult to have conversation with her. She has had on and off agitation and confusion over the weekend, as well as CO2 retention. Discussed with Dr Shanelle Green- he feels pt is taking too much pain medication, and requests it be reduced. Will reduce MSContin to 30 mg q 12 hours. If pt does not utilize a lot of breakthrough, will reduce further. Will continue to follow. Will discuss findings with members of the interdisciplinary team.   
 
  
More than 50% of this 25 minute visit was spent counseling and coordination of care as outlined above. Subjective:  
 
Review of Systems: A comprehensive review of systems was negative except for:  
Constitutional: Positive for fatigue. Respiratory: Positive for cough, secretions MS: Positive for generalized pain Objective:  
 
Visit Vitals /74 (BP 1 Location: Left arm, BP Patient Position: At rest) Pulse 72 Temp 98.1 °F (36.7 °C) Resp 22 Wt 98 lb 1.6 oz (44.5 kg) SpO2 97% BMI 18.54 kg/m² Physical Exam: 
 
General:  Lethargic. Debilitated. No acute distress. Eyes:  Conjunctivae/corneas clear Nose: Nares normal. Septum midline. O2 via NC Neck: Supple, symmetrical, trachea midline Lungs:   Coarse bilaterally, unlabored. Heart:  Regular rate and rhythm Abdomen:   Soft, non-tender, non-distended. Extremities: Normal, atraumatic, no cyanosis or edema. Skin: Skin color, texture, turgor normal.  
Neurologic: Nonfocal.  
Psych: Lethargic. Oriented to person, place, and situation Signed By: Yanira Lutz NP February 24, 2020

## 2020-02-24 NOTE — PROGRESS NOTES
's visit requested by staff. I conveyed care and concern and explored patient's emotions. Ms. Ehsan Ortiz responded minimally during the visit. She seemed to have difficulty keeping her eyes open. I affirmed her emotions and offered a non-anxious presence. Ashleigh Lucas MDiv Board Certified [address] [place] [address]

## 2020-02-24 NOTE — PROGRESS NOTES
Critical Care Outreach Nurse Progress Report: 
 
Subjective: In to assess pt secondary to f/u MD concern. MEWS Score: 1 (02/24/20 0245) Vitals:  
 02/24/20 1137 02/24/20 1219 02/24/20 1512 02/24/20 1709 BP: 104/70  143/86 145/65 Pulse: 91  76 74 Resp: 20  20 16 Temp: 98.1 °F (36.7 °C)  97.4 °F (36.3 °C) SpO2: 93% 96% 91% 94% Weight:      
  
 
Objective: Pt lying in bed, somnolent, grimaces and opens eyes to sternal rub, O2 Sat 93% on 4L NC, RR 11 and pt having periods of apnea. Pt is DNR. ABG has been ordered. Pain Intensity 1: 0 (02/24/20 0245) Pain Location 1: Neck Pain Intervention(s) 1: Medication (see MAR) Patient Stated Pain Goal: 0 Assessment: Pt somnolent, only responding to sternal rub and noxious stimuli. Did wake up briefly during sternal rub and when asked where pt was, pt stated \"[place],\" but then fell back asleep. Lung sounds coarse/rhonchi. O2 Sat 93-94% on 4L HFNC, HR 65.  Bp stable. Plan: ABG drawn and results noted. Called and updated Dr. Anderson Beavers. Orders received for narcan 0.4mg IV x1 dose and see how pt responds. ABG:   
Lab Results Component Value Date/Time PHI 7.335 (L) 02/24/2020 06:01 PM  
 PCO2I 76.5 (HH) 02/24/2020 06:01 PM  
 PO2I 63 (L) 02/24/2020 06:01 PM  
 HCO3I 40.8 (H) 02/24/2020 06:01 PM  
 
Narcan given and pt now more awake, moaning, restless in bed, audible gurgling noted, pt has very weak cough and unable to clear secretions well. O2 Sat 94% on 4L HFNC, RR 18 now, HR 86. Dr. Nae Aguero at bedside to see pt. Orders received to give breathing treatment and start flutter valve. Will continue all other current care. Will monitor per outreach protocol.

## 2020-02-24 NOTE — PROGRESS NOTES
Date of Outreach Update: 
Deisi Aguilar was seen and assessed. MEWS Score: 1 (02/24/20 0245) Vitals:  
 02/23/20 2014 02/23/20 2336 02/24/20 0149 02/24/20 0245 BP:  139/79  118/71 Pulse:  91  88 Resp:  17  20 Temp:  99.6 °F (37.6 °C)  99.2 °F (37.3 °C) SpO2: 91% 93% 92% 95% Weight:      
  
 
 Pain Assessment Pain Intensity 1: 0 (02/24/20 0245) Pain Location 1: Neck Pain Intervention(s) 1: Medication (see MAR) Patient Stated Pain Goal: 0 Previous Outreach assessment has been reviewed. Pt sitting up in bed/anxious, refused to wear cpap mask over night. On 7L NC. SOB on exertion. Will continue to follow up per outreach protocol. Signed By:   Glenis Aj RN   February 24, 2020 5:53 AM

## 2020-02-24 NOTE — PROGRESS NOTES
Problem: Falls - Risk of 
Goal: *Absence of Falls Outcome: Progressing Towards Goal 
Note: Fall Risk Interventions: 
Mobility Interventions: Patient to call before getting OOB Mentation Interventions: Adequate sleep, hydration, pain control Medication Interventions: Bed/chair exit alarm Elimination Interventions: Call light in reach History of Falls Interventions: Bed/chair exit alarm

## 2020-02-24 NOTE — PROGRESS NOTES
andrés 
 
[facility] On[facility] Inpatient Hematology / Oncology Progress Note Admission Date: 2/10/2020  3:39 PM 
Reason for Admission/Hospital Course: Lung cancer ([address] [C34.90] Hypoxia [R09.02] 
 
 
24 Hour Events: 
Afeb on 6LPM now Sitting up in the bed; somnolent and confused ABG with elevated CO2 - pulm following Repeat UDS Completed C1 cis/etop on 2/15 ROS: 
Constitutional: +fatigue. Negative for fever. CV: Negative for chest pain, palpitations, edema. Respiratory: +dyspnea +wheezing GI: Negative for nausea, abdominal pain, diarrhea. 10 point review of systems is otherwise negative with the exception of the elements mentioned above in the HPI. Allergies Allergen Reactions  Lithium Analogues Unknown (comments)  Morphine (Pf) Rash Pt is currently taking Morphine 30mg QID  Other Medication Swelling Whatever holds Vit d pill together OBJECTIVE: 
Patient Vitals for the past 8 hrs: 
 BP Temp Pulse Resp SpO2  
20 0819     97 % 20 0724 159/74 98.1 °F (36.7 °C) 72 22 94 % 20 0245 118/71 99.2 °F (37.3 °C) 88 20 95 % 20 0149     92 % Temp (24hrs), Av.9 °F (37.2 °C), Min:98 °F (36.7 °C), Max:100.2 °F (37.9 °C) No intake/output data recorded. Physical Exam: 
Constitutional: Ill appearing  female in no distress, sitting up in the bed. Appears drowsy and is somewhat confused as she does not recall recent conversation. HEENT: Normocephalic and atraumatic. Oropharynx with white patches; mucous membranes are moist.  Sclerae anicteric. Neck supple without JVD. No thyromegaly present. Skin Warm and dry. No bruising and no rash noted. No erythema. No pallor. Respiratory Lungs coarse with scattered wheezes bilaterally; normal air exchange. Using abdominal accessory muscles. No retractions. On NC.   
CVS Normal rate, regular rhythm and normal S1 and S2. No murmurs, gallops, or rubs. Abdomen Soft, nontender and nondistended, normoactive bowel sounds. No palpable mass. No hepatosplenomegaly. Neuro No obvious deficits MSK Normal range of motion in general.  No edema and no tenderness. Psych Appropriate mood Labs: 
   
Recent Labs  
  02/24/20 0341 02/23/20 0205 02/22/20 
2693 WBC 0.2* 0.1* 0.3*  
RBC 2.88* 2.64* 3.27* HGB 8.2* 7.6* 9.4* HCT 25.1* 23.3* 28.9* MCV 87.2 88.3 88.4 MCH 28.5 28.8 28.7 MCHC 32.7 32.6 32.5  
RDW 12.8 12.6 12.4 PLT 51* 31* 45* DF MANUAL MANUAL MANUAL Recent Labs  
  02/24/20 0341 02/23/20 0205 02/22/20 
9108  134* 135* K 3.0* 3.5 3.8 CL 95* 97* 97* CO2 36* 32 32 AGAP 5* 5* 6*  
* 169* 130* BUN 24* 28* 26* CREA 0.59* 0.64 0.62 GFRAA >60 >60 >60 GFRNA >60 >60 >60  
CA 8.8 8.2* 8.4 SGOT 48* 39* 63* AP 61 56 62  
TP 6.4 5.9* 6.5 ALB 1.9* 1.8* 2.2*  
GLOB 4.5* 4.1* 4.3* AGRAT 0.4* 0.4* 0.5* MG 2.1 1.7* 1.9 Imaging: XR CHEST PA LAT [[id number]] Collected: 02/21/20 0931 Order Status: Completed Updated: 02/21/20 1775 Narrative:    
EXAMINATION: CHEST RADIOGRAPH 2/21/2020 9:00 AM 
 
ACCESSION NUMBER: [id number] INDICATION: neutropenic fever COMPARISON: Chest x-ray 2/19/2020, chest CT 2/5/2020 TECHNIQUE: PA and lateral views of the chest were obtained. FINDINGS:  
 
Cardiac Silhouette: Unchanged widening of the superior mediastinum. Unchanged positioning of a left-sided central venous access port and of a 
cardiac pacemaker. Lungs: Unchanged moderate right pleural effusion and right basilar opacity. Pleura: No definite left pleural effusion. No pneumothorax. Osseous Structures: Prior median sternotomy and coronary artery bypass grafting. Thoracic spine spondylosis. Upper Abdomen: Abdominal aortic atherosclerosis. Impression:    
IMPRESSION: 
 
1.  Unchanged moderate right pleural effusion and right basilar opacity, which 
 may be compressive atelectasis or infection. 2. Unchanged widening of the superior mediastinum, corresponding to the superior 
mediastinal mass on the CT of 2/5/2020. 
 
3. Follow-up to resolution is recommended. VOICE DICTATED BY: Dr. Gómez Villanueva XR CHEST SNGL V [[id number]] Collected: 02/19/20 0449 Order Status: Completed Updated: 02/19/20 0501 Narrative:    
EXAM: Chest x-ray. INDICATION: Dyspnea. COMPARISON: February 17, 2020. TECHNIQUE: Frontal view chest x-ray. FINDINGS: Mild bibasilar lung atelectasis or infiltrates and a small right 
pleural effusion are unchanged. No pneumothorax or left pleural effusion is 
identified. The cardiac size is within normal limits. Again noted is a prior 
sternotomy, a left chest wall pacemaker, a left chest wall infusion port 
catheter in the patient's known upper mediastinal mass. Impression:    
IMPRESSION: Unchanged mild bibasilar lung atelectasis or infiltrates and small 
right pleural effusion. XR CHEST SNGL V [[id number]] Collected: 02/17/20 2023 Order Status: Completed Updated: 02/17/20 2026 Narrative:    
Portable chest x-ray Clinical indications: Hypoxia. FINDINGS: Single AP view the chest compared to a similar exam dated 2/12/2020 
shows airspace opacity at the right lung base with a right pleural effusion. Atelectasis or pneumonia should be considered. The left lung is stable and 
clear. A pacer device is in place. Impression:    
IMPRESSION: No significant interval change with right lower lung airspace 
opacity and a right pleural effusion. Atelectasis or pneumonia should be 
considered. XR CHEST SNGL V [[id number]] Collected: 02/12/20 1439 Order Status: Completed Updated: 02/12/20 1442 Narrative:    
CHEST X-RAY, single portable view  2/12/2020 History: Hypoxemia and cough. Technique: Single frontal view of the chest. 
 
Comparison: Chest x-ray 2/10/2020 Findings: A stable left-sided intracardiac device is seen. A stable left-sided venous port 
is seen. Stable mild cardiomegaly is seen. Lungs are expanded without 
appreciable pneumothorax. Stable masslike density along the right upper 
paramediastinal border. Increasing now small to moderate right basilar pleural 
effusion. Impression:    
IMPRESSION: 1.  Increasing now small to moderate basilar pleural effusion. The etiology of 
effusion is uncertain although this could represent sequela of heart 
failure/fluid overload given the associated cardiomegaly and the rapid onset of 
this finding. XR CHEST PA LAT [[id number]] Collected: 02/10/20 1746 Order Status: Completed Updated: 02/10/20 1752 Narrative:    
Chest 2 view CLINICAL INDICATION: Acute worsening dyspnea, moderate to severe; SVC syndrome, 
mediastinal mass COMPARISON: 1/23/2020 radiograph, 1/30/2020 CT PET, and 2/5/2020 CT with 
contrast 
 
TECHNIQUE: Sitting upright AP and lateral views of the chest  
 
FINDINGS: Patient is rotated to the left. Lung volumes are slightly shallow. There is artifact due to external tubing. There is interval mild to moderate right hemidiaphragm elevation. There is 
slightly increased mild groundglass and reticular opacity in the right lower 
lobe. No evidence of a pneumothorax, significant pleural effusion, or overt CHF. The mediastinal and hilar contours again demonstrate widening consistent with 
the known masses that were better evaluated on the recent CT, previous CABG, 
left side portacatheter, and dual-lead pacemaker. The bone density is again low throughout. No acute fracture or dislocation is 
evident. Impression:    
IMPRESSION:  
1. Right lower lobe infiltrate. 2. Mediastinal masses compatible with malignancy, further evaluated on recent CT 
and PET. Medications: 
Current Facility-Administered Medications Medication Dose Route Frequency  potassium chloride (K-DUR, KLOR-CON) SR tablet 20 mEq  20 mEq Oral BID  Vancomycin Trough Reminder   Other ONCE  
 vancomycin (VANCOCIN) 500 mg in 0.9% sodium chloride 100 mL IVPB  500 mg IntraVENous Q12H  
 magnesium oxide (MAG-OX) tablet 400 mg  400 mg Oral TID  cefepime (MAXIPIME) 2 g in 0.9% sodium chloride (MBP/ADV) 100 mL  2 g IntraVENous Q8H  
 tbo-filgrastim (GRANIX) injection 300 mcg  300 mcg SubCUTAneous QPM  
 0.9% sodium chloride infusion 250 mL  250 mL IntraVENous PRN  
 diphenhydrAMINE (BENADRYL) capsule 25 mg  25 mg Oral Q6H PRN  
 acetaminophen (TYLENOL) tablet 650 mg  650 mg Oral Q6H PRN  
 nystatin (MYCOSTATIN) 100,000 unit/mL oral suspension 500,000 Units  500,000 Units Oral QID  LORazepam (ATIVAN) tablet 0.25 mg  0.25 mg Oral Q6H PRN  
 morphine (ROXANOL) concentrated oral syringe 10 mg  10 mg Oral Q6H PRN  
 furosemide (LASIX) tablet 20 mg  20 mg Oral DAILY  guaiFENesin ER (MUCINEX) tablet 1,200 mg  1,200 mg Oral Q12H  
 tiotropium bromide (SPIRIVA RESPIMAT) 2.5 mcg /actuation  2 Puff Inhalation DAILY  central line flush (saline) syringe 10 mL  10 mL InterCATHeter PRN  
 heparin (porcine) pf 300 Units  300 Units InterCATHeter PRN  polyethylene glycol (MIRALAX) packet 17 g  17 g Oral DAILY  senna-docusate (PERICOLACE) 8.6-50 mg per tablet 1 Tab  1 Tab Oral DAILY  predniSONE (DELTASONE) tablet 40 mg  40 mg Oral DAILY WITH BREAKFAST  albuterol (PROVENTIL VENTOLIN) nebulizer solution 2.5 mg  2.5 mg Nebulization TID RT  
 [Held by provider] amLODIPine (NORVASC) tablet 5 mg  5 mg Oral DAILY  atorvastatin (LIPITOR) tablet 80 mg  80 mg Oral DAILY  [Held by provider] carvediloL (COREG) tablet 6.25 mg  6.25 mg Oral BID WITH MEALS  
 [Held by provider] enoxaparin (LOVENOX) injection 30 mg  30 mg SubCUTAneous Q24H  
 albuterol (PROVENTIL VENTOLIN) nebulizer solution 2.5 mg  2.5 mg Nebulization Q4H PRN  
  morphine CR (MS CONTIN) tablet 60 mg  60 mg Oral Q12H  
 morphine IR (MS IR) tablet 15 mg  15 mg Oral Q4H PRN  
 ondansetron (ZOFRAN ODT) tablet 8 mg  8 mg Oral Q8H PRN  prochlorperazine (COMPAZINE) tablet 10 mg  10 mg Oral Q6H PRN  
 sertraline (ZOLOFT) tablet 25 mg  25 mg Oral DAILY ASSESSMENT: 
 
Problem List  Date Reviewed: 2/19/2020 Codes Class Noted * (Principal) Lung cancer ([facility] ICD-10-CM: C34.90 ICD-9-CM: 162.9  2/10/2020 Hypoxia ICD-10-CM: R09.02 
ICD-9-CM: 799.02  2/10/2020 Malignant neoplasm of overlapping sites of left lung [facility]) ICD-10-CM: C34.82 
ICD-9-CM: 162.8  1/22/2020 Hypokalemia ICD-10-CM: E87.6 ICD-9-CM: 276.8  12/7/2019 Chest pain ICD-10-CM: R07.9 ICD-9-CM: 786.50  12/6/2019 Chronic tension-type headache, intractable ICD-10-CM: D89.245 ICD-9-CM: 339.12  5/13/2019 Cigarette nicotine dependence without complication [facility]-77-FU: T53.349 ICD-9-CM: 305.1  7/3/2018 COPD exacerbation ([facility] ICD-10-CM: J44.1 ICD-9-CM: 491.21  Unknown Recurrent UTI ICD-10-CM: N39.0 ICD-9-CM: 599.0  4/24/2018 Gross hematuria ICD-10-CM: R31.0 ICD-9-CM: 599.71  4/24/2018 Urinary retention ICD-10-CM: R33.9 ICD-9-CM: 788.20  4/24/2018 Sick sinus syndrome (HCC) (Chronic) ICD-10-CM: I49.5 ICD-9-CM: 427.81  9/29/2017 IBS (irritable bowel syndrome) ICD-10-CM: K58.9 ICD-9-CM: 564.1  Unknown 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 
ICD-9-CM: 433.20  12/28/2016 Encounter for medication management ICD-10-CM: Z23.873 ICD-9-CM: V58.69  12/28/2016 Neuropathy (Chronic) ICD-10-CM: G62.9 ICD-9-CM: 355.9  12/28/2016 Overview Signed 7/28/2017  2:20 PM by Donnell Cooper MD  
  Last Assessment & Plan:  
Patient is currently on gabapentin 600 mg. Medication may cause increased drowsiness with Rispirdol, however pt has no complaints today. Intractable migraine with aura without status migrainosus ICD-10-CM: G43.119 ICD-9-CM: 346.01  12/28/2016 Peripheral vascular disease (HCC) (Chronic) ICD-10-CM: I73.9 ICD-9-CM: 443.9  12/1/2016 Overview Signed 12/1/2016  4:23 PM by Denyse Collet, MD  
  CTA (2/11/16): Moderate to severe stenosis in the proximal right subclavian artery. Borderline personality disorder ([facility] ICD-10-CM: F60.3 ICD-9-CM: 301.83  Unknown Overview Signed 7/28/2017  2:20 PM by Cleo Otero MD  
  Last Assessment & Plan: 1. Continue inpatient hospitalization 2. Lithium level 1.5. Will hold am dose and change to 300mg PO BID. Will recheck in 3 days. 3.  Continue Neurontin 4. Consider Vistaril PRN if indicated 5. Encourage active participation in groups and on the milieu 6. Consider discharge and aftercare needs 7. Encourage abstinence from drugs of abuse Cerebrovascular disease ICD-10-CM: I67.9 ICD-9-CM: 437.9  Unknown Overview Signed 12/1/2016  4:23 PM by Denyse Collet, MD  
  CTA of the neck (2/11/16) : No significant stenosis of the right carotid artery. 37% stenosis in the proximal left internal carotid artery. Severe stenosis at the origin of the left vertebral artery Chronic hepatitis C ([facility] ICD-10-CM: B18.2 ICD-9-CM: 070.54  Unknown Chronic pain ICD-10-CM: G89.29 ICD-9-CM: 338.29  Unknown Overview Signed 7/28/2017  2:20 PM by Cleo Otero MD  
  Last Assessment & Plan:  
Patient has history of chronic neck and back pain secondary to degenerative changes. She was recently prescribed a 15 day supply of Dilaudid 2 mg TID by \"pain management doctor\". This script was filled on 5.8. 17. Plan: - Given patient's history of substance use, she would largely benefit from detox of narcotics. Will try conservative methods while inpatient for back pain as well as neck and shoulder pain.  Continue Ultram 100 mg Q6H while we await permission to speak to pain management doctor. Caity Sanders office in regards to patient's pain management contract. Patient has been dismissed from his services for positive UDS. - Avoid Toradol given her elevated Creatinine and glucose at time of admission. A1c normal at 5.4. ASCUS with positive high risk HPV cervical ICD-10-CM: R87.610, R87.810 ICD-9-CM: 795.01, 795.05  Unknown Pacemaker ICD-10-CM: Z95.0 ICD-9-CM: V45.01  8/10/2016 Overview Signed 8/10/2016 10:47 AM by Kwesi Che MD  
  1. [employer]  :  Due to sick sinus syndrome. Chronic diastolic congestive heart failure (HCC) (Chronic) ICD-10-CM: I50.32 
ICD-9-CM: 428.32, 428.0  8/9/2016 Overview Addendum 8/27/2019  5:11 PM by Kwesi Che MD  
  Admitted [facility] 8/12/11 with CP and elevated BNP at 352. 
1.  Echo (8/3/11):  EF > 55%. Mild LVH. Pseudonormal LV filling pattern. Bi-atrial enlargement. .  Mild mitral regurgitation. 2.  Echo (8/22/19):  EF 65-70%. Indeterminant diastolic function. Mild LAE. Coronary atherosclerosis of native coronary vessel (Chronic) ICD-10-CM: I25.10 ICD-9-CM: 414.01  8/9/2016 Overview Addendum 12/9/2019  7:53 AM by Kwesi Che MD  
  1.  3 Vessel CABG (7/8/11):  MIR to LAD. LIMA in Y graft to superior and inferior diagonal. 
2.  Cath (8/31/12): Occluded grafts. FFR of LAD 0.89. Similar stenosis of diagonal but no FFR done. 3.  LHC (11/1/17): Mild to moderate nonhemodynamically significant CAD. Normal IFR of LAD (40%: 0.98) and Diagonal (50%: 0.98) 4. LHC (12/7/19): Stable CAD. Tobacco use disorder (Chronic) ICD-10-CM: B42.398 ICD-9-CM: 305.1  Unknown HTN (hypertension), benign (Chronic) ICD-10-CM: I10 
ICD-9-CM: 401.1  8/9/2016 Dyslipidemia (Chronic) ICD-10-CM: O31.9 ICD-9-CM: 272.4  8/9/2016 Abnormal MRI of head ICD-10-CM: R93.0 ICD-9-CM: 793.0  1/1/2015 Overview Signed 8/27/2016  4:28 PM by Sarah Hickey MD  
  Frontal Lobe periventricular WM disease. Ruled out for MS by Neurology Cocaine abuse in remission [facility]) ICD-10-CM: F14.11 ICD-9-CM: 305.63  9/18/2012 S/P CABG x 3 (Chronic) ICD-10-CM: Z95.1 ICD-9-CM: V45.81  9/18/2012 Bipolar disorder (HCC) (Chronic) ICD-10-CM: F31.9 ICD-9-CM: 296.80  9/18/2012 Acute respiratory failure with hypoxia [facility]) ICD-10-CM: J96.01 
ICD-9-CM: 518.81  7/12/2011  
   
  
 
   
48 y.o. F consulted for cancer in 1/2020. She worked up neck/upper chest pain with CT finding of upper mediastinal mass, EBUS 1/22/20 with FNA showed malignant cells with broad IHC negativity except for suggestion of liver origin. She had active drug abuse as documented reason being discharged from her prior pain management. She presented to [facility] on 1/30/20, has missed the scheduled PET, reported moved in with her brother and a friend drove her to visit.  We discussed her rather atypical cancer presentation, called radiology to urgently re-schedule PET to further define the origin of cancer and extent, discussed her narco management and she gave inconsistent answers regarding whether and when she used cocaine, also stated she stopped pain med herself rather then being discharged by Dr. Yesica Lora, although she appeared in pain, involved SW and palliative care, signed narco contract, yet urine screen positive for cocaine, then she changed her story of last use 1 week ago but that should not lead to positive urine test either, discussed the breaking of contract and trust voids prescription of narco. PET 1/30/20 showed large right mediastinal/supraclavicle avid disease but no evidence of disease otherwise and no suspicious disease in the liver, I reviewed PET personally and discussed to have urgent excisional biopsy for more tissue of histology and molecular tests, meanwhile I would not rec treat this as liver cancer, but rather treat with protocol of limited stage SCLC or stage III NSCLC if brain MRI negative to aim at possible cure or durable control, she was agreeable and Adam will arrange for urgent biopsy/port/rad onc eval, start cisplatin/etoposide/XRT 2/10/20, hyponatremia and weight gain concern of SIADH and instructed not to drink excessively and monitor, SVC syndrome and monitor closely for rx response, however she was not able to tolerate pre-hydration for cisplatin and admit for SOB likely related to SVC syndrome, administer chemo inpt with slow rate, may dosing XRT upfront for SVC relief, IR eval if needed. PLAN: 
Lung cancer 
-supposed to start Cis/VP16/XRT yesterday. 2/11 plan for chemo tomorrow if echo ok. XRT plan is not completed yet. 2/12 start chemo today if echo ok. Over to XRT for rescanning today. 2/13 D2 Cis/VP16 not given 2/14 Will give D 2 cis/VP16 as patient wishes to proceed with chemotherapy. 2/15 Proceed with C1D3 Cis/Etop today 2/21 Completed C1 cis/etop on 2/15. C2 due 3/4. Has not started XRT yet. 2/24 Continues to await XRT. Shortness of breath/Cough 
- on 2 LPM NC. Mucinex BID. CXR with known malignancy and RLL infiltrate. 2/11 start cef/vanc for possible PNA. Check echo. 2/12 continues on cef/vanc. Echo pending. Increased oxygen needs overnight, up to 5 LPM. pulm consult pending. Check BNP for completeness. Echo still pending. 2/13 echo ok with EF of 55-60%. BNP elevated. Lasix 20 mg daily PRN if I>O by 500 cc. Lasix given this AM. Pulmonology also following as well. On high flow at 40 LPM. Continues on cef/vanc for possible PNA. Currently able to use bedpan for urination- may need jara placed. 2/14 check blood gas. Ox 95-98% on high alon at 40 lpm. Back on cef/vanc. 2/15 O2 down to 5L, remains on Cef/Vanc 
2/16 O2 down to 3L today, continue Cef/Vanc/steroids/nebs 2/18 O2 at 3L, D5/7 Cef/Vanc 2/19 On optiflow. Pulm DC'd Vanc yesterday. Con't Cef. Tmax 100.6. Repeat CXR with no changes. Check UA, BCx. Check BNP. 
2/20 Optiflow @ 30L. Tmax 100.5. UA neg. BCx pending. On Cef, completes tomorrow. BNP 2115. On Lasix 20mg IV daily. Pulm following. 
2/21 O2 down to 4L. Pulm changed lasix to oral.  Pulm following. 
2/22 O2 down to 2 LPM. 2/24 O2 at 6 L/min NC. CXR showed worsening basilar infiltrates/effusion. Pulm following. Neutropenic fever 2/21 Tmax 101.1. BCx-NGTD.  UCx/sputum Cx pending. Repeat CXR. Con't Cef. Vanc ordered. 2/22 afebrile. On cef/vanc. Repeat CXR with unchanged widening of the mediastinum and unchanged moderate right pleural effusion and right basilar opacity which may be compressive atelectasis or infection. Blood cultures and urine culture NGTD.  
2/23 BC and urine culture NGTD. Continues on cef/vanc. 2/24 Afebrile. Cef/Vanc continues. ?SIDAH/hyponatremia - On IV fluids at this time with some improvement. Check urine sodium. Consult nephro 2/12 neph consult pending.  today. 2/13 appreciate nephros recommendations. NA up to 133 today. 2/14 Na improved up to 135.  
2/20 Na+ 134 
2/22   
2/24 Na 136 Confusion 
-ongoing confusion noted. UDS pending. 2/17 more alert and clearer today 2/18 alert, oriented 2/23 very lethargic this AM. Looks uncomfortable. Check CT head, EKG, cardiac enzymes, and ABG. Repeat CXR ordered. 2/24 CT head negative. ABG with respiratory acidosis/hypercarbia. Possibly related to sedation. Palliative care to help titrate down pain medications. Obtaining UDS. Oral candidiasis 2/20 Nystatin ordered Pancytopenia secondary to chemotherapy - Transfuse prn per Cari SOPs 
2/21 Start Granix Continue home medications Cari SOPs Lovenox for DVT prophylaxis (hold for plt <50k) Patient may not be able to receive STR on discharge per nursing staff- will need to discuss with CM on Monday. Goals and plan of care reviewed with the patient. All questions answered to the best of our ability. EDDY Larkin [facility] [id number] [place] [address] Office : [phone] Fax : [phone] I personally saw, exammed and counselled the patient, and discussed with NP, agree with above history/assessment/plan. 48 y. 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 Diana Frazier M.D. [facility] [id number] [place] [address] Office : [phone] Fax : [phone]

## 2020-02-24 NOTE — PROGRESS NOTES
END OF SHIFT NOTE: 
 
Intake/Output 
02/23 1901 - 02/24 0700 In: 61 [P.O.:60] Out: 350 [Urine:350] Voiding: YES Catheter: NO 
Drain:   
 
 
 
 
 
Stool:  0 occurrences. Stool Assessment Stool Color: Tessie Left (02/23/20 2014) Stool Appearance: Formed; Soft (02/23/20 2014) Stool Amount: Medium (02/23/20 2014) Stool Source/Status: Rectum (02/23/20 2014) Emesis:  0 occurrences. VITAL SIGNS Patient Vitals for the past 12 hrs: 
 Temp Pulse Resp BP SpO2  
02/24/20 0245 99.2 °F (37.3 °C) 88 20 118/71 95 % 02/24/20 0149     92 % 02/23/20 2336 99.6 °F (37.6 °C) 91 17 139/79 93 % 02/23/20 2014     91 % 02/23/20 1917 98.2 °F (36.8 °C) 74 18 101/50 96 % 02/23/20 1900    126/75  Pain Assessment Pain 1 Pain Scale 1: Numeric (0 - 10) (02/24/20 0245) Pain Intensity 1: 0 (02/24/20 0245) Patient Stated Pain Goal: 0 (02/24/20 0245) Pain Reassessment 1: Yes (02/24/20 0159) Pain Onset 1: pta (02/22/20 1800) Pain Location 1: Neck (02/24/20 0119) Pain Orientation 1: Right (02/24/20 0119) Pain Description 1: Aching (02/24/20 0119) Pain Intervention(s) 1: Medication (see MAR) (02/24/20 0119) Ambulating Yes Additional Information: VSS. Afebrile. Pt agitated all night. RN tried suctioning pt's mouth, pt did not allow. Noticed pt having tremors and more confused. No needs voiced at this time Shift report given to oncoming nurse at the bedside.  
 
Chaz Hahn, RN

## 2020-02-25 NOTE — PROGRESS NOTES
Case Management met with sister of pt in rm 56. Sister is concerned that no one in the family has the funds for burial. Let sister know that she would need to discuss this with nursing supervisor. Sister was waiting another bother. Asked pt if she would like to talk to a , she said yes.  called. No further needs from Case Management.

## 2020-02-25 NOTE — DISCHARGE SUMMARY
Death Summary [address] Admission date:  2/10/2020 Discharge date:   
 
Admitting Diagnosis:  Lung cancer ([facility].) [C34.90] Hypoxia [R09.02] Discharge Diagnosis:   
Problem List as of 2/25/2020 Date Reviewed: 2/19/2020 Codes Class Noted - Resolved * (Principal) Lung cancer ([facility] ICD-10-CM: C34.90 ICD-9-CM: 162.9  2/10/2020 - Present Hypoxia ICD-10-CM: R09.02 
ICD-9-CM: 799.02  2/10/2020 - Present Malignant neoplasm of overlapping sites of left lung [facility]) ICD-10-CM: C34.82 
ICD-9-CM: 162.8  1/22/2020 - Present Hypokalemia ICD-10-CM: E87.6 ICD-9-CM: 276.8  12/7/2019 - Present Chest pain ICD-10-CM: R07.9 ICD-9-CM: 786.50  12/6/2019 - Present Chronic tension-type headache, intractable ICD-10-CM: W50.282 ICD-9-CM: 339.12  5/13/2019 - Present Cigarette nicotine dependence without complication MNF-00-MO: H47.363 ICD-9-CM: 305.1  7/3/2018 - Present COPD exacerbation ([facility] ICD-10-CM: J44.1 ICD-9-CM: 491.21  Unknown - Present Recurrent UTI ICD-10-CM: N39.0 ICD-9-CM: 599.0  4/24/2018 - Present Gross hematuria ICD-10-CM: R31.0 ICD-9-CM: 599.71  4/24/2018 - Present Urinary retention ICD-10-CM: R33.9 ICD-9-CM: 788.20  4/24/2018 - Present Sick sinus syndrome (HCC) (Chronic) ICD-10-CM: I49.5 ICD-9-CM: 427.81  9/29/2017 - Present IBS (irritable bowel syndrome) ICD-10-CM: K58.9 ICD-9-CM: 564.1  Unknown - Present 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 
ICD-9-CM: 433.20  12/28/2016 - Present Encounter for medication management ICD-10-CM: E60.219 ICD-9-CM: V58.69  12/28/2016 - Present Neuropathy (Chronic) ICD-10-CM: G62.9 ICD-9-CM: 355.9  12/28/2016 - Present Overview Signed 7/28/2017  2:20 PM by Hammad Lowe MD  
  Last Assessment & Plan:  
Patient is currently on gabapentin 600 mg.  Medication may cause increased drowsiness with Rispirdol, however pt has no complaints today. Intractable migraine with aura without status migrainosus ICD-10-CM: G43.119 ICD-9-CM: 346.01  12/28/2016 - Present Peripheral vascular disease (HCC) (Chronic) ICD-10-CM: I73.9 ICD-9-CM: 443.9  12/1/2016 - Present Overview Signed 12/1/2016  4:23 PM by Jerson Malik MD  
  CTA (2/11/16): Moderate to severe stenosis in the proximal right subclavian artery. Borderline personality disorder ([facility] ICD-10-CM: F60.3 ICD-9-CM: 301.83  Unknown - Present Overview Signed 7/28/2017  2:20 PM by Reese Wiggins MD  
  Last Assessment & Plan: 1. Continue inpatient hospitalization 2. Lithium level 1.5. Will hold am dose and change to 300mg PO BID. Will recheck in 3 days. 3.  Continue Neurontin 4. Consider Vistaril PRN if indicated 5. Encourage active participation in groups and on the milieu 6. Consider discharge and aftercare needs 7. Encourage abstinence from drugs of abuse Cerebrovascular disease ICD-10-CM: I67.9 ICD-9-CM: 437.9  Unknown - Present Overview Signed 12/1/2016  4:23 PM by Jerson Malik MD  
  CTA of the neck (2/11/16) : No significant stenosis of the right carotid artery. 37% stenosis in the proximal left internal carotid artery. Severe stenosis at the origin of the left vertebral artery Chronic hepatitis C ([facility] ICD-10-CM: B18.2 ICD-9-CM: 070.54  Unknown - Present Chronic pain ICD-10-CM: G89.29 ICD-9-CM: 338.29  Unknown - Present Overview Signed 7/28/2017  2:20 PM by Reese Wiggins MD  
  Last Assessment & Plan:  
Patient has history of chronic neck and back pain secondary to degenerative changes. She was recently prescribed a 15 day supply of Dilaudid 2 mg TID by \"pain management doctor\". This script was filled on 5.8. 17. Plan: - Given patient's history of substance use, she would largely benefit from detox of narcotics. Will try conservative methods while inpatient for back pain as well as neck and shoulder pain. Continue Ultram 100 mg Q6H while we await permission to speak to pain management doctor. Omar Quinteros [place] office in regards to patient's pain management contract. Patient has been dismissed from his services for positive UDS. - Avoid Toradol given her elevated Creatinine and glucose at time of admission. A1c normal at 5.4. ASCUS with positive high risk HPV cervical ICD-10-CM: R87.610, R87.810 ICD-9-CM: 795.01, 795.05  Unknown - Present Pacemaker ICD-10-CM: Z95.0 ICD-9-CM: V45.01  8/10/2016 - Present Overview Signed 8/10/2016 10:47 AM by Dania Solis MD  
  1. [employer]  :  Due to sick sinus syndrome. Chronic diastolic congestive heart failure (HCC) (Chronic) ICD-10-CM: I50.32 
ICD-9-CM: 428.32, 428.0  8/9/2016 - Present Overview Addendum 8/27/2019  5:11 PM by Dania Solis MD  
  Admitted [facility] 8/12/11 with CP and elevated BNP at 352. 
1.  Echo (8/3/11):  EF > 55%. Mild LVH. Pseudonormal LV filling pattern. Bi-atrial enlargement. .  Mild mitral regurgitation. 2.  Echo (8/22/19):  EF 65-70%. Indeterminant diastolic function. Mild LAE. Coronary atherosclerosis of native coronary vessel (Chronic) ICD-10-CM: I25.10 ICD-9-CM: 414.01  8/9/2016 - Present Overview Addendum 12/9/2019  7:53 AM by Dania Solis MD  
  1.  3 Vessel CABG (7/8/11):  MIR to LAD. LIMA in Y graft to superior and inferior diagonal. 
2.  Cath (8/31/12): Occluded grafts. FFR of LAD 0.89. Similar stenosis of diagonal but no FFR done. 3.  LHC (11/1/17): Mild to moderate nonhemodynamically significant CAD. Normal IFR of LAD (40%: 0.98) and Diagonal (50%: 0.98) 4. LHC (12/7/19): Stable CAD. Tobacco use disorder (Chronic) ICD-10-CM: S76.333 ICD-9-CM: 305.1  Unknown - Present HTN (hypertension), benign (Chronic) ICD-10-CM: I10 
ICD-9-CM: 401.1  8/9/2016 - Present Dyslipidemia (Chronic) ICD-10-CM: A10.1 ICD-9-CM: 272.4  8/9/2016 - Present Abnormal MRI of head ICD-10-CM: R93.0 ICD-9-CM: 793.0  1/1/2015 - Present Overview Signed 8/27/2016  4:28 PM by Jv Romero MD  
  Frontal Lobe periventricular WM disease. Ruled out for MS by Neurology Cocaine abuse in remission [facility]) ICD-10-CM: F14.11 ICD-9-CM: 305.63  9/18/2012 - Present S/P CABG x 3 (Chronic) ICD-10-CM: Z95.1 ICD-9-CM: V45.81  9/18/2012 - Present Bipolar disorder (HCC) (Chronic) ICD-10-CM: F31.9 ICD-9-CM: 296.80  9/18/2012 - Present Acute respiratory failure with hypoxia [facility]) ICD-10-CM: J96.01 
ICD-9-CM: 518.81  7/12/2011 - Present RESOLVED: Encounter for medication management ICD-10-CM: M89.281 ICD-9-CM: V58.69  7/3/2017 - 1/24/2018 RESOLVED: Paresthesia ICD-10-CM: R20.2 ICD-9-CM: 782.0  12/28/2016 - 1/10/2017 RESOLVED: Bipolar 1 disorder (HCC) ICD-10-CM: F31.9 ICD-9-CM: 296.7  12/28/2016 - 1/10/2017 RESOLVED: Secondary hypertension ICD-10-CM: I15.9 ICD-9-CM: 405.99  12/1/2016 - 1/10/2017 RESOLVED: Bilateral carotid artery disease ([facility] ICD-10-CM: I73.9 ICD-9-CM: 447.9  12/1/2016 - 12/1/2016 RESOLVED: Chronic UTI ICD-10-CM: N39.0 ICD-9-CM: 599.0  Unknown - 1/10/2017 RESOLVED: History of coronary artery bypass graft x 3 ICD-10-CM: Z95.1 ICD-9-CM: V15.1  Unknown - 1/10/2017 RESOLVED: Cocaine dependence ([facility] ICD-10-CM: E99.93 ICD-9-CM: 304.20  8/9/2016 - 8/27/2016 Overview Signed 8/9/2016  1:30 PM by Raimundo Del Rio Claims to have stopped cocaine at this time. RESOLVED: Chest pain ICD-10-CM: R07.9 ICD-9-CM: 786.50  9/18/2012 - 8/27/2016 RESOLVED: UTI (lower urinary tract infection) ICD-10-CM: N39.0 ICD-9-CM: 599.0  9/18/2012 - 8/27/2016 RESOLVED: Intentional underdosing of medication regimen by patient due to financial hardship ICD-10-CM: Z91.120 
ICD-9-CM: E873.8  9/18/2012 - 8/27/2016 RESOLVED: Dizzy ICD-10-CM: K16 ICD-9-CM: 780.4  9/18/2012 - 8/27/2016 Overview Signed 9/18/2012  3:53 AM by Malcolm Escobar NP With recent falls RESOLVED: S/P cardiac cath ICD-10-CM: Z98.890 ICD-9-CM: V45.89  9/18/2012 - 11/30/2016 Overview Signed 9/18/2012  4:01 AM by Malcolm Escobar NP  
  July and August of this year. RESOLVED: Cough ICD-10-CM: R05 ICD-9-CM: 786.2  9/18/2012 - 8/27/2016 RESOLVED: Pulmonary edema ICD-10-CM: J81.1 ICD-9-CM: 979  7/12/2011 - 7/16/2011 RESOLVED: Hypomagnesemia ICD-10-CM: T32.08 
ICD-9-CM: 275.2  7/10/2011 - 7/12/2011 RESOLVED: Hypocalcemia ICD-10-CM: E83.51 
ICD-9-CM: 275.41  7/9/2011 - 7/10/2011 RESOLVED: Pneumothorax ICD-10-CM: J93.9 ICD-9-CM: 512.89  7/9/2011 - 7/16/2011 RESOLVED: Encounter for weaning from ventilator [facility]) ICD-10-CM: Z99.11 ICD-9-CM: V46.13  7/8/2011 - 7/9/2011 RESOLVED: Hypoxemia ICD-10-CM: R09.02 
ICD-9-CM: 799.02  7/8/2011 - 7/16/2011 RESOLVED: VT (ventricular tachycardia) (HCC) ICD-10-CM: I47.2 ICD-9-CM: 427.1  7/8/2011 - 7/16/2011 RESOLVED: Hypopotassemia ICD-10-CM: E87.6 ICD-9-CM: 276.8  7/4/2011 - 7/8/2011 Consultants: Palliative Medicine, Pulmonary, Nephrology Hospital course: 
48 y.o. F consulted for cancer in 1/2020. She worked up neck/upper chest pain with CT finding of upper mediastinal mass, EBUS 1/22/20 with FNA showed malignant cells with broad IHC negativity except for suggestion of liver origin. She had active drug abuse as documented reason being discharged from her prior pain management. She presented to [facility] on 1/30/20, has missed the scheduled PET, reported moved in with her brother and a friend drove her to visit.  We discussed her rather atypical cancer presentation, called radiology to urgently re-schedule PET to further define the origin of cancer and extent, discussed her narco management and she gave inconsistent answers regarding whether and when she used cocaine, also stated she stopped pain med herself rather then being discharged by Dr. Dino Arevalo, although she appeared in pain, involved SW and palliative care, signed narco contract, yet urine screen positive for cocaine, then she changed her story of last use 1 week ago but that should not lead to positive urine test either, discussed the breaking of contract and trust voids prescription of narco. PET 1/30/20 showed large right mediastinal/supraclavicle avid disease but no evidence of disease otherwise and no suspicious disease in the liver, I reviewed PET personally and discussed to have urgent excisional biopsy for more tissue of histology and molecular tests, meanwhile I would not rec treat this as liver cancer, but rather treat with protocol of limited stage SCLC or stage III NSCLC if brain MRI negative to aim at possible cure or durable control, she was agreeable and Adam will arrange for urgent biopsy/port/rad onc eval, start cisplatin/etoposide/XRT 2/10/20, hyponatremia and weight gain concern of SIADH and instructed not to drink excessively and monitor, SVC syndrome and monitor closely for rx response, however she was not able to tolerate pre-hydration for cisplatin and admit for SOB likely related to SVC syndrome, administer chemo inpt with slow rate, may dosing XRT upfront for SVC relief, IR eval if needed. See course of hospitalization below. She continued to deteriorate despite all medical efforts. Her brother/POA elected to change her to comfort measures today. No respirations or pulse noted. Pupils fixed. She was pronounced dead at [phone]. Lung cancer 
-supposed to start Cis/VP16/XRT yesterday. 2/11 plan for chemo tomorrow if echo ok. XRT plan is not completed yet. 2/12 start chemo today if echo ok. Over to XRT for rescanning today. 2/13 D2 Cis/VP16 not given 2/14 Will give D 2 cis/VP16 as patient wishes to proceed with chemotherapy. 2/15 Proceed with C1D3 Cis/Etop today 2/21 Completed C1 cis/etop on 2/15. C2 due 3/4. Has not started XRT yet. 2/24 Continues to await XRT. 2/25 Spoke with patient's HCPOA (brother - Liza Valera) over the phone. He is requesting comfort measures only for the patient (witnessed by Rowena Kam). It was initially elected that patient be comfort measures only, however pt and brother indicated they wanted to pursue chemotherapy. At this time he is declining further escalations in care. He reports he wants to pursue comfort measures only as the patient originally elected. Discussed that by electing comfort measures only he is declining further escalations in care, including life-saving treatment. He verbalized understanding and reports he wants to make her as comfortable as possible and avoid seeking any further treatment. Palliative care aware and hospice consulted. 
  
Shortness of breath/Cough 
- on 2 LPM NC. Mucinex BID. CXR with known malignancy and RLL infiltrate. 2/11 start cef/vanc for possible PNA. Check echo. 2/12 continues on cef/vanc. Echo pending. Increased oxygen needs overnight, up to 5 LPM. pulm consult pending. Check BNP for completeness. Echo still pending. 2/13 echo ok with EF of 55-60%. BNP elevated. Lasix 20 mg daily PRN if I>O by 500 cc. Lasix given this AM. Pulmonology also following as well. On high flow at 40 LPM. Continues on cef/vanc for possible PNA. Currently able to use bedpan for urination- may need jara placed. 2/14 check blood gas. Ox 95-98% on high alon at 40 lpm. Back on cef/vanc. 2/15 O2 down to 5L, remains on Cef/Vanc 
2/16 O2 down to 3L today, continue Cef/Vanc/steroids/nebs 2/18 O2 at 3L, D5/7 Cef/Vanc 
2/19 On optiflow. Pulm DC'd Vanc yesterday. Con't Cef. Tmax 100.6. Repeat CXR with no changes. Check UA, BCx. Check BNP. 
2/20 Optiflow @ 30L. Tmax 100.5. UA neg. BCx pending. On Cef, completes tomorrow. BNP 2115. On Lasix 20mg IV daily. Pulm following. 
2/21 O2 down to 4L. Pulm changed lasix to oral.  Pulm following. 
2/22 O2 down to 2 LPM. 2/24 O2 at 6 L/min NC. CXR showed worsening basilar infiltrates/effusion. Pulm following. 
2/25 O2 down to 4L. ABG ordered by pulmonlogy yesterday showed worsening hypercapnia. Pt is a DNR and according to palliative care notes (2/14) she has expressed that she does not want to go to the ICU.  
  
Neutropenic fever 2/21 Tmax 101.1. BCx-NGTD.  UCx/sputum Cx pending. Repeat CXR. Con't Cef. Vanc ordered. 2/22 afebrile. On cef/vanc. Repeat CXR with unchanged widening of the mediastinum and unchanged moderate right pleural effusion and right basilar opacity which may be compressive atelectasis or infection. Blood cultures and urine culture NGTD.  
2/23 BC and urine culture NGTD. Continues on cef/vanc. 2/24 Afebrile. Cef/Vanc continues. 
  
?SIDAH/hyponatremia - On IV fluids at this time with some improvement. Check urine sodium. Consult nephro 2/12 neph consult pending.  today. 2/13 appreciate nephros recommendations. NA up to 133 today. 2/14 Na improved up to 135.  
2/20 Na+ 134 
2/22   
2/24 Na 136 
  
Confusion 
-ongoing confusion noted. UDS pending. 2/17 more alert and clearer today 2/18 alert, oriented 2/23 very lethargic this AM. Looks uncomfortable. Check CT head, EKG, cardiac enzymes, and ABG. Repeat CXR ordered. 2/24 CT head negative. ABG with respiratory acidosis/hypercarbia. Possibly related to sedation. Palliative care to help titrate down pain medications. Obtaining UDS. 2/25 Further lethargy. Nurses reported overnight she was restless and refused to leave supplemental O2 in place. Unable to hold conversation today. Received Narcan yesterday evening and has continued lethargy.  Has not been receiving sedating meds since. UDS +opiates. 
  
Oral candidiasis  Nystatin ordered 
  
Pancytopenia secondary to chemotherapy - Transfuse prn per Cari SOPs 
 Start Granix Final: 
--Pronounced dead at 1453. --Total discharge greater than 30 minutes in duration. Ruth Barry, NP [facility] I personally saw, exammed and counselled the patient, and discussed with NP, agree with above history/assessment/plan. 48 y. 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 
  
 
Ronald Coyne M.D. [address] Office : [phone] Fax : [phone]

## 2020-02-25 NOTE — PROGRESS NOTES
END OF SHIFT NOTE: 
 
Intake/Output 
02/24 1901 - 02/25 0700 In: 584 [I.V.:584] Out: -   
Voiding: YES Catheter: NO 
Drain:   
 
 
 
 
 
Stool:  1 occurrences. Stool Assessment Stool Color: Brown (02/25/20 0230) Stool Appearance: Formed; Soft (02/25/20 0230) Stool Amount: Small (02/25/20 0230) Stool Source/Status: Rectum (02/25/20 0230) Emesis:  0 occurrences. VITAL SIGNS Patient Vitals for the past 12 hrs: 
 Temp Pulse Resp BP SpO2  
02/25/20 0300 97.6 °F (36.4 °C) 93 15 159/87 92 % 02/24/20 2327 97.9 °F (36.6 °C) 71 16 154/57 93 % 02/24/20 1908 97.3 °F (36.3 °C) 84 16 135/82 90 % 02/24/20 1843     93 % Pain Assessment Pain 1 Pain Scale 1: Numeric (0 - 10) (02/25/20 0230) Pain Intensity 1: 0 (02/25/20 0230) Patient Stated Pain Goal: 0 (02/25/20 0230) Pain Reassessment 1: Patient resting w/respiratory rate greater than 10 (02/25/20 0230) Pain Onset 1: pta (02/22/20 1800) Pain Location 1: Neck (02/24/20 0119) Pain Orientation 1: Right (02/24/20 0119) Pain Description 1: Aching (02/24/20 0119) Pain Intervention(s) 1: Medication (see MAR) (02/24/20 1355) Ambulating No 
 
Additional Information: Pt lethargic throughout night. Found pt without oxygen on several times. Shift report given to oncoming nurse at the bedside.  
 
Yuli Gallagher RN

## 2020-02-25 NOTE — PROGRESS NOTES
Problem: Falls - Risk of 
Goal: *Absence of Falls Description Document [facility] Fall Risk and appropriate interventions in the flowsheet. Outcome: Progressing Towards Goal 
Note: Fall Risk Interventions: 
Mobility Interventions: Patient to call before getting OOB Mentation Interventions: Adequate sleep, hydration, pain control Medication Interventions: Bed/chair exit alarm Elimination Interventions: Call light in reach History of Falls Interventions: Bed/chair exit alarm

## 2020-02-25 NOTE — PROGRESS NOTES
[facility] OUTREACH NURSE PROGRESS REPORT SUBJECTIVE: Called to assess patient secondary to MD concern on 2/23/20. MEWS Score: 1 (02/24/20 2327) Vitals:  
 02/24/20 2327 02/25/20 0300 02/25/20 0756 02/25/20 9546 BP: 154/57 159/87 90/50 Pulse: 71 93 89 Resp: 16 15 26 Temp: 97.9 °F (36.6 °C) 97.6 °F (36.4 °C) 97.2 °F (36.2 °C) SpO2: 93% 92% 94% 94% Weight:      
  
 
LAB DATA: 
 
Recent Labs  
  02/25/20 0349 02/24/20 
0341 02/23/20 
0205  136 134* K 2.8* 3.0* 3.5 CL 98 95* 97* CO2 39* 36* 32 AGAP 4* 5* 5* * 131* 169* BUN 31* 24* 28* CREA 0.66 0.59* 0.64 GFRAA >60 >60 >60 GFRNA >60 >60 >60  
CA 9.1 8.8 8.2* MG 1.9 2.1 1.7* ALB 1.9* 1.9* 1.8* TP 6.5 6.4 5.9*  
GLOB 4.6* 4.5* 4.1* AGRAT 0.4* 0.4* 0.4* ALT 50 56 59 Recent Labs  
  02/25/20 0349 02/24/20 0341 02/23/20 
0205 WBC 2.5* 0.2* 0.1* HGB 8.7* 8.2* 7.6* HCT 26.5* 25.1* 23.3*  
PLT 97* 51* 31* OBJECTIVE: On arrival to room, I found patient to be restless in bed. Pain Assessment Pain Intensity 1: 0 (02/25/20 0230) Pain Location 1: Neck Pain Intervention(s) 1: Medication (see MAR) Patient Stated Pain Goal: 0 
 
  
  
  
  
 
  
  
  
   
 
ASSESSMENT:  Patient is restless in bed, able to focus with eyes but otherwise does not follow commands or participate in conversation. Patient received narcan for lethargy yesterday per notes. Per MAR, did not receive any sedating medications overnight. SpO2 87% on 4L NC on arrival to room, patient pulled up in bed and coughed, SpO2 now 97%, . Crackles auscultated and congested cough present. Receiving scheduled lasix and mucinex. Patient discussed with primary RN. PLAN:  Continue to closely follow per critical care outreach protocol.

## 2020-02-25 NOTE — PROGRESS NOTES
d 
 
[facility] Hematology & Oncology Inpatient Hematology / Oncology Progress Note Admission Date: 2/10/2020  3:39 PM 
Reason for Admission/Hospital Course: Lung cancer ([facility] [C34.90] Hypoxia [R09.02] 
 
 
24 Hour Events: 
Now comfort measures only Continued lethargy Given Narcan yesterday - little response UDS negative except opiates Completed C1 cis/etop on 2/15 ROS: 
Constitutional: +fatigue. Negative for fever. CV: Negative for chest pain, palpitations, edema. Respiratory: +dyspnea +wheezing GI: Negative for nausea, abdominal pain, diarrhea. 10 point review of systems is otherwise negative with the exception of the elements mentioned above in the HPI. Allergies Allergen Reactions  Lithium Analogues Unknown (comments)  Morphine (Pf) Rash Pt is currently taking Morphine 30mg QID  Other Medication Swelling Whatever holds Vit d pill together OBJECTIVE: 
Patient Vitals for the past 8 hrs: 
 BP Temp Pulse Resp SpO2  
20 0810     94 % 20 0756 90/50 97.2 °F (36.2 °C) 89 26 94 % 20 0300 159/87 97.6 °F (36.4 °C) 93 15 92 % Temp (24hrs), Av.6 °F (36.4 °C), Min:97.2 °F (36.2 °C), Max:98.1 °F (36.7 °C) No intake/output data recorded. Physical Exam: 
Constitutional: Ill appearing female slouched in bed. Pt very lethargic, unable to answer questions. HEENT: Normocephalic and atraumatic. Mucous membranes are moist.  Sclerae anicteric. Neck supple without JVD. No thyromegaly present. Skin Warm and dry. No bruising and no rash noted. No erythema. No pallor. Respiratory Lungs coarse with scattered wheezes bilaterally. Using abdominal accessory muscles. No retractions. On NC.   
CVS Normal rate, regular rhythm and normal S1 and S2. No murmurs, gallops, or rubs. Abdomen Soft, nontender and nondistended, normoactive bowel sounds. No palpable mass. No hepatosplenomegaly. Neuro No obvious deficits MSK Normal range of motion in general.  No edema and no tenderness. Psych Appropriate mood Labs: 
   
Recent Labs  
  02/25/20 0349 02/24/20 0341 02/23/20 
0205 WBC 2.5* 0.2* 0.1*  
RBC 3.04* 2.88* 2.64* HGB 8.7* 8.2* 7.6* HCT 26.5* 25.1* 23.3*  
MCV 87.2 87.2 88.3 MCH 28.6 28.5 28.8 MCHC 32.8 32.7 32.6 RDW 13.0 12.8 12.6 PLT 97* 51* 31* GRANS 76  --   --   
LYMPH 5*  --   -- MONOS 9  --   --   
EOS 0*  --   --   
BASOS 2  --   --   
IG 8*  --   --   
DF AUTOMATED MANUAL MANUAL ANEU 1.9  --   --   
ABL 0.1*  --   --   
ABM 0.2  --   --   
ROSA ISELA 0.0  --   --   
ABB 0.1  --   --   
AIG 0.2  --   --   
 
  
Recent Labs  
  02/25/20 0349 02/24/20 0341 02/23/20 
0205  136 134* K 2.8* 3.0* 3.5 CL 98 95* 97* CO2 39* 36* 32 AGAP 4* 5* 5* * 131* 169* BUN 31* 24* 28* CREA 0.66 0.59* 0.64 GFRAA >60 >60 >60 GFRNA >60 >60 >60  
CA 9.1 8.8 8.2* SGOT 55* 48* 39* AP 69 61 56  
TP 6.5 6.4 5.9* ALB 1.9* 1.9* 1.8*  
GLOB 4.6* 4.5* 4.1* AGRAT 0.4* 0.4* 0.4* MG 1.9 2.1 1.7* Imaging: XR CHEST PA LAT [[id number]] Collected: 02/21/20 0931 Order Status: Completed Updated: 02/21/20 8529 Narrative:    
EXAMINATION: CHEST RADIOGRAPH 2/21/2020 9:00 AM 
 
ACCESSION NUMBER: [id number] INDICATION: neutropenic fever COMPARISON: Chest x-ray 2/19/2020, chest CT 2/5/2020 TECHNIQUE: PA and lateral views of the chest were obtained. FINDINGS:  
 
Cardiac Silhouette: Unchanged widening of the superior mediastinum. Unchanged positioning of a left-sided central venous access port and of a 
cardiac pacemaker. Lungs: Unchanged moderate right pleural effusion and right basilar opacity. Pleura: No definite left pleural effusion. No pneumothorax. Osseous Structures: Prior median sternotomy and coronary artery bypass grafting. Thoracic spine spondylosis. Upper Abdomen: Abdominal aortic atherosclerosis.   
Impression:    
IMPRESSION: 
 
 1. Unchanged moderate right pleural effusion and right basilar opacity, which 
may be compressive atelectasis or infection. 2. Unchanged widening of the superior mediastinum, corresponding to the superior 
mediastinal mass on the CT of 2/5/2020. 
 
3. Follow-up to resolution is recommended. VOICE DICTATED BY: Dr. Isabelle Dominguez XR CHEST SNGL V [[id number]] Collected: 02/19/20 0449 Order Status: Completed Updated: 02/19/20 0501 Narrative:    
EXAM: Chest x-ray. INDICATION: Dyspnea. COMPARISON: February 17, 2020. TECHNIQUE: Frontal view chest x-ray. FINDINGS: Mild bibasilar lung atelectasis or infiltrates and a small right 
pleural effusion are unchanged. No pneumothorax or left pleural effusion is 
identified. The cardiac size is within normal limits. Again noted is a prior 
sternotomy, a left chest wall pacemaker, a left chest wall infusion port 
catheter in the patient's known upper mediastinal mass. Impression:    
IMPRESSION: Unchanged mild bibasilar lung atelectasis or infiltrates and small 
right pleural effusion. XR CHEST SNGL V [[id number]] Collected: 02/17/20 2023 Order Status: Completed Updated: 02/17/20 2026 Narrative:    
Portable chest x-ray Clinical indications: Hypoxia. FINDINGS: Single AP view the chest compared to a similar exam dated 2/12/2020 
shows airspace opacity at the right lung base with a right pleural effusion. Atelectasis or pneumonia should be considered. The left lung is stable and 
clear. A pacer device is in place. Impression:    
IMPRESSION: No significant interval change with right lower lung airspace 
opacity and a right pleural effusion. Atelectasis or pneumonia should be 
considered. XR CHEST SNGL V [[id number]] Collected: 02/12/20 1439 Order Status: Completed Updated: 02/12/20 1442 Narrative:    
CHEST X-RAY, single portable view  2/12/2020 History: Hypoxemia and cough.  
 
Technique: Single frontal view of the chest. 
 
 Comparison: Chest x-ray 2/10/2020 Findings: A stable left-sided intracardiac device is seen. A stable left-sided venous port 
is seen. Stable mild cardiomegaly is seen. Lungs are expanded without 
appreciable pneumothorax. Stable masslike density along the right upper 
paramediastinal border. Increasing now small to moderate right basilar pleural 
effusion. Impression:    
IMPRESSION: 1.  Increasing now small to moderate basilar pleural effusion. The etiology of 
effusion is uncertain although this could represent sequela of heart 
failure/fluid overload given the associated cardiomegaly and the rapid onset of 
this finding. XR CHEST PA LAT [[id number]] Collected: 02/10/20 1746 Order Status: Completed Updated: 02/10/20 1752 Narrative:    
Chest 2 view CLINICAL INDICATION: Acute worsening dyspnea, moderate to severe; SVC syndrome, 
mediastinal mass COMPARISON: 1/23/2020 radiograph, 1/30/2020 CT PET, and 2/5/2020 CT with 
contrast 
 
TECHNIQUE: Sitting upright AP and lateral views of the chest  
 
FINDINGS: Patient is rotated to the left. Lung volumes are slightly shallow. There is artifact due to external tubing. There is interval mild to moderate right hemidiaphragm elevation. There is 
slightly increased mild groundglass and reticular opacity in the right lower 
lobe. No evidence of a pneumothorax, significant pleural effusion, or overt CHF. The mediastinal and hilar contours again demonstrate widening consistent with 
the known masses that were better evaluated on the recent CT, previous CABG, 
left side portacatheter, and dual-lead pacemaker. The bone density is again low throughout. No acute fracture or dislocation is 
evident. Impression:    
IMPRESSION:  
1. Right lower lobe infiltrate. 2. Mediastinal masses compatible with malignancy, further evaluated on recent CT 
and PET. Medications: 
Current Facility-Administered Medications Medication Dose Route Frequency  vancomycin (VANCOCIN) 750 mg in  mL infusion  750 mg IntraVENous Q18H  potassium chloride (K-DUR, KLOR-CON) SR tablet 40 mEq  40 mEq Oral BID  magnesium oxide (MAG-OX) tablet 400 mg  400 mg Oral TID  cefepime (MAXIPIME) 2 g in 0.9% sodium chloride (MBP/ADV) 100 mL  2 g IntraVENous Q8H  
 tbo-filgrastim (GRANIX) injection 300 mcg  300 mcg SubCUTAneous QPM  
 0.9% sodium chloride infusion 250 mL  250 mL IntraVENous PRN  
 diphenhydrAMINE (BENADRYL) capsule 25 mg  25 mg Oral Q6H PRN  
 acetaminophen (TYLENOL) tablet 650 mg  650 mg Oral Q6H PRN  
 nystatin (MYCOSTATIN) 100,000 unit/mL oral suspension 500,000 Units  500,000 Units Oral QID  LORazepam (ATIVAN) tablet 0.25 mg  0.25 mg Oral Q6H PRN  
 morphine (ROXANOL) concentrated oral syringe 10 mg  10 mg Oral Q6H PRN  
 furosemide (LASIX) tablet 20 mg  20 mg Oral DAILY  guaiFENesin ER (MUCINEX) tablet 1,200 mg  1,200 mg Oral Q12H  
 tiotropium bromide (SPIRIVA RESPIMAT) 2.5 mcg /actuation  2 Puff Inhalation DAILY  central line flush (saline) syringe 10 mL  10 mL InterCATHeter PRN  
 heparin (porcine) pf 300 Units  300 Units InterCATHeter PRN  polyethylene glycol (MIRALAX) packet 17 g  17 g Oral DAILY  senna-docusate (PERICOLACE) 8.6-50 mg per tablet 1 Tab  1 Tab Oral DAILY  predniSONE (DELTASONE) tablet 40 mg  40 mg Oral DAILY WITH BREAKFAST  albuterol (PROVENTIL VENTOLIN) nebulizer solution 2.5 mg  2.5 mg Nebulization TID RT  
 [Held by provider] amLODIPine (NORVASC) tablet 5 mg  5 mg Oral DAILY  atorvastatin (LIPITOR) tablet 80 mg  80 mg Oral DAILY  [Held by provider] carvediloL (COREG) tablet 6.25 mg  6.25 mg Oral BID WITH MEALS  
 [Held by provider] enoxaparin (LOVENOX) injection 30 mg  30 mg SubCUTAneous Q24H  
 albuterol (PROVENTIL VENTOLIN) nebulizer solution 2.5 mg  2.5 mg Nebulization Q4H PRN  
 morphine IR (MS IR) tablet 15 mg  15 mg Oral Q4H PRN  
  ondansetron (ZOFRAN ODT) tablet 8 mg  8 mg Oral Q8H PRN  prochlorperazine (COMPAZINE) tablet 10 mg  10 mg Oral Q6H PRN  
 sertraline (ZOLOFT) tablet 25 mg  25 mg Oral DAILY ASSESSMENT: 
 
Problem List  Date Reviewed: 2/19/2020 Codes Class Noted * (Principal) Lung cancer ([facility] ICD-10-CM: C34.90 ICD-9-CM: 162.9  2/10/2020 Hypoxia ICD-10-CM: R09.02 
ICD-9-CM: 799.02  2/10/2020 Malignant neoplasm of overlapping sites of left lung [facility]) ICD-10-CM: C34.82 
ICD-9-CM: 162.8  1/22/2020 Hypokalemia ICD-10-CM: E87.6 ICD-9-CM: 276.8  12/7/2019 Chest pain ICD-10-CM: R07.9 ICD-9-CM: 786.50  12/6/2019 Chronic tension-type headache, intractable ICD-10-CM: N78.885 ICD-9-CM: 339.12  5/13/2019 Cigarette nicotine dependence without complication QMA-57-WS: S94.857 ICD-9-CM: 305.1  7/3/2018 COPD exacerbation ([facility] ICD-10-CM: J44.1 ICD-9-CM: 491.21  Unknown Recurrent UTI ICD-10-CM: N39.0 ICD-9-CM: 599.0  4/24/2018 Gross hematuria ICD-10-CM: R31.0 ICD-9-CM: 599.71  4/24/2018 Urinary retention ICD-10-CM: R33.9 ICD-9-CM: 788.20  4/24/2018 Sick sinus syndrome (HCC) (Chronic) ICD-10-CM: I49.5 ICD-9-CM: 427.81  9/29/2017 IBS (irritable bowel syndrome) ICD-10-CM: K58.9 ICD-9-CM: 564.1  Unknown 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 
ICD-9-CM: 433.20  12/28/2016 Encounter for medication management ICD-10-CM: P68.281 ICD-9-CM: V58.69  12/28/2016 Neuropathy (Chronic) ICD-10-CM: G62.9 ICD-9-CM: 355.9  12/28/2016 Overview Signed 7/28/2017  2:20 PM by Ad Powell MD  
  Last Assessment & Plan:  
Patient is currently on gabapentin 600 mg. Medication may cause increased drowsiness with Rispirdol, however pt has no complaints today. Intractable migraine with aura without status migrainosus ICD-10-CM: G43.119 ICD-9-CM: 346.01  12/28/2016 Peripheral vascular disease (HCC) (Chronic) ICD-10-CM: I73.9 ICD-9-CM: 443.9  12/1/2016 Overview Signed 12/1/2016  4:23 PM by Masoud Ragsdale MD  
  CTA (2/11/16): Moderate to severe stenosis in the proximal right subclavian artery. Borderline [facility] ICD-10-CM: F60.3 ICD-9-CM: 301.83  Unknown Overview Signed 7/28/2017  2:20 PM by Edith Patel MD  
  Last Assessment & Plan: 1. Continue inpatient hospitalization 2. Lithium level 1.5. Will hold am dose and change to 300mg PO BID. Will recheck in 3 days. 3.  Continue Neurontin 4. Consider Vistaril PRN if indicated 5. Encourage active participation in groups and on the milieu 6. Consider discharge and aftercare needs 7. Encourage abstinence from drugs of abuse Cerebrovascular disease ICD-10-CM: I67.9 ICD-9-CM: 437.9  Unknown Overview Signed 12/1/2016  4:23 PM by Masoud Ragsdale MD  
  CTA of the neck (2/11/16) : No significant stenosis of the right carotid artery. 37% stenosis in the proximal left internal carotid artery. Severe stenosis at the origin of the left vertebral artery Chronic hepatitis C ([facility] ICD-10-CM: B18.2 ICD-9-CM: 070.54  Unknown Chronic pain ICD-10-CM: G89.29 ICD-9-CM: 338.29  Unknown Overview Signed 7/28/2017  2:20 PM by Edith Patel MD  
  Last Assessment & Plan:  
Patient has history of chronic neck and back pain secondary to degenerative changes. She was recently prescribed a 15 day supply of Dilaudid 2 mg TID by \"pain management doctor\". This script was filled on 5.8. 17. Plan: - Given patient's history of substance use, she would largely benefit from detox of narcotics. Will try conservative methods while inpatient for back pain as well as neck and shoulder pain. Continue Ultram 100 mg Q6H while we await permission to speak to pain management doctor.  
Tl Ramos office in regards to patient's pain management contract. Patient has been dismissed from his services for positive UDS. - Avoid Toradol given her elevated Creatinine and glucose at time of admission. A1c normal at 5.4. ASCUS with positive high risk HPV cervical ICD-10-CM: R87.610, R87.810 ICD-9-CM: 795.01, 795.05  Unknown Pacemaker ICD-10-CM: Z95.0 ICD-9-CM: V45.01  8/10/2016 Overview Signed 8/10/2016 10:47 AM by Ariane eLzama MD  
  1. [employer]  :  Due to sick sinus syndrome. Chronic diastolic congestive heart failure (HCC) (Chronic) ICD-10-CM: I50.32 
ICD-9-CM: 428.32, 428.0  8/9/2016 Overview Addendum 8/27/2019  5:11 PM by Ariane Lezama MD  
  Admitted [facility] 8/12/11 with CP and elevated BNP at 352. 
1.  Echo (8/3/11):  EF > 55%. Mild LVH. Pseudonormal LV filling pattern. Bi-atrial enlargement. .  Mild mitral regurgitation. 2.  Echo (8/22/19):  EF 65-70%. Indeterminant diastolic function. Mild LAE. Coronary atherosclerosis of native coronary vessel (Chronic) ICD-10-CM: I25.10 ICD-9-CM: 414.01  8/9/2016 Overview Addendum 12/9/2019  7:53 AM by Ariane Lezama MD  
  1.  3 Vessel CABG (7/8/11):  MIR to LAD. LIMA in Y graft to superior and inferior diagonal. 
2.  Cath (8/31/12): Occluded grafts. FFR of LAD 0.89. Similar stenosis of diagonal but no FFR done. 3.  LHC (11/1/17): Mild to moderate nonhemodynamically significant CAD. Normal IFR of LAD (40%: 0.98) and Diagonal (50%: 0.98) 4. LHC (12/7/19): Stable CAD. Tobacco use disorder (Chronic) ICD-10-CM: U70.986 ICD-9-CM: 305.1  Unknown HTN (hypertension), benign (Chronic) ICD-10-CM: I10 
ICD-9-CM: 401.1  8/9/2016 Dyslipidemia (Chronic) ICD-10-CM: C77.0 ICD-9-CM: 272.4  8/9/2016 Abnormal MRI of head ICD-10-CM: R93.0 ICD-9-CM: 793.0  1/1/2015 Overview Signed 8/27/2016  4:28 PM by Jazmín Rodrigues MD  
  Frontal Lobe periventricular WM disease. Ruled out for MS by Neurology Cocaine abuse in remission [facility]) ICD-10-CM: F14.11 ICD-9-CM: 305.63  9/18/2012 S/P CABG x 3 (Chronic) ICD-10-CM: Z95.1 ICD-9-CM: V45.81  9/18/2012 Bipolar disorder (HCC) (Chronic) ICD-10-CM: F31.9 ICD-9-CM: 296.80  9/18/2012 Acute respiratory failure with hypoxia [facility]) ICD-10-CM: J96.01 
ICD-9-CM: 518.81  7/12/2011  
   
  
 
   
48 y.o. F consulted for cancer in 1/2020. She worked up neck/upper chest pain with CT finding of upper mediastinal mass, EBUS 1/22/20 with FNA showed malignant cells with broad IHC negativity except for suggestion of liver origin. She had active drug abuse as documented reason being discharged from her prior pain management. She presented to [facility] on 1/30/20, has missed the scheduled PET, reported moved in with her brother and a friend drove her to visit.  We discussed her rather atypical cancer presentation, called radiology to urgently re-schedule PET to further define the origin of cancer and extent, discussed her narco management and she gave inconsistent answers regarding whether and when she used cocaine, also stated she stopped pain med herself rather then being discharged by Dr. Zohra Suresh, although she appeared in pain, involved SW and palliative care, signed narco contract, yet urine screen positive for cocaine, then she changed her story of last use 1 week ago but that should not lead to positive urine test either, discussed the breaking of contract and trust voids prescription of narco. PET 1/30/20 showed large right mediastinal/supraclavicle avid disease but no evidence of disease otherwise and no suspicious disease in the liver, I reviewed PET personally and discussed to have urgent excisional biopsy for more tissue of histology and molecular tests, meanwhile I would not rec treat this as liver cancer, but rather treat with protocol of limited stage SCLC or stage III NSCLC if brain MRI negative to aim at possible cure or durable control, she was agreeable and Adam will arrange for urgent biopsy/port/rad onc eval, start cisplatin/etoposide/XRT 2/10/20, hyponatremia and weight gain concern of SIADH and instructed not to drink excessively and monitor, SVC syndrome and monitor closely for rx response, however she was not able to tolerate pre-hydration for cisplatin and admit for SOB likely related to SVC syndrome, administer chemo inpt with slow rate, may dosing XRT upfront for SVC relief, IR eval if needed. PLAN: 
Lung cancer 
-supposed to start Cis/VP16/XRT yesterday. 2/11 plan for chemo tomorrow if echo ok. XRT plan is not completed yet. 2/12 start chemo today if echo ok. Over to XRT for rescanning today. 2/13 D2 Cis/VP16 not given 2/14 Will give D 2 cis/VP16 as patient wishes to proceed with chemotherapy. 2/15 Proceed with C1D3 Cis/Etop today 2/21 Completed C1 cis/etop on 2/15. C2 due 3/4. Has not started XRT yet. 2/24 Continues to await XRT. 2/25 Spoke with patient's HCPOA (brother - Wyatt Brush) over the phone. He is requesting comfort measures only for the patient (witnessed by Kristine Michel). It was initially elected that patient be comfort measures only, however pt and brother indicated they wanted to pursue chemotherapy. At this time he is declining further escalations in care. He reports he wants to pursue comfort measures only as the patient originally elected. Discussed that by electing comfort measures only he is declining further escalations in care, including life-saving treatment. He verbalized understanding and reports he wants to make her as comfortable as possible and avoid seeking any further treatment. Palliative care aware and hospice consulted. Shortness of breath/Cough 
- on 2 LPM NC. Mucinex BID. CXR with known malignancy and RLL infiltrate. 2/11 start cef/vanc for possible PNA. Check echo. 2/12 continues on cef/vanc. Echo pending.  Increased oxygen needs overnight, up to 5 LPM. pulm consult pending. Check BNP for completeness. Echo still pending. 2/13 echo ok with EF of 55-60%. BNP elevated. Lasix 20 mg daily PRN if I>O by 500 cc. Lasix given this AM. Pulmonology also following as well. On high flow at 40 LPM. Continues on cef/vanc for possible PNA. Currently able to use bedpan for urination- may need jara placed. 2/14 check blood gas. Ox 95-98% on high alon at 40 lpm. Back on cef/vanc. 2/15 O2 down to 5L, remains on Cef/Vanc 
2/16 O2 down to 3L today, continue Cef/Vanc/steroids/nebs 2/18 O2 at 3L, D5/7 Cef/Vanc 
2/19 On optiflow. Pulm DC'd Vanc yesterday. Con't Cef. Tmax 100.6. Repeat CXR with no changes. Check UA, BCx. Check BNP. 
2/20 Optiflow @ 30L. Tmax 100.5. UA neg. BCx pending. On Cef, completes tomorrow. BNP 2115. On Lasix 20mg IV daily. Pulm following. 
2/21 O2 down to 4L. Pulm changed lasix to oral.  Pulm following. 
2/22 O2 down to 2 LPM. 2/24 O2 at 6 L/min NC. CXR showed worsening basilar infiltrates/effusion. Pulm following. 
2/25 O2 down to 4L. ABG ordered by pulmonlogy yesterday showed worsening hypercapnia. Pt is a DNR and according to palliative care notes (2/14) she has expressed that she does not want to go to the ICU. Neutropenic fever 2/21 Tmax 101.1. BCx-NGTD.  UCx/sputum Cx pending. Repeat CXR. Con't Cef. Vanc ordered. 2/22 afebrile. On cef/vanc. Repeat CXR with unchanged widening of the mediastinum and unchanged moderate right pleural effusion and right basilar opacity which may be compressive atelectasis or infection. Blood cultures and urine culture NGTD.  
2/23 BC and urine culture NGTD. Continues on cef/vanc. 2/24 Afebrile. Cef/Vanc continues. ?SIDAH/hyponatremia - On IV fluids at this time with some improvement. Check urine sodium. Consult nephro 2/12 neph consult pending.  today. 2/13 appreciate nephros recommendations. NA up to 133 today. 2/14 Na improved up to 135.  
2/20 Na+ 134 2/22   
2/24 Na 136 Confusion 
-ongoing confusion noted. UDS pending. 2/17 more alert and clearer today 2/18 alert, oriented 2/23 very lethargic this AM. Looks uncomfortable. Check CT head, EKG, cardiac enzymes, and ABG. Repeat CXR ordered. 2/24 CT head negative. ABG with respiratory acidosis/hypercarbia. Possibly related to sedation. Palliative care to help titrate down pain medications. Obtaining UDS. 2/25 Further lethargy. Nurses reported overnight she was restless and refused to leave supplemental O2 in place. Unable to hold conversation today. Received Narcan yesterday evening and has continued lethargy. Has not been receiving sedating meds since. UDS +opiates. Oral candidiasis 2/20 Nystatin ordered Pancytopenia secondary to chemotherapy - Transfuse prn per Cari SOPs 
2/21 Start Granix Continue home medications Cari SOPs Lovenox for DVT prophylaxis (hold for plt <50k) Goals and plan of care reviewed with the patient. All questions answered to the best of our ability. [facility] [id number] [address] Office : [phone] Fax : [phone]

## 2020-02-25 NOTE — PROGRESS NOTES
0700-Bedside report received from [place], [facility]. Sitting up with legs crossed in bed moving head back and forth moaning. Will not engage in conversation. 0830-Removed oxygen. Replaced. Appears to be in pain. 0-Lizbeth SOLANO NP with oncology notified that pt is not following commands and cannot take PO meds. 1030-Patients sister Giselle Robison called for update on pt status. Had privacy code. Updated. 1040-Remove oxygen. Replaced. Thrashing around in bed. Concerned pt will fall. Discussed with Raisa Fox NP. Reports will discuss need for BiPap with Pulmonary. Called pt brother and Hannah Nieto and notified that we may have to transfer patient to ICU for [facility]. Reports that they do not want an ICU transfer and would like patient \"to stay there and be comfortable\". When investigated that statement he said that he wanted all treatment to stop and us to concentrate on making her comfortable. Raisa Fox NP notified. She called Hannah Nieto and he confirmed that he wants her to be comfort measures only. 1452-Pupils fixed and dilated. No respirations. No pulse. Raisa Fox NP notified. [id number]-Antionette, charge RN reports will notify nursing supervisor. Rigo notified. 1520-Deniz WILSON notified of patient death.

## 2020-02-25 NOTE — PROGRESS NOTES
Patient  with his family member arriving later. Her family members are grieving appropriately.  provided empathy, comfort, a spiritual presence, emotional support and prayer. MARY Cobbin

## 2020-02-25 NOTE — PROGRESS NOTES
Follow-up visit to continue support after speaking with nurse. No family was present during the visit. Ms. Andrade Wright were closed and she did not respond to my voice. I offered a non-anxious presence and silent prayer. Chaplains remain available for follow-up as needed. Leticia Miller MDiv Board Certified [employer]

## 2020-02-25 NOTE — PROGRESS NOTES
Nutrition follow-up: 
Charted reviewed and spoke with primary RN, Lore Lan. Patient is now comfort measures. No additional interventions indicated at this time. If POC changes and new nutrition need identified, please consult RD. [address] [place] [address], [address], [address] Jaime [address]oud[address]ury

## 2020-02-25 NOTE — PROGRESS NOTES
Date of Outreach Update: 
Nannette John was seen and assessed. MEWS Score: 1 (02/24/20 2327) Vitals:  
 02/24/20 2327 02/25/20 0300 02/25/20 0756 02/25/20 8687 BP: 154/57 159/87 90/50 Pulse: 71 93 89 Resp: 16 15 26 Temp: 97.9 °F (36.6 °C) 97.6 °F (36.4 °C) 97.2 °F (36.2 °C) SpO2: 93% 92% 94% 94% Weight:      
  
 
 Pain Assessment Pain Intensity 1: 0 (02/25/20 0230) Pain Location 1: Neck Pain Intervention(s) 1: Medication (see MAR) Patient Stated Pain Goal: 0 Previous Outreach assessment has been reviewed. There have been no significant clinical changes since the completion of the last dated Outreach assessment. Patient is now comfort measures only per providers notes and primary RN. Will discharge from outreach program per protocol. Signed By:   Ni Calvo   February 25, 2020 12:06 PM

## 2020-02-25 NOTE — PROGRESS NOTES
Palliative Care Progress Note Patient: Sreedhar Bautista MRN: [id number]  SSN: [id number] YOB: 1965  Age: 48 y.o. Sex: female Assessment/Plan: Chief Complaint/Interval History:  Pt confused this morning. Received narcan overnight for lethargy after ex  had come to visit. Family now pursuing comfort measures. Principal Diagnosis: · Altered Mental Status R41.82 Additional Diagnoses: · Acute Respiratory Failure, Unspecified  J96.00 · Debility, Unspecified  R53.81 · Dyspnea  R06.00 
· Frailty  R54 · Counseling, Encounter for Medical Advice  Z71.9 
· Encounter for Palliative Care  Z51.5 Palliative Performance Scale (PPS) PPS: 40 Medical Decision Making:  
Reviewed and summarized labs and imaging. Pt confused at bedside. Family now pursuing comfort measures per oncology team.   
Start morphine 4 mg iv q 4 hr prn Ativan 1 mg iv q2 hr pnr Suspect some level of withdrawal after narcan contributing to confusion. Pt with chronic narcotic abuse 
dnr in place Hospice consulted. Will discuss findings with members of the interdisciplinary team.   
 
More than 50% of this 35 minute visit was spent counseling and coordination of care as outlined above. Subjective:  
 
Review of Systems unable to obtain due to mentation Objective:  
 
Visit Vitals BP 90/50 (BP 1 Location: Left arm, BP Patient Position: At rest) Pulse 89 Temp 97.2 °F (36.2 °C) Resp 26 Wt 90 lb 6.4 oz (41 kg) SpO2 94% BMI 17.08 kg/m² Physical Exam: 
 
General:  Confused Eyes:  Conjunctivae/corneas clear Nose: Nares normal. Septum midline. Neck: Supple, symmetrical, trachea midline, no JVD Lungs:   Coarse bilateral bs Heart:  Regular rate and rhythm, no murmur Abdomen:   Soft, non-tender, non-distended Extremities: Normal, atraumatic, no cyanosis or edema Skin: Skin color, texture, turgor normal. No rash or lesions. Neurologic: Moves all extremities Psych: confused Signed By: Luanne Cueva MD   
 February 25, 2020

## 2020-02-25 NOTE — PROGRESS NOTES
Called for patient lethargy. Narcan led to awakening and suspect that pain medications led to AMS. She remains confused and has upper airway rhonchi. Will ask for breathing treatment and can repeat Narcan if needed for hypersomnolence again. Note that ABG was performed prior to Narcan and that patient has expressed wishes to be DNR and not to go to ICU on 2/14 (Bren Ibarra note). Her brother Artie Sullivan is her HCPOA.  
Don Rose MD

## 2020-02-27 LAB
CALCULATED P AXIS, ECG09: 42 DEGREES
CALCULATED R AXIS, ECG10: 7 DEGREES
CALCULATED T AXIS, ECG11: 93 DEGREES
DIAGNOSIS, 93000: NORMAL
P-R INTERVAL, ECG05: 116 MS
Q-T INTERVAL, ECG07: 346 MS
QRS DURATION, ECG06: 76 MS
QTC CALCULATION (BEZET), ECG08: 400 MS
VENTRICULAR RATE, ECG03: 80 BPM

## 2020-03-03 ENCOUNTER — APPOINTMENT (OUTPATIENT)
Dept: INFUSION THERAPY | Age: 55
End: 2020-03-03

## 2020-03-03 ENCOUNTER — APPOINTMENT (OUTPATIENT)
Dept: RADIATION ONCOLOGY | Age: 55
End: 2020-03-03

## 2020-03-04 ENCOUNTER — APPOINTMENT (OUTPATIENT)
Dept: RADIATION ONCOLOGY | Age: 55
End: 2020-03-04

## 2020-03-04 ENCOUNTER — APPOINTMENT (OUTPATIENT)
Dept: INFUSION THERAPY | Age: 55
End: 2020-03-04

## 2020-03-05 ENCOUNTER — APPOINTMENT (OUTPATIENT)
Dept: RADIATION ONCOLOGY | Age: 55
End: 2020-03-05

## 2020-03-06 ENCOUNTER — APPOINTMENT (OUTPATIENT)
Dept: RADIATION ONCOLOGY | Age: 55
End: 2020-03-06

## 2020-03-09 ENCOUNTER — APPOINTMENT (OUTPATIENT)
Dept: RADIATION ONCOLOGY | Age: 55
End: 2020-03-09

## 2020-03-10 ENCOUNTER — APPOINTMENT (OUTPATIENT)
Dept: RADIATION ONCOLOGY | Age: 55
End: 2020-03-10

## 2020-03-11 ENCOUNTER — APPOINTMENT (OUTPATIENT)
Dept: RADIATION ONCOLOGY | Age: 55
End: 2020-03-11

## 2020-03-12 ENCOUNTER — APPOINTMENT (OUTPATIENT)
Dept: RADIATION ONCOLOGY | Age: 55
End: 2020-03-12

## 2020-03-13 ENCOUNTER — APPOINTMENT (OUTPATIENT)
Dept: RADIATION ONCOLOGY | Age: 55
End: 2020-03-13

## 2020-03-16 ENCOUNTER — APPOINTMENT (OUTPATIENT)
Dept: RADIATION ONCOLOGY | Age: 55
End: 2020-03-16

## 2020-03-17 ENCOUNTER — APPOINTMENT (OUTPATIENT)
Dept: RADIATION ONCOLOGY | Age: 55
End: 2020-03-17

## 2020-03-18 ENCOUNTER — APPOINTMENT (OUTPATIENT)
Dept: RADIATION ONCOLOGY | Age: 55
End: 2020-03-18

## 2020-03-19 ENCOUNTER — APPOINTMENT (OUTPATIENT)
Dept: RADIATION ONCOLOGY | Age: 55
End: 2020-03-19

## 2020-03-20 ENCOUNTER — APPOINTMENT (OUTPATIENT)
Dept: RADIATION ONCOLOGY | Age: 55
End: 2020-03-20

## 2020-03-23 ENCOUNTER — APPOINTMENT (OUTPATIENT)
Dept: RADIATION ONCOLOGY | Age: 55
End: 2020-03-23

## 2020-03-24 ENCOUNTER — APPOINTMENT (OUTPATIENT)
Dept: RADIATION ONCOLOGY | Age: 55
End: 2020-03-24

## 2020-03-25 ENCOUNTER — APPOINTMENT (OUTPATIENT)
Dept: RADIATION ONCOLOGY | Age: 55
End: 2020-03-25

## 2020-03-26 ENCOUNTER — APPOINTMENT (OUTPATIENT)
Dept: RADIATION ONCOLOGY | Age: 55
End: 2020-03-26

## 2020-03-27 ENCOUNTER — APPOINTMENT (OUTPATIENT)
Dept: RADIATION ONCOLOGY | Age: 55
End: 2020-03-27

## 2020-03-27 NOTE — PROGRESS NOTES
Discussed with ICU outreach nurse and she will add to their list. 
 
 
 
  
JOHN Flanagan [address] [facility] Hematology and Oncology [id number] [place] [address] Office : [phone] Fax : [phone]

## 2020-03-30 ENCOUNTER — APPOINTMENT (OUTPATIENT)
Dept: RADIATION ONCOLOGY | Age: 55
End: 2020-03-30

## 2020-03-31 ENCOUNTER — APPOINTMENT (OUTPATIENT)
Dept: RADIATION ONCOLOGY | Age: 55
End: 2020-03-31

## 2020-04-01 ENCOUNTER — APPOINTMENT (OUTPATIENT)
Dept: RADIATION ONCOLOGY | Age: 55
End: 2020-04-01

## 2020-04-02 ENCOUNTER — APPOINTMENT (OUTPATIENT)
Dept: RADIATION ONCOLOGY | Age: 55
End: 2020-04-02

## 2020-04-03 ENCOUNTER — APPOINTMENT (OUTPATIENT)
Dept: RADIATION ONCOLOGY | Age: 55
End: 2020-04-03

## 2020-04-06 ENCOUNTER — APPOINTMENT (OUTPATIENT)
Dept: RADIATION ONCOLOGY | Age: 55
End: 2020-04-06

## 2020-04-07 ENCOUNTER — APPOINTMENT (OUTPATIENT)
Dept: RADIATION ONCOLOGY | Age: 55
End: 2020-04-07

## 2020-04-08 ENCOUNTER — APPOINTMENT (OUTPATIENT)
Dept: RADIATION ONCOLOGY | Age: 55
End: 2020-04-08

## 2020-04-09 ENCOUNTER — APPOINTMENT (OUTPATIENT)
Dept: RADIATION ONCOLOGY | Age: 55
End: 2020-04-09

## 2020-04-10 ENCOUNTER — APPOINTMENT (OUTPATIENT)
Dept: RADIATION ONCOLOGY | Age: 55
End: 2020-04-10

## 2020-04-13 ENCOUNTER — APPOINTMENT (OUTPATIENT)
Dept: RADIATION ONCOLOGY | Age: 55
End: 2020-04-13

## 2021-08-16 LAB
AMPHET UR QL SCN: NEGATIVE
BARBITURATES UR QL SCN: NEGATIVE
BENZODIAZ UR QL: NEGATIVE
CANNABINOIDS UR QL SCN: NEGATIVE
COCAINE UR QL SCN: POSITIVE
METHADONE UR QL: NEGATIVE
OPIATES UR QL: POSITIVE
PCP UR QL: ABNORMAL

## 2022-01-17 NOTE — PROGRESS NOTES
d 
 
[facility] Hematology & Oncology Inpatient Hematology / Oncology Progress Note Admission Date: 2/10/2020  3:39 PM 
Reason for Admission/Hospital Course: Lung cancer ([facility] [address] [C34.90] Hypoxia [R09.02] 
 
 
24 Hour Events: 
Afeb, VSS, 
O2 down to 5L Son at bedside Lethargic, sitting up in bed, mittens in place ROS: 
+ cough 
+shortness of breath 
+ pain 10 point review of systems is otherwise negative with the exception of the elements mentioned above in the HPI. Allergies Allergen Reactions  Lithium Analogues Unknown (comments)  Morphine (Pf) Rash Pt is currently taking Morphine 30mg QID  Other Medication Swelling Whatever holds Vit d pill together OBJECTIVE: 
Patient Vitals for the past 8 hrs: 
 BP Temp Pulse Resp SpO2  
02/15/20 0646 136/83 98.1 °F (36.7 °C) 64 18 96 % Temp (24hrs), Av.6 °F (36.4 °C), Min:96.9 °F (36.1 °C), Max:98.1 °F (36.7 °C) 
 
02/15 07 - 02/15 1900 In: 26 [P.O.:220] Out: - Physical Exam: 
Constitutional: Ill appearing  female in no mild distress, sitting in the hospital bed. Alert and oriented x 2. Mittens in place. Sob at bedside. HEENT: Normocephalic and atraumatic. Oropharynx is clear, mucous membranes are moist.  Sclerae anicteric. Neck supple without JVD. No thyromegaly present. Lymph node 
 deferred. Skin Warm and dry. No bruising and no rash noted. No erythema. No pallor. Respiratory Lungs are coarse; normal air exchange without accessory muscle use. On O2 @5L. CVS Normal rate, regular rhythm and normal S1 and S2. No murmurs, gallops, or rubs. Abdomen Soft, nontender and nondistended, normoactive bowel sounds. No palpable mass. No hepatosplenomegaly. Neuro Lethargic MSK Normal range of motion in general.  No edema and no tenderness. Psych Lethargic Labs: 
   
Recent Labs  
  02/15/20 
0257 20 
1114 20 
0201 WBC 16.8* 16.0* 10.6 RBC 3.08* 2.90* 3.21* HGB 8.8* 8.3* 9.2* HCT 28.1* 26.3* 29.1*  
MCV 91.2 90.7 90.7 MCH 28.6 28.6 28.7 MCHC 31.3* 31.6 31.6 RDW 13.1 13.0 12.6  392 433 GRANS 97* 96* 96* LYMPH 1* 2* 3*  
MONOS 1* 2* 1*  
EOS 0* 0* 0*  
BASOS 0 0 0 IG 1 1 0  
DF AUTOMATED AUTOMATED AUTOMATED ANEU 16.3* 15.4* 10.2* ABL 0.2* 0.3* 0.3* ABM 0.1 0.3 0.1 ROSA ISELA 0.0 0.0 0.0 ABB 0.0 0.0 0.0 AIG 0.2 0.1 0.0 Recent Labs  
  02/15/20 
0257 02/14/20 
1114 02/13/20 
0201  135* 133* K 3.4* 3.4* 4.0  
 102 100 CO2 28 29 24 AGAP 6* 4* 9 * 150* 130* BUN 24* 21 8 CREA 0.59* 0.59* 0.46* GFRAA >60 >60 >60 GFRNA >60 >60 >60  
CA 8.4 8.4 8.4 SGOT 33 38* 22 AP 55 54 70 TP 6.5 6.2* 6.7 ALB 2.2* 2.0* 2.0*  
GLOB 4.3* 4.2* 4.7* AGRAT 0.5* 0.5* 0.4* Imaging: 
 
Medications: 
Current Facility-Administered Medications Medication Dose Route Frequency  [START ON 2/16/2020] Vancomycin Trough Reminder   Other ONCE  
 methylPREDNISolone (PF) (SOLU-MEDROL) injection 40 mg  40 mg IntraVENous Q6H  
 furosemide (LASIX) injection 20 mg  20 mg IntraVENous DAILY PRN  
 amLODIPine (NORVASC) tablet 5 mg  5 mg Oral DAILY  atorvastatin (LIPITOR) tablet 80 mg  80 mg Oral DAILY  carvediloL (COREG) tablet 6.25 mg  6.25 mg Oral BID WITH MEALS  enoxaparin (LOVENOX) injection 30 mg  30 mg SubCUTAneous Q24H  
 0.9% sodium chloride infusion  50 mL/hr IntraVENous CONTINUOUS  
 guaiFENesin ER (MUCINEX) tablet 1,200 mg  1,200 mg Oral BID  cefepime (MAXIPIME) 2 g in 0.9% sodium chloride (MBP/ADV) 100 mL  2 g IntraVENous Q12H  potassium chloride (K-DUR, KLOR-CON) SR tablet 20 mEq  20 mEq Oral DAILY  vancomycin (VANCOCIN) 750 mg in  mL infusion  750 mg IntraVENous Q12H  
 OLANZapine (ZyPREXA zydis) disintegrating tablet 10 mg  10 mg Oral QHS  haloperidol lactate (HALDOL) injection 5 mg  5 mg IntraMUSCular Q6H PRN  
 morphine injection 5 mg  5 mg IntraVENous Q2H PRN  
  LORazepam (ATIVAN) injection 1 mg  1 mg IntraVENous Q2H PRN  
 LORazepam (ATIVAN) tablet 0.5 mg  0.5 mg Oral Q6H PRN  
 albuterol (PROVENTIL VENTOLIN) nebulizer solution 2.5 mg  2.5 mg Nebulization Q4H PRN  
 morphine CR (MS CONTIN) tablet 60 mg  60 mg Oral Q12H  
 morphine IR (MS IR) tablet 15 mg  15 mg Oral Q4H PRN  
 ondansetron (ZOFRAN ODT) tablet 8 mg  8 mg Oral Q8H PRN  prochlorperazine (COMPAZINE) tablet 10 mg  10 mg Oral Q6H PRN  
 sertraline (ZOLOFT) tablet 25 mg  25 mg Oral DAILY ASSESSMENT: 
 
Problem List  Date Reviewed: 2/13/2020 Codes Class Noted Lung cancer [facility]) ICD-10-CM: C34.90 ICD-9-CM: 162.9  2/10/2020 Hypoxia ICD-10-CM: R09.02 
ICD-9-CM: 799.02  2/10/2020 Malignant neoplasm of overlapping sites of left lung [facility]) ICD-10-CM: C34.82 
ICD-9-CM: 162.8  1/22/2020 Hypokalemia ICD-10-CM: E87.6 ICD-9-CM: 276.8  12/7/2019 Chest pain ICD-10-CM: R07.9 ICD-9-CM: 786.50  12/6/2019 Chronic tension-type headache, intractable ICD-10-CM: T91.960 ICD-9-CM: 339.12  5/13/2019 Cigarette nicotine dependence without complication YL-44-ZN: I41.115 ICD-9-CM: 305.1  7/3/2018 COPD exacerbation ([facility] [address] ICD-10-CM: J44.1 ICD-9-CM: 491.21  Unknown Recurrent UTI ICD-10-CM: N39.0 ICD-9-CM: 599.0  4/24/2018 Gross hematuria ICD-10-CM: R31.0 ICD-9-CM: 599.71  4/24/2018 Urinary retention ICD-10-CM: R33.9 ICD-9-CM: 788.20  4/24/2018 Sick sinus syndrome (HCC) (Chronic) ICD-10-CM: I49.5 ICD-9-CM: 427.81  9/29/2017 IBS (irritable bowel syndrome) ICD-10-CM: K58.9 ICD-9-CM: 564.1  Unknown 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 
ICD-9-CM: 433.20  12/28/2016 Encounter for medication management ICD-10-CM: S02.799 ICD-9-CM: V58.69  12/28/2016 Neuropathy (Chronic) ICD-10-CM: G62.9 ICD-9-CM: 355.9  12/28/2016 Overview Signed 7/28/2017  2:20 PM by Vanessa Tai MD  
  Last Assessment & Plan:  
Patient is currently on gabapentin 600 mg. Medication may cause increased drowsiness with Rispirdol, however pt has no complaints today. Intractable migraine with aura without status migrainosus ICD-10-CM: G43.119 ICD-9-CM: 346.01  12/28/2016 Peripheral vascular disease (HCC) (Chronic) ICD-10-CM: I73.9 ICD-9-CM: 443.9  12/1/2016 Overview Signed 12/1/2016  4:23 PM by Steve Allen MD  
  CTA (2/11/16): Moderate to severe stenosis in the proximal right subclavian artery. Borderline personality disorder ([facility] ICD-10-CM: F60.3 ICD-9-CM: 301.83  Unknown Overview Signed 7/28/2017  2:20 PM by Vanessa Tai MD  
  Last Assessment & Plan: 1. Continue inpatient hospitalization 2. Lithium level 1.5. Will hold am dose and change to 300mg PO BID. Will recheck in 3 days. 3.  Continue Neurontin 4. Consider Vistaril PRN if indicated 5. Encourage active participation in groups and on the milieu 6. Consider discharge and aftercare needs 7. Encourage abstinence from drugs of abuse Cerebrovascular disease ICD-10-CM: I67.9 ICD-9-CM: 437.9  Unknown Overview Signed 12/1/2016  4:23 PM by Steve Allen MD  
  CTA of the neck (2/11/16) : No significant stenosis of the right carotid artery. 37% stenosis in the proximal left internal carotid artery. Severe stenosis at the origin of the left vertebral artery Chronic hepatitis C ([facility] [address] ICD-10-CM: B18.2 ICD-9-CM: 070.54  Unknown Chronic pain ICD-10-CM: G89.29 ICD-9-CM: 338.29  Unknown Overview Signed 7/28/2017  2:20 PM by Vanessa Tai MD  
  Last Assessment & Plan:  
Patient has history of chronic neck and back pain secondary to degenerative changes. She was recently prescribed a 15 day supply of Dilaudid 2 mg TID by \"pain management doctor\". This script was filled on 5.8. 17. Plan: - Given patient's history of substance use, she would largely benefit from detox of narcotics. Will try conservative methods while inpatient for back pain as well as neck and shoulder pain. Continue Ultram 100 mg Q6H while we await permission to speak to pain management doctor. Ignacio Amezquita office in regards to patient's pain management contract. Patient has been dismissed from his services for positive UDS. - Avoid Toradol given her elevated Creatinine and glucose at time of admission. A1c normal at 5.4. ASCUS with positive high risk HPV cervical ICD-10-CM: R87.610, R87.810 ICD-9-CM: 795.01, 795.05  Unknown Pacemaker ICD-10-CM: Z95.0 ICD-9-CM: V45.01  8/10/2016 Overview Signed 8/10/2016 10:47 AM by Antonio Simpson MD  
  1. [employer]  :  Due to sick sinus syndrome. Chronic diastolic congestive heart failure (HCC) (Chronic) ICD-10-CM: I50.32 
ICD-9-CM: 428.32, 428.0  8/9/2016 Overview Addendum 8/27/2019  5:11 PM by Antonio Simpson MD  
  Admitted [facility] 8/12/11 with CP and elevated BNP at 352. 
1.  Echo (8/3/11):  EF > 55%. Mild LVH. Pseudonormal LV filling pattern. Bi-atrial enlargement. .  Mild mitral regurgitation. 2.  Echo (8/22/19):  EF 65-70%. Indeterminant diastolic function. Mild LAE. Coronary atherosclerosis of native coronary vessel (Chronic) ICD-10-CM: I25.10 ICD-9-CM: 414.01  8/9/2016 Overview Addendum 12/9/2019  7:53 AM by Antonio Simpson MD  
  1.  3 Vessel CABG (7/8/11):  MIR to LAD. LIMA in Y graft to superior and inferior diagonal. 
2.  Cath (8/31/12): Occluded grafts. FFR of LAD 0.89. Similar stenosis of diagonal but no FFR done. 3.  LHC (11/1/17): Mild to moderate nonhemodynamically significant CAD. Normal IFR of LAD (40%: 0.98) and Diagonal (50%: 0.98) 4. [facility] (12/7/19): Stable CAD. Tobacco use disorder (Chronic) ICD-10-CM: G59.073 ICD-9-CM: 305.1  Unknown HTN (hypertension), benign (Chronic) ICD-10-CM: I10 
ICD-9-CM: 401.1  8/9/2016 Dyslipidemia (Chronic) ICD-10-CM: M22.3 ICD-9-CM: 272.4  8/9/2016 Abnormal MRI of head ICD-10-CM: R93.0 ICD-9-CM: 793.0  1/1/2015 Overview Signed 8/27/2016  4:28 PM by Pee Garcia MD  
  Frontal Lobe periventricular WM disease. Ruled out for MS by Neurology Cocaine abuse in remission [facility]) ICD-10-CM: F14.11 ICD-9-CM: 305.63  9/18/2012 S/P CABG x 3 (Chronic) ICD-10-CM: Z95.1 ICD-9-CM: V45.81  9/18/2012 Bipolar disorder (HCC) (Chronic) ICD-10-CM: F31.9 ICD-9-CM: 296.80  9/18/2012 Acute respiratory failure with hypoxia [facility]) ICD-10-CM: J96.01 
ICD-9-CM: 518.81  7/12/2011  
   
  
 
   
48 y.o. F consulted for cancer in 1/2020. She worked up neck/upper chest pain with CT finding of upper mediastinal mass, EBUS 1/22/20 with FNA showed malignant cells with broad IHC negativity except for suggestion of liver origin. She had active drug abuse as documented reason being discharged from her prior pain management. She presented to [facility] on 1/30/20, has missed the scheduled PET, reported moved in with her brother and a friend drove her to visit.  We discussed her rather atypical cancer presentation, called radiology to urgently re-schedule PET to further define the origin of cancer and extent, discussed her narco management and she gave inconsistent answers regarding whether and when she used cocaine, also stated she stopped pain med herself rather then being discharged by Dr. Rene Lowe, although she appeared in pain, involved SW and palliative care, signed narco contract, yet urine screen positive for cocaine, then she changed her story of last use 1 week ago but that should not lead to positive urine test either, discussed the breaking of contract and trust voids prescription of narco. PET 1/30/20 showed large right mediastinal/supraclavicle avid disease but no evidence of disease otherwise and no suspicious disease in the liver, I reviewed PET personally and discussed to have urgent excisional biopsy for more tissue of histology and molecular tests, meanwhile I would not rec treat this as liver cancer, but rather treat with protocol of limited stage SCLC or stage III NSCLC if brain MRI negative to aim at possible cure or durable control, she was agreeable and Adam will arrange for urgent biopsy/port/rad onc eval, start cisplatin/etoposide/XRT 2/10/20, hyponatremia and weight gain concern of SIADH and instructed not to drink excessively and monitor, SVC syndrome and monitor closely for rx response, however she was not able to tolerate pre-hydration for cisplatin and admit for SOB likely related to SVC syndrome, administer chemo inpt with slow rate, may dosing XRT upfront for SVC relief, IR eval if needed. PLAN: 
Lung cancer 
-supposed to start Cis/VP16/XRT yesterday. 2/11 plan for chemo tomorrow if echo ok. XRT plan is not completed yet. 2/12 start chemo today if echo ok. Over to XRT for rescanning today. 2/13 D2 Cis/VP16 not given 2/14 Will give D 2 cis/VP16 as patient wishes to proceed with chemotherapy. 2/15 Proceed with C1D3 Cis/Etop today Shortness of breath/Cough 
- on 2 LPM NC. Mucinex BID. CXR with known malignancy and RLL infiltrate. 2/11 start cef/vanc for possible PNA. Check echo. 2/12 continues on cef/vanc. Echo pending. Increased oxygen needs overnight, up to 5 LPM. pulm consult pending. Check BNP for completeness. Echo still pending. 2/13 echo ok with EF of 55-60%. BNP elevated. Lasix 20 mg daily PRN if I>O by 500 cc. Lasix given this AM. Pulmonology also following as well. On high flow at 40 LPM. Continues on cef/vanc for possible PNA. Currently able to use bedpan for urination- may need jara placed. 2/14 check blood gas. Ox 95-98% on high alon at 40 lpm. Back on cef/vanc. 2/15 O2 down to 5L, remains on Cef/Vanc ? SIDAH/hyponatremia - On IV fluids at this time with some improvement. Check urine sodium. Consult nephro 2/12 neph consult pending.  today. 2/13 appreciate nephros recommendations. NA up to 133 today. 2/14 Na improved up to 135. Confusion 
-ongoing confusion noted. UDS pending. Continue home medications Cari SOPs Lovenox for DVT prophylaxis Goals and plan of care reviewed with the patient. All questions answered to the best of our ability. Disposition/Update to plan of care 2/15: O2 needs now down to 5L, confusion is ongoing/intermittent. Son states she has been eating more and coughing less. Proceed with C1D3 Cis/Etop today. Pain is controlled currently. Dorthula Goltz, NP [facility] Hematology and Oncology [address] [place] [address] Office : [phone] Fax : [phone] Staged Advancement Flap Text: The defect edges were debeveled with a #15 scalpel blade.  Given the location of the defect, shape of the defect and the proximity to free margins a staged advancement flap was deemed most appropriate.  Using a sterile surgical marker, an appropriate advancement flap was drawn incorporating the defect and placing the expected incisions within the relaxed skin tension lines where possible. The area thus outlined was incised deep to adipose tissue with a #15 scalpel blade.  The skin margins were undermined to an appropriate distance in all directions utilizing iris scissors.

## 2022-01-28 NOTE — ROUTINE PROCESS
TRANSFER - OUT REPORT:    Verbal report given to Latoya Ellsworth RN (name) on John Govea  being transferred to [address] [place] [address] ([facility]) for routine progression of care       Report consisted of patients Situation, Background, Assessment and   Recommendations(SBAR). Information from the following report(s) Procedure Summary, MAR and Recent Results was reviewed with the receiving nurse. Lines:       Opportunity for questions and clarification was provided.       Patient transported with:   [facility] w/ Dr Siena Sanz  Pressure wire LAD, no stents needed at this time  6F Mynx to RFA  4F sheath to RFV  Heparin 6000 units IV  Versed 4 mg IV  Fentanyl 100 mcg IV Statement Selected

## 2025-07-08 NOTE — PROGRESS NOTES
"Anupam Alcantara - Oncology ([facility])  Adult Nutrition  Progress Note    SUMMARY       Recommendations    1. Continue pureed diet as tolerated     - please continue to document PO % intake via flowsheets       2. Encourage good intake    3. RD to monitor weight, labs, meds, intake, tolerance    Goals:   1. % nutritional needs met with diet during admission     2. Maintain weight during admission    Nutrition Goal Status: new  Communication of RD Recs:  (POC)    Nutrition Discharge Planning    Nutrition Discharge Planning: Diet texture per SLP    Reason for Assessment    Reason For Assessment: RD follow-up  Diagnosis: renal disease  General Information Comments: RD f/u. Pt advanced to a pureed diet with poor appetite despite stent placement, TF d/c. Refused pureed diet education on 7/7. Palliative care consulted. Per  note "patient is not expected to survive this hospitalization", inappropriate to visit pt at f/u. No new wt since 6/30. No wounds or edema noted. Nausea present.    Nutrition/Diet History    Spiritual, Cultural Beliefs, Protestant Practices, Values that Affect Care: no  Factors Affecting Nutritional Intake: decreased appetite  Nutrition-related SDOH: Unable to assess at this time    Anthropometrics    Height: 5' 7" (170.2 cm)  Height (inches): 67 in  Height Method: Stated  Weight: 55.8 kg (123 lb)  Weight (lb): 123 lb  Weight Method: Standard Scale  Ideal Body Weight (IBW), Male: 148 lb  % Ideal Body Weight, Male (lb): 83.11 %  BMI (Calculated): 19.3  BMI Grade: less than 23 (older than 65 years) - underweight      Lab/Procedures/Meds    Pertinent Labs Reviewed: reviewed  Pertinent Labs Comments: Na 130, BUN 65, Cr 1.8, , protein total 4.4, alb 1.8  Pertinent Medications Reviewed: reviewed  Pertinent Medications Comments: Pantoprazole, heparin, furosemide, abx    Estimated/Assessed Needs    Weight Used For Calorie Calculations: 55.8 kg (123 lb 0.3 oz)  Energy Calorie Requirements " Saw patient with Dr Niall Matt for PET follow up for lung cancer. PET 1/30/20 showed large right mediastinal/supraclavicle avid disease but no evidence of disease otherwise and no suspicious disease in the liver, PET discussed and pt to have urgent excisional biopsy for more tissue of histology and molecular tests, meanwhile we will not rec treat this as liver cancer, but rather treat with protocol of limited stage SCLC if brain MRI negative to aim at possible cure or durable control, she was agreeable and we will arrange for Stat Brain MRI related to confusion and mental changes, urgent biopsy/port/rad onc eval, start cisplatin/etoposide/XRT asap in 1-2 weeks, will start chemo for pain control if XRT needs more time. Pt has [employer]tronic pacemaker which has been approved for MRI compatibility with the radiology supervisor. SW arranging transportation for Radonc and MRI on Monday, CE and palliative care on Tues and port placement on Wed. Encouraged to call with questions. Navigation will continue to follow. (kcal): 3177-6064 kcal (30-35 kcal/kg)  Energy Need Method: Kcal/kg  Protein Requirements: 67-84 g/pro (1.2-1.5 g/kg)  Weight Used For Protein Calculations: 55.8 kg (123 lb 0.3 oz)    RDA Method (mL): 1674  CHO Requirement: 209-244    Nutrition Prescription Ordered    Current Diet Order: Pureed    Evaluation of Received Nutrient/Fluid Intake    Parenteral Calories (kcal): 1215  Parenteral Protein (gm): 202  Lipid Calories (kcals): 500 kcals  GIR (Glucose Infusion Rate) (mg/kg/min): 1.88 mg/kg/min  I/O: +4456.9 since admit  Energy Calories Required: not meeting needs  Protein Required: not meeting needs  Fluid Required:  (per MD)  Comments: LBM 7/7  Tolerance: tolerating  % Intake of Estimated Energy Needs: 0 - 25 %  % Meal Intake: 0 - 25 %    PES Statement  Increased nutrient needs related to Wound healing as evidenced by  (pressure injury stage 3)  Status: Resolved    Nutrition Risk    Level of Risk/Frequency of Follow-up: high (2/wk)     Monitor and Evaluation    Monitor and Evaluation: Skin, Nutrition focused physical findings, Lipid profile, Inflammatory profile, Glucose/endocrine profile, Gastrointestinal profile, Electrolyte and renal panel, Beliefs and attitudes, Weight     Nutrition Follow-Up    RD Follow-up?: No

## (undated) DEVICE — SHEAR HARMONIC ACET 5MMX36CM -- ACE PLUS

## (undated) DEVICE — (D)PREP SKN CHLRAPRP APPL 26ML -- CONVERT TO ITEM 371833

## (undated) DEVICE — SOLUTION IRRIG 3000ML 0.9% SOD CHL FLX CONT 0797208] ICU MEDICAL INC]

## (undated) DEVICE — TRAY CATH 16F URIN MTR LTX -- CONVERT TO ITEM 363111

## (undated) DEVICE — SNARE POLYP SM W13MMXL240CM SHTH DIA2.4MM OVL FLX DISP

## (undated) DEVICE — [HIGH FLOW INSUFFLATOR,  DO NOT USE IF PACKAGE IS DAMAGED,  KEEP DRY,  KEEP AWAY FROM SUNLIGHT,  PROTECT FROM HEAT AND RADIOACTIVE SOURCES.]: Brand: PNEUMOSURE

## (undated) DEVICE — REM POLYHESIVE ADULT PATIENT RETURN ELECTRODE: Brand: VALLEYLAB

## (undated) DEVICE — Device: Brand: BALLOON

## (undated) DEVICE — CONNECTOR TBNG OD5-7MM O2 END DISP

## (undated) DEVICE — KENDALL RADIOLUCENT FOAM MONITORING ELECTRODE RECTANGULAR SHAPE: Brand: KENDALL

## (undated) DEVICE — TROCAR SLEEVE: Brand: KII ® LOW PROFILE SLEEVE

## (undated) DEVICE — SOL INJ SOD CL0.9% 10ML PREFIL --

## (undated) DEVICE — 2, DISPOSABLE SUCTION/IRRIGATOR WITHOUT DISPOSABLE TIP: Brand: STRYKEFLOW

## (undated) DEVICE — CUTTER ENDOSCP L340MM LIN ARTC SGL STROKE FIRING ENDOPATH

## (undated) DEVICE — CANNULA NSL ORAL AD FOR CAPNOFLEX CO2 O2 AIRLFE

## (undated) DEVICE — MOUTHPIECE ENDOSCP 20X27MM --

## (undated) DEVICE — SYR 5ML 1/5 GRAD LL NSAF LF --

## (undated) DEVICE — BAG SPEC RETRV 275ML 10ML DISPOSABLE RELIACATCH

## (undated) DEVICE — LAP CHOLE: Brand: MEDLINE INDUSTRIES, INC.

## (undated) DEVICE — SET EXTN L6IN W/ S STL CLMP

## (undated) DEVICE — RELOAD STPL SZ 0 L45MM DIA3.5MM 0DEG STD REG TISS BLU TI

## (undated) DEVICE — SUTURE VCRL SZ 0 L27IN ABSRB VLT L26MM CT-2 1/2 CIR J334H

## (undated) DEVICE — KENDALL SCD EXPRESS SLEEVES, KNEE LENGTH, MEDIUM: Brand: KENDALL SCD

## (undated) DEVICE — SINGLE USE SUCTION VALVE MAJ-209: Brand: SINGLE USE SUCTION VALVE (STERILE)

## (undated) DEVICE — SYR 3ML LL TIP 1/10ML GRAD --

## (undated) DEVICE — 2000CC GUARDIAN II: Brand: GUARDIAN

## (undated) DEVICE — SUTURE VCRL SZ 4-0 L27IN ABSRB UD L19MM PS-2 3/8 CIR PRIM J426H

## (undated) DEVICE — SINGLE USE BIOPSY VALVE MAJ-210: Brand: SINGLE USE BIOPSY VALVE (STERILE)

## (undated) DEVICE — TROCAR ENDOSCP L100MM DIA12MM STBL SL BLDELSS ENDOPATH XCEL

## (undated) DEVICE — BLADELESS OPTICAL TROCAR WITH FIXATION CANNULA: Brand: VERSAPORT

## (undated) DEVICE — FCPS BX HOT RJ4 2.2MMX240CM -- RADIAL JAW 4 BX/40

## (undated) DEVICE — (D)SYR 10ML 1/5ML GRAD NSAF -- PKGING CHANGE USE ITEM 338027

## (undated) DEVICE — SINGLE USE ASPIRATION NEEDLE: Brand: SINGLE USE ASPIRATION NEEDLE

## (undated) DEVICE — SOL ANTI-FOG 6ML MEDC -- MEDICHOICE - CONVERT TO 358427

## (undated) DEVICE — BRONCHOSCOPY PACK: Brand: MEDLINE INDUSTRIES, INC.

## (undated) DEVICE — CONTAINER PREFIL FRMLN 40ML --

## (undated) DEVICE — NDL PRT INJ NSAF BLNT 18GX1.5 --

## (undated) DEVICE — GEL MEDC ULTRASOUND 5L -- REPLACED BY 326862

## (undated) DEVICE — CONNECTOR VENT EL DBL SWVL 15M 22M 15F